# Patient Record
Sex: FEMALE | Race: WHITE | Employment: OTHER | URBAN - METROPOLITAN AREA
[De-identification: names, ages, dates, MRNs, and addresses within clinical notes are randomized per-mention and may not be internally consistent; named-entity substitution may affect disease eponyms.]

---

## 2017-02-14 RX ORDER — ERGOCALCIFEROL 1.25 MG/1
CAPSULE ORAL
Qty: 12 CAPSULE | Refills: 3 | Status: SHIPPED | OUTPATIENT
Start: 2017-02-14 | End: 2018-01-16 | Stop reason: SDUPTHER

## 2017-03-21 ENCOUNTER — HOSPITAL ENCOUNTER (OUTPATIENT)
Dept: LAB | Age: 72
Discharge: HOME | End: 2017-03-21
Payer: MEDICARE

## 2017-03-21 DIAGNOSIS — E03.1 CONGENITAL HYPOTHYROIDISM WITHOUT GOITER: ICD-10-CM

## 2017-03-21 DIAGNOSIS — R73.09 ELEVATED GLUCOSE: ICD-10-CM

## 2017-03-21 DIAGNOSIS — E78.2 MIXED HYPERLIPIDEMIA: ICD-10-CM

## 2017-03-21 LAB
ALBUMIN SERPL-MCNC: 4.3 G/DL (ref 3.9–4.9)
ALP BLD-CCNC: 67 U/L (ref 40–130)
ALT SERPL-CCNC: 14 U/L (ref 0–33)
ANION GAP SERPL CALCULATED.3IONS-SCNC: 15 MEQ/L (ref 7–13)
AST SERPL-CCNC: 22 U/L (ref 0–35)
BASOPHILS ABSOLUTE: 0.1 K/UL (ref 0–0.2)
BASOPHILS RELATIVE PERCENT: 0.7 %
BILIRUB SERPL-MCNC: 0.6 MG/DL (ref 0–1.2)
BUN BLDV-MCNC: 17 MG/DL (ref 8–23)
CALCIUM SERPL-MCNC: 9.7 MG/DL (ref 8.6–10.2)
CHLORIDE BLD-SCNC: 93 MEQ/L (ref 98–107)
CHOLESTEROL, TOTAL: 140 MG/DL (ref 0–199)
CO2: 23 MEQ/L (ref 22–29)
CREAT SERPL-MCNC: 1.14 MG/DL (ref 0.5–0.9)
EOSINOPHILS ABSOLUTE: 0.1 K/UL (ref 0–0.7)
EOSINOPHILS RELATIVE PERCENT: 0.4 %
GFR AFRICAN AMERICAN: 56.4
GFR NON-AFRICAN AMERICAN: 46.6
GLOBULIN: 2.3 G/DL (ref 2.3–3.5)
GLUCOSE BLD-MCNC: 101 MG/DL (ref 74–109)
HBA1C MFR BLD: 5.7 % (ref 4.8–5.9)
HCT VFR BLD CALC: 42.9 % (ref 37–47)
HDLC SERPL-MCNC: 72 MG/DL (ref 40–59)
HEMOGLOBIN: 14.3 G/DL (ref 12–16)
LDL CHOLESTEROL CALCULATED: 39 MG/DL (ref 0–129)
LYMPHOCYTES ABSOLUTE: 1.4 K/UL (ref 1–4.8)
LYMPHOCYTES RELATIVE PERCENT: 9.3 %
MCH RBC QN AUTO: 30 PG (ref 27–31.3)
MCHC RBC AUTO-ENTMCNC: 33.4 % (ref 33–37)
MCV RBC AUTO: 89.8 FL (ref 82–100)
MONOCYTES ABSOLUTE: 0.6 K/UL (ref 0.2–0.8)
MONOCYTES RELATIVE PERCENT: 3.8 %
NEUTROPHILS ABSOLUTE: 12.5 K/UL (ref 1.4–6.5)
NEUTROPHILS RELATIVE PERCENT: 85.8 %
PDW BLD-RTO: 14.6 % (ref 11.5–14.5)
PLATELET # BLD: 255 K/UL (ref 130–400)
POTASSIUM SERPL-SCNC: 4.2 MEQ/L (ref 3.5–5.1)
RBC # BLD: 4.78 M/UL (ref 4.2–5.4)
SODIUM BLD-SCNC: 131 MEQ/L (ref 132–144)
T4 FREE: 1.77 NG/DL (ref 0.93–1.7)
TOTAL PROTEIN: 6.6 G/DL (ref 6.4–8.1)
TRIGL SERPL-MCNC: 144 MG/DL (ref 0–200)
TSH SERPL DL<=0.05 MIU/L-ACNC: 0.26 UIU/ML (ref 0.27–4.2)
WBC # BLD: 14.6 K/UL (ref 4.8–10.8)

## 2017-03-21 PROCEDURE — 84443 ASSAY THYROID STIM HORMONE: CPT

## 2017-03-21 PROCEDURE — 83036 HEMOGLOBIN GLYCOSYLATED A1C: CPT

## 2017-03-21 PROCEDURE — 84439 ASSAY OF FREE THYROXINE: CPT

## 2017-03-21 PROCEDURE — 36415 COLL VENOUS BLD VENIPUNCTURE: CPT

## 2017-03-21 PROCEDURE — 85025 COMPLETE CBC W/AUTO DIFF WBC: CPT

## 2017-03-21 PROCEDURE — 80053 COMPREHEN METABOLIC PANEL: CPT

## 2017-03-21 PROCEDURE — 80061 LIPID PANEL: CPT

## 2017-03-23 ENCOUNTER — OFFICE VISIT (OUTPATIENT)
Dept: FAMILY MEDICINE CLINIC | Age: 72
End: 2017-03-23

## 2017-03-23 ENCOUNTER — CARE COORDINATOR VISIT (OUTPATIENT)
Dept: FAMILY MEDICINE CLINIC | Age: 72
End: 2017-03-23

## 2017-03-23 VITALS
HEART RATE: 93 BPM | TEMPERATURE: 98.2 F | BODY MASS INDEX: 26.33 KG/M2 | SYSTOLIC BLOOD PRESSURE: 118 MMHG | RESPIRATION RATE: 16 BRPM | WEIGHT: 148.6 LBS | HEIGHT: 63 IN | OXYGEN SATURATION: 93 % | DIASTOLIC BLOOD PRESSURE: 78 MMHG

## 2017-03-23 DIAGNOSIS — F32.A ANXIETY AND DEPRESSION: ICD-10-CM

## 2017-03-23 DIAGNOSIS — J44.1 CHRONIC OBSTRUCTIVE PULMONARY DISEASE WITH ACUTE EXACERBATION (HCC): ICD-10-CM

## 2017-03-23 DIAGNOSIS — I65.23 BILATERAL CAROTID ARTERY STENOSIS: ICD-10-CM

## 2017-03-23 DIAGNOSIS — E78.2 MIXED HYPERLIPIDEMIA: ICD-10-CM

## 2017-03-23 DIAGNOSIS — I25.10 CORONARY ARTERY DISEASE INVOLVING NATIVE HEART WITHOUT ANGINA PECTORIS, UNSPECIFIED VESSEL OR LESION TYPE: ICD-10-CM

## 2017-03-23 DIAGNOSIS — R94.4 DECREASED GFR: ICD-10-CM

## 2017-03-23 DIAGNOSIS — Z12.11 SCREENING FOR COLON CANCER: ICD-10-CM

## 2017-03-23 DIAGNOSIS — E03.1 CONGENITAL HYPOTHYROIDISM WITHOUT GOITER: ICD-10-CM

## 2017-03-23 DIAGNOSIS — R42 LIGHTHEADEDNESS: ICD-10-CM

## 2017-03-23 DIAGNOSIS — I10 ESSENTIAL HYPERTENSION: Primary | ICD-10-CM

## 2017-03-23 DIAGNOSIS — E55.9 VITAMIN D DEFICIENCY: ICD-10-CM

## 2017-03-23 DIAGNOSIS — F41.9 ANXIETY AND DEPRESSION: ICD-10-CM

## 2017-03-23 PROCEDURE — G8926 SPIRO NO PERF OR DOC: HCPCS | Performed by: NURSE PRACTITIONER

## 2017-03-23 PROCEDURE — 4040F PNEUMOC VAC/ADMIN/RCVD: CPT | Performed by: NURSE PRACTITIONER

## 2017-03-23 PROCEDURE — G8399 PT W/DXA RESULTS DOCUMENT: HCPCS | Performed by: NURSE PRACTITIONER

## 2017-03-23 PROCEDURE — 1123F ACP DISCUSS/DSCN MKR DOCD: CPT | Performed by: NURSE PRACTITIONER

## 2017-03-23 PROCEDURE — G8599 NO ASA/ANTIPLAT THER USE RNG: HCPCS | Performed by: NURSE PRACTITIONER

## 2017-03-23 PROCEDURE — G8484 FLU IMMUNIZE NO ADMIN: HCPCS | Performed by: NURSE PRACTITIONER

## 2017-03-23 PROCEDURE — 1036F TOBACCO NON-USER: CPT | Performed by: NURSE PRACTITIONER

## 2017-03-23 PROCEDURE — 82274 ASSAY TEST FOR BLOOD FECAL: CPT | Performed by: NURSE PRACTITIONER

## 2017-03-23 PROCEDURE — 3023F SPIROM DOC REV: CPT | Performed by: NURSE PRACTITIONER

## 2017-03-23 PROCEDURE — 3014F SCREEN MAMMO DOC REV: CPT | Performed by: NURSE PRACTITIONER

## 2017-03-23 PROCEDURE — 1090F PRES/ABSN URINE INCON ASSESS: CPT | Performed by: NURSE PRACTITIONER

## 2017-03-23 PROCEDURE — G8420 CALC BMI NORM PARAMETERS: HCPCS | Performed by: NURSE PRACTITIONER

## 2017-03-23 PROCEDURE — G8428 CUR MEDS NOT DOCUMENT: HCPCS | Performed by: NURSE PRACTITIONER

## 2017-03-23 PROCEDURE — 3017F COLORECTAL CA SCREEN DOC REV: CPT | Performed by: NURSE PRACTITIONER

## 2017-03-23 PROCEDURE — 99214 OFFICE O/P EST MOD 30 MIN: CPT | Performed by: NURSE PRACTITIONER

## 2017-03-28 ENCOUNTER — APPOINTMENT (OUTPATIENT)
Dept: GENERAL RADIOLOGY | Age: 72
DRG: 190 | End: 2017-03-28
Payer: MEDICARE

## 2017-03-28 ENCOUNTER — HOSPITAL ENCOUNTER (INPATIENT)
Age: 72
LOS: 1 days | Discharge: HOME | DRG: 190 | End: 2017-03-29
Attending: EMERGENCY MEDICINE | Admitting: INTERNAL MEDICINE
Payer: MEDICARE

## 2017-03-28 DIAGNOSIS — J44.1 COPD EXACERBATION (HCC): Primary | ICD-10-CM

## 2017-03-28 DIAGNOSIS — R09.02 HYPOXIA: ICD-10-CM

## 2017-03-28 LAB
ALBUMIN SERPL-MCNC: 3.8 G/DL (ref 3.9–4.9)
ALP BLD-CCNC: 65 U/L (ref 40–130)
ALT SERPL-CCNC: 18 U/L (ref 0–33)
ANION GAP SERPL CALCULATED.3IONS-SCNC: 13 MEQ/L (ref 7–13)
AST SERPL-CCNC: 17 U/L (ref 0–35)
BASOPHILS ABSOLUTE: 0.1 K/UL (ref 0–0.2)
BASOPHILS RELATIVE PERCENT: 0.7 %
BILIRUB SERPL-MCNC: 0.2 MG/DL (ref 0–1.2)
BUN BLDV-MCNC: 9 MG/DL (ref 8–23)
CALCIUM SERPL-MCNC: 9.5 MG/DL (ref 8.6–10.2)
CHLORIDE BLD-SCNC: 97 MEQ/L (ref 98–107)
CO2: 29 MEQ/L (ref 22–29)
CREAT SERPL-MCNC: 0.83 MG/DL (ref 0.5–0.9)
EOSINOPHILS ABSOLUTE: 0.2 K/UL (ref 0–0.7)
EOSINOPHILS RELATIVE PERCENT: 2.9 %
GFR AFRICAN AMERICAN: >60
GFR NON-AFRICAN AMERICAN: >60
GLOBULIN: 2.1 G/DL (ref 2.3–3.5)
GLUCOSE BLD-MCNC: 157 MG/DL (ref 60–115)
GLUCOSE BLD-MCNC: 157 MG/DL (ref 60–115)
GLUCOSE BLD-MCNC: 166 MG/DL (ref 60–115)
GLUCOSE BLD-MCNC: 167 MG/DL (ref 74–109)
GLUCOSE BLD-MCNC: 190 MG/DL (ref 60–115)
HBA1C MFR BLD: 5.7 % (ref 4.8–5.9)
HCT VFR BLD CALC: 40.8 % (ref 37–47)
HEMOGLOBIN: 13.8 G/DL (ref 12–16)
LYMPHOCYTES ABSOLUTE: 3 K/UL (ref 1–4.8)
LYMPHOCYTES RELATIVE PERCENT: 35 %
MCH RBC QN AUTO: 30.7 PG (ref 27–31.3)
MCHC RBC AUTO-ENTMCNC: 33.9 % (ref 33–37)
MCV RBC AUTO: 90.5 FL (ref 82–100)
MONOCYTES ABSOLUTE: 1 K/UL (ref 0.2–0.8)
MONOCYTES RELATIVE PERCENT: 11.6 %
NEUTROPHILS ABSOLUTE: 4.2 K/UL (ref 1.4–6.5)
NEUTROPHILS RELATIVE PERCENT: 49.8 %
PDW BLD-RTO: 14.3 % (ref 11.5–14.5)
PERFORMED ON: ABNORMAL
PLATELET # BLD: 225 K/UL (ref 130–400)
POTASSIUM SERPL-SCNC: 4.5 MEQ/L (ref 3.5–5.1)
RBC # BLD: 4.5 M/UL (ref 4.2–5.4)
SODIUM BLD-SCNC: 139 MEQ/L (ref 132–144)
TOTAL PROTEIN: 5.9 G/DL (ref 6.4–8.1)
WBC # BLD: 8.5 K/UL (ref 4.8–10.8)

## 2017-03-28 PROCEDURE — 96365 THER/PROPH/DIAG IV INF INIT: CPT

## 2017-03-28 PROCEDURE — 93005 ELECTROCARDIOGRAM TRACING: CPT

## 2017-03-28 PROCEDURE — 6370000000 HC RX 637 (ALT 250 FOR IP): Performed by: INTERNAL MEDICINE

## 2017-03-28 PROCEDURE — 94640 AIRWAY INHALATION TREATMENT: CPT

## 2017-03-28 PROCEDURE — 6360000002 HC RX W HCPCS: Performed by: EMERGENCY MEDICINE

## 2017-03-28 PROCEDURE — 96367 TX/PROPH/DG ADDL SEQ IV INF: CPT

## 2017-03-28 PROCEDURE — 94761 N-INVAS EAR/PLS OXIMETRY MLT: CPT

## 2017-03-28 PROCEDURE — 85025 COMPLETE CBC W/AUTO DIFF WBC: CPT

## 2017-03-28 PROCEDURE — 6370000000 HC RX 637 (ALT 250 FOR IP): Performed by: EMERGENCY MEDICINE

## 2017-03-28 PROCEDURE — 2580000003 HC RX 258: Performed by: EMERGENCY MEDICINE

## 2017-03-28 PROCEDURE — 71020 XR CHEST STANDARD TWO VW: CPT

## 2017-03-28 PROCEDURE — 2700000000 HC OXYGEN THERAPY PER DAY

## 2017-03-28 PROCEDURE — 96375 TX/PRO/DX INJ NEW DRUG ADDON: CPT

## 2017-03-28 PROCEDURE — 1210000000 HC MED SURG R&B

## 2017-03-28 PROCEDURE — 96372 THER/PROPH/DIAG INJ SC/IM: CPT

## 2017-03-28 PROCEDURE — 83036 HEMOGLOBIN GLYCOSYLATED A1C: CPT

## 2017-03-28 PROCEDURE — 2580000003 HC RX 258: Performed by: INTERNAL MEDICINE

## 2017-03-28 PROCEDURE — 96376 TX/PRO/DX INJ SAME DRUG ADON: CPT

## 2017-03-28 PROCEDURE — 6360000002 HC RX W HCPCS: Performed by: INTERNAL MEDICINE

## 2017-03-28 PROCEDURE — 99285 EMERGENCY DEPT VISIT HI MDM: CPT

## 2017-03-28 PROCEDURE — 80053 COMPREHEN METABOLIC PANEL: CPT

## 2017-03-28 RX ORDER — MORPHINE SULFATE 4 MG/ML
4 INJECTION, SOLUTION INTRAMUSCULAR; INTRAVENOUS EVERY 4 HOURS PRN
Status: DISCONTINUED | OUTPATIENT
Start: 2017-03-28 | End: 2017-03-29 | Stop reason: HOSPADM

## 2017-03-28 RX ORDER — GUAIFENESIN 100 MG/5ML
10 SOLUTION ORAL EVERY 4 HOURS PRN
Status: DISCONTINUED | OUTPATIENT
Start: 2017-03-28 | End: 2017-03-29 | Stop reason: HOSPADM

## 2017-03-28 RX ORDER — LISINOPRIL 10 MG/1
10 TABLET ORAL 2 TIMES DAILY
Status: DISCONTINUED | OUTPATIENT
Start: 2017-03-28 | End: 2017-03-29 | Stop reason: HOSPADM

## 2017-03-28 RX ORDER — SODIUM CHLORIDE 0.9 % (FLUSH) 0.9 %
10 SYRINGE (ML) INJECTION PRN
Status: DISCONTINUED | OUTPATIENT
Start: 2017-03-28 | End: 2017-03-29 | Stop reason: HOSPADM

## 2017-03-28 RX ORDER — ALPRAZOLAM 0.25 MG/1
0.25 TABLET ORAL 3 TIMES DAILY PRN
Status: DISCONTINUED | OUTPATIENT
Start: 2017-03-28 | End: 2017-03-29 | Stop reason: HOSPADM

## 2017-03-28 RX ORDER — METHYLPREDNISOLONE SODIUM SUCCINATE 125 MG/2ML
125 INJECTION, POWDER, LYOPHILIZED, FOR SOLUTION INTRAMUSCULAR; INTRAVENOUS ONCE
Status: COMPLETED | OUTPATIENT
Start: 2017-03-28 | End: 2017-03-28

## 2017-03-28 RX ORDER — SODIUM CHLORIDE 0.9 % (FLUSH) 0.9 %
10 SYRINGE (ML) INJECTION EVERY 12 HOURS SCHEDULED
Status: DISCONTINUED | OUTPATIENT
Start: 2017-03-28 | End: 2017-03-29 | Stop reason: HOSPADM

## 2017-03-28 RX ORDER — SIMVASTATIN 20 MG
10 TABLET ORAL NIGHTLY
Status: DISCONTINUED | OUTPATIENT
Start: 2017-03-28 | End: 2017-03-29 | Stop reason: HOSPADM

## 2017-03-28 RX ORDER — NITROGLYCERIN 0.4 MG/1
0.4 TABLET SUBLINGUAL EVERY 5 MIN PRN
Status: DISCONTINUED | OUTPATIENT
Start: 2017-03-28 | End: 2017-03-29 | Stop reason: HOSPADM

## 2017-03-28 RX ORDER — PANTOPRAZOLE SODIUM 40 MG/1
40 TABLET, DELAYED RELEASE ORAL
Status: DISCONTINUED | OUTPATIENT
Start: 2017-03-28 | End: 2017-03-29 | Stop reason: HOSPADM

## 2017-03-28 RX ORDER — GUAIFENESIN 600 MG/1
600 TABLET, EXTENDED RELEASE ORAL 2 TIMES DAILY
Status: DISCONTINUED | OUTPATIENT
Start: 2017-03-28 | End: 2017-03-29 | Stop reason: HOSPADM

## 2017-03-28 RX ORDER — NICOTINE POLACRILEX 4 MG
15 LOZENGE BUCCAL PRN
Status: DISCONTINUED | OUTPATIENT
Start: 2017-03-28 | End: 2017-03-29 | Stop reason: HOSPADM

## 2017-03-28 RX ORDER — OXYCODONE HYDROCHLORIDE AND ACETAMINOPHEN 5; 325 MG/1; MG/1
2 TABLET ORAL EVERY 8 HOURS PRN
Status: DISCONTINUED | OUTPATIENT
Start: 2017-03-28 | End: 2017-03-29 | Stop reason: HOSPADM

## 2017-03-28 RX ORDER — HYDRALAZINE HYDROCHLORIDE 20 MG/ML
10 INJECTION INTRAMUSCULAR; INTRAVENOUS EVERY 4 HOURS PRN
Status: DISCONTINUED | OUTPATIENT
Start: 2017-03-28 | End: 2017-03-29 | Stop reason: HOSPADM

## 2017-03-28 RX ORDER — TRAMADOL HYDROCHLORIDE 50 MG/1
50 TABLET ORAL EVERY 6 HOURS PRN
Status: DISCONTINUED | OUTPATIENT
Start: 2017-03-28 | End: 2017-03-29 | Stop reason: HOSPADM

## 2017-03-28 RX ORDER — METHYLPREDNISOLONE SODIUM SUCCINATE 40 MG/ML
40 INJECTION, POWDER, LYOPHILIZED, FOR SOLUTION INTRAMUSCULAR; INTRAVENOUS EVERY 8 HOURS
Status: DISCONTINUED | OUTPATIENT
Start: 2017-03-28 | End: 2017-03-29

## 2017-03-28 RX ORDER — DULOXETIN HYDROCHLORIDE 30 MG/1
60 CAPSULE, DELAYED RELEASE ORAL DAILY
Status: DISCONTINUED | OUTPATIENT
Start: 2017-03-28 | End: 2017-03-29 | Stop reason: HOSPADM

## 2017-03-28 RX ORDER — FAMOTIDINE 20 MG/1
20 TABLET, FILM COATED ORAL DAILY
Status: DISCONTINUED | OUTPATIENT
Start: 2017-03-28 | End: 2017-03-29 | Stop reason: HOSPADM

## 2017-03-28 RX ORDER — ACETAMINOPHEN 325 MG/1
650 TABLET ORAL EVERY 4 HOURS PRN
Status: DISCONTINUED | OUTPATIENT
Start: 2017-03-28 | End: 2017-03-29 | Stop reason: HOSPADM

## 2017-03-28 RX ORDER — CLONIDINE HYDROCHLORIDE 0.1 MG/1
0.1 TABLET ORAL EVERY 4 HOURS PRN
Status: DISCONTINUED | OUTPATIENT
Start: 2017-03-28 | End: 2017-03-29 | Stop reason: HOSPADM

## 2017-03-28 RX ORDER — MORPHINE SULFATE 2 MG/ML
2 INJECTION, SOLUTION INTRAMUSCULAR; INTRAVENOUS
Status: DISCONTINUED | OUTPATIENT
Start: 2017-03-28 | End: 2017-03-29 | Stop reason: HOSPADM

## 2017-03-28 RX ORDER — IPRATROPIUM BROMIDE AND ALBUTEROL SULFATE 2.5; .5 MG/3ML; MG/3ML
1 SOLUTION RESPIRATORY (INHALATION) EVERY 6 HOURS
Status: DISCONTINUED | OUTPATIENT
Start: 2017-03-28 | End: 2017-03-29 | Stop reason: HOSPADM

## 2017-03-28 RX ORDER — POLYETHYLENE GLYCOL 3350 17 G/17G
17 POWDER, FOR SOLUTION ORAL DAILY PRN
Status: DISCONTINUED | OUTPATIENT
Start: 2017-03-28 | End: 2017-03-29 | Stop reason: HOSPADM

## 2017-03-28 RX ORDER — DEXTROSE MONOHYDRATE 25 G/50ML
12.5 INJECTION, SOLUTION INTRAVENOUS PRN
Status: DISCONTINUED | OUTPATIENT
Start: 2017-03-28 | End: 2017-03-29 | Stop reason: HOSPADM

## 2017-03-28 RX ORDER — OXYCODONE HYDROCHLORIDE AND ACETAMINOPHEN 5; 325 MG/1; MG/1
1 TABLET ORAL EVERY 6 HOURS PRN
Status: DISCONTINUED | OUTPATIENT
Start: 2017-03-28 | End: 2017-03-29 | Stop reason: HOSPADM

## 2017-03-28 RX ORDER — ONDANSETRON 2 MG/ML
4 INJECTION INTRAMUSCULAR; INTRAVENOUS EVERY 6 HOURS PRN
Status: DISCONTINUED | OUTPATIENT
Start: 2017-03-28 | End: 2017-03-29 | Stop reason: HOSPADM

## 2017-03-28 RX ORDER — TRAZODONE HYDROCHLORIDE 50 MG/1
50 TABLET ORAL NIGHTLY
Status: DISCONTINUED | OUTPATIENT
Start: 2017-03-28 | End: 2017-03-29 | Stop reason: HOSPADM

## 2017-03-28 RX ORDER — LEVOTHYROXINE SODIUM 0.07 MG/1
75 TABLET ORAL DAILY
Status: DISCONTINUED | OUTPATIENT
Start: 2017-03-28 | End: 2017-03-29 | Stop reason: HOSPADM

## 2017-03-28 RX ORDER — IPRATROPIUM BROMIDE AND ALBUTEROL SULFATE 2.5; .5 MG/3ML; MG/3ML
1 SOLUTION RESPIRATORY (INHALATION) ONCE
Status: COMPLETED | OUTPATIENT
Start: 2017-03-28 | End: 2017-03-28

## 2017-03-28 RX ORDER — ALBUTEROL SULFATE 2.5 MG/3ML
2.5 SOLUTION RESPIRATORY (INHALATION)
Status: DISCONTINUED | OUTPATIENT
Start: 2017-03-28 | End: 2017-03-29 | Stop reason: HOSPADM

## 2017-03-28 RX ORDER — DEXTROSE MONOHYDRATE 50 MG/ML
100 INJECTION, SOLUTION INTRAVENOUS PRN
Status: DISCONTINUED | OUTPATIENT
Start: 2017-03-28 | End: 2017-03-29 | Stop reason: HOSPADM

## 2017-03-28 RX ADMIN — INSULIN LISPRO 3 UNITS: 100 INJECTION, SOLUTION INTRAVENOUS; SUBCUTANEOUS at 08:33

## 2017-03-28 RX ADMIN — TRAZODONE HYDROCHLORIDE 50 MG: 50 TABLET ORAL at 20:30

## 2017-03-28 RX ADMIN — IPRATROPIUM BROMIDE AND ALBUTEROL SULFATE 1 AMPULE: .5; 3 SOLUTION RESPIRATORY (INHALATION) at 03:44

## 2017-03-28 RX ADMIN — INSULIN LISPRO 1 UNITS: 100 INJECTION, SOLUTION INTRAVENOUS; SUBCUTANEOUS at 12:32

## 2017-03-28 RX ADMIN — TRAMADOL HYDROCHLORIDE 50 MG: 50 TABLET, COATED ORAL at 18:29

## 2017-03-28 RX ADMIN — THEOPHYLLINE ANHYDROUS 400 MG: 200 CAPSULE, EXTENDED RELEASE ORAL at 08:00

## 2017-03-28 RX ADMIN — INSULIN LISPRO 0 UNITS: 100 INJECTION, SOLUTION INTRAVENOUS; SUBCUTANEOUS at 08:01

## 2017-03-28 RX ADMIN — Medication 10 ML: at 20:32

## 2017-03-28 RX ADMIN — IPRATROPIUM BROMIDE AND ALBUTEROL SULFATE 1 AMPULE: .5; 3 SOLUTION RESPIRATORY (INHALATION) at 03:36

## 2017-03-28 RX ADMIN — LEVOTHYROXINE SODIUM 75 MCG: 75 TABLET ORAL at 05:02

## 2017-03-28 RX ADMIN — IPRATROPIUM BROMIDE AND ALBUTEROL SULFATE 1 AMPULE: .5; 3 SOLUTION RESPIRATORY (INHALATION) at 09:42

## 2017-03-28 RX ADMIN — METHYLPREDNISOLONE SODIUM SUCCINATE 40 MG: 40 INJECTION, POWDER, FOR SOLUTION INTRAMUSCULAR; INTRAVENOUS at 12:34

## 2017-03-28 RX ADMIN — METOPROLOL TARTRATE 12.5 MG: 25 TABLET ORAL at 20:30

## 2017-03-28 RX ADMIN — GUAIFENESIN 600 MG: 600 TABLET, EXTENDED RELEASE ORAL at 12:41

## 2017-03-28 RX ADMIN — LISINOPRIL 10 MG: 10 TABLET ORAL at 08:00

## 2017-03-28 RX ADMIN — INSULIN LISPRO 3 UNITS: 100 INJECTION, SOLUTION INTRAVENOUS; SUBCUTANEOUS at 12:31

## 2017-03-28 RX ADMIN — IPRATROPIUM BROMIDE AND ALBUTEROL SULFATE 1 AMPULE: .5; 3 SOLUTION RESPIRATORY (INHALATION) at 21:33

## 2017-03-28 RX ADMIN — METOPROLOL TARTRATE 12.5 MG: 25 TABLET ORAL at 08:00

## 2017-03-28 RX ADMIN — SIMVASTATIN 10 MG: 20 TABLET, FILM COATED ORAL at 20:30

## 2017-03-28 RX ADMIN — LISINOPRIL 10 MG: 10 TABLET ORAL at 20:30

## 2017-03-28 RX ADMIN — FAMOTIDINE 20 MG: 20 TABLET, FILM COATED ORAL at 08:01

## 2017-03-28 RX ADMIN — INSULIN LISPRO 3 UNITS: 100 INJECTION, SOLUTION INTRAVENOUS; SUBCUTANEOUS at 18:24

## 2017-03-28 RX ADMIN — AZITHROMYCIN MONOHYDRATE 500 MG: 500 INJECTION, POWDER, LYOPHILIZED, FOR SOLUTION INTRAVENOUS at 04:10

## 2017-03-28 RX ADMIN — INSULIN LISPRO 1 UNITS: 100 INJECTION, SOLUTION INTRAVENOUS; SUBCUTANEOUS at 18:29

## 2017-03-28 RX ADMIN — IPRATROPIUM BROMIDE AND ALBUTEROL SULFATE 1 AMPULE: .5; 3 SOLUTION RESPIRATORY (INHALATION) at 15:40

## 2017-03-28 RX ADMIN — CEFTRIAXONE 1 G: 1 INJECTION, POWDER, FOR SOLUTION INTRAMUSCULAR; INTRAVENOUS at 03:42

## 2017-03-28 RX ADMIN — DULOXETINE HYDROCHLORIDE 60 MG: 30 CAPSULE, DELAYED RELEASE ORAL at 07:59

## 2017-03-28 RX ADMIN — METHYLPREDNISOLONE SODIUM SUCCINATE 125 MG: 125 INJECTION, POWDER, FOR SOLUTION INTRAMUSCULAR; INTRAVENOUS at 03:32

## 2017-03-28 RX ADMIN — GUAIFENESIN 600 MG: 600 TABLET, EXTENDED RELEASE ORAL at 20:30

## 2017-03-28 RX ADMIN — PANTOPRAZOLE SODIUM 40 MG: 40 TABLET, DELAYED RELEASE ORAL at 05:02

## 2017-03-28 RX ADMIN — Medication 10 ML: at 08:39

## 2017-03-28 RX ADMIN — ENOXAPARIN SODIUM 40 MG: 40 INJECTION SUBCUTANEOUS at 07:59

## 2017-03-28 RX ADMIN — INSULIN LISPRO 1 UNITS: 100 INJECTION, SOLUTION INTRAVENOUS; SUBCUTANEOUS at 20:55

## 2017-03-28 ASSESSMENT — ENCOUNTER SYMPTOMS
EYE PAIN: 0
BACK PAIN: 0
CHEST TIGHTNESS: 1
WHEEZING: 0
ABDOMINAL PAIN: 0
COUGH: 1
DIARRHEA: 0
VOMITING: 0
SHORTNESS OF BREATH: 1
BLOOD IN STOOL: 0
TROUBLE SWALLOWING: 0
NAUSEA: 0
RHINORRHEA: 0

## 2017-03-28 ASSESSMENT — PAIN SCALES - GENERAL: PAINLEVEL_OUTOF10: 6

## 2017-03-29 VITALS
DIASTOLIC BLOOD PRESSURE: 54 MMHG | RESPIRATION RATE: 20 BRPM | BODY MASS INDEX: 27.23 KG/M2 | HEIGHT: 62 IN | TEMPERATURE: 97.7 F | WEIGHT: 148 LBS | SYSTOLIC BLOOD PRESSURE: 122 MMHG | OXYGEN SATURATION: 95 % | HEART RATE: 112 BPM

## 2017-03-29 LAB
ANION GAP SERPL CALCULATED.3IONS-SCNC: 18 MEQ/L (ref 7–13)
BUN BLDV-MCNC: 17 MG/DL (ref 8–23)
CALCIUM SERPL-MCNC: 9.8 MG/DL (ref 8.6–10.2)
CHLORIDE BLD-SCNC: 91 MEQ/L (ref 98–107)
CO2: 25 MEQ/L (ref 22–29)
CREAT SERPL-MCNC: 0.83 MG/DL (ref 0.5–0.9)
GFR AFRICAN AMERICAN: >60
GFR NON-AFRICAN AMERICAN: >60
GLUCOSE BLD-MCNC: 148 MG/DL (ref 60–115)
GLUCOSE BLD-MCNC: 170 MG/DL (ref 74–109)
HCT VFR BLD CALC: 40.5 % (ref 37–47)
HEMOGLOBIN: 13.5 G/DL (ref 12–16)
MCH RBC QN AUTO: 30.3 PG (ref 27–31.3)
MCHC RBC AUTO-ENTMCNC: 33.2 % (ref 33–37)
MCV RBC AUTO: 91.2 FL (ref 82–100)
PDW BLD-RTO: 14.8 % (ref 11.5–14.5)
PERFORMED ON: ABNORMAL
PLATELET # BLD: 283 K/UL (ref 130–400)
POTASSIUM SERPL-SCNC: 5 MEQ/L (ref 3.5–5.1)
RBC # BLD: 4.45 M/UL (ref 4.2–5.4)
SODIUM BLD-SCNC: 134 MEQ/L (ref 132–144)
WBC # BLD: 21.2 K/UL (ref 4.8–10.8)

## 2017-03-29 PROCEDURE — 80048 BASIC METABOLIC PNL TOTAL CA: CPT

## 2017-03-29 PROCEDURE — 6360000002 HC RX W HCPCS: Performed by: INTERNAL MEDICINE

## 2017-03-29 PROCEDURE — 96372 THER/PROPH/DIAG INJ SC/IM: CPT

## 2017-03-29 PROCEDURE — 85027 COMPLETE CBC AUTOMATED: CPT

## 2017-03-29 PROCEDURE — 94640 AIRWAY INHALATION TREATMENT: CPT

## 2017-03-29 PROCEDURE — 96376 TX/PRO/DX INJ SAME DRUG ADON: CPT

## 2017-03-29 PROCEDURE — 36415 COLL VENOUS BLD VENIPUNCTURE: CPT

## 2017-03-29 PROCEDURE — 2580000003 HC RX 258: Performed by: INTERNAL MEDICINE

## 2017-03-29 PROCEDURE — 6370000000 HC RX 637 (ALT 250 FOR IP): Performed by: INTERNAL MEDICINE

## 2017-03-29 PROCEDURE — 96368 THER/DIAG CONCURRENT INF: CPT

## 2017-03-29 PROCEDURE — 96366 THER/PROPH/DIAG IV INF ADDON: CPT

## 2017-03-29 RX ORDER — METHYLPREDNISOLONE 4 MG/1
4 TABLET ORAL
Status: DISCONTINUED | OUTPATIENT
Start: 2017-03-30 | End: 2017-03-29 | Stop reason: HOSPADM

## 2017-03-29 RX ORDER — METHYLPREDNISOLONE 4 MG/1
TABLET ORAL
Qty: 1 KIT | Refills: 0 | Status: SHIPPED | OUTPATIENT
Start: 2017-03-29 | End: 2017-04-04

## 2017-03-29 RX ORDER — METHYLPREDNISOLONE 4 MG/1
4 TABLET ORAL NIGHTLY
Status: DISCONTINUED | OUTPATIENT
Start: 2017-03-31 | End: 2017-03-29 | Stop reason: HOSPADM

## 2017-03-29 RX ORDER — AZITHROMYCIN 250 MG/1
250 TABLET, FILM COATED ORAL DAILY
Qty: 5 TABLET | Refills: 0 | Status: SHIPPED | OUTPATIENT
Start: 2017-03-29 | End: 2017-04-03

## 2017-03-29 RX ORDER — METHYLPREDNISOLONE 4 MG/1
8 TABLET ORAL NIGHTLY
Status: DISCONTINUED | OUTPATIENT
Start: 2017-03-30 | End: 2017-03-29 | Stop reason: HOSPADM

## 2017-03-29 RX ORDER — METHYLPREDNISOLONE 4 MG/1
4 TABLET ORAL SEE ADMIN INSTRUCTIONS
Status: DISCONTINUED | OUTPATIENT
Start: 2017-03-29 | End: 2017-03-29

## 2017-03-29 RX ORDER — METHYLPREDNISOLONE 4 MG/1
24 TABLET ORAL ONCE
Status: COMPLETED | OUTPATIENT
Start: 2017-03-29 | End: 2017-03-29

## 2017-03-29 RX ORDER — GUAIFENESIN 600 MG/1
600 TABLET, EXTENDED RELEASE ORAL 2 TIMES DAILY
Qty: 20 TABLET | Refills: 0 | Status: SHIPPED | OUTPATIENT
Start: 2017-03-29 | End: 2017-06-23 | Stop reason: CLARIF

## 2017-03-29 RX ADMIN — ACETAMINOPHEN 650 MG: 325 TABLET ORAL at 10:05

## 2017-03-29 RX ADMIN — IPRATROPIUM BROMIDE AND ALBUTEROL SULFATE 1 AMPULE: .5; 3 SOLUTION RESPIRATORY (INHALATION) at 06:07

## 2017-03-29 RX ADMIN — Medication 10 ML: at 08:55

## 2017-03-29 RX ADMIN — LEVOTHYROXINE SODIUM 75 MCG: 75 TABLET ORAL at 06:11

## 2017-03-29 RX ADMIN — INSULIN LISPRO 3 UNITS: 100 INJECTION, SOLUTION INTRAVENOUS; SUBCUTANEOUS at 08:55

## 2017-03-29 RX ADMIN — ENOXAPARIN SODIUM 40 MG: 40 INJECTION SUBCUTANEOUS at 08:51

## 2017-03-29 RX ADMIN — TRAMADOL HYDROCHLORIDE 50 MG: 50 TABLET, COATED ORAL at 08:54

## 2017-03-29 RX ADMIN — CEFTRIAXONE 1 G: 1 INJECTION, POWDER, FOR SOLUTION INTRAMUSCULAR; INTRAVENOUS at 03:33

## 2017-03-29 RX ADMIN — GUAIFENESIN 600 MG: 600 TABLET, EXTENDED RELEASE ORAL at 08:51

## 2017-03-29 RX ADMIN — AZITHROMYCIN MONOHYDRATE 500 MG: 500 INJECTION, POWDER, LYOPHILIZED, FOR SOLUTION INTRAVENOUS at 03:58

## 2017-03-29 RX ADMIN — THEOPHYLLINE ANHYDROUS 400 MG: 200 CAPSULE, EXTENDED RELEASE ORAL at 08:51

## 2017-03-29 RX ADMIN — TRAMADOL HYDROCHLORIDE 50 MG: 50 TABLET, COATED ORAL at 01:18

## 2017-03-29 RX ADMIN — INSULIN LISPRO 1 UNITS: 100 INJECTION, SOLUTION INTRAVENOUS; SUBCUTANEOUS at 08:54

## 2017-03-29 RX ADMIN — METOPROLOL TARTRATE 12.5 MG: 25 TABLET ORAL at 08:05

## 2017-03-29 RX ADMIN — PANTOPRAZOLE SODIUM 40 MG: 40 TABLET, DELAYED RELEASE ORAL at 06:11

## 2017-03-29 RX ADMIN — DULOXETINE HYDROCHLORIDE 60 MG: 30 CAPSULE, DELAYED RELEASE ORAL at 08:51

## 2017-03-29 RX ADMIN — METHYLPREDNISOLONE SODIUM SUCCINATE 40 MG: 40 INJECTION, POWDER, FOR SOLUTION INTRAMUSCULAR; INTRAVENOUS at 03:34

## 2017-03-29 RX ADMIN — METHYLPREDNISOLONE 24 MG: 4 TABLET ORAL at 10:36

## 2017-03-29 RX ADMIN — FAMOTIDINE 20 MG: 20 TABLET, FILM COATED ORAL at 08:51

## 2017-03-29 RX ADMIN — LISINOPRIL 10 MG: 10 TABLET ORAL at 08:51

## 2017-03-29 ASSESSMENT — PAIN SCALES - GENERAL
PAINLEVEL_OUTOF10: 5
PAINLEVEL_OUTOF10: 5
PAINLEVEL_OUTOF10: 6

## 2017-03-30 ENCOUNTER — CARE COORDINATION (OUTPATIENT)
Dept: FAMILY MEDICINE CLINIC | Age: 72
End: 2017-03-30

## 2017-04-04 LAB
EKG ATRIAL RATE: 113 BPM
EKG P AXIS: 78 DEGREES
EKG P-R INTERVAL: 136 MS
EKG Q-T INTERVAL: 354 MS
EKG QRS DURATION: 124 MS
EKG QTC CALCULATION (BAZETT): 485 MS
EKG R AXIS: 94 DEGREES
EKG T AXIS: 60 DEGREES
EKG VENTRICULAR RATE: 113 BPM

## 2017-05-29 DIAGNOSIS — I10 ESSENTIAL HYPERTENSION: ICD-10-CM

## 2017-05-30 RX ORDER — BISOPROLOL FUMARATE AND HYDROCHLOROTHIAZIDE 10; 6.25 MG/1; MG/1
TABLET ORAL
Qty: 90 TABLET | Refills: 0 | Status: SHIPPED | OUTPATIENT
Start: 2017-05-30 | End: 2017-06-23

## 2017-06-21 ENCOUNTER — HOSPITAL ENCOUNTER (OUTPATIENT)
Dept: LAB | Age: 72
Discharge: HOME | End: 2017-06-21
Payer: MEDICARE

## 2017-06-21 DIAGNOSIS — I10 ESSENTIAL HYPERTENSION: ICD-10-CM

## 2017-06-21 DIAGNOSIS — E78.2 MIXED HYPERLIPIDEMIA: ICD-10-CM

## 2017-06-21 DIAGNOSIS — E55.9 VITAMIN D DEFICIENCY: ICD-10-CM

## 2017-06-21 LAB
ALBUMIN SERPL-MCNC: 4 G/DL (ref 3.9–4.9)
ALP BLD-CCNC: 57 U/L (ref 40–130)
ALT SERPL-CCNC: 11 U/L (ref 0–33)
ANION GAP SERPL CALCULATED.3IONS-SCNC: 16 MEQ/L (ref 7–13)
AST SERPL-CCNC: 16 U/L (ref 0–35)
BASOPHILS ABSOLUTE: 0.1 K/UL (ref 0–0.2)
BASOPHILS RELATIVE PERCENT: 1.1 %
BILIRUB SERPL-MCNC: 0.4 MG/DL (ref 0–1.2)
BUN BLDV-MCNC: 10 MG/DL (ref 8–23)
CALCIUM SERPL-MCNC: 9.5 MG/DL (ref 8.6–10.2)
CHLORIDE BLD-SCNC: 94 MEQ/L (ref 98–107)
CHOLESTEROL, TOTAL: 134 MG/DL (ref 0–199)
CO2: 26 MEQ/L (ref 22–29)
CREAT SERPL-MCNC: 1.09 MG/DL (ref 0.5–0.9)
EOSINOPHILS ABSOLUTE: 0.2 K/UL (ref 0–0.7)
EOSINOPHILS RELATIVE PERCENT: 2.8 %
GFR AFRICAN AMERICAN: 59.3
GFR NON-AFRICAN AMERICAN: 49
GLOBULIN: 2.4 G/DL (ref 2.3–3.5)
GLUCOSE BLD-MCNC: 96 MG/DL (ref 74–109)
HCT VFR BLD CALC: 43.9 % (ref 37–47)
HDLC SERPL-MCNC: 57 MG/DL (ref 40–59)
HEMOGLOBIN: 14.3 G/DL (ref 12–16)
LDL CHOLESTEROL CALCULATED: 47 MG/DL (ref 0–129)
LYMPHOCYTES ABSOLUTE: 2.5 K/UL (ref 1–4.8)
LYMPHOCYTES RELATIVE PERCENT: 29.7 %
MCH RBC QN AUTO: 29.9 PG (ref 27–31.3)
MCHC RBC AUTO-ENTMCNC: 32.7 % (ref 33–37)
MCV RBC AUTO: 91.5 FL (ref 82–100)
MONOCYTES ABSOLUTE: 0.9 K/UL (ref 0.2–0.8)
MONOCYTES RELATIVE PERCENT: 11.1 %
NEUTROPHILS ABSOLUTE: 4.6 K/UL (ref 1.4–6.5)
NEUTROPHILS RELATIVE PERCENT: 55.3 %
PDW BLD-RTO: 13.7 % (ref 11.5–14.5)
PLATELET # BLD: 276 K/UL (ref 130–400)
POTASSIUM SERPL-SCNC: 2.9 MEQ/L (ref 3.5–5.1)
RBC # BLD: 4.8 M/UL (ref 4.2–5.4)
SODIUM BLD-SCNC: 136 MEQ/L (ref 132–144)
T4 FREE: 1.4 NG/DL (ref 0.93–1.7)
TOTAL PROTEIN: 6.4 G/DL (ref 6.4–8.1)
TRIGL SERPL-MCNC: 151 MG/DL (ref 0–200)
TSH SERPL DL<=0.05 MIU/L-ACNC: 0.37 UIU/ML (ref 0.27–4.2)
VITAMIN D 25-HYDROXY: 47.2 NG/ML (ref 30–100)
WBC # BLD: 8.3 K/UL (ref 4.8–10.8)

## 2017-06-21 PROCEDURE — 36415 COLL VENOUS BLD VENIPUNCTURE: CPT

## 2017-06-21 PROCEDURE — 84439 ASSAY OF FREE THYROXINE: CPT

## 2017-06-21 PROCEDURE — 84443 ASSAY THYROID STIM HORMONE: CPT

## 2017-06-21 PROCEDURE — 80053 COMPREHEN METABOLIC PANEL: CPT

## 2017-06-21 PROCEDURE — 85025 COMPLETE CBC W/AUTO DIFF WBC: CPT

## 2017-06-21 PROCEDURE — 80061 LIPID PANEL: CPT

## 2017-06-21 PROCEDURE — 82306 VITAMIN D 25 HYDROXY: CPT

## 2017-06-22 ENCOUNTER — TELEPHONE (OUTPATIENT)
Dept: INTERNAL MEDICINE | Age: 72
End: 2017-06-22

## 2017-06-22 DIAGNOSIS — E87.6 LOW SERUM POTASSIUM LEVEL: Primary | ICD-10-CM

## 2017-06-22 RX ORDER — POTASSIUM CHLORIDE 20 MEQ/1
40 TABLET, EXTENDED RELEASE ORAL DAILY
Qty: 4 TABLET | Refills: 0
Start: 2017-06-22 | End: 2017-06-23 | Stop reason: SDUPTHER

## 2017-06-23 ENCOUNTER — OFFICE VISIT (OUTPATIENT)
Dept: FAMILY MEDICINE CLINIC | Age: 72
End: 2017-06-23

## 2017-06-23 ENCOUNTER — HOSPITAL ENCOUNTER (OUTPATIENT)
Age: 72
Setting detail: SPECIMEN
Discharge: HOME | End: 2017-06-23
Payer: MEDICARE

## 2017-06-23 VITALS
RESPIRATION RATE: 12 BRPM | BODY MASS INDEX: 27.23 KG/M2 | HEIGHT: 62 IN | SYSTOLIC BLOOD PRESSURE: 138 MMHG | TEMPERATURE: 97.9 F | WEIGHT: 148 LBS | OXYGEN SATURATION: 93 % | DIASTOLIC BLOOD PRESSURE: 80 MMHG | HEART RATE: 121 BPM

## 2017-06-23 DIAGNOSIS — E78.2 MIXED HYPERLIPIDEMIA: ICD-10-CM

## 2017-06-23 DIAGNOSIS — E87.6 LOW SERUM POTASSIUM LEVEL: ICD-10-CM

## 2017-06-23 DIAGNOSIS — M15.9 OSTEOARTHRITIS OF MULTIPLE JOINTS, UNSPECIFIED OSTEOARTHRITIS TYPE: ICD-10-CM

## 2017-06-23 DIAGNOSIS — I10 ESSENTIAL HYPERTENSION: Primary | ICD-10-CM

## 2017-06-23 DIAGNOSIS — E55.9 VITAMIN D DEFICIENCY: ICD-10-CM

## 2017-06-23 DIAGNOSIS — E87.6 HYPOKALEMIA: ICD-10-CM

## 2017-06-23 DIAGNOSIS — J44.1 CHRONIC OBSTRUCTIVE PULMONARY DISEASE WITH ACUTE EXACERBATION (HCC): ICD-10-CM

## 2017-06-23 DIAGNOSIS — F41.9 ANXIETY: ICD-10-CM

## 2017-06-23 DIAGNOSIS — Z79.899 HIGH RISK MEDICATION USE: ICD-10-CM

## 2017-06-23 DIAGNOSIS — E03.1 CONGENITAL HYPOTHYROIDISM WITHOUT GOITER: ICD-10-CM

## 2017-06-23 PROCEDURE — G8419 CALC BMI OUT NRM PARAM NOF/U: HCPCS | Performed by: NURSE PRACTITIONER

## 2017-06-23 PROCEDURE — 3014F SCREEN MAMMO DOC REV: CPT | Performed by: NURSE PRACTITIONER

## 2017-06-23 PROCEDURE — 3023F SPIROM DOC REV: CPT | Performed by: NURSE PRACTITIONER

## 2017-06-23 PROCEDURE — G8399 PT W/DXA RESULTS DOCUMENT: HCPCS | Performed by: NURSE PRACTITIONER

## 2017-06-23 PROCEDURE — 3017F COLORECTAL CA SCREEN DOC REV: CPT | Performed by: NURSE PRACTITIONER

## 2017-06-23 PROCEDURE — G8427 DOCREV CUR MEDS BY ELIG CLIN: HCPCS | Performed by: NURSE PRACTITIONER

## 2017-06-23 PROCEDURE — 99214 OFFICE O/P EST MOD 30 MIN: CPT | Performed by: NURSE PRACTITIONER

## 2017-06-23 PROCEDURE — 1123F ACP DISCUSS/DSCN MKR DOCD: CPT | Performed by: NURSE PRACTITIONER

## 2017-06-23 PROCEDURE — 1090F PRES/ABSN URINE INCON ASSESS: CPT | Performed by: NURSE PRACTITIONER

## 2017-06-23 PROCEDURE — 4040F PNEUMOC VAC/ADMIN/RCVD: CPT | Performed by: NURSE PRACTITIONER

## 2017-06-23 PROCEDURE — G8926 SPIRO NO PERF OR DOC: HCPCS | Performed by: NURSE PRACTITIONER

## 2017-06-23 PROCEDURE — 1036F TOBACCO NON-USER: CPT | Performed by: NURSE PRACTITIONER

## 2017-06-23 PROCEDURE — G8598 ASA/ANTIPLAT THER USED: HCPCS | Performed by: NURSE PRACTITIONER

## 2017-06-23 PROCEDURE — 80307 DRUG TEST PRSMV CHEM ANLYZR: CPT

## 2017-06-23 RX ORDER — TRAMADOL HYDROCHLORIDE 50 MG/1
TABLET ORAL
Qty: 30 TABLET | Refills: 0 | Status: SHIPPED | OUTPATIENT
Start: 2017-06-23 | End: 2017-09-26 | Stop reason: SDUPTHER

## 2017-06-23 RX ORDER — POTASSIUM CHLORIDE 20 MEQ/1
20 TABLET, EXTENDED RELEASE ORAL DAILY
Qty: 30 TABLET | Refills: 0 | Status: SHIPPED | OUTPATIENT
Start: 2017-06-23 | End: 2017-09-26 | Stop reason: ALTCHOICE

## 2017-06-23 RX ORDER — BISOPROLOL FUMARATE 10 MG/1
10 TABLET ORAL DAILY
Qty: 90 TABLET | Refills: 1 | Status: SHIPPED | OUTPATIENT
Start: 2017-06-23 | End: 2017-06-26 | Stop reason: SDUPTHER

## 2017-06-23 RX ORDER — TRAMADOL HYDROCHLORIDE 50 MG/1
TABLET ORAL
Qty: 180 TABLET | Refills: 0 | Status: SHIPPED | OUTPATIENT
Start: 2017-06-23 | End: 2017-06-23 | Stop reason: SDUPTHER

## 2017-06-23 RX ORDER — ALPRAZOLAM 0.25 MG/1
0.25 TABLET ORAL 3 TIMES DAILY PRN
Qty: 90 TABLET | Refills: 0 | Status: SHIPPED | OUTPATIENT
Start: 2017-06-23 | End: 2017-10-26 | Stop reason: SDUPTHER

## 2017-06-23 ASSESSMENT — PATIENT HEALTH QUESTIONNAIRE - PHQ9
1. LITTLE INTEREST OR PLEASURE IN DOING THINGS: 0
2. FEELING DOWN, DEPRESSED OR HOPELESS: 0
SUM OF ALL RESPONSES TO PHQ QUESTIONS 1-9: 0
SUM OF ALL RESPONSES TO PHQ9 QUESTIONS 1 & 2: 0

## 2017-06-24 ENCOUNTER — HOSPITAL ENCOUNTER (OUTPATIENT)
Dept: LAB | Age: 72
Discharge: HOME | End: 2017-06-24
Payer: MEDICARE

## 2017-06-24 DIAGNOSIS — E87.6 HYPOKALEMIA: ICD-10-CM

## 2017-06-24 LAB
ALBUMIN SERPL-MCNC: 4.1 G/DL (ref 3.9–4.9)
ALP BLD-CCNC: 62 U/L (ref 40–130)
ALT SERPL-CCNC: 13 U/L (ref 0–33)
ANION GAP SERPL CALCULATED.3IONS-SCNC: 15 MEQ/L (ref 7–13)
AST SERPL-CCNC: 19 U/L (ref 0–35)
BILIRUB SERPL-MCNC: 0.2 MG/DL (ref 0–1.2)
BUN BLDV-MCNC: 7 MG/DL (ref 8–23)
CALCIUM SERPL-MCNC: 9.8 MG/DL (ref 8.6–10.2)
CHLORIDE BLD-SCNC: 102 MEQ/L (ref 98–107)
CO2: 26 MEQ/L (ref 22–29)
CREAT SERPL-MCNC: 0.77 MG/DL (ref 0.5–0.9)
GFR AFRICAN AMERICAN: >60
GFR NON-AFRICAN AMERICAN: >60
GLOBULIN: 2.2 G/DL (ref 2.3–3.5)
GLUCOSE BLD-MCNC: 113 MG/DL (ref 74–109)
POTASSIUM SERPL-SCNC: 5 MEQ/L (ref 3.5–5.1)
SODIUM BLD-SCNC: 143 MEQ/L (ref 132–144)
TOTAL PROTEIN: 6.3 G/DL (ref 6.4–8.1)

## 2017-06-24 PROCEDURE — 36415 COLL VENOUS BLD VENIPUNCTURE: CPT

## 2017-06-24 PROCEDURE — 80053 COMPREHEN METABOLIC PANEL: CPT

## 2017-06-25 LAB
ALCOHOL URINE: NEGATIVE MG/DL
AMPHETAMINE SCREEN, URINE: NEGATIVE NG/ML
BARBITURATE SCREEN URINE: NEGATIVE NG/ML
BENZODIAZEPINE SCREEN, URINE: NEGATIVE NG/ML
CANNABINOID SCREEN URINE: NEGATIVE NG/ML
COCAINE METABOLITE SCREEN URINE: NEGATIVE NG/ML
CREATININE URINE: 50.9 MG/DL (ref 20–400)
Lab: NORMAL
MDMA URINE: NEGATIVE NG/ML
METHADONE SCREEN, URINE: NEGATIVE NG/ML
OPIATE SCREEN URINE: NEGATIVE NG/ML
OXYCODONE SCREEN URINE: NEGATIVE NG/ML
PHENCYCLIDINE SCREEN URINE: NEGATIVE NG/ML
PROPOXYPHENE SCREEN: NEGATIVE NG/ML

## 2017-06-26 ENCOUNTER — OFFICE VISIT (OUTPATIENT)
Dept: FAMILY MEDICINE CLINIC | Age: 72
End: 2017-06-26

## 2017-06-26 VITALS
WEIGHT: 148 LBS | DIASTOLIC BLOOD PRESSURE: 60 MMHG | HEIGHT: 62 IN | SYSTOLIC BLOOD PRESSURE: 104 MMHG | BODY MASS INDEX: 27.23 KG/M2 | TEMPERATURE: 98 F | RESPIRATION RATE: 12 BRPM | HEART RATE: 90 BPM | OXYGEN SATURATION: 94 %

## 2017-06-26 DIAGNOSIS — S16.1XXA STRAIN OF NECK MUSCLE, INITIAL ENCOUNTER: ICD-10-CM

## 2017-06-26 DIAGNOSIS — R00.0 TACHYCARDIA: Primary | ICD-10-CM

## 2017-06-26 PROCEDURE — 1036F TOBACCO NON-USER: CPT | Performed by: NURSE PRACTITIONER

## 2017-06-26 PROCEDURE — 99213 OFFICE O/P EST LOW 20 MIN: CPT | Performed by: NURSE PRACTITIONER

## 2017-06-26 PROCEDURE — 4040F PNEUMOC VAC/ADMIN/RCVD: CPT | Performed by: NURSE PRACTITIONER

## 2017-06-26 PROCEDURE — 3014F SCREEN MAMMO DOC REV: CPT | Performed by: NURSE PRACTITIONER

## 2017-06-26 PROCEDURE — G8399 PT W/DXA RESULTS DOCUMENT: HCPCS | Performed by: NURSE PRACTITIONER

## 2017-06-26 PROCEDURE — G8427 DOCREV CUR MEDS BY ELIG CLIN: HCPCS | Performed by: NURSE PRACTITIONER

## 2017-06-26 PROCEDURE — G8598 ASA/ANTIPLAT THER USED: HCPCS | Performed by: NURSE PRACTITIONER

## 2017-06-26 PROCEDURE — 1090F PRES/ABSN URINE INCON ASSESS: CPT | Performed by: NURSE PRACTITIONER

## 2017-06-26 PROCEDURE — 1123F ACP DISCUSS/DSCN MKR DOCD: CPT | Performed by: NURSE PRACTITIONER

## 2017-06-26 PROCEDURE — 3017F COLORECTAL CA SCREEN DOC REV: CPT | Performed by: NURSE PRACTITIONER

## 2017-06-26 PROCEDURE — G8419 CALC BMI OUT NRM PARAM NOF/U: HCPCS | Performed by: NURSE PRACTITIONER

## 2017-06-26 RX ORDER — CYCLOBENZAPRINE HCL 5 MG
5 TABLET ORAL NIGHTLY PRN
Qty: 30 TABLET | Refills: 0 | Status: SHIPPED | OUTPATIENT
Start: 2017-06-26 | End: 2018-05-03 | Stop reason: ALTCHOICE

## 2017-06-26 RX ORDER — LEVOTHYROXINE SODIUM 0.07 MG/1
TABLET ORAL
Qty: 90 TABLET | Refills: 0 | Status: SHIPPED | OUTPATIENT
Start: 2017-06-26 | End: 2017-09-24 | Stop reason: SDUPTHER

## 2017-06-26 RX ORDER — BISOPROLOL FUMARATE 10 MG/1
10 TABLET ORAL DAILY
Qty: 30 TABLET | Refills: 0 | Status: SHIPPED | OUTPATIENT
Start: 2017-06-26 | End: 2017-11-13 | Stop reason: SDUPTHER

## 2017-06-26 ASSESSMENT — PATIENT HEALTH QUESTIONNAIRE - PHQ9
SUM OF ALL RESPONSES TO PHQ QUESTIONS 1-9: 0
2. FEELING DOWN, DEPRESSED OR HOPELESS: 0
SUM OF ALL RESPONSES TO PHQ9 QUESTIONS 1 & 2: 0
1. LITTLE INTEREST OR PLEASURE IN DOING THINGS: 0

## 2017-06-30 DIAGNOSIS — J44.9 CHRONIC OBSTRUCTIVE PULMONARY DISEASE, UNSPECIFIED COPD TYPE (HCC): ICD-10-CM

## 2017-06-30 RX ORDER — NEBULIZER ACCESSORIES
1 KIT MISCELLANEOUS DAILY PRN
Qty: 1 KIT | Refills: 5 | Status: SHIPPED | OUTPATIENT
Start: 2017-06-30 | End: 2017-11-13 | Stop reason: SDUPTHER

## 2017-07-22 DIAGNOSIS — F41.9 ANXIETY AND DEPRESSION: ICD-10-CM

## 2017-07-22 DIAGNOSIS — F32.A ANXIETY AND DEPRESSION: ICD-10-CM

## 2017-07-22 RX ORDER — THEOPHYLLINE 400 MG/1
TABLET, EXTENDED RELEASE ORAL
Qty: 90 TABLET | Refills: 0 | Status: SHIPPED | OUTPATIENT
Start: 2017-07-22 | End: 2017-10-23 | Stop reason: SDUPTHER

## 2017-07-22 RX ORDER — DULOXETIN HYDROCHLORIDE 60 MG/1
CAPSULE, DELAYED RELEASE ORAL
Qty: 90 CAPSULE | Refills: 0 | Status: SHIPPED | OUTPATIENT
Start: 2017-07-22 | End: 2017-09-26 | Stop reason: SDUPTHER

## 2017-07-24 RX ORDER — LOVASTATIN 20 MG/1
TABLET ORAL
Qty: 90 TABLET | Refills: 0 | Status: SHIPPED | OUTPATIENT
Start: 2017-07-24 | End: 2017-10-22 | Stop reason: SDUPTHER

## 2017-09-02 ENCOUNTER — TELEPHONE (OUTPATIENT)
Dept: FAMILY MEDICINE CLINIC | Age: 72
End: 2017-09-02

## 2017-09-02 DIAGNOSIS — F41.9 ANXIETY AND DEPRESSION: Primary | ICD-10-CM

## 2017-09-02 DIAGNOSIS — F32.A ANXIETY AND DEPRESSION: Primary | ICD-10-CM

## 2017-09-02 RX ORDER — DULOXETIN HYDROCHLORIDE 60 MG/1
60 CAPSULE, DELAYED RELEASE ORAL DAILY
Qty: 30 CAPSULE | Refills: 0 | Status: SHIPPED | OUTPATIENT
Start: 2017-09-02 | End: 2017-10-26 | Stop reason: SDUPTHER

## 2017-09-23 ENCOUNTER — HOSPITAL ENCOUNTER (OUTPATIENT)
Dept: LAB | Age: 72
Discharge: HOME | End: 2017-09-23
Payer: MEDICARE

## 2017-09-23 DIAGNOSIS — E55.9 VITAMIN D DEFICIENCY: ICD-10-CM

## 2017-09-23 DIAGNOSIS — E03.1 CONGENITAL HYPOTHYROIDISM WITHOUT GOITER: ICD-10-CM

## 2017-09-23 DIAGNOSIS — E78.2 MIXED HYPERLIPIDEMIA: ICD-10-CM

## 2017-09-23 DIAGNOSIS — I10 ESSENTIAL HYPERTENSION: ICD-10-CM

## 2017-09-23 LAB
ALBUMIN SERPL-MCNC: 4.2 G/DL (ref 3.9–4.9)
ALP BLD-CCNC: 62 U/L (ref 40–130)
ALT SERPL-CCNC: 10 U/L (ref 0–33)
ANION GAP SERPL CALCULATED.3IONS-SCNC: 17 MEQ/L (ref 7–13)
AST SERPL-CCNC: 20 U/L (ref 0–35)
BASOPHILS ABSOLUTE: 0.1 K/UL (ref 0–0.2)
BASOPHILS RELATIVE PERCENT: 1.7 %
BILIRUB SERPL-MCNC: 0.3 MG/DL (ref 0–1.2)
BUN BLDV-MCNC: 10 MG/DL (ref 8–23)
CALCIUM SERPL-MCNC: 9.8 MG/DL (ref 8.6–10.2)
CHLORIDE BLD-SCNC: 99 MEQ/L (ref 98–107)
CHOLESTEROL, TOTAL: 147 MG/DL (ref 0–199)
CO2: 27 MEQ/L (ref 22–29)
CREAT SERPL-MCNC: 1.18 MG/DL (ref 0.5–0.9)
EOSINOPHILS ABSOLUTE: 0.4 K/UL (ref 0–0.7)
EOSINOPHILS RELATIVE PERCENT: 4.5 %
GFR AFRICAN AMERICAN: 54.1
GFR NON-AFRICAN AMERICAN: 44.7
GLOBULIN: 2.2 G/DL (ref 2.3–3.5)
GLUCOSE BLD-MCNC: 95 MG/DL (ref 74–109)
HCT VFR BLD CALC: 42.4 % (ref 37–47)
HDLC SERPL-MCNC: 58 MG/DL (ref 40–59)
HEMOGLOBIN: 13.6 G/DL (ref 12–16)
LDL CHOLESTEROL CALCULATED: 57 MG/DL (ref 0–129)
LYMPHOCYTES ABSOLUTE: 2.2 K/UL (ref 1–4.8)
LYMPHOCYTES RELATIVE PERCENT: 24.7 %
MCH RBC QN AUTO: 29.3 PG (ref 27–31.3)
MCHC RBC AUTO-ENTMCNC: 32.2 % (ref 33–37)
MCV RBC AUTO: 91 FL (ref 82–100)
MONOCYTES ABSOLUTE: 0.9 K/UL (ref 0.2–0.8)
MONOCYTES RELATIVE PERCENT: 9.7 %
NEUTROPHILS ABSOLUTE: 5.2 K/UL (ref 1.4–6.5)
NEUTROPHILS RELATIVE PERCENT: 59.4 %
PDW BLD-RTO: 15 % (ref 11.5–14.5)
PLATELET # BLD: 280 K/UL (ref 130–400)
POTASSIUM SERPL-SCNC: 4.8 MEQ/L (ref 3.5–5.1)
RBC # BLD: 4.66 M/UL (ref 4.2–5.4)
SODIUM BLD-SCNC: 143 MEQ/L (ref 132–144)
T4 FREE: 1.15 NG/DL (ref 0.93–1.7)
TOTAL PROTEIN: 6.4 G/DL (ref 6.4–8.1)
TRIGL SERPL-MCNC: 161 MG/DL (ref 0–200)
TSH SERPL DL<=0.05 MIU/L-ACNC: 1.18 UIU/ML (ref 0.27–4.2)
VITAMIN D 25-HYDROXY: 40 NG/ML (ref 30–100)
WBC # BLD: 8.8 K/UL (ref 4.8–10.8)

## 2017-09-23 PROCEDURE — 84443 ASSAY THYROID STIM HORMONE: CPT

## 2017-09-23 PROCEDURE — 36415 COLL VENOUS BLD VENIPUNCTURE: CPT

## 2017-09-23 PROCEDURE — 80061 LIPID PANEL: CPT

## 2017-09-23 PROCEDURE — 84439 ASSAY OF FREE THYROXINE: CPT

## 2017-09-23 PROCEDURE — 85025 COMPLETE CBC W/AUTO DIFF WBC: CPT

## 2017-09-23 PROCEDURE — 80053 COMPREHEN METABOLIC PANEL: CPT

## 2017-09-23 PROCEDURE — 82306 VITAMIN D 25 HYDROXY: CPT

## 2017-09-25 RX ORDER — LEVOTHYROXINE SODIUM 0.07 MG/1
TABLET ORAL
Qty: 90 TABLET | Refills: 0 | Status: SHIPPED | OUTPATIENT
Start: 2017-09-25 | End: 2017-12-24 | Stop reason: SDUPTHER

## 2017-09-26 ENCOUNTER — OFFICE VISIT (OUTPATIENT)
Dept: FAMILY MEDICINE CLINIC | Age: 72
End: 2017-09-26

## 2017-09-26 VITALS
RESPIRATION RATE: 12 BRPM | SYSTOLIC BLOOD PRESSURE: 128 MMHG | HEART RATE: 88 BPM | TEMPERATURE: 100 F | HEIGHT: 62 IN | WEIGHT: 144.13 LBS | BODY MASS INDEX: 26.52 KG/M2 | DIASTOLIC BLOOD PRESSURE: 72 MMHG | OXYGEN SATURATION: 94 %

## 2017-09-26 DIAGNOSIS — R59.9 GLANDS SWOLLEN: ICD-10-CM

## 2017-09-26 DIAGNOSIS — E03.1 CONGENITAL HYPOTHYROIDISM WITHOUT GOITER: ICD-10-CM

## 2017-09-26 DIAGNOSIS — E55.9 VITAMIN D DEFICIENCY: ICD-10-CM

## 2017-09-26 DIAGNOSIS — M54.2 NECK PAIN: ICD-10-CM

## 2017-09-26 DIAGNOSIS — M53.82 NECK MUSCLE WEAKNESS: ICD-10-CM

## 2017-09-26 DIAGNOSIS — R94.4 DECREASED GFR: ICD-10-CM

## 2017-09-26 DIAGNOSIS — F33.41 MAJOR DEPRESSIVE DISORDER, RECURRENT, IN PARTIAL REMISSION (HCC): ICD-10-CM

## 2017-09-26 DIAGNOSIS — M15.9 OSTEOARTHRITIS OF MULTIPLE JOINTS, UNSPECIFIED OSTEOARTHRITIS TYPE: ICD-10-CM

## 2017-09-26 DIAGNOSIS — I10 ESSENTIAL HYPERTENSION: Primary | ICD-10-CM

## 2017-09-26 DIAGNOSIS — Z23 NEEDS FLU SHOT: ICD-10-CM

## 2017-09-26 DIAGNOSIS — R22.1 NECK FULLNESS: ICD-10-CM

## 2017-09-26 DIAGNOSIS — J44.1 CHRONIC OBSTRUCTIVE PULMONARY DISEASE WITH ACUTE EXACERBATION (HCC): ICD-10-CM

## 2017-09-26 DIAGNOSIS — I25.10 CORONARY ARTERY DISEASE INVOLVING NATIVE HEART WITHOUT ANGINA PECTORIS, UNSPECIFIED VESSEL OR LESION TYPE: ICD-10-CM

## 2017-09-26 DIAGNOSIS — E78.2 MIXED HYPERLIPIDEMIA: ICD-10-CM

## 2017-09-26 PROCEDURE — G8417 CALC BMI ABV UP PARAM F/U: HCPCS | Performed by: NURSE PRACTITIONER

## 2017-09-26 PROCEDURE — 99214 OFFICE O/P EST MOD 30 MIN: CPT | Performed by: NURSE PRACTITIONER

## 2017-09-26 PROCEDURE — 3017F COLORECTAL CA SCREEN DOC REV: CPT | Performed by: NURSE PRACTITIONER

## 2017-09-26 PROCEDURE — 3023F SPIROM DOC REV: CPT | Performed by: NURSE PRACTITIONER

## 2017-09-26 PROCEDURE — 1123F ACP DISCUSS/DSCN MKR DOCD: CPT | Performed by: NURSE PRACTITIONER

## 2017-09-26 PROCEDURE — 4040F PNEUMOC VAC/ADMIN/RCVD: CPT | Performed by: NURSE PRACTITIONER

## 2017-09-26 PROCEDURE — 3014F SCREEN MAMMO DOC REV: CPT | Performed by: NURSE PRACTITIONER

## 2017-09-26 PROCEDURE — G0008 ADMIN INFLUENZA VIRUS VAC: HCPCS | Performed by: NURSE PRACTITIONER

## 2017-09-26 PROCEDURE — G8427 DOCREV CUR MEDS BY ELIG CLIN: HCPCS | Performed by: NURSE PRACTITIONER

## 2017-09-26 PROCEDURE — G8598 ASA/ANTIPLAT THER USED: HCPCS | Performed by: NURSE PRACTITIONER

## 2017-09-26 PROCEDURE — G8926 SPIRO NO PERF OR DOC: HCPCS | Performed by: NURSE PRACTITIONER

## 2017-09-26 PROCEDURE — G8399 PT W/DXA RESULTS DOCUMENT: HCPCS | Performed by: NURSE PRACTITIONER

## 2017-09-26 PROCEDURE — 1090F PRES/ABSN URINE INCON ASSESS: CPT | Performed by: NURSE PRACTITIONER

## 2017-09-26 PROCEDURE — 1036F TOBACCO NON-USER: CPT | Performed by: NURSE PRACTITIONER

## 2017-09-26 PROCEDURE — 90662 IIV NO PRSV INCREASED AG IM: CPT | Performed by: NURSE PRACTITIONER

## 2017-09-26 RX ORDER — PREDNISONE 1 MG/1
TABLET ORAL
Refills: 1 | Status: ON HOLD | COMMUNITY
Start: 2017-08-09 | End: 2018-06-17 | Stop reason: HOSPADM

## 2017-09-26 RX ORDER — TRAMADOL HYDROCHLORIDE 50 MG/1
TABLET ORAL
Qty: 30 TABLET | Refills: 2 | Status: SHIPPED | OUTPATIENT
Start: 2017-09-26 | End: 2018-01-15 | Stop reason: SDUPTHER

## 2017-10-02 ENCOUNTER — HOSPITAL ENCOUNTER (OUTPATIENT)
Dept: CT IMAGING | Age: 72
Discharge: HOME | End: 2017-10-02
Payer: MEDICARE

## 2017-10-02 ENCOUNTER — HOSPITAL ENCOUNTER (OUTPATIENT)
Dept: ULTRASOUND IMAGING | Age: 72
Discharge: HOME | End: 2017-10-02
Payer: MEDICARE

## 2017-10-02 DIAGNOSIS — E03.1 CONGENITAL HYPOTHYROIDISM WITHOUT GOITER: ICD-10-CM

## 2017-10-02 DIAGNOSIS — M54.2 NECK PAIN: ICD-10-CM

## 2017-10-02 DIAGNOSIS — R59.9 GLANDS SWOLLEN: ICD-10-CM

## 2017-10-02 PROCEDURE — 76536 US EXAM OF HEAD AND NECK: CPT

## 2017-10-02 PROCEDURE — 70490 CT SOFT TISSUE NECK W/O DYE: CPT

## 2017-10-02 NOTE — PROGRESS NOTES
Please notify Sergey Almendarez that ultrasound does not show any nodules or dangerous findings of the thyroid. The swollen glands appears to be related to possible dental infection or problem. Please ask her to schedule appointment with dentist and follow up with me if symptoms do not improve.

## 2017-10-23 DIAGNOSIS — F41.9 ANXIETY AND DEPRESSION: ICD-10-CM

## 2017-10-23 DIAGNOSIS — F32.A ANXIETY AND DEPRESSION: ICD-10-CM

## 2017-10-23 RX ORDER — LOVASTATIN 20 MG/1
TABLET ORAL
Qty: 90 TABLET | Refills: 1 | Status: SHIPPED | OUTPATIENT
Start: 2017-10-23 | End: 2018-04-21 | Stop reason: SDUPTHER

## 2017-10-23 RX ORDER — DULOXETIN HYDROCHLORIDE 60 MG/1
CAPSULE, DELAYED RELEASE ORAL
Qty: 90 CAPSULE | Refills: 0 | Status: SHIPPED | OUTPATIENT
Start: 2017-10-23 | End: 2017-10-26 | Stop reason: ALTCHOICE

## 2017-10-23 RX ORDER — THEOPHYLLINE 400 MG/1
TABLET, EXTENDED RELEASE ORAL
Qty: 90 TABLET | Refills: 0 | Status: SHIPPED | OUTPATIENT
Start: 2017-10-23 | End: 2018-01-21 | Stop reason: SDUPTHER

## 2017-10-26 ENCOUNTER — OFFICE VISIT (OUTPATIENT)
Dept: FAMILY MEDICINE CLINIC | Age: 72
End: 2017-10-26

## 2017-10-26 VITALS
SYSTOLIC BLOOD PRESSURE: 120 MMHG | HEART RATE: 85 BPM | BODY MASS INDEX: 26.31 KG/M2 | WEIGHT: 143 LBS | HEIGHT: 62 IN | TEMPERATURE: 98.1 F | DIASTOLIC BLOOD PRESSURE: 80 MMHG | OXYGEN SATURATION: 95 %

## 2017-10-26 DIAGNOSIS — F41.9 ANXIETY: ICD-10-CM

## 2017-10-26 DIAGNOSIS — E03.1 CONGENITAL HYPOTHYROIDISM WITHOUT GOITER: ICD-10-CM

## 2017-10-26 DIAGNOSIS — F41.9 ANXIETY AND DEPRESSION: Primary | ICD-10-CM

## 2017-10-26 DIAGNOSIS — F32.A ANXIETY AND DEPRESSION: Primary | ICD-10-CM

## 2017-10-26 DIAGNOSIS — I10 ESSENTIAL HYPERTENSION: ICD-10-CM

## 2017-10-26 DIAGNOSIS — E55.9 VITAMIN D DEFICIENCY: ICD-10-CM

## 2017-10-26 DIAGNOSIS — E78.5 DYSLIPIDEMIA: ICD-10-CM

## 2017-10-26 PROCEDURE — 3017F COLORECTAL CA SCREEN DOC REV: CPT | Performed by: NURSE PRACTITIONER

## 2017-10-26 PROCEDURE — 1036F TOBACCO NON-USER: CPT | Performed by: NURSE PRACTITIONER

## 2017-10-26 PROCEDURE — 3014F SCREEN MAMMO DOC REV: CPT | Performed by: NURSE PRACTITIONER

## 2017-10-26 PROCEDURE — 1090F PRES/ABSN URINE INCON ASSESS: CPT | Performed by: NURSE PRACTITIONER

## 2017-10-26 PROCEDURE — G8399 PT W/DXA RESULTS DOCUMENT: HCPCS | Performed by: NURSE PRACTITIONER

## 2017-10-26 PROCEDURE — G8427 DOCREV CUR MEDS BY ELIG CLIN: HCPCS | Performed by: NURSE PRACTITIONER

## 2017-10-26 PROCEDURE — G8417 CALC BMI ABV UP PARAM F/U: HCPCS | Performed by: NURSE PRACTITIONER

## 2017-10-26 PROCEDURE — G8484 FLU IMMUNIZE NO ADMIN: HCPCS | Performed by: NURSE PRACTITIONER

## 2017-10-26 PROCEDURE — 99214 OFFICE O/P EST MOD 30 MIN: CPT | Performed by: NURSE PRACTITIONER

## 2017-10-26 PROCEDURE — 1123F ACP DISCUSS/DSCN MKR DOCD: CPT | Performed by: NURSE PRACTITIONER

## 2017-10-26 PROCEDURE — 4040F PNEUMOC VAC/ADMIN/RCVD: CPT | Performed by: NURSE PRACTITIONER

## 2017-10-26 PROCEDURE — G8598 ASA/ANTIPLAT THER USED: HCPCS | Performed by: NURSE PRACTITIONER

## 2017-10-26 RX ORDER — BUPROPION HYDROCHLORIDE 75 MG/1
75 TABLET ORAL 2 TIMES DAILY
Qty: 60 TABLET | Refills: 1 | Status: SHIPPED | OUTPATIENT
Start: 2017-10-26 | End: 2018-02-07 | Stop reason: SDUPTHER

## 2017-10-26 RX ORDER — DULOXETIN HYDROCHLORIDE 30 MG/1
30 CAPSULE, DELAYED RELEASE ORAL DAILY
Qty: 30 CAPSULE | Refills: 0 | Status: SHIPPED | OUTPATIENT
Start: 2017-10-26 | End: 2017-11-13 | Stop reason: SDUPTHER

## 2017-10-26 RX ORDER — ALPRAZOLAM 0.25 MG/1
0.25 TABLET ORAL 3 TIMES DAILY PRN
Qty: 30 TABLET | Refills: 0 | Status: SHIPPED | OUTPATIENT
Start: 2017-10-26 | End: 2018-01-15 | Stop reason: SDUPTHER

## 2017-10-26 ASSESSMENT — PATIENT HEALTH QUESTIONNAIRE - PHQ9
1. LITTLE INTEREST OR PLEASURE IN DOING THINGS: 1
2. FEELING DOWN, DEPRESSED OR HOPELESS: 1
SUM OF ALL RESPONSES TO PHQ QUESTIONS 1-9: 2
SUM OF ALL RESPONSES TO PHQ9 QUESTIONS 1 & 2: 2

## 2017-10-26 NOTE — PROGRESS NOTES
Peggy Rowley reports being in a fair mood that is not stable. The patient is  reporting insomnia, difficulty concentrating and usual interest in activities. This patient is  not homicidal or suicidal.  The medication from last visit, Cymbalta, is not helping and is  causing any side effects. The patient is not going to counseling/therapy. She was started on Cymbalta several years ago. It helped at first.   She used to take thorazine about 50 years ago and she stopped taking shortly in. She is not sure what to do. She stopped taking Cymbalta abruptly in the past and had terrible withdrawal effects. She has taken xanax in the past with some relief. Other than that, no medications tried. She wants to try something different but is afraid to come off of the Cymbalta again for fear of withdrawal symptoms. She states that she regrets ever having smoked a cigarette. Her chronic shortness of breath contributes to a down mood at times as well. Swollen glands: she states that symptoms are about the same. She is aware of several broken/bad teeth. No pain with them. She is going to wait until she has cataract surgery before she will see dentist. This is not bothering her. She does not want it looked into further at this time. EXAM:  Constitutional Blood pressure 120/80, pulse 85, temperature 98.1 °F (36.7 °C), temperature source Oral, height 5' 2\" (1.575 m), weight 143 lb (64.9 kg), SpO2 95 %, not currently breastfeeding. ,normal affect, no acute distress, appears well developed and well nourished. Lungs are clear with equal breath sounds. Chest wall is not tender. Heart is in a regular rhythm with normal rate and no murmurs, rubs, or gallops. The four extremities are without edema. DIAGNOSIS:   1. Anxiety and depression     2. Dyslipidemia  CBC Auto Differential    Comprehensive Metabolic Panel    Lipid Panel   3.  Congenital hypothyroidism without goiter  T4, Free    TSH without Reflex 4. Vitamin D deficiency  Vitamin D 25 Hydroxy   5. Essential hypertension     6. Anxiety  ALPRAZolam (XANAX) 0.25 MG tablet       PLAN: Include orders in the DX section. Follow up: 2 weeks and as needed. Also follow up in 2 months for labs prior. Reviewed recent CT scan results. Refill of xanax given. Controlled Substances Monitoring:     Attestation: The Prescription Monitoring Report for this patient was reviewed today. (Moshe Joseph, [place]QUIDEA)  Documentation: Possible medication side effects, risk of tolerance and/or dependence, and alternative treatments discussed., No signs of potential drug abuse or diversion identified. Bishop Nico Avendano, [place])    Start taking Cymbalta 30 mg daily. Start Wellbutrin tomorrow and that is 75 mg twice a day.        Electronically signed by Jose David Kc, 4:06 PM 10/26/17

## 2017-11-13 ENCOUNTER — OFFICE VISIT (OUTPATIENT)
Dept: FAMILY MEDICINE CLINIC | Age: 72
End: 2017-11-13

## 2017-11-13 VITALS
HEART RATE: 78 BPM | DIASTOLIC BLOOD PRESSURE: 80 MMHG | SYSTOLIC BLOOD PRESSURE: 120 MMHG | RESPIRATION RATE: 14 BRPM | BODY MASS INDEX: 25.95 KG/M2 | WEIGHT: 141 LBS | OXYGEN SATURATION: 95 % | HEIGHT: 62 IN | TEMPERATURE: 97.8 F

## 2017-11-13 DIAGNOSIS — F33.1 MODERATE EPISODE OF RECURRENT MAJOR DEPRESSIVE DISORDER (HCC): ICD-10-CM

## 2017-11-13 DIAGNOSIS — J44.9 CHRONIC OBSTRUCTIVE PULMONARY DISEASE, UNSPECIFIED COPD TYPE (HCC): ICD-10-CM

## 2017-11-13 DIAGNOSIS — I51.89 DIASTOLIC DYSFUNCTION: ICD-10-CM

## 2017-11-13 DIAGNOSIS — R00.0 TACHYCARDIA: ICD-10-CM

## 2017-11-13 DIAGNOSIS — R68.89 ACTIVITY INTOLERANCE: Primary | ICD-10-CM

## 2017-11-13 PROCEDURE — G8399 PT W/DXA RESULTS DOCUMENT: HCPCS | Performed by: NURSE PRACTITIONER

## 2017-11-13 PROCEDURE — 4040F PNEUMOC VAC/ADMIN/RCVD: CPT | Performed by: NURSE PRACTITIONER

## 2017-11-13 PROCEDURE — G8427 DOCREV CUR MEDS BY ELIG CLIN: HCPCS | Performed by: NURSE PRACTITIONER

## 2017-11-13 PROCEDURE — 3023F SPIROM DOC REV: CPT | Performed by: NURSE PRACTITIONER

## 2017-11-13 PROCEDURE — 1123F ACP DISCUSS/DSCN MKR DOCD: CPT | Performed by: NURSE PRACTITIONER

## 2017-11-13 PROCEDURE — 3017F COLORECTAL CA SCREEN DOC REV: CPT | Performed by: NURSE PRACTITIONER

## 2017-11-13 PROCEDURE — G8417 CALC BMI ABV UP PARAM F/U: HCPCS | Performed by: NURSE PRACTITIONER

## 2017-11-13 PROCEDURE — G8484 FLU IMMUNIZE NO ADMIN: HCPCS | Performed by: NURSE PRACTITIONER

## 2017-11-13 PROCEDURE — 1090F PRES/ABSN URINE INCON ASSESS: CPT | Performed by: NURSE PRACTITIONER

## 2017-11-13 PROCEDURE — 3014F SCREEN MAMMO DOC REV: CPT | Performed by: NURSE PRACTITIONER

## 2017-11-13 PROCEDURE — G8598 ASA/ANTIPLAT THER USED: HCPCS | Performed by: NURSE PRACTITIONER

## 2017-11-13 PROCEDURE — 99213 OFFICE O/P EST LOW 20 MIN: CPT | Performed by: NURSE PRACTITIONER

## 2017-11-13 PROCEDURE — 1036F TOBACCO NON-USER: CPT | Performed by: NURSE PRACTITIONER

## 2017-11-13 PROCEDURE — G8926 SPIRO NO PERF OR DOC: HCPCS | Performed by: NURSE PRACTITIONER

## 2017-11-13 RX ORDER — IPRATROPIUM BROMIDE AND ALBUTEROL SULFATE 2.5; .5 MG/3ML; MG/3ML
1 SOLUTION RESPIRATORY (INHALATION) EVERY 6 HOURS PRN
Qty: 200 VIAL | Refills: 3 | Status: ON HOLD | OUTPATIENT
Start: 2017-11-13 | End: 2019-06-06

## 2017-11-13 RX ORDER — NEBULIZER ACCESSORIES
1 KIT MISCELLANEOUS DAILY PRN
Qty: 1 KIT | Refills: 5 | Status: SHIPPED | OUTPATIENT
Start: 2017-11-13 | End: 2018-11-05 | Stop reason: SDUPTHER

## 2017-11-13 RX ORDER — DULOXETIN HYDROCHLORIDE 20 MG/1
20 CAPSULE, DELAYED RELEASE ORAL DAILY
Qty: 30 CAPSULE | Refills: 1 | Status: SHIPPED | OUTPATIENT
Start: 2017-11-13 | End: 2018-01-15 | Stop reason: SDUPTHER

## 2017-11-13 RX ORDER — BISOPROLOL FUMARATE 10 MG/1
10 TABLET ORAL DAILY
Qty: 30 TABLET | Refills: 5 | Status: SHIPPED | OUTPATIENT
Start: 2017-11-13 | End: 2018-01-15 | Stop reason: SDUPTHER

## 2017-11-13 ASSESSMENT — PATIENT HEALTH QUESTIONNAIRE - PHQ9
1. LITTLE INTEREST OR PLEASURE IN DOING THINGS: 0
2. FEELING DOWN, DEPRESSED OR HOPELESS: 0
SUM OF ALL RESPONSES TO PHQ9 QUESTIONS 1 & 2: 0
SUM OF ALL RESPONSES TO PHQ QUESTIONS 1-9: 0

## 2017-11-13 NOTE — PROGRESS NOTES
Ever Kiran reports being in a fair mood that is  stable. The patient is not reporting insomnia, difficulty concentrating and usual interest in activities. This patient is  not homicidal or suicidal.  The medication from last visit, wellbutrin, is  helping and is not causing any side effects. The patient is not going to counseling/therapy. She states that she is down to 30 mg cymbalta and so far has not had any symptoms of withdrawal.   She likes the Wellbutrin so far and would like to continue. COPD: her breathing has been at baseline but she has noticed that she gets heart palpitations. This is not always with activity. Sometimes they wake her up from sleep. She is no more labored at breathing than normal. She used to follow with Dr. Lizbet Alexandre but has not followed with him for a long time. She remembers being told in the past being told that she has CHF but she cannot remember who told her and when. No chest pain or pressure reported. EXAM:  Constitutional Blood pressure 120/80, pulse 78, temperature 97.8 °F (36.6 °C), temperature source Oral, resp. rate 14, height 5' 2\" (1.575 m), weight 141 lb (64 kg), SpO2 95 %, not currently breastfeeding. ,normal affect, no acute distress, appears well developed and well nourished. Lungs are clear with equal breath sounds. Chest wall is not tender. Heart is in a regular rhythm with normal rate and no murmurs, rubs, or gallops. The four extremities are without edema. DIAGNOSIS:   1. Activity intolerance  ECHO Complete 2D W Doppler W Color    Holter Monitor 24 Hour   2. Chronic obstructive pulmonary disease, unspecified COPD type ([facility]  ipratropium-albuterol (DUONEB) 0.5-2.5 (3) MG/3ML SOLN nebulizer solution    Respiratory Therapy Supplies (NEBULIZER/TUBING/MOUTHPIECE) KIT   3. Tachycardia  bisoprolol (ZEBETA) 10 MG tablet   4. Diastolic dysfunction  ECHO Complete 2D W Doppler W Color    Holter Monitor 24 Hour   5.  Moderate episode of recurrent major depressive disorder ([facility]         PLAN: Include orders in the DX section. Follow up: 1 month and as needed. Blood work one week prior as ordered. Recommend current cardiac testing (holter and echocardiogram). She does not want to see a specialist at this time. Discussed blood pressure medication. May consider change or increase depending on Holter monitor results. She had hypokalemia with thiazide diuretic. Will continue to wean cymbalta (20 mg capsule ordered) and continue current dose of wellbutrin. Will plan to start every other day for a week or so at next visit time and increase wellbutrin strength.        Electronically signed by Alexey Beach, 5:14 PM 11/13/17

## 2017-12-05 ENCOUNTER — HOSPITAL ENCOUNTER (OUTPATIENT)
Dept: NON INVASIVE DIAGNOSTICS | Age: 72
Discharge: HOME | End: 2017-12-05
Payer: MEDICARE

## 2017-12-05 DIAGNOSIS — I51.89 DIASTOLIC DYSFUNCTION: ICD-10-CM

## 2017-12-05 DIAGNOSIS — R68.89 ACTIVITY INTOLERANCE: ICD-10-CM

## 2017-12-05 LAB
LV EF: 55 %
LVEF MODALITY: NORMAL

## 2017-12-05 PROCEDURE — 93226 XTRNL ECG REC<48 HR SCAN A/R: CPT

## 2017-12-05 PROCEDURE — 93306 TTE W/DOPPLER COMPLETE: CPT

## 2017-12-05 PROCEDURE — 93225 XTRNL ECG REC<48 HRS REC: CPT

## 2017-12-13 DIAGNOSIS — J44.9 CHRONIC OBSTRUCTIVE PULMONARY DISEASE, UNSPECIFIED COPD TYPE (HCC): ICD-10-CM

## 2017-12-15 ENCOUNTER — TELEPHONE (OUTPATIENT)
Dept: FAMILY MEDICINE CLINIC | Age: 72
End: 2017-12-15

## 2017-12-15 NOTE — TELEPHONE ENCOUNTER
The echocardiogram is essentially normal with some minor changes. I do not see results for Holter monitor yet.

## 2017-12-21 RX ORDER — OMEPRAZOLE 20 MG/1
CAPSULE, DELAYED RELEASE ORAL
Qty: 90 CAPSULE | Refills: 3 | Status: ON HOLD | OUTPATIENT
Start: 2017-12-21 | End: 2018-06-12

## 2017-12-26 RX ORDER — LEVOTHYROXINE SODIUM 0.07 MG/1
TABLET ORAL
Qty: 90 TABLET | Refills: 1 | Status: SHIPPED | OUTPATIENT
Start: 2017-12-26 | End: 2018-06-22 | Stop reason: SDUPTHER

## 2017-12-27 RX ORDER — BISOPROLOL FUMARATE 10 MG/1
TABLET ORAL
Qty: 90 TABLET | Refills: 1 | Status: SHIPPED | OUTPATIENT
Start: 2017-12-27 | End: 2018-07-02 | Stop reason: SDUPTHER

## 2017-12-27 NOTE — TELEPHONE ENCOUNTER
Last seen and last approved on 11/13/2017 but, went to local pharmacy and [facility] is requesting 90 day refill.

## 2018-01-15 ENCOUNTER — OFFICE VISIT (OUTPATIENT)
Dept: FAMILY MEDICINE CLINIC | Age: 73
End: 2018-01-15

## 2018-01-15 VITALS
DIASTOLIC BLOOD PRESSURE: 62 MMHG | HEART RATE: 80 BPM | SYSTOLIC BLOOD PRESSURE: 108 MMHG | OXYGEN SATURATION: 95 % | TEMPERATURE: 97.9 F | RESPIRATION RATE: 14 BRPM | WEIGHT: 140 LBS | BODY MASS INDEX: 25.76 KG/M2 | HEIGHT: 62 IN

## 2018-01-15 DIAGNOSIS — F32.A DEPRESSION, UNSPECIFIED DEPRESSION TYPE: Primary | ICD-10-CM

## 2018-01-15 DIAGNOSIS — Z23 NEED FOR VACCINATION FOR STREP PNEUMONIAE: ICD-10-CM

## 2018-01-15 DIAGNOSIS — N64.4 BREAST PAIN, RIGHT: ICD-10-CM

## 2018-01-15 DIAGNOSIS — M15.9 OSTEOARTHRITIS OF MULTIPLE JOINTS, UNSPECIFIED OSTEOARTHRITIS TYPE: ICD-10-CM

## 2018-01-15 DIAGNOSIS — F41.9 ANXIETY: ICD-10-CM

## 2018-01-15 PROCEDURE — 90670 PCV13 VACCINE IM: CPT | Performed by: NURSE PRACTITIONER

## 2018-01-15 PROCEDURE — G8417 CALC BMI ABV UP PARAM F/U: HCPCS | Performed by: NURSE PRACTITIONER

## 2018-01-15 PROCEDURE — 1090F PRES/ABSN URINE INCON ASSESS: CPT | Performed by: NURSE PRACTITIONER

## 2018-01-15 PROCEDURE — G8599 NO ASA/ANTIPLAT THER USE RNG: HCPCS | Performed by: NURSE PRACTITIONER

## 2018-01-15 PROCEDURE — G0009 ADMIN PNEUMOCOCCAL VACCINE: HCPCS | Performed by: NURSE PRACTITIONER

## 2018-01-15 PROCEDURE — 3014F SCREEN MAMMO DOC REV: CPT | Performed by: NURSE PRACTITIONER

## 2018-01-15 PROCEDURE — 1036F TOBACCO NON-USER: CPT | Performed by: NURSE PRACTITIONER

## 2018-01-15 PROCEDURE — 99214 OFFICE O/P EST MOD 30 MIN: CPT | Performed by: NURSE PRACTITIONER

## 2018-01-15 PROCEDURE — 4040F PNEUMOC VAC/ADMIN/RCVD: CPT | Performed by: NURSE PRACTITIONER

## 2018-01-15 PROCEDURE — G8427 DOCREV CUR MEDS BY ELIG CLIN: HCPCS | Performed by: NURSE PRACTITIONER

## 2018-01-15 PROCEDURE — 3017F COLORECTAL CA SCREEN DOC REV: CPT | Performed by: NURSE PRACTITIONER

## 2018-01-15 PROCEDURE — G8399 PT W/DXA RESULTS DOCUMENT: HCPCS | Performed by: NURSE PRACTITIONER

## 2018-01-15 PROCEDURE — G8484 FLU IMMUNIZE NO ADMIN: HCPCS | Performed by: NURSE PRACTITIONER

## 2018-01-15 PROCEDURE — 1123F ACP DISCUSS/DSCN MKR DOCD: CPT | Performed by: NURSE PRACTITIONER

## 2018-01-15 RX ORDER — TRAMADOL HYDROCHLORIDE 50 MG/1
TABLET ORAL
Qty: 30 TABLET | Refills: 0 | Status: SHIPPED | OUTPATIENT
Start: 2018-01-15 | End: 2018-02-22 | Stop reason: SDUPTHER

## 2018-01-15 RX ORDER — DULOXETIN HYDROCHLORIDE 20 MG/1
20 CAPSULE, DELAYED RELEASE ORAL DAILY
Qty: 30 CAPSULE | Refills: 0 | Status: SHIPPED | OUTPATIENT
Start: 2018-01-15 | End: 2018-02-16 | Stop reason: SDUPTHER

## 2018-01-15 RX ORDER — ALPRAZOLAM 0.25 MG/1
0.25 TABLET ORAL 3 TIMES DAILY PRN
Qty: 30 TABLET | Refills: 0 | Status: SHIPPED | OUTPATIENT
Start: 2018-01-15 | End: 2018-01-15 | Stop reason: SDUPTHER

## 2018-01-15 RX ORDER — ALPRAZOLAM 0.25 MG/1
0.25 TABLET ORAL 3 TIMES DAILY PRN
Qty: 30 TABLET | Refills: 0 | Status: SHIPPED | OUTPATIENT
Start: 2018-01-15 | End: 2018-02-22 | Stop reason: SDUPTHER

## 2018-01-15 ASSESSMENT — PATIENT HEALTH QUESTIONNAIRE - PHQ9
SUM OF ALL RESPONSES TO PHQ9 QUESTIONS 1 & 2: 2
2. FEELING DOWN, DEPRESSED OR HOPELESS: 1
SUM OF ALL RESPONSES TO PHQ QUESTIONS 1-9: 2
1. LITTLE INTEREST OR PLEASURE IN DOING THINGS: 1

## 2018-01-16 RX ORDER — ERGOCALCIFEROL 1.25 MG/1
CAPSULE ORAL
Qty: 12 CAPSULE | Refills: 3 | Status: SHIPPED | OUTPATIENT
Start: 2018-01-16 | End: 2018-12-18 | Stop reason: SDUPTHER

## 2018-01-22 RX ORDER — THEOPHYLLINE 400 MG/1
TABLET, EXTENDED RELEASE ORAL
Qty: 90 TABLET | Refills: 1 | Status: SHIPPED | OUTPATIENT
Start: 2018-01-22 | End: 2018-07-21 | Stop reason: SDUPTHER

## 2018-02-08 RX ORDER — BUPROPION HYDROCHLORIDE 75 MG/1
75 TABLET ORAL 2 TIMES DAILY
Qty: 60 TABLET | Refills: 1 | Status: ON HOLD | OUTPATIENT
Start: 2018-02-08 | End: 2018-11-06

## 2018-02-16 DIAGNOSIS — F32.A DEPRESSION, UNSPECIFIED DEPRESSION TYPE: ICD-10-CM

## 2018-02-16 RX ORDER — DULOXETIN HYDROCHLORIDE 20 MG/1
CAPSULE, DELAYED RELEASE ORAL
Qty: 30 CAPSULE | Refills: 0 | Status: SHIPPED | OUTPATIENT
Start: 2018-02-16 | End: 2018-05-03 | Stop reason: ALTCHOICE

## 2018-02-18 DIAGNOSIS — J44.9 CHRONIC OBSTRUCTIVE PULMONARY DISEASE, UNSPECIFIED COPD TYPE (HCC): ICD-10-CM

## 2018-02-19 RX ORDER — DILTIAZEM HYDROCHLORIDE 60 MG/1
TABLET, FILM COATED ORAL
Qty: 30.6 G | Refills: 5 | Status: SHIPPED | OUTPATIENT
Start: 2018-02-19 | End: 2019-04-10 | Stop reason: DRUGHIGH

## 2018-02-22 DIAGNOSIS — M15.9 OSTEOARTHRITIS OF MULTIPLE JOINTS, UNSPECIFIED OSTEOARTHRITIS TYPE: ICD-10-CM

## 2018-02-22 DIAGNOSIS — F41.9 ANXIETY: ICD-10-CM

## 2018-02-23 RX ORDER — ALPRAZOLAM 0.25 MG/1
0.25 TABLET ORAL 3 TIMES DAILY PRN
Qty: 30 TABLET | Refills: 0 | Status: SHIPPED | OUTPATIENT
Start: 2018-02-23 | End: 2018-05-03 | Stop reason: SDUPTHER

## 2018-02-23 RX ORDER — TRAMADOL HYDROCHLORIDE 50 MG/1
TABLET ORAL
Qty: 30 TABLET | Refills: 0 | Status: SHIPPED | OUTPATIENT
Start: 2018-02-23 | End: 2018-05-03 | Stop reason: SDUPTHER

## 2018-04-14 ENCOUNTER — HOSPITAL ENCOUNTER (OUTPATIENT)
Dept: LAB | Age: 73
Discharge: HOME | End: 2018-04-14
Payer: MEDICARE

## 2018-04-14 LAB
ALBUMIN SERPL-MCNC: 3.9 G/DL (ref 3.9–4.9)
ALP BLD-CCNC: 84 U/L (ref 40–130)
ALT SERPL-CCNC: 14 U/L (ref 0–33)
ANION GAP SERPL CALCULATED.3IONS-SCNC: 15 MEQ/L (ref 7–13)
AST SERPL-CCNC: 22 U/L (ref 0–35)
BASOPHILS ABSOLUTE: 0.1 K/UL (ref 0–0.2)
BASOPHILS RELATIVE PERCENT: 0.9 %
BILIRUB SERPL-MCNC: 0.8 MG/DL (ref 0–1.2)
BUN BLDV-MCNC: 15 MG/DL (ref 8–23)
CALCIUM SERPL-MCNC: 9.8 MG/DL (ref 8.6–10.2)
CHLORIDE BLD-SCNC: 95 MEQ/L (ref 98–107)
CHOLESTEROL, TOTAL: 218 MG/DL (ref 0–199)
CO2: 27 MEQ/L (ref 22–29)
CREAT SERPL-MCNC: 1 MG/DL (ref 0.5–0.9)
EOSINOPHILS ABSOLUTE: 0.1 K/UL (ref 0–0.7)
EOSINOPHILS RELATIVE PERCENT: 1 %
GFR AFRICAN AMERICAN: >60
GFR NON-AFRICAN AMERICAN: 54
GLOBULIN: 2.5 G/DL (ref 2.3–3.5)
GLUCOSE BLD-MCNC: 105 MG/DL (ref 74–109)
HCT VFR BLD CALC: 43.3 % (ref 37–47)
HDLC SERPL-MCNC: 68 MG/DL (ref 40–59)
HEMOGLOBIN: 14.5 G/DL (ref 12–16)
LDL CHOLESTEROL CALCULATED: 116 MG/DL (ref 0–129)
LYMPHOCYTES ABSOLUTE: 2.4 K/UL (ref 1–4.8)
LYMPHOCYTES RELATIVE PERCENT: 21.3 %
MCH RBC QN AUTO: 31.2 PG (ref 27–31.3)
MCHC RBC AUTO-ENTMCNC: 33.5 % (ref 33–37)
MCV RBC AUTO: 93.1 FL (ref 82–100)
MONOCYTES ABSOLUTE: 0.9 K/UL (ref 0.2–0.8)
MONOCYTES RELATIVE PERCENT: 8 %
NEUTROPHILS ABSOLUTE: 7.9 K/UL (ref 1.4–6.5)
NEUTROPHILS RELATIVE PERCENT: 68.8 %
PDW BLD-RTO: 13.8 % (ref 11.5–14.5)
PLATELET # BLD: 370 K/UL (ref 130–400)
POTASSIUM SERPL-SCNC: 4.9 MEQ/L (ref 3.5–5.1)
RBC # BLD: 4.65 M/UL (ref 4.2–5.4)
SODIUM BLD-SCNC: 137 MEQ/L (ref 132–144)
T4 FREE: 1.24 NG/DL (ref 0.93–1.7)
TOTAL PROTEIN: 6.4 G/DL (ref 6.4–8.1)
TRIGL SERPL-MCNC: 168 MG/DL (ref 0–200)
TSH SERPL DL<=0.05 MIU/L-ACNC: 0.9 UIU/ML (ref 0.27–4.2)
VITAMIN D 25-HYDROXY: 47.3 NG/ML (ref 30–100)
WBC # BLD: 11.5 K/UL (ref 4.8–10.8)

## 2018-04-14 PROCEDURE — 82306 VITAMIN D 25 HYDROXY: CPT

## 2018-04-14 PROCEDURE — 36415 COLL VENOUS BLD VENIPUNCTURE: CPT

## 2018-04-14 PROCEDURE — 84443 ASSAY THYROID STIM HORMONE: CPT

## 2018-04-14 PROCEDURE — 80053 COMPREHEN METABOLIC PANEL: CPT

## 2018-04-14 PROCEDURE — 85025 COMPLETE CBC W/AUTO DIFF WBC: CPT

## 2018-04-14 PROCEDURE — 80061 LIPID PANEL: CPT

## 2018-04-14 PROCEDURE — 84439 ASSAY OF FREE THYROXINE: CPT

## 2018-04-23 RX ORDER — LOVASTATIN 20 MG/1
TABLET ORAL
Qty: 90 TABLET | Refills: 1 | Status: SHIPPED | OUTPATIENT
Start: 2018-04-23 | End: 2018-10-18 | Stop reason: SDUPTHER

## 2018-05-03 ENCOUNTER — OFFICE VISIT (OUTPATIENT)
Dept: FAMILY MEDICINE CLINIC | Age: 73
End: 2018-05-03
Payer: MEDICARE

## 2018-05-03 VITALS
OXYGEN SATURATION: 97 % | WEIGHT: 139 LBS | BODY MASS INDEX: 25.58 KG/M2 | HEART RATE: 90 BPM | RESPIRATION RATE: 16 BRPM | DIASTOLIC BLOOD PRESSURE: 70 MMHG | TEMPERATURE: 98.9 F | SYSTOLIC BLOOD PRESSURE: 130 MMHG | HEIGHT: 62 IN

## 2018-05-03 DIAGNOSIS — Z85.3 HISTORY OF BREAST CANCER: ICD-10-CM

## 2018-05-03 DIAGNOSIS — N63.10 BILATERAL BREAST LUMP: ICD-10-CM

## 2018-05-03 DIAGNOSIS — N63.20 BILATERAL BREAST LUMP: ICD-10-CM

## 2018-05-03 DIAGNOSIS — F33.1 MODERATE EPISODE OF RECURRENT MAJOR DEPRESSIVE DISORDER (HCC): ICD-10-CM

## 2018-05-03 DIAGNOSIS — I65.23 BILATERAL CAROTID ARTERY STENOSIS: ICD-10-CM

## 2018-05-03 DIAGNOSIS — G47.09 OTHER INSOMNIA: ICD-10-CM

## 2018-05-03 DIAGNOSIS — F41.9 ANXIETY: ICD-10-CM

## 2018-05-03 DIAGNOSIS — M15.9 OSTEOARTHRITIS OF MULTIPLE JOINTS, UNSPECIFIED OSTEOARTHRITIS TYPE: ICD-10-CM

## 2018-05-03 DIAGNOSIS — I25.10 CORONARY ARTERY DISEASE INVOLVING NATIVE HEART WITHOUT ANGINA PECTORIS, UNSPECIFIED VESSEL OR LESION TYPE: ICD-10-CM

## 2018-05-03 DIAGNOSIS — E78.2 MIXED HYPERLIPIDEMIA: Primary | ICD-10-CM

## 2018-05-03 DIAGNOSIS — J44.1 CHRONIC OBSTRUCTIVE PULMONARY DISEASE WITH ACUTE EXACERBATION (HCC): ICD-10-CM

## 2018-05-03 DIAGNOSIS — E03.1 CONGENITAL HYPOTHYROIDISM WITHOUT GOITER: ICD-10-CM

## 2018-05-03 DIAGNOSIS — E55.9 VITAMIN D DEFICIENCY: ICD-10-CM

## 2018-05-03 PROCEDURE — G8926 SPIRO NO PERF OR DOC: HCPCS | Performed by: NURSE PRACTITIONER

## 2018-05-03 PROCEDURE — G8599 NO ASA/ANTIPLAT THER USE RNG: HCPCS | Performed by: NURSE PRACTITIONER

## 2018-05-03 PROCEDURE — G8417 CALC BMI ABV UP PARAM F/U: HCPCS | Performed by: NURSE PRACTITIONER

## 2018-05-03 PROCEDURE — 4040F PNEUMOC VAC/ADMIN/RCVD: CPT | Performed by: NURSE PRACTITIONER

## 2018-05-03 PROCEDURE — 3017F COLORECTAL CA SCREEN DOC REV: CPT | Performed by: NURSE PRACTITIONER

## 2018-05-03 PROCEDURE — 3023F SPIROM DOC REV: CPT | Performed by: NURSE PRACTITIONER

## 2018-05-03 PROCEDURE — G8399 PT W/DXA RESULTS DOCUMENT: HCPCS | Performed by: NURSE PRACTITIONER

## 2018-05-03 PROCEDURE — 1123F ACP DISCUSS/DSCN MKR DOCD: CPT | Performed by: NURSE PRACTITIONER

## 2018-05-03 PROCEDURE — 1090F PRES/ABSN URINE INCON ASSESS: CPT | Performed by: NURSE PRACTITIONER

## 2018-05-03 PROCEDURE — 1036F TOBACCO NON-USER: CPT | Performed by: NURSE PRACTITIONER

## 2018-05-03 PROCEDURE — 99214 OFFICE O/P EST MOD 30 MIN: CPT | Performed by: NURSE PRACTITIONER

## 2018-05-03 PROCEDURE — G8427 DOCREV CUR MEDS BY ELIG CLIN: HCPCS | Performed by: NURSE PRACTITIONER

## 2018-05-03 RX ORDER — BUPROPION HYDROCHLORIDE 150 MG/1
150 TABLET ORAL EVERY MORNING
Qty: 90 TABLET | Refills: 1 | Status: SHIPPED | OUTPATIENT
Start: 2018-05-03 | End: 2018-07-02 | Stop reason: ALTCHOICE

## 2018-05-03 RX ORDER — TRAMADOL HYDROCHLORIDE 50 MG/1
TABLET ORAL
Qty: 90 TABLET | Refills: 0 | Status: SHIPPED | OUTPATIENT
Start: 2018-05-03 | End: 2018-11-05 | Stop reason: SDUPTHER

## 2018-05-03 RX ORDER — TRAZODONE HYDROCHLORIDE 150 MG/1
TABLET ORAL
Qty: 90 TABLET | Refills: 1 | Status: SHIPPED | OUTPATIENT
Start: 2018-05-03 | End: 2018-10-30 | Stop reason: SDUPTHER

## 2018-05-03 RX ORDER — VENLAFAXINE HYDROCHLORIDE 75 MG/1
75 CAPSULE, EXTENDED RELEASE ORAL DAILY
Qty: 90 CAPSULE | Refills: 1 | Status: ON HOLD | OUTPATIENT
Start: 2018-05-03 | End: 2018-11-06 | Stop reason: HOSPADM

## 2018-05-03 RX ORDER — ALPRAZOLAM 0.25 MG/1
0.25 TABLET ORAL 3 TIMES DAILY PRN
Qty: 90 TABLET | Refills: 0 | Status: SHIPPED | OUTPATIENT
Start: 2018-05-03 | End: 2018-11-05 | Stop reason: SDUPTHER

## 2018-06-07 ENCOUNTER — HOSPITAL ENCOUNTER (OUTPATIENT)
Dept: LAB | Age: 73
Discharge: HOME | End: 2018-06-07
Payer: MEDICARE

## 2018-06-07 ENCOUNTER — OFFICE VISIT (OUTPATIENT)
Dept: FAMILY MEDICINE CLINIC | Age: 73
End: 2018-06-07
Payer: MEDICARE

## 2018-06-07 ENCOUNTER — HOSPITAL ENCOUNTER (OUTPATIENT)
Age: 73
Discharge: HOME | End: 2018-06-09
Payer: MEDICARE

## 2018-06-07 ENCOUNTER — HOSPITAL ENCOUNTER (OUTPATIENT)
Dept: GENERAL RADIOLOGY | Age: 73
Discharge: HOME | End: 2018-06-09
Payer: MEDICARE

## 2018-06-07 VITALS
WEIGHT: 140 LBS | OXYGEN SATURATION: 95 % | SYSTOLIC BLOOD PRESSURE: 114 MMHG | BODY MASS INDEX: 25.76 KG/M2 | HEIGHT: 62 IN | DIASTOLIC BLOOD PRESSURE: 62 MMHG | RESPIRATION RATE: 14 BRPM | HEART RATE: 96 BPM | TEMPERATURE: 98.5 F

## 2018-06-07 DIAGNOSIS — R06.02 SHORTNESS OF BREATH: ICD-10-CM

## 2018-06-07 DIAGNOSIS — R42 LIGHTHEADEDNESS: ICD-10-CM

## 2018-06-07 DIAGNOSIS — Z79.899 HIGH RISK MEDICATION USE: ICD-10-CM

## 2018-06-07 DIAGNOSIS — I65.23 BILATERAL CAROTID ARTERY STENOSIS: ICD-10-CM

## 2018-06-07 DIAGNOSIS — R00.0 TACHYCARDIA: ICD-10-CM

## 2018-06-07 DIAGNOSIS — R73.09 ELEVATED GLUCOSE: ICD-10-CM

## 2018-06-07 DIAGNOSIS — H65.93 FLUID LEVEL BEHIND TYMPANIC MEMBRANE OF BOTH EARS: ICD-10-CM

## 2018-06-07 DIAGNOSIS — F41.9 ANXIETY AND DEPRESSION: Primary | ICD-10-CM

## 2018-06-07 DIAGNOSIS — I25.10 CORONARY ARTERY DISEASE INVOLVING NATIVE HEART WITHOUT ANGINA PECTORIS, UNSPECIFIED VESSEL OR LESION TYPE: ICD-10-CM

## 2018-06-07 DIAGNOSIS — J44.1 CHRONIC OBSTRUCTIVE PULMONARY DISEASE WITH ACUTE EXACERBATION (HCC): ICD-10-CM

## 2018-06-07 DIAGNOSIS — F32.A ANXIETY AND DEPRESSION: Primary | ICD-10-CM

## 2018-06-07 LAB
ALBUMIN SERPL-MCNC: 4.2 G/DL (ref 3.9–4.9)
ALP BLD-CCNC: 85 U/L (ref 40–130)
ALT SERPL-CCNC: 15 U/L (ref 0–33)
ANION GAP SERPL CALCULATED.3IONS-SCNC: 18 MEQ/L (ref 7–13)
AST SERPL-CCNC: 20 U/L (ref 0–35)
BASOPHILS ABSOLUTE: 0.1 K/UL (ref 0–0.2)
BASOPHILS RELATIVE PERCENT: 0.6 %
BILIRUB SERPL-MCNC: 0.4 MG/DL (ref 0–1.2)
BUN BLDV-MCNC: 17 MG/DL (ref 8–23)
CALCIUM SERPL-MCNC: 10 MG/DL (ref 8.6–10.2)
CHLORIDE BLD-SCNC: 96 MEQ/L (ref 98–107)
CO2: 23 MEQ/L (ref 22–29)
CREAT SERPL-MCNC: 1.15 MG/DL (ref 0.5–0.9)
EOSINOPHILS ABSOLUTE: 0.2 K/UL (ref 0–0.7)
EOSINOPHILS RELATIVE PERCENT: 2.4 %
GFR AFRICAN AMERICAN: 55.6
GFR NON-AFRICAN AMERICAN: 46
GLOBULIN: 3.3 G/DL (ref 2.3–3.5)
GLUCOSE BLD-MCNC: 108 MG/DL (ref 74–109)
HBA1C MFR BLD: 5.4 % (ref 4.8–5.9)
HCT VFR BLD CALC: 43.7 % (ref 37–47)
HEMOGLOBIN: 14.8 G/DL (ref 12–16)
LYMPHOCYTES ABSOLUTE: 2.5 K/UL (ref 1–4.8)
LYMPHOCYTES RELATIVE PERCENT: 24.1 %
MCH RBC QN AUTO: 31.3 PG (ref 27–31.3)
MCHC RBC AUTO-ENTMCNC: 33.9 % (ref 33–37)
MCV RBC AUTO: 92.4 FL (ref 82–100)
MONOCYTES ABSOLUTE: 0.9 K/UL (ref 0.2–0.8)
MONOCYTES RELATIVE PERCENT: 8.2 %
NEUTROPHILS ABSOLUTE: 6.7 K/UL (ref 1.4–6.5)
NEUTROPHILS RELATIVE PERCENT: 64.7 %
PDW BLD-RTO: 13.7 % (ref 11.5–14.5)
PLATELET # BLD: 368 K/UL (ref 130–400)
POTASSIUM SERPL-SCNC: 4.7 MEQ/L (ref 3.5–5.1)
PRO-BNP: 667 PG/ML
RBC # BLD: 4.73 M/UL (ref 4.2–5.4)
SODIUM BLD-SCNC: 137 MEQ/L (ref 132–144)
TOTAL PROTEIN: 7.5 G/DL (ref 6.4–8.1)
TROPONIN: <0.01 NG/ML (ref 0–0.01)
WBC # BLD: 10.4 K/UL (ref 4.8–10.8)

## 2018-06-07 PROCEDURE — 85025 COMPLETE CBC W/AUTO DIFF WBC: CPT

## 2018-06-07 PROCEDURE — 80307 DRUG TEST PRSMV CHEM ANLYZR: CPT

## 2018-06-07 PROCEDURE — 4040F PNEUMOC VAC/ADMIN/RCVD: CPT | Performed by: NURSE PRACTITIONER

## 2018-06-07 PROCEDURE — 83880 ASSAY OF NATRIURETIC PEPTIDE: CPT

## 2018-06-07 PROCEDURE — G8427 DOCREV CUR MEDS BY ELIG CLIN: HCPCS | Performed by: NURSE PRACTITIONER

## 2018-06-07 PROCEDURE — 3023F SPIROM DOC REV: CPT | Performed by: NURSE PRACTITIONER

## 2018-06-07 PROCEDURE — 71046 X-RAY EXAM CHEST 2 VIEWS: CPT

## 2018-06-07 PROCEDURE — 1036F TOBACCO NON-USER: CPT | Performed by: NURSE PRACTITIONER

## 2018-06-07 PROCEDURE — G8399 PT W/DXA RESULTS DOCUMENT: HCPCS | Performed by: NURSE PRACTITIONER

## 2018-06-07 PROCEDURE — 99214 OFFICE O/P EST MOD 30 MIN: CPT | Performed by: NURSE PRACTITIONER

## 2018-06-07 PROCEDURE — 83036 HEMOGLOBIN GLYCOSYLATED A1C: CPT

## 2018-06-07 PROCEDURE — G8599 NO ASA/ANTIPLAT THER USE RNG: HCPCS | Performed by: NURSE PRACTITIONER

## 2018-06-07 PROCEDURE — 1123F ACP DISCUSS/DSCN MKR DOCD: CPT | Performed by: NURSE PRACTITIONER

## 2018-06-07 PROCEDURE — G8417 CALC BMI ABV UP PARAM F/U: HCPCS | Performed by: NURSE PRACTITIONER

## 2018-06-07 PROCEDURE — G8926 SPIRO NO PERF OR DOC: HCPCS | Performed by: NURSE PRACTITIONER

## 2018-06-07 PROCEDURE — 84484 ASSAY OF TROPONIN QUANT: CPT

## 2018-06-07 PROCEDURE — 1090F PRES/ABSN URINE INCON ASSESS: CPT | Performed by: NURSE PRACTITIONER

## 2018-06-07 PROCEDURE — 80053 COMPREHEN METABOLIC PANEL: CPT

## 2018-06-07 PROCEDURE — 3017F COLORECTAL CA SCREEN DOC REV: CPT | Performed by: NURSE PRACTITIONER

## 2018-06-07 PROCEDURE — 36415 COLL VENOUS BLD VENIPUNCTURE: CPT

## 2018-06-07 RX ORDER — FLUTICASONE PROPIONATE 50 MCG
1 SPRAY, SUSPENSION (ML) NASAL DAILY
Qty: 1 BOTTLE | Refills: 3 | Status: ON HOLD | OUTPATIENT
Start: 2018-06-07 | End: 2018-06-12

## 2018-06-08 ENCOUNTER — TELEPHONE (OUTPATIENT)
Dept: FAMILY MEDICINE CLINIC | Age: 73
End: 2018-06-08

## 2018-06-08 DIAGNOSIS — R93.89 ABNORMAL CXR: Primary | ICD-10-CM

## 2018-06-11 ENCOUNTER — HOSPITAL ENCOUNTER (EMERGENCY)
Age: 73
Discharge: HOME | End: 2018-06-12
Attending: EMERGENCY MEDICINE
Payer: MEDICARE

## 2018-06-11 DIAGNOSIS — J44.1 COPD EXACERBATION (HCC): Primary | ICD-10-CM

## 2018-06-11 LAB
BASOPHILS ABSOLUTE: 0 K/UL (ref 0–0.2)
BASOPHILS RELATIVE PERCENT: 0 %
EKG ATRIAL RATE: 108 BPM
EKG P AXIS: 69 DEGREES
EKG P-R INTERVAL: 146 MS
EKG Q-T INTERVAL: 360 MS
EKG QRS DURATION: 124 MS
EKG QTC CALCULATION (BAZETT): 482 MS
EKG R AXIS: 89 DEGREES
EKG T AXIS: 55 DEGREES
EKG VENTRICULAR RATE: 108 BPM
EOSINOPHILS ABSOLUTE: 0 K/UL (ref 0–0.7)
EOSINOPHILS RELATIVE PERCENT: 0 %
HCT VFR BLD CALC: 38.9 % (ref 37–47)
HEMOGLOBIN: 13.3 G/DL (ref 12–16)
LYMPHOCYTES ABSOLUTE: 0.7 K/UL (ref 1–4.8)
LYMPHOCYTES RELATIVE PERCENT: 4.5 %
MCH RBC QN AUTO: 31.6 PG (ref 27–31.3)
MCHC RBC AUTO-ENTMCNC: 34.1 % (ref 33–37)
MCV RBC AUTO: 92.7 FL (ref 82–100)
MONOCYTES ABSOLUTE: 1 K/UL (ref 0.2–0.8)
MONOCYTES RELATIVE PERCENT: 6.1 %
NEUTROPHILS ABSOLUTE: 14.7 K/UL (ref 1.4–6.5)
NEUTROPHILS RELATIVE PERCENT: 89.4 %
PDW BLD-RTO: 13.5 % (ref 11.5–14.5)
PLATELET # BLD: 291 K/UL (ref 130–400)
RBC # BLD: 4.2 M/UL (ref 4.2–5.4)
WBC # BLD: 16.4 K/UL (ref 4.8–10.8)

## 2018-06-11 PROCEDURE — 6370000000 HC RX 637 (ALT 250 FOR IP): Performed by: EMERGENCY MEDICINE

## 2018-06-11 PROCEDURE — 93005 ELECTROCARDIOGRAM TRACING: CPT

## 2018-06-11 PROCEDURE — 94640 AIRWAY INHALATION TREATMENT: CPT

## 2018-06-11 PROCEDURE — 84484 ASSAY OF TROPONIN QUANT: CPT

## 2018-06-11 PROCEDURE — 83880 ASSAY OF NATRIURETIC PEPTIDE: CPT

## 2018-06-11 PROCEDURE — 85025 COMPLETE CBC W/AUTO DIFF WBC: CPT

## 2018-06-11 PROCEDURE — 80048 BASIC METABOLIC PNL TOTAL CA: CPT

## 2018-06-11 PROCEDURE — 36415 COLL VENOUS BLD VENIPUNCTURE: CPT

## 2018-06-11 PROCEDURE — 99284 EMERGENCY DEPT VISIT MOD MDM: CPT

## 2018-06-11 RX ORDER — IPRATROPIUM BROMIDE AND ALBUTEROL SULFATE 2.5; .5 MG/3ML; MG/3ML
1 SOLUTION RESPIRATORY (INHALATION) ONCE
Status: COMPLETED | OUTPATIENT
Start: 2018-06-11 | End: 2018-06-11

## 2018-06-11 RX ADMIN — IPRATROPIUM BROMIDE AND ALBUTEROL SULFATE 1 AMPULE: .5; 3 SOLUTION RESPIRATORY (INHALATION) at 23:00

## 2018-06-11 ASSESSMENT — ENCOUNTER SYMPTOMS
COUGH: 1
ABDOMINAL PAIN: 0
SHORTNESS OF BREATH: 1

## 2018-06-12 ENCOUNTER — APPOINTMENT (OUTPATIENT)
Dept: GENERAL RADIOLOGY | Age: 73
DRG: 192 | End: 2018-06-12
Payer: MEDICARE

## 2018-06-12 ENCOUNTER — HOSPITAL ENCOUNTER (INPATIENT)
Age: 73
LOS: 6 days | Discharge: HOME | DRG: 192 | End: 2018-06-18
Attending: STUDENT IN AN ORGANIZED HEALTH CARE EDUCATION/TRAINING PROGRAM | Admitting: INTERNAL MEDICINE
Payer: MEDICARE

## 2018-06-12 VITALS
SYSTOLIC BLOOD PRESSURE: 117 MMHG | TEMPERATURE: 98.6 F | HEART RATE: 115 BPM | OXYGEN SATURATION: 93 % | RESPIRATION RATE: 18 BRPM | DIASTOLIC BLOOD PRESSURE: 74 MMHG

## 2018-06-12 DIAGNOSIS — R79.82 ELEVATED C-REACTIVE PROTEIN (CRP): ICD-10-CM

## 2018-06-12 DIAGNOSIS — R91.1 PULMONARY NODULE: ICD-10-CM

## 2018-06-12 DIAGNOSIS — J44.1 ACUTE EXACERBATION OF CHRONIC OBSTRUCTIVE PULMONARY DISEASE (COPD) (HCC): Primary | ICD-10-CM

## 2018-06-12 DIAGNOSIS — Z78.9 FAILURE OF OUTPATIENT TREATMENT: ICD-10-CM

## 2018-06-12 DIAGNOSIS — R09.02 HYPOXIA: ICD-10-CM

## 2018-06-12 LAB
6-ACETYLMORPHINE: NOT DETECTED
7-AMINOCLONAZEPAM: NOT DETECTED
ALBUMIN SERPL-MCNC: 4 G/DL (ref 3.9–4.9)
ALP BLD-CCNC: 69 U/L (ref 40–130)
ALPHA-OH-ALPRAZOLAM: PRESENT
ALPRAZOLAM: NOT DETECTED
ALT SERPL-CCNC: 10 U/L (ref 0–33)
AMPHETAMINE: NOT DETECTED
ANION GAP SERPL CALCULATED.3IONS-SCNC: 15 MEQ/L (ref 7–13)
ANION GAP SERPL CALCULATED.3IONS-SCNC: 16 MEQ/L (ref 7–13)
APTT: 25.6 SEC (ref 21.6–35.4)
AST SERPL-CCNC: 16 U/L (ref 0–35)
BARBITURATES: NOT DETECTED
BASOPHILS ABSOLUTE: 0 K/UL (ref 0–0.2)
BASOPHILS RELATIVE PERCENT: 0.1 %
BENZOYLECGONINE: NOT DETECTED
BILIRUB SERPL-MCNC: 0.6 MG/DL (ref 0–1.2)
BUN BLDV-MCNC: 11 MG/DL (ref 8–23)
BUN BLDV-MCNC: 12 MG/DL (ref 8–23)
BUPRENORPHINE: NOT DETECTED
C-REACTIVE PROTEIN, HIGH SENSITIVITY: 101.3 MG/L (ref 0–5)
CALCIUM SERPL-MCNC: 9.6 MG/DL (ref 8.6–10.2)
CALCIUM SERPL-MCNC: 9.9 MG/DL (ref 8.6–10.2)
CARISOPRODOL: NOT DETECTED
CHLORIDE BLD-SCNC: 96 MEQ/L (ref 98–107)
CHLORIDE BLD-SCNC: 98 MEQ/L (ref 98–107)
CLONAZEPAM: NOT DETECTED
CO2: 24 MEQ/L (ref 22–29)
CO2: 25 MEQ/L (ref 22–29)
CODEINE: NOT DETECTED
CREAT SERPL-MCNC: 0.69 MG/DL (ref 0.5–0.9)
CREAT SERPL-MCNC: 0.8 MG/DL (ref 0.5–0.9)
CREATININE URINE: 100 MG/DL (ref 20–400)
DIAZEPAM: NOT DETECTED
EER PAIN MGT DRUG PANEL, HIGH RES/EMIT U: NORMAL
EOSINOPHILS ABSOLUTE: 0 K/UL (ref 0–0.7)
EOSINOPHILS RELATIVE PERCENT: 0.1 %
ETHYL GLUCURONIDE: NOT DETECTED
FENTANYL: NOT DETECTED
GFR AFRICAN AMERICAN: >60
GFR AFRICAN AMERICAN: >60
GFR NON-AFRICAN AMERICAN: >60
GFR NON-AFRICAN AMERICAN: >60
GLOBULIN: 2.9 G/DL (ref 2.3–3.5)
GLUCOSE BLD-MCNC: 125 MG/DL (ref 74–109)
GLUCOSE BLD-MCNC: 149 MG/DL (ref 74–109)
HCT VFR BLD CALC: 38.8 % (ref 37–47)
HEMOGLOBIN: 13.2 G/DL (ref 12–16)
HYDROCODONE: NOT DETECTED
HYDROMORPHONE: NOT DETECTED
INR BLD: 0.9
LACTIC ACID: 1.4 MMOL/L (ref 0.5–2.2)
LORAZEPAM: NOT DETECTED
LYMPHOCYTES ABSOLUTE: 0.5 K/UL (ref 1–4.8)
LYMPHOCYTES RELATIVE PERCENT: 3.8 %
MAGNESIUM: 2 MG/DL (ref 1.7–2.3)
MARIJUANA METABOLITE: NOT DETECTED
MCH RBC QN AUTO: 32.2 PG (ref 27–31.3)
MCHC RBC AUTO-ENTMCNC: 34.1 % (ref 33–37)
MCV RBC AUTO: 94.4 FL (ref 82–100)
MDA: NOT DETECTED
MDEA: NOT DETECTED
MDMA URINE: NOT DETECTED
MEPERIDINE: NOT DETECTED
METHADONE: NOT DETECTED
METHAMPHETAMINE: NOT DETECTED
METHYLPHENIDATE: NOT DETECTED
MIDAZOLAM: NOT DETECTED
MONOCYTES ABSOLUTE: 1.2 K/UL (ref 0.2–0.8)
MONOCYTES RELATIVE PERCENT: 9.3 %
MORPHINE: NOT DETECTED
NEUTROPHILS ABSOLUTE: 11.1 K/UL (ref 1.4–6.5)
NEUTROPHILS RELATIVE PERCENT: 86.7 %
NORBUPRENORPHINE, FREE: NOT DETECTED
NORDIAZEPAM: NOT DETECTED
NORFENTANYL: NOT DETECTED
NORHYDROCODONE, URINE: NOT DETECTED
NOROXYCODONE: NOT DETECTED
NOROXYMORPHONE, URINE: NOT DETECTED
OXAZEPAM: NOT DETECTED
OXYCODONE: NOT DETECTED
OXYMORPHONE: NOT DETECTED
PAIN MANAGEMENT DRUG PANEL: NORMAL
PCP: NOT DETECTED
PDW BLD-RTO: 13.9 % (ref 11.5–14.5)
PHENTERMINE: NOT DETECTED
PLATELET # BLD: 262 K/UL (ref 130–400)
POTASSIUM SERPL-SCNC: 4 MEQ/L (ref 3.5–5.1)
POTASSIUM SERPL-SCNC: 4.1 MEQ/L (ref 3.5–5.1)
PRO-BNP: 252 PG/ML
PRO-BNP: 307 PG/ML
PROPOXYPHENE: NOT DETECTED
PROTHROMBIN TIME: 9.9 SEC (ref 9.6–12.3)
RBC # BLD: 4.1 M/UL (ref 4.2–5.4)
SODIUM BLD-SCNC: 135 MEQ/L (ref 132–144)
SODIUM BLD-SCNC: 139 MEQ/L (ref 132–144)
TAPENTADOL, URINE: NOT DETECTED
TAPENTADOL-O-SULFATE, URINE: NOT DETECTED
TEMAZEPAM: NOT DETECTED
TOTAL CK: 99 U/L (ref 0–170)
TOTAL PROTEIN: 6.9 G/DL (ref 6.4–8.1)
TRAMADOL: PRESENT
TROPONIN: <0.01 NG/ML (ref 0–0.01)
TROPONIN: <0.01 NG/ML (ref 0–0.01)
WBC # BLD: 12.8 K/UL (ref 4.8–10.8)
ZOLPIDEM: NOT DETECTED

## 2018-06-12 PROCEDURE — 85610 PROTHROMBIN TIME: CPT

## 2018-06-12 PROCEDURE — 80053 COMPREHEN METABOLIC PANEL: CPT

## 2018-06-12 PROCEDURE — 83735 ASSAY OF MAGNESIUM: CPT

## 2018-06-12 PROCEDURE — 87040 BLOOD CULTURE FOR BACTERIA: CPT

## 2018-06-12 PROCEDURE — 6360000002 HC RX W HCPCS: Performed by: STUDENT IN AN ORGANIZED HEALTH CARE EDUCATION/TRAINING PROGRAM

## 2018-06-12 PROCEDURE — 94640 AIRWAY INHALATION TREATMENT: CPT

## 2018-06-12 PROCEDURE — 99285 EMERGENCY DEPT VISIT HI MDM: CPT

## 2018-06-12 PROCEDURE — 99223 1ST HOSP IP/OBS HIGH 75: CPT | Performed by: INTERNAL MEDICINE

## 2018-06-12 PROCEDURE — 85730 THROMBOPLASTIN TIME PARTIAL: CPT

## 2018-06-12 PROCEDURE — 83880 ASSAY OF NATRIURETIC PEPTIDE: CPT

## 2018-06-12 PROCEDURE — 84484 ASSAY OF TROPONIN QUANT: CPT

## 2018-06-12 PROCEDURE — 94664 DEMO&/EVAL PT USE INHALER: CPT

## 2018-06-12 PROCEDURE — 96365 THER/PROPH/DIAG IV INF INIT: CPT

## 2018-06-12 PROCEDURE — 71045 X-RAY EXAM CHEST 1 VIEW: CPT

## 2018-06-12 PROCEDURE — 6370000000 HC RX 637 (ALT 250 FOR IP): Performed by: INTERNAL MEDICINE

## 2018-06-12 PROCEDURE — 2580000003 HC RX 258: Performed by: STUDENT IN AN ORGANIZED HEALTH CARE EDUCATION/TRAINING PROGRAM

## 2018-06-12 PROCEDURE — 6360000002 HC RX W HCPCS: Performed by: INTERNAL MEDICINE

## 2018-06-12 PROCEDURE — 82550 ASSAY OF CK (CPK): CPT

## 2018-06-12 PROCEDURE — 2060000000 HC ICU INTERMEDIATE R&B

## 2018-06-12 PROCEDURE — 83605 ASSAY OF LACTIC ACID: CPT

## 2018-06-12 PROCEDURE — 96375 TX/PRO/DX INJ NEW DRUG ADDON: CPT

## 2018-06-12 PROCEDURE — 96367 TX/PROPH/DG ADDL SEQ IV INF: CPT

## 2018-06-12 PROCEDURE — 2580000003 HC RX 258: Performed by: INTERNAL MEDICINE

## 2018-06-12 PROCEDURE — 6370000000 HC RX 637 (ALT 250 FOR IP): Performed by: STUDENT IN AN ORGANIZED HEALTH CARE EDUCATION/TRAINING PROGRAM

## 2018-06-12 PROCEDURE — 85025 COMPLETE CBC W/AUTO DIFF WBC: CPT

## 2018-06-12 PROCEDURE — 86141 C-REACTIVE PROTEIN HS: CPT

## 2018-06-12 PROCEDURE — 36415 COLL VENOUS BLD VENIPUNCTURE: CPT

## 2018-06-12 RX ORDER — BUPROPION HYDROCHLORIDE 150 MG/1
150 TABLET ORAL EVERY MORNING
Status: DISCONTINUED | OUTPATIENT
Start: 2018-06-12 | End: 2018-06-18 | Stop reason: HOSPADM

## 2018-06-12 RX ORDER — TRAZODONE HYDROCHLORIDE 150 MG/1
150 TABLET ORAL NIGHTLY
Status: DISCONTINUED | OUTPATIENT
Start: 2018-06-12 | End: 2018-06-18 | Stop reason: HOSPADM

## 2018-06-12 RX ORDER — VENLAFAXINE HYDROCHLORIDE 75 MG/1
75 CAPSULE, EXTENDED RELEASE ORAL DAILY
Status: DISCONTINUED | OUTPATIENT
Start: 2018-06-12 | End: 2018-06-18 | Stop reason: HOSPADM

## 2018-06-12 RX ORDER — METHYLPREDNISOLONE SODIUM SUCCINATE 40 MG/ML
40 INJECTION, POWDER, LYOPHILIZED, FOR SOLUTION INTRAMUSCULAR; INTRAVENOUS EVERY 8 HOURS
Status: DISCONTINUED | OUTPATIENT
Start: 2018-06-12 | End: 2018-06-15

## 2018-06-12 RX ORDER — ONDANSETRON 2 MG/ML
4 INJECTION INTRAMUSCULAR; INTRAVENOUS EVERY 6 HOURS PRN
Status: DISCONTINUED | OUTPATIENT
Start: 2018-06-12 | End: 2018-06-18 | Stop reason: HOSPADM

## 2018-06-12 RX ORDER — ALBUTEROL SULFATE 2.5 MG/3ML
2.5 SOLUTION RESPIRATORY (INHALATION) 3 TIMES DAILY
Status: DISCONTINUED | OUTPATIENT
Start: 2018-06-12 | End: 2018-06-18

## 2018-06-12 RX ORDER — SODIUM CHLORIDE 0.9 % (FLUSH) 0.9 %
10 SYRINGE (ML) INJECTION EVERY 12 HOURS SCHEDULED
Status: DISCONTINUED | OUTPATIENT
Start: 2018-06-12 | End: 2018-06-18 | Stop reason: HOSPADM

## 2018-06-12 RX ORDER — OMEPRAZOLE 20 MG/1
1 CAPSULE, DELAYED RELEASE ORAL DAILY
Status: DISCONTINUED | OUTPATIENT
Start: 2018-06-12 | End: 2018-06-12 | Stop reason: CLARIF

## 2018-06-12 RX ORDER — PANTOPRAZOLE SODIUM 40 MG/1
40 TABLET, DELAYED RELEASE ORAL
Status: DISCONTINUED | OUTPATIENT
Start: 2018-06-13 | End: 2018-06-12

## 2018-06-12 RX ORDER — MAGNESIUM SULFATE IN WATER 40 MG/ML
2 INJECTION, SOLUTION INTRAVENOUS ONCE
Status: COMPLETED | OUTPATIENT
Start: 2018-06-12 | End: 2018-06-12

## 2018-06-12 RX ORDER — BUPROPION HYDROCHLORIDE 75 MG/1
75 TABLET ORAL 2 TIMES DAILY
Status: DISCONTINUED | OUTPATIENT
Start: 2018-06-12 | End: 2018-06-12 | Stop reason: SDUPTHER

## 2018-06-12 RX ORDER — METHYLPREDNISOLONE SODIUM SUCCINATE 125 MG/2ML
125 INJECTION, POWDER, LYOPHILIZED, FOR SOLUTION INTRAMUSCULAR; INTRAVENOUS ONCE
Status: COMPLETED | OUTPATIENT
Start: 2018-06-12 | End: 2018-06-12

## 2018-06-12 RX ORDER — BISOPROLOL FUMARATE 5 MG/1
TABLET ORAL DAILY
Status: DISCONTINUED | OUTPATIENT
Start: 2018-06-12 | End: 2018-06-15 | Stop reason: CLARIF

## 2018-06-12 RX ORDER — BUDESONIDE AND FORMOTEROL FUMARATE DIHYDRATE 80; 4.5 UG/1; UG/1
2 AEROSOL RESPIRATORY (INHALATION) 2 TIMES DAILY
Status: DISCONTINUED | OUTPATIENT
Start: 2018-06-12 | End: 2018-06-12 | Stop reason: CLARIF

## 2018-06-12 RX ORDER — ALBUTEROL SULFATE 2.5 MG/3ML
2.5 SOLUTION RESPIRATORY (INHALATION)
Status: DISCONTINUED | OUTPATIENT
Start: 2018-06-12 | End: 2018-06-18

## 2018-06-12 RX ORDER — ALBUTEROL SULFATE 2.5 MG/3ML
2.5 SOLUTION RESPIRATORY (INHALATION) EVERY 10 MIN PRN
Status: DISCONTINUED | OUTPATIENT
Start: 2018-06-12 | End: 2018-06-12

## 2018-06-12 RX ORDER — LEVOTHYROXINE SODIUM 0.07 MG/1
75 TABLET ORAL DAILY
Status: DISCONTINUED | OUTPATIENT
Start: 2018-06-12 | End: 2018-06-18 | Stop reason: HOSPADM

## 2018-06-12 RX ORDER — THEOPHYLLINE 400 MG/1
400 TABLET, EXTENDED RELEASE ORAL DAILY
Status: DISCONTINUED | OUTPATIENT
Start: 2018-06-12 | End: 2018-06-18 | Stop reason: HOSPADM

## 2018-06-12 RX ORDER — ALPRAZOLAM 0.25 MG/1
0.25 TABLET ORAL 3 TIMES DAILY PRN
Status: DISCONTINUED | OUTPATIENT
Start: 2018-06-12 | End: 2018-06-18 | Stop reason: HOSPADM

## 2018-06-12 RX ORDER — ALBUTEROL SULFATE 2.5 MG/3ML
2.5 SOLUTION RESPIRATORY (INHALATION)
Status: DISCONTINUED | OUTPATIENT
Start: 2018-06-12 | End: 2018-06-12

## 2018-06-12 RX ORDER — IPRATROPIUM BROMIDE AND ALBUTEROL SULFATE 2.5; .5 MG/3ML; MG/3ML
1 SOLUTION RESPIRATORY (INHALATION) ONCE
Status: COMPLETED | OUTPATIENT
Start: 2018-06-12 | End: 2018-06-12

## 2018-06-12 RX ORDER — SODIUM CHLORIDE 0.9 % (FLUSH) 0.9 %
10 SYRINGE (ML) INJECTION PRN
Status: DISCONTINUED | OUTPATIENT
Start: 2018-06-12 | End: 2018-06-18 | Stop reason: HOSPADM

## 2018-06-12 RX ORDER — FLUTICASONE PROPIONATE 50 MCG
1 SPRAY, SUSPENSION (ML) NASAL DAILY
Status: DISCONTINUED | OUTPATIENT
Start: 2018-06-12 | End: 2018-06-12

## 2018-06-12 RX ADMIN — IPRATROPIUM BROMIDE AND ALBUTEROL SULFATE 1 AMPULE: .5; 3 SOLUTION RESPIRATORY (INHALATION) at 10:45

## 2018-06-12 RX ADMIN — Medication 10 ML: at 21:57

## 2018-06-12 RX ADMIN — THEOPHYLLINE 400 MG: 400 TABLET, EXTENDED RELEASE ORAL at 15:34

## 2018-06-12 RX ADMIN — CEFTRIAXONE SODIUM 1 G: 1 INJECTION, POWDER, FOR SOLUTION INTRAMUSCULAR; INTRAVENOUS at 12:28

## 2018-06-12 RX ADMIN — TRAZODONE HYDROCHLORIDE 150 MG: 150 TABLET ORAL at 21:56

## 2018-06-12 RX ADMIN — BUPROPION HYDROCHLORIDE 150 MG: 150 TABLET, EXTENDED RELEASE ORAL at 15:34

## 2018-06-12 RX ADMIN — METHYLPREDNISOLONE SODIUM SUCCINATE 40 MG: 40 INJECTION, POWDER, FOR SOLUTION INTRAMUSCULAR; INTRAVENOUS at 21:56

## 2018-06-12 RX ADMIN — ENOXAPARIN SODIUM 40 MG: 40 INJECTION SUBCUTANEOUS at 15:33

## 2018-06-12 RX ADMIN — METHYLPREDNISOLONE SODIUM SUCCINATE 125 MG: 125 INJECTION, POWDER, FOR SOLUTION INTRAMUSCULAR; INTRAVENOUS at 11:01

## 2018-06-12 RX ADMIN — ALBUTEROL SULFATE 2.5 MG: 2.5 SOLUTION RESPIRATORY (INHALATION) at 10:55

## 2018-06-12 RX ADMIN — VENLAFAXINE HYDROCHLORIDE 75 MG: 75 CAPSULE, EXTENDED RELEASE ORAL at 21:56

## 2018-06-12 RX ADMIN — METHYLPREDNISOLONE SODIUM SUCCINATE 40 MG: 40 INJECTION, POWDER, FOR SOLUTION INTRAMUSCULAR; INTRAVENOUS at 15:33

## 2018-06-12 RX ADMIN — ALBUTEROL SULFATE 2.5 MG: 2.5 SOLUTION RESPIRATORY (INHALATION) at 14:53

## 2018-06-12 RX ADMIN — ALBUTEROL SULFATE 2.5 MG: 2.5 SOLUTION RESPIRATORY (INHALATION) at 11:06

## 2018-06-12 RX ADMIN — LEVOTHYROXINE SODIUM 75 MCG: 75 TABLET ORAL at 15:34

## 2018-06-12 RX ADMIN — Medication 2 PUFF: at 19:32

## 2018-06-12 RX ADMIN — AZITHROMYCIN MONOHYDRATE 500 MG: 500 INJECTION, POWDER, LYOPHILIZED, FOR SOLUTION INTRAVENOUS at 12:52

## 2018-06-12 RX ADMIN — ALBUTEROL SULFATE 2.5 MG: 2.5 SOLUTION RESPIRATORY (INHALATION) at 19:32

## 2018-06-12 RX ADMIN — MAGNESIUM SULFATE HEPTAHYDRATE 2 G: 40 INJECTION, SOLUTION INTRAVENOUS at 11:01

## 2018-06-12 ASSESSMENT — ENCOUNTER SYMPTOMS
CHEST TIGHTNESS: 0
BACK PAIN: 0
SINUS PRESSURE: 0
ABDOMINAL PAIN: 0
SHORTNESS OF BREATH: 1
WHEEZING: 1
COUGH: 1
NAUSEA: 0
DIARRHEA: 0
TROUBLE SWALLOWING: 0
VOMITING: 0

## 2018-06-12 ASSESSMENT — PULMONARY FUNCTION TESTS
PEFR_L/MIN: 80
PEFR_L/MIN: 150

## 2018-06-12 ASSESSMENT — PAIN SCALES - GENERAL: PAINLEVEL_OUTOF10: 0

## 2018-06-13 ENCOUNTER — APPOINTMENT (OUTPATIENT)
Dept: CT IMAGING | Age: 73
DRG: 192 | End: 2018-06-13
Payer: MEDICARE

## 2018-06-13 ENCOUNTER — APPOINTMENT (OUTPATIENT)
Dept: WOMENS IMAGING | Age: 73
DRG: 192 | End: 2018-06-13
Payer: MEDICARE

## 2018-06-13 ENCOUNTER — APPOINTMENT (OUTPATIENT)
Dept: ULTRASOUND IMAGING | Age: 73
DRG: 192 | End: 2018-06-13
Payer: MEDICARE

## 2018-06-13 LAB
ANION GAP SERPL CALCULATED.3IONS-SCNC: 13 MEQ/L (ref 7–13)
BASOPHILS ABSOLUTE: 0 K/UL (ref 0–0.2)
BASOPHILS RELATIVE PERCENT: 0.1 %
BUN BLDV-MCNC: 12 MG/DL (ref 8–23)
CALCIUM SERPL-MCNC: 9.2 MG/DL (ref 8.6–10.2)
CHLORIDE BLD-SCNC: 100 MEQ/L (ref 98–107)
CO2: 25 MEQ/L (ref 22–29)
CREAT SERPL-MCNC: 0.72 MG/DL (ref 0.5–0.9)
EOSINOPHILS ABSOLUTE: 0 K/UL (ref 0–0.7)
EOSINOPHILS RELATIVE PERCENT: 0 %
GFR AFRICAN AMERICAN: >60
GFR NON-AFRICAN AMERICAN: >60
GLUCOSE BLD-MCNC: 153 MG/DL (ref 74–109)
HCT VFR BLD CALC: 36 % (ref 37–47)
HEMOGLOBIN: 11.3 G/DL (ref 12–16)
LYMPHOCYTES ABSOLUTE: 0.5 K/UL (ref 1–4.8)
LYMPHOCYTES RELATIVE PERCENT: 6 %
MCH RBC QN AUTO: 29.6 PG (ref 27–31.3)
MCHC RBC AUTO-ENTMCNC: 31.3 % (ref 33–37)
MCV RBC AUTO: 94.4 FL (ref 82–100)
MONOCYTES ABSOLUTE: 0.4 K/UL (ref 0.2–0.8)
MONOCYTES RELATIVE PERCENT: 5.1 %
NEUTROPHILS ABSOLUTE: 6.8 K/UL (ref 1.4–6.5)
NEUTROPHILS RELATIVE PERCENT: 88.8 %
PDW BLD-RTO: 13.5 % (ref 11.5–14.5)
PLATELET # BLD: 246 K/UL (ref 130–400)
POTASSIUM REFLEX MAGNESIUM: 4.2 MEQ/L (ref 3.5–5.1)
RBC # BLD: 3.82 M/UL (ref 4.2–5.4)
SODIUM BLD-SCNC: 138 MEQ/L (ref 132–144)
WBC # BLD: 7.7 K/UL (ref 4.8–10.8)

## 2018-06-13 PROCEDURE — 71260 CT THORAX DX C+: CPT

## 2018-06-13 PROCEDURE — 99221 1ST HOSP IP/OBS SF/LOW 40: CPT | Performed by: SURGERY

## 2018-06-13 PROCEDURE — 2700000000 HC OXYGEN THERAPY PER DAY

## 2018-06-13 PROCEDURE — 80048 BASIC METABOLIC PNL TOTAL CA: CPT

## 2018-06-13 PROCEDURE — 94640 AIRWAY INHALATION TREATMENT: CPT

## 2018-06-13 PROCEDURE — 2580000003 HC RX 258: Performed by: INTERNAL MEDICINE

## 2018-06-13 PROCEDURE — 6360000002 HC RX W HCPCS: Performed by: INTERNAL MEDICINE

## 2018-06-13 PROCEDURE — 6360000004 HC RX CONTRAST MEDICATION: Performed by: INTERNAL MEDICINE

## 2018-06-13 PROCEDURE — 76642 ULTRASOUND BREAST LIMITED: CPT

## 2018-06-13 PROCEDURE — 2060000000 HC ICU INTERMEDIATE R&B

## 2018-06-13 PROCEDURE — 6370000000 HC RX 637 (ALT 250 FOR IP): Performed by: INTERNAL MEDICINE

## 2018-06-13 PROCEDURE — G0279 TOMOSYNTHESIS, MAMMO: HCPCS

## 2018-06-13 PROCEDURE — 85025 COMPLETE CBC W/AUTO DIFF WBC: CPT

## 2018-06-13 PROCEDURE — 99232 SBSQ HOSP IP/OBS MODERATE 35: CPT | Performed by: INTERNAL MEDICINE

## 2018-06-13 PROCEDURE — 94760 N-INVAS EAR/PLS OXIMETRY 1: CPT

## 2018-06-13 PROCEDURE — 36415 COLL VENOUS BLD VENIPUNCTURE: CPT

## 2018-06-13 RX ADMIN — ALPRAZOLAM 0.25 MG: 0.25 TABLET ORAL at 13:20

## 2018-06-13 RX ADMIN — THEOPHYLLINE 400 MG: 400 TABLET, EXTENDED RELEASE ORAL at 08:39

## 2018-06-13 RX ADMIN — LEVOTHYROXINE SODIUM 75 MCG: 75 TABLET ORAL at 08:39

## 2018-06-13 RX ADMIN — BUPROPION HYDROCHLORIDE 150 MG: 150 TABLET, EXTENDED RELEASE ORAL at 08:39

## 2018-06-13 RX ADMIN — METHYLPREDNISOLONE SODIUM SUCCINATE 40 MG: 40 INJECTION, POWDER, FOR SOLUTION INTRAMUSCULAR; INTRAVENOUS at 05:55

## 2018-06-13 RX ADMIN — IOPAMIDOL 75 ML: 755 INJECTION, SOLUTION INTRAVENOUS at 07:43

## 2018-06-13 RX ADMIN — Medication 2 PUFF: at 19:37

## 2018-06-13 RX ADMIN — METHYLPREDNISOLONE SODIUM SUCCINATE 40 MG: 40 INJECTION, POWDER, FOR SOLUTION INTRAMUSCULAR; INTRAVENOUS at 22:34

## 2018-06-13 RX ADMIN — AZITHROMYCIN MONOHYDRATE 500 MG: 500 INJECTION, POWDER, LYOPHILIZED, FOR SOLUTION INTRAVENOUS at 15:53

## 2018-06-13 RX ADMIN — TRAZODONE HYDROCHLORIDE 150 MG: 150 TABLET ORAL at 22:34

## 2018-06-13 RX ADMIN — Medication 10 ML: at 22:34

## 2018-06-13 RX ADMIN — ALBUTEROL SULFATE 2.5 MG: 2.5 SOLUTION RESPIRATORY (INHALATION) at 19:38

## 2018-06-13 RX ADMIN — VENLAFAXINE HYDROCHLORIDE 75 MG: 75 CAPSULE, EXTENDED RELEASE ORAL at 08:39

## 2018-06-13 RX ADMIN — METHYLPREDNISOLONE SODIUM SUCCINATE 40 MG: 40 INJECTION, POWDER, FOR SOLUTION INTRAMUSCULAR; INTRAVENOUS at 13:20

## 2018-06-13 RX ADMIN — TIOTROPIUM BROMIDE 18 MCG: 18 CAPSULE ORAL; RESPIRATORY (INHALATION) at 09:53

## 2018-06-13 RX ADMIN — ALBUTEROL SULFATE 2.5 MG: 2.5 SOLUTION RESPIRATORY (INHALATION) at 09:51

## 2018-06-13 RX ADMIN — Medication 2 PUFF: at 09:54

## 2018-06-13 RX ADMIN — ENOXAPARIN SODIUM 40 MG: 40 INJECTION SUBCUTANEOUS at 08:39

## 2018-06-13 ASSESSMENT — PAIN SCALES - GENERAL
PAINLEVEL_OUTOF10: 0
PAINLEVEL_OUTOF10: 0

## 2018-06-13 ASSESSMENT — ENCOUNTER SYMPTOMS
SORE THROAT: 0
FACIAL SWELLING: 0
PHOTOPHOBIA: 0

## 2018-06-14 PROCEDURE — 2060000000 HC ICU INTERMEDIATE R&B

## 2018-06-14 PROCEDURE — 2580000003 HC RX 258: Performed by: INTERNAL MEDICINE

## 2018-06-14 PROCEDURE — 94640 AIRWAY INHALATION TREATMENT: CPT

## 2018-06-14 PROCEDURE — 2700000000 HC OXYGEN THERAPY PER DAY

## 2018-06-14 PROCEDURE — 6360000002 HC RX W HCPCS: Performed by: INTERNAL MEDICINE

## 2018-06-14 PROCEDURE — 99232 SBSQ HOSP IP/OBS MODERATE 35: CPT | Performed by: INTERNAL MEDICINE

## 2018-06-14 PROCEDURE — 6370000000 HC RX 637 (ALT 250 FOR IP): Performed by: INTERNAL MEDICINE

## 2018-06-14 RX ORDER — ACETAMINOPHEN 325 MG/1
650 TABLET ORAL EVERY 4 HOURS PRN
Status: DISCONTINUED | OUTPATIENT
Start: 2018-06-14 | End: 2018-06-18 | Stop reason: HOSPADM

## 2018-06-14 RX ADMIN — TRAZODONE HYDROCHLORIDE 150 MG: 150 TABLET ORAL at 21:55

## 2018-06-14 RX ADMIN — LEVOTHYROXINE SODIUM 75 MCG: 75 TABLET ORAL at 11:01

## 2018-06-14 RX ADMIN — METHYLPREDNISOLONE SODIUM SUCCINATE 40 MG: 40 INJECTION, POWDER, FOR SOLUTION INTRAMUSCULAR; INTRAVENOUS at 05:22

## 2018-06-14 RX ADMIN — Medication 10 ML: at 11:03

## 2018-06-14 RX ADMIN — BUPROPION HYDROCHLORIDE 150 MG: 150 TABLET, EXTENDED RELEASE ORAL at 11:01

## 2018-06-14 RX ADMIN — VENLAFAXINE HYDROCHLORIDE 75 MG: 75 CAPSULE, EXTENDED RELEASE ORAL at 11:01

## 2018-06-14 RX ADMIN — ENOXAPARIN SODIUM 40 MG: 40 INJECTION SUBCUTANEOUS at 11:02

## 2018-06-14 RX ADMIN — ALBUTEROL SULFATE 2.5 MG: 2.5 SOLUTION RESPIRATORY (INHALATION) at 18:57

## 2018-06-14 RX ADMIN — Medication 10 ML: at 20:41

## 2018-06-14 RX ADMIN — METHYLPREDNISOLONE SODIUM SUCCINATE 40 MG: 40 INJECTION, POWDER, FOR SOLUTION INTRAMUSCULAR; INTRAVENOUS at 14:49

## 2018-06-14 RX ADMIN — ALBUTEROL SULFATE 2.5 MG: 2.5 SOLUTION RESPIRATORY (INHALATION) at 07:49

## 2018-06-14 RX ADMIN — Medication 2 PUFF: at 18:57

## 2018-06-14 RX ADMIN — METHYLPREDNISOLONE SODIUM SUCCINATE 40 MG: 40 INJECTION, POWDER, FOR SOLUTION INTRAMUSCULAR; INTRAVENOUS at 20:39

## 2018-06-14 RX ADMIN — AZITHROMYCIN MONOHYDRATE 500 MG: 500 INJECTION, POWDER, LYOPHILIZED, FOR SOLUTION INTRAVENOUS at 12:21

## 2018-06-14 RX ADMIN — ALPRAZOLAM 0.25 MG: 0.25 TABLET ORAL at 14:50

## 2018-06-14 RX ADMIN — ALBUTEROL SULFATE 2.5 MG: 2.5 SOLUTION RESPIRATORY (INHALATION) at 13:30

## 2018-06-14 RX ADMIN — Medication 2 PUFF: at 07:49

## 2018-06-14 RX ADMIN — TIOTROPIUM BROMIDE 18 MCG: 18 CAPSULE ORAL; RESPIRATORY (INHALATION) at 07:49

## 2018-06-14 RX ADMIN — THEOPHYLLINE 400 MG: 400 TABLET, EXTENDED RELEASE ORAL at 11:01

## 2018-06-14 ASSESSMENT — PAIN SCALES - GENERAL
PAINLEVEL_OUTOF10: 0

## 2018-06-15 PROCEDURE — 6370000000 HC RX 637 (ALT 250 FOR IP): Performed by: INTERNAL MEDICINE

## 2018-06-15 PROCEDURE — 94640 AIRWAY INHALATION TREATMENT: CPT

## 2018-06-15 PROCEDURE — 94760 N-INVAS EAR/PLS OXIMETRY 1: CPT

## 2018-06-15 PROCEDURE — 2060000000 HC ICU INTERMEDIATE R&B

## 2018-06-15 PROCEDURE — 94664 DEMO&/EVAL PT USE INHALER: CPT

## 2018-06-15 PROCEDURE — 6360000002 HC RX W HCPCS: Performed by: INTERNAL MEDICINE

## 2018-06-15 PROCEDURE — 2580000003 HC RX 258: Performed by: INTERNAL MEDICINE

## 2018-06-15 PROCEDURE — 99232 SBSQ HOSP IP/OBS MODERATE 35: CPT | Performed by: INTERNAL MEDICINE

## 2018-06-15 PROCEDURE — 2700000000 HC OXYGEN THERAPY PER DAY

## 2018-06-15 RX ORDER — METHYLPREDNISOLONE SODIUM SUCCINATE 40 MG/ML
40 INJECTION, POWDER, LYOPHILIZED, FOR SOLUTION INTRAMUSCULAR; INTRAVENOUS DAILY
Status: DISCONTINUED | OUTPATIENT
Start: 2018-06-15 | End: 2018-06-16

## 2018-06-15 RX ADMIN — ALPRAZOLAM 0.25 MG: 0.25 TABLET ORAL at 11:27

## 2018-06-15 RX ADMIN — BUPROPION HYDROCHLORIDE 150 MG: 150 TABLET, EXTENDED RELEASE ORAL at 08:35

## 2018-06-15 RX ADMIN — METHYLPREDNISOLONE SODIUM SUCCINATE 40 MG: 40 INJECTION, POWDER, FOR SOLUTION INTRAMUSCULAR; INTRAVENOUS at 06:45

## 2018-06-15 RX ADMIN — ALBUTEROL SULFATE 2.5 MG: 2.5 SOLUTION RESPIRATORY (INHALATION) at 07:30

## 2018-06-15 RX ADMIN — THEOPHYLLINE 400 MG: 400 TABLET, EXTENDED RELEASE ORAL at 08:35

## 2018-06-15 RX ADMIN — VENLAFAXINE HYDROCHLORIDE 75 MG: 75 CAPSULE, EXTENDED RELEASE ORAL at 08:35

## 2018-06-15 RX ADMIN — Medication 2 PUFF: at 07:37

## 2018-06-15 RX ADMIN — LEVOTHYROXINE SODIUM 75 MCG: 75 TABLET ORAL at 08:35

## 2018-06-15 RX ADMIN — ENOXAPARIN SODIUM 40 MG: 40 INJECTION SUBCUTANEOUS at 08:34

## 2018-06-15 RX ADMIN — ALBUTEROL SULFATE 2.5 MG: 2.5 SOLUTION RESPIRATORY (INHALATION) at 21:26

## 2018-06-15 RX ADMIN — METOPROLOL TARTRATE 25 MG: 25 TABLET ORAL at 14:15

## 2018-06-15 RX ADMIN — METOPROLOL TARTRATE 25 MG: 25 TABLET ORAL at 21:15

## 2018-06-15 RX ADMIN — ALBUTEROL SULFATE 2.5 MG: 2.5 SOLUTION RESPIRATORY (INHALATION) at 11:37

## 2018-06-15 RX ADMIN — TRAZODONE HYDROCHLORIDE 150 MG: 150 TABLET ORAL at 22:27

## 2018-06-15 RX ADMIN — Medication 2 PUFF: at 21:27

## 2018-06-15 RX ADMIN — Medication 10 ML: at 21:16

## 2018-06-15 RX ADMIN — TIOTROPIUM BROMIDE 18 MCG: 18 CAPSULE ORAL; RESPIRATORY (INHALATION) at 07:29

## 2018-06-15 RX ADMIN — AZITHROMYCIN MONOHYDRATE 500 MG: 500 INJECTION, POWDER, LYOPHILIZED, FOR SOLUTION INTRAVENOUS at 11:27

## 2018-06-15 ASSESSMENT — ENCOUNTER SYMPTOMS
COUGH: 1
SHORTNESS OF BREATH: 1
VOMITING: 0
NAUSEA: 0

## 2018-06-15 ASSESSMENT — PAIN SCALES - GENERAL: PAINLEVEL_OUTOF10: 0

## 2018-06-16 PROCEDURE — 6370000000 HC RX 637 (ALT 250 FOR IP): Performed by: INTERNAL MEDICINE

## 2018-06-16 PROCEDURE — 2060000000 HC ICU INTERMEDIATE R&B

## 2018-06-16 PROCEDURE — 2580000003 HC RX 258: Performed by: INTERNAL MEDICINE

## 2018-06-16 PROCEDURE — 99232 SBSQ HOSP IP/OBS MODERATE 35: CPT | Performed by: INTERNAL MEDICINE

## 2018-06-16 PROCEDURE — 2700000000 HC OXYGEN THERAPY PER DAY

## 2018-06-16 PROCEDURE — 6360000002 HC RX W HCPCS: Performed by: INTERNAL MEDICINE

## 2018-06-16 PROCEDURE — 94640 AIRWAY INHALATION TREATMENT: CPT

## 2018-06-16 RX ORDER — PREDNISONE 20 MG/1
40 TABLET ORAL DAILY
Status: DISCONTINUED | OUTPATIENT
Start: 2018-06-16 | End: 2018-06-18 | Stop reason: HOSPADM

## 2018-06-16 RX ADMIN — VENLAFAXINE HYDROCHLORIDE 75 MG: 75 CAPSULE, EXTENDED RELEASE ORAL at 21:18

## 2018-06-16 RX ADMIN — METOPROLOL TARTRATE 25 MG: 25 TABLET ORAL at 21:19

## 2018-06-16 RX ADMIN — LEVOTHYROXINE SODIUM 75 MCG: 75 TABLET ORAL at 08:12

## 2018-06-16 RX ADMIN — ENOXAPARIN SODIUM 40 MG: 40 INJECTION SUBCUTANEOUS at 08:12

## 2018-06-16 RX ADMIN — ALBUTEROL SULFATE 2.5 MG: 2.5 SOLUTION RESPIRATORY (INHALATION) at 19:10

## 2018-06-16 RX ADMIN — Medication 10 ML: at 08:12

## 2018-06-16 RX ADMIN — ALBUTEROL SULFATE 2.5 MG: 2.5 SOLUTION RESPIRATORY (INHALATION) at 04:12

## 2018-06-16 RX ADMIN — TRAZODONE HYDROCHLORIDE 150 MG: 150 TABLET ORAL at 21:19

## 2018-06-16 RX ADMIN — Medication 2 PUFF: at 19:10

## 2018-06-16 RX ADMIN — METOPROLOL TARTRATE 25 MG: 25 TABLET ORAL at 08:12

## 2018-06-16 RX ADMIN — THEOPHYLLINE 400 MG: 400 TABLET, EXTENDED RELEASE ORAL at 08:12

## 2018-06-16 RX ADMIN — BUPROPION HYDROCHLORIDE 150 MG: 150 TABLET, EXTENDED RELEASE ORAL at 08:12

## 2018-06-16 RX ADMIN — TIOTROPIUM BROMIDE 18 MCG: 18 CAPSULE ORAL; RESPIRATORY (INHALATION) at 04:05

## 2018-06-16 RX ADMIN — METHYLPREDNISOLONE SODIUM SUCCINATE 40 MG: 40 INJECTION, POWDER, FOR SOLUTION INTRAMUSCULAR; INTRAVENOUS at 08:12

## 2018-06-16 RX ADMIN — ALBUTEROL SULFATE 2.5 MG: 2.5 SOLUTION RESPIRATORY (INHALATION) at 14:41

## 2018-06-16 RX ADMIN — Medication 2 PUFF: at 04:05

## 2018-06-16 RX ADMIN — Medication 10 ML: at 21:19

## 2018-06-16 RX ADMIN — AZITHROMYCIN MONOHYDRATE 500 MG: 500 INJECTION, POWDER, LYOPHILIZED, FOR SOLUTION INTRAVENOUS at 11:26

## 2018-06-16 ASSESSMENT — PAIN SCALES - GENERAL
PAINLEVEL_OUTOF10: 0

## 2018-06-17 LAB
BLOOD CULTURE, ROUTINE: NORMAL
CULTURE, BLOOD 2: NORMAL

## 2018-06-17 PROCEDURE — 6360000002 HC RX W HCPCS: Performed by: INTERNAL MEDICINE

## 2018-06-17 PROCEDURE — 6370000000 HC RX 637 (ALT 250 FOR IP): Performed by: INTERNAL MEDICINE

## 2018-06-17 PROCEDURE — 2060000000 HC ICU INTERMEDIATE R&B

## 2018-06-17 PROCEDURE — 94760 N-INVAS EAR/PLS OXIMETRY 1: CPT

## 2018-06-17 PROCEDURE — 2700000000 HC OXYGEN THERAPY PER DAY

## 2018-06-17 PROCEDURE — 2580000003 HC RX 258: Performed by: INTERNAL MEDICINE

## 2018-06-17 PROCEDURE — 94640 AIRWAY INHALATION TREATMENT: CPT

## 2018-06-17 RX ORDER — PREDNISONE 20 MG/1
TABLET ORAL
Qty: 18 TABLET | Refills: 0 | Status: SHIPPED | OUTPATIENT
Start: 2018-06-17 | End: 2018-07-02 | Stop reason: ALTCHOICE

## 2018-06-17 RX ADMIN — ENOXAPARIN SODIUM 40 MG: 40 INJECTION SUBCUTANEOUS at 08:15

## 2018-06-17 RX ADMIN — ACETAMINOPHEN 650 MG: 325 TABLET ORAL at 16:15

## 2018-06-17 RX ADMIN — ALBUTEROL SULFATE 2.5 MG: 2.5 SOLUTION RESPIRATORY (INHALATION) at 19:52

## 2018-06-17 RX ADMIN — LEVOTHYROXINE SODIUM 75 MCG: 75 TABLET ORAL at 08:16

## 2018-06-17 RX ADMIN — VENLAFAXINE HYDROCHLORIDE 75 MG: 75 CAPSULE, EXTENDED RELEASE ORAL at 08:16

## 2018-06-17 RX ADMIN — METOPROLOL TARTRATE 25 MG: 25 TABLET ORAL at 20:33

## 2018-06-17 RX ADMIN — METOPROLOL TARTRATE 25 MG: 25 TABLET ORAL at 08:15

## 2018-06-17 RX ADMIN — THEOPHYLLINE 400 MG: 400 TABLET, EXTENDED RELEASE ORAL at 08:15

## 2018-06-17 RX ADMIN — ALBUTEROL SULFATE 2.5 MG: 2.5 SOLUTION RESPIRATORY (INHALATION) at 13:09

## 2018-06-17 RX ADMIN — PREDNISONE 40 MG: 20 TABLET ORAL at 08:15

## 2018-06-17 RX ADMIN — TRAZODONE HYDROCHLORIDE 150 MG: 150 TABLET ORAL at 20:33

## 2018-06-17 RX ADMIN — TIOTROPIUM BROMIDE 18 MCG: 18 CAPSULE ORAL; RESPIRATORY (INHALATION) at 08:41

## 2018-06-17 RX ADMIN — Medication 2 PUFF: at 19:52

## 2018-06-17 RX ADMIN — Medication 10 ML: at 08:15

## 2018-06-17 RX ADMIN — ALBUTEROL SULFATE 2.5 MG: 2.5 SOLUTION RESPIRATORY (INHALATION) at 08:41

## 2018-06-17 RX ADMIN — BUPROPION HYDROCHLORIDE 150 MG: 150 TABLET, EXTENDED RELEASE ORAL at 08:15

## 2018-06-17 RX ADMIN — Medication 2 PUFF: at 08:41

## 2018-06-17 ASSESSMENT — PAIN SCALES - GENERAL
PAINLEVEL_OUTOF10: 0
PAINLEVEL_OUTOF10: 3
PAINLEVEL_OUTOF10: 0

## 2018-06-17 ASSESSMENT — PAIN DESCRIPTION - LOCATION: LOCATION: HEAD

## 2018-06-17 ASSESSMENT — PAIN DESCRIPTION - PAIN TYPE: TYPE: ACUTE PAIN

## 2018-06-18 VITALS
TEMPERATURE: 98.2 F | BODY MASS INDEX: 25.45 KG/M2 | DIASTOLIC BLOOD PRESSURE: 72 MMHG | SYSTOLIC BLOOD PRESSURE: 138 MMHG | HEIGHT: 63 IN | HEART RATE: 86 BPM | WEIGHT: 143.6 LBS | RESPIRATION RATE: 18 BRPM | OXYGEN SATURATION: 95 %

## 2018-06-18 PROCEDURE — 6370000000 HC RX 637 (ALT 250 FOR IP): Performed by: INTERNAL MEDICINE

## 2018-06-18 PROCEDURE — 94664 DEMO&/EVAL PT USE INHALER: CPT

## 2018-06-18 PROCEDURE — 6360000002 HC RX W HCPCS: Performed by: INTERNAL MEDICINE

## 2018-06-18 PROCEDURE — 94618 PULMONARY STRESS TESTING: CPT

## 2018-06-18 PROCEDURE — 94640 AIRWAY INHALATION TREATMENT: CPT

## 2018-06-18 PROCEDURE — 99232 SBSQ HOSP IP/OBS MODERATE 35: CPT | Performed by: INTERNAL MEDICINE

## 2018-06-18 PROCEDURE — 2700000000 HC OXYGEN THERAPY PER DAY

## 2018-06-18 RX ORDER — ALBUTEROL SULFATE 2.5 MG/3ML
2.5 SOLUTION RESPIRATORY (INHALATION) EVERY 4 HOURS PRN
Status: DISCONTINUED | OUTPATIENT
Start: 2018-06-18 | End: 2018-06-18 | Stop reason: HOSPADM

## 2018-06-18 RX ADMIN — PREDNISONE 40 MG: 20 TABLET ORAL at 08:41

## 2018-06-18 RX ADMIN — ENOXAPARIN SODIUM 40 MG: 40 INJECTION SUBCUTANEOUS at 08:41

## 2018-06-18 RX ADMIN — BUPROPION HYDROCHLORIDE 150 MG: 150 TABLET, EXTENDED RELEASE ORAL at 08:41

## 2018-06-18 RX ADMIN — VENLAFAXINE HYDROCHLORIDE 75 MG: 75 CAPSULE, EXTENDED RELEASE ORAL at 08:41

## 2018-06-18 RX ADMIN — THEOPHYLLINE 400 MG: 400 TABLET, EXTENDED RELEASE ORAL at 08:41

## 2018-06-18 RX ADMIN — Medication 2 PUFF: at 09:18

## 2018-06-18 RX ADMIN — METOPROLOL TARTRATE 25 MG: 25 TABLET ORAL at 08:41

## 2018-06-18 RX ADMIN — LEVOTHYROXINE SODIUM 75 MCG: 75 TABLET ORAL at 06:00

## 2018-06-18 ASSESSMENT — PAIN SCALES - GENERAL: PAINLEVEL_OUTOF10: 0

## 2018-06-19 ENCOUNTER — TELEPHONE (OUTPATIENT)
Dept: FAMILY MEDICINE CLINIC | Age: 73
End: 2018-06-19

## 2018-06-19 NOTE — TELEPHONE ENCOUNTER
[facility] Transitions Initial Follow Up Call    Outreach made within 2 business days of discharge: Yes    Patient: Valarie Rodriguez Patient : 1945   MRN: [id number]  Reason for Admission: There are no discharge diagnoses documented for the most recent discharge. Discharge Date: 18       Spoke with: Trav Lopez: Khadar Burger    TCM Interactive Patient Contact:  Was patient able to fill all prescriptions: Yes  Was patient instructed to bring all medications to the follow-up visit: Yes  Is patient taking all medications as directed in the discharge summary? Yes  Does patient understand their discharge instructions: Yes  Does patient have questions or concerns that need addressed prior to 7-14 day follow up office visit: no    Scheduled appointment with PCP within 7-14 days    Follow Up  Future Appointments  Date Time Provider Darlene Walsh   2018 11:00 AM [address], MD [place] [address]   8/3/2018 10:30 AM Jewels Arreguin APRN - CNP [place] [id number]     Pt has an appt coming up on Monday [facility] hospital follow up.       Rogers Rodriguez MA

## 2018-06-20 ENCOUNTER — CARE COORDINATION (OUTPATIENT)
Dept: CASE MANAGEMENT | Age: 73
End: 2018-06-20

## 2018-07-02 ENCOUNTER — OFFICE VISIT (OUTPATIENT)
Dept: FAMILY MEDICINE CLINIC | Age: 73
End: 2018-07-02
Payer: MEDICARE

## 2018-07-02 VITALS
TEMPERATURE: 97.8 F | DIASTOLIC BLOOD PRESSURE: 66 MMHG | BODY MASS INDEX: 23.83 KG/M2 | HEART RATE: 75 BPM | OXYGEN SATURATION: 96 % | WEIGHT: 134.5 LBS | RESPIRATION RATE: 14 BRPM | SYSTOLIC BLOOD PRESSURE: 112 MMHG | HEIGHT: 63 IN

## 2018-07-02 DIAGNOSIS — J44.1 ACUTE EXACERBATION OF CHRONIC OBSTRUCTIVE PULMONARY DISEASE (COPD) (HCC): ICD-10-CM

## 2018-07-02 DIAGNOSIS — Z09 HOSPITAL DISCHARGE FOLLOW-UP: Primary | ICD-10-CM

## 2018-07-02 PROCEDURE — 99495 TRANSJ CARE MGMT MOD F2F 14D: CPT | Performed by: FAMILY MEDICINE

## 2018-07-02 PROCEDURE — 1111F DSCHRG MED/CURRENT MED MERGE: CPT | Performed by: FAMILY MEDICINE

## 2018-07-02 RX ORDER — BISOPROLOL FUMARATE 10 MG/1
TABLET ORAL
Qty: 90 TABLET | Refills: 1 | Status: ON HOLD | OUTPATIENT
Start: 2018-07-02 | End: 2018-11-06 | Stop reason: HOSPADM

## 2018-07-02 ASSESSMENT — ENCOUNTER SYMPTOMS
APNEA: 0
WHEEZING: 0
NAUSEA: 0
VOMITING: 0
DIARRHEA: 0
ABDOMINAL PAIN: 0
SHORTNESS OF BREATH: 1
BLOOD IN STOOL: 0
CHEST TIGHTNESS: 0
CONSTIPATION: 0
STRIDOR: 0
COUGH: 0

## 2018-07-02 NOTE — TELEPHONE ENCOUNTER
pharmacy requesting medication refill.  Please approve or deny this request.    Rx requested:  Requested Prescriptions     Pending Prescriptions Disp Refills    bisoprolol (Wyona Brash) 10 MG tablet [Pharmacy Med Name: BISOPROLOL FUM TABS 10MG] 90 tablet 1     Sig: TAKE 1 TABLET DAILY       Last Office Visit:   6/7/2018    Last Labs:      Next Visit Date:  Future Appointments  Date Time Provider [facility] Jillian   7/2/2018 1:30 PM Flynn Dimas MD [facility]   7/3/2018 7:30 AM Mariann Butler MD [address] [place] [address]   8/3/2018 10:30 AM Nidia Lincoln, APRN - [facility] 94

## 2018-07-02 NOTE — PROGRESS NOTES
Post-Discharge Transitional Care Management Services      Maddi Haq   YOB: 1945    Date of Office Visit:  7/2/2018  Date of Hospital Admission: 6/12/18  Date of Hospital Discharge: 6/18/18  Readmission Risk Score [risk of hospital readmission >=10  medium risk (chance of readmission ~ 12%) >14  high risk (chance of readmission ~18%)]:Readmission Risk Score: 16    Care management risk score Rising risk (score 2-5) and Complex Care (Scores >=6): 2       Patient Active Problem List   Diagnosis    Hyperlipidemia    Hypothyroidism    COPD (chronic obstructive pulmonary disease) ([facility]- Dr. Devan Fields Anxiety and depression    Insomnia    Osteoarthritis    S/P carotid endarterectomy    Dyspepsia    CAD (coronary artery disease)    History of tobacco abuse    GERD (gastroesophageal reflux disease)    Breast cancer ([facility]    Osteoporosis    Vitamin D deficiency    HTN (hypertension)    RBBB    Cholelithiasis    Right carotid bruit    Congenital hypothyroidism without goiter    Dyslipidemia    Hiatal hernia    Disturbance of smell and taste    Abnormal cardiovascular stress test    Myocardial perfusion defect, homogeneous    S/P cardiac catheterization    Abnormal ultrasound of breast    Bilateral carotid artery stenosis- Dr. Ellis Hernandez Decreased GFR    Diastolic dysfunction    Depression    Moderate episode of recurrent major depressive disorder (HCC)    History of breast cancer- left    Acute exacerbation of chronic obstructive pulmonary disease (COPD) ([facility]    Pulmonary nodule    Breast mass, right       No Known Allergies    Medications listed as ordered at the time of discharge from hospital   Shaila MATOS   Home Medication Instructions AKSHAT:    Printed on:07/02/18 5875   Medication Information                      albuterol sulfate HFA (PROAIR HFA) 108 (90 BASE) MCG/ACT inhaler  Inhale 2 puffs into the lungs every 6 hours as needed for Wheezing or Shortness of Breath             ALPRAZolam (XANAX) 0.25 MG tablet  Take 1 tablet by mouth 3 times daily as needed for Anxiety for up to 90 days. .             bisoprolol (ZEBETA) 10 MG tablet  TAKE 1 TABLET DAILY             buPROPion (WELLBUTRIN) 75 MG tablet  Take 1 tablet by mouth 2 times daily             ipratropium-albuterol (DUONEB) 0.5-2.5 (3) MG/3ML SOLN nebulizer solution  Inhale 3 mLs into the lungs every 6 hours as needed for Shortness of Breath             levothyroxine (SYNTHROID) 75 MCG tablet  TAKE 1 TABLET DAILY             lovastatin (MEVACOR) 20 MG tablet  TAKE 1 TABLET NIGHTLY             Respiratory Therapy Supplies (NEBULIZER/TUBING/MOUTHPIECE) KIT  1 kit by Does not apply route daily as needed (wheezing)             SYMBICORT 80-4.5 MCG/ACT AERO  USE 2 INHALATIONS TWICE A DAY             theophylline (UNIPHYL) 400 MG extended release tablet  TAKE 1 TABLET DAILY             tiotropium (SPIRIVA HANDIHALER) 18 MCG inhalation capsule  Inhale 1 capsule into the lungs daily             traZODone (DESYREL) 150 MG tablet  take 1 (ONE) tablet nightly             venlafaxine (EFFEXOR XR) 75 MG extended release capsule  Take 1 capsule by mouth daily             vitamin D (ERGOCALCIFEROL) 58532 units CAPS capsule  TAKE 1 CAPSULE ONCE A WEEK                   Medications marked \"taking\" at this time  Outpatient Prescriptions Marked as Taking for the 7/2/18 encounter (Office Visit) with Nicole Leroy MD   Medication Sig Dispense Refill    bisoprolol (ZEBETA) 10 MG tablet TAKE 1 TABLET DAILY 90 tablet 1    levothyroxine (SYNTHROID) 75 MCG tablet TAKE 1 TABLET DAILY 90 tablet 0    traZODone (DESYREL) 150 MG tablet take 1 (ONE) tablet nightly 90 tablet 1    ALPRAZolam (XANAX) 0.25 MG tablet Take 1 tablet by mouth 3 times daily as needed for Anxiety for up to 90 days. . 90 tablet 0    venlafaxine (EFFEXOR XR) 75 MG extended release capsule Take 1 capsule by mouth daily 90 capsule 1    lovastatin (MEVACOR) 20 MG tablet TAKE 1 TABLET NIGHTLY 90 tablet 1    SYMBICORT 80-4.5 MCG/ACT AERO USE 2 INHALATIONS TWICE A DAY 30.6 g 5    buPROPion (WELLBUTRIN) 75 MG tablet Take 1 tablet by mouth 2 times daily 60 tablet 1    theophylline (UNIPHYL) 400 MG extended release tablet TAKE 1 TABLET DAILY 90 tablet 1    vitamin D (ERGOCALCIFEROL) 44595 units CAPS capsule TAKE 1 CAPSULE ONCE A WEEK 12 capsule 3    tiotropium (SPIRIVA HANDIHALER) 18 MCG inhalation capsule Inhale 1 capsule into the lungs daily 90 capsule 3    ipratropium-albuterol (DUONEB) 0.5-2.5 (3) MG/3ML SOLN nebulizer solution Inhale 3 mLs into the lungs every 6 hours as needed for Shortness of Breath 200 vial 3    Respiratory Therapy Supplies (NEBULIZER/TUBING/MOUTHPIECE) KIT 1 kit by Does not apply route daily as needed (wheezing) 1 kit 5    albuterol sulfate HFA (PROAIR HFA) 108 (90 BASE) MCG/ACT inhaler Inhale 2 puffs into the lungs every 6 hours as needed for Wheezing or Shortness of Breath 3 Inhaler 3        Medications patient taking as of now reconciled against medications ordered at time of hospital discharge: Yes    Vitals:    07/02/18 1308   BP: 112/66   Site: Left Arm   Position: Sitting   Cuff Size: Medium Adult   Pulse: 75   Resp: 14   Temp: 97.8 °F (36.6 °C)   TempSrc: Temporal   SpO2: 96%   Weight: 134 lb 8 oz (61 kg)   Height: 5' 3\" (1.6 m)     Body mass index is 23.83 kg/m². Wt Readings from Last 3 Encounters:   07/02/18 134 lb 8 oz (61 kg)   06/18/18 143 lb 9.6 oz (65.1 kg)   06/07/18 140 lb (63.5 kg)     BP Readings from Last 3 Encounters:   07/02/18 112/66   06/18/18 138/72   06/12/18 117/74        Inpatient course: Discharge summary reviewed- see chart. Chief Complaint   Patient presents with    Follow-Up from Hospital     patient is here for a follow up from [facility]. was seen on 6/11/2018. was discharged on 6/18/2018. she was diagnosed with COPD.        HPI   Patient is 64-year-old female with a history of COPD who presents as a hospital follow-up for a recent COPD. She improved her hospitalization with steroids and antibiotics. Incidental finding of a right breast mass was discovered patient has since had an evaluation by general surgery and diagnostic mammogram and ultrasound with a plan for surgery soon. During Admission she was set up to have home oxygen , which she uses at night and as needed . Since discharge she has seen pulmonology, no changes in overall plan. She states that she feels well, shortness of breath is minimal only when she significantly exerts herself. She admits to a horse voice but states that it is improving. She denies any fever, chills, chest discomfort, or lower extremity edema    Review of Systems   Constitutional: Negative for activity change, appetite change and fatigue. Respiratory: Positive for shortness of breath (Back To baseline). Negative for apnea, cough, chest tightness, wheezing and stridor. Cardiovascular: Negative for chest pain, palpitations and leg swelling. Gastrointestinal: Negative for abdominal pain, blood in stool, constipation, diarrhea, nausea and vomiting. Musculoskeletal: Negative for arthralgias. Neurological: Negative for seizures and headaches. Psychiatric/Behavioral: Negative for hallucinations and suicidal ideas. Non face to face  following discharge, date last encounter closed (first attempt may have been earlier): 6/19/2018  1:30 PM 6/19/2018  1:30 PM    Call initiated 2 business days of discharge: Yes     Interval history/Current status: Improved      Physical Exam   Constitutional: She is oriented to person, place, and time. She appears well-developed and well-nourished. No distress. HENT:   Head: Normocephalic and atraumatic. Eyes: Conjunctivae and EOM are normal. Pupils are equal, round, and reactive to light. Neck: Normal range of motion. Neck supple. No JVD present. No tracheal deviation present. No thyromegaly present.    Cardiovascular: Normal rate, regular pt's family at bedside

## 2018-07-03 ENCOUNTER — OFFICE VISIT (OUTPATIENT)
Dept: CARDIOLOGY CLINIC | Age: 73
End: 2018-07-03
Payer: MEDICARE

## 2018-07-03 VITALS
BODY MASS INDEX: 24.1 KG/M2 | OXYGEN SATURATION: 96 % | HEART RATE: 76 BPM | SYSTOLIC BLOOD PRESSURE: 111 MMHG | WEIGHT: 136 LBS | RESPIRATION RATE: 16 BRPM | HEIGHT: 63 IN | DIASTOLIC BLOOD PRESSURE: 64 MMHG

## 2018-07-03 DIAGNOSIS — R93.1 MYOCARDIAL PERFUSION DEFECT: ICD-10-CM

## 2018-07-03 DIAGNOSIS — I65.23 CAROTID STENOSIS, ASYMPTOMATIC, BILATERAL: ICD-10-CM

## 2018-07-03 DIAGNOSIS — Z98.890 S/P CARDIAC CATHETERIZATION: ICD-10-CM

## 2018-07-03 DIAGNOSIS — I10 ESSENTIAL HYPERTENSION: ICD-10-CM

## 2018-07-03 DIAGNOSIS — E78.5 DYSLIPIDEMIA: ICD-10-CM

## 2018-07-03 DIAGNOSIS — I45.10 RBBB: ICD-10-CM

## 2018-07-03 DIAGNOSIS — I51.89 DIASTOLIC DYSFUNCTION: Primary | ICD-10-CM

## 2018-07-03 DIAGNOSIS — R94.39 ABNORMAL CARDIOVASCULAR STRESS TEST: ICD-10-CM

## 2018-07-03 DIAGNOSIS — I25.10 CORONARY ARTERY DISEASE INVOLVING NATIVE HEART WITHOUT ANGINA PECTORIS, UNSPECIFIED VESSEL OR LESION TYPE: ICD-10-CM

## 2018-07-03 PROCEDURE — 4040F PNEUMOC VAC/ADMIN/RCVD: CPT | Performed by: INTERNAL MEDICINE

## 2018-07-03 PROCEDURE — 1123F ACP DISCUSS/DSCN MKR DOCD: CPT | Performed by: INTERNAL MEDICINE

## 2018-07-03 PROCEDURE — 99214 OFFICE O/P EST MOD 30 MIN: CPT | Performed by: INTERNAL MEDICINE

## 2018-07-03 PROCEDURE — G8420 CALC BMI NORM PARAMETERS: HCPCS | Performed by: INTERNAL MEDICINE

## 2018-07-03 PROCEDURE — 1111F DSCHRG MED/CURRENT MED MERGE: CPT | Performed by: INTERNAL MEDICINE

## 2018-07-03 PROCEDURE — 1036F TOBACCO NON-USER: CPT | Performed by: INTERNAL MEDICINE

## 2018-07-03 PROCEDURE — G8427 DOCREV CUR MEDS BY ELIG CLIN: HCPCS | Performed by: INTERNAL MEDICINE

## 2018-07-03 PROCEDURE — G8598 ASA/ANTIPLAT THER USED: HCPCS | Performed by: INTERNAL MEDICINE

## 2018-07-03 PROCEDURE — 1090F PRES/ABSN URINE INCON ASSESS: CPT | Performed by: INTERNAL MEDICINE

## 2018-07-03 PROCEDURE — G8399 PT W/DXA RESULTS DOCUMENT: HCPCS | Performed by: INTERNAL MEDICINE

## 2018-07-03 PROCEDURE — 1101F PT FALLS ASSESS-DOCD LE1/YR: CPT | Performed by: INTERNAL MEDICINE

## 2018-07-03 PROCEDURE — 3017F COLORECTAL CA SCREEN DOC REV: CPT | Performed by: INTERNAL MEDICINE

## 2018-07-03 NOTE — PROGRESS NOTES
(PROAIR HFA) 108 (90 BASE) MCG/ACT inhaler Inhale 2 puffs into the lungs every 6 hours as needed for Wheezing or Shortness of Breath 3 Inhaler 3     No current facility-administered medications for this visit. Review of Systems:   Review of Systems   Constitutional: Negative for activity change and appetite change. HENT: Negative for congestion. Respiratory: Negative for apnea, choking and chest tightness. Cardiovascular: Negative for chest pain. Gastrointestinal: Negative for abdominal distention and abdominal pain. Endocrine: Negative for cold intolerance and heat intolerance. Genitourinary: Negative for dysuria and enuresis. Musculoskeletal: Negative for arthralgias and back pain. Skin: Negative for color change. Allergic/Immunologic: Negative. Neurological: Negative for dizziness, seizures, syncope and light-headedness. Psychiatric/Behavioral: Negative for agitation, behavioral problems and confusion. Physical Examination:    /64 (Site: Left Arm, Position: Sitting, Cuff Size: Large Adult)   Pulse 76   Resp 16   Ht 5' 3\" (1.6 m)   Wt 136 lb (61.7 kg)   LMP  (LMP Unknown)   SpO2 96%   BMI 24.09 kg/m²    Physical Exam   Constitutional: The patient appears healthy. No distress. HENT: Mouth/Throat: Oropharynx is clear. Eyes: Pupils are equal, round, and reactive to light. Neck: Normal range of motion. No JVD present. Cardiovascular: Regular rhythm, S1 normal, S2 normal, normal heart sounds and intact distal pulses. Exam reveals no gallop. No murmur heard. Pulses:       Radial pulses are 2+ on the right side. Dorsalis pedis pulses are 2+ on the right side. Pulmonary/Chest: Effort normal and breath sounds normal. No wheezes. No rales. No tenderness. Abdominal: Soft. Bowel sounds are normal.   Musculoskeletal: Normal range of motion. No edema. Neurological: The patient is alert and oriented to person, place, and time. Intact cranial nerves. Carotid stenosis, asymptomatic, bilateral    - Diagnostic Cardiac Cath Lab Procedure; Future       Counseling:  Heart Healthy Lifestyle, Improve BMI, Low Salt Diet and Walk Daily    Return in about 4 weeks (around 7/31/2018) for followup cv disease. Electronically signed by   Savana Germain.  Brielle Brush MD [facility] Director of Cardiology Services and Cardiac Catheterization Laboratory  [facility], [place]    on 7/3/2018 at 8:20 AM

## 2018-07-17 NOTE — PROGRESS NOTES
Subjective:      Patient ID: Katherine Schneider is a 74 y.o. female. HPI  She is here today because of an abnormal right breast mammogram. She was seen in November 2016 with an abnormal mammogram and had an ultrasound guided core biopsy done. The results were benign but I was still concerned about the lesion. A registered letter was sent and she did not respond to my request for another biopsy. She has a history of invasive ductal cancer of the left breast in 2014 managed with lumpectomy and RT. The tumor was 1.5 cm, ER and MO and HER 2 positive. She was recently admitted to [facility] with respiratory failure and a right breast mass was noted. Diagnostic breast studies were obtained and are suspicious for breast cancer. I recommended a wide excision of the mass be done and she is here to discuss this further.      Past Medical History:   Diagnosis Date    Anxiety     Anxiety and depression     CAD (coronary artery disease)     [facility] ([facility] 3/2014    Invasive ductal cancer left breast    Carotid artery stenosis and occlusion     COPD (chronic obstructive pulmonary disease) ([facility]     Depression 1/15/2018    GERD (gastroesophageal reflux disease)     Headache(784.0)     Hyperlipidemia     Hypertension     Hypothyroidism     Insomnia     Osteoarthritis     RBBB 10/15/2014    Right carotid bruit 5/26/2015    S/P cardiac catheterization 7/13/2016     Past Surgical History:   Procedure Laterality Date    BREAST BIOPSY Right 11/8/2016    ULTRASOUND GUIDED BIOPSY RIGHT BREAST WITH FROZEN SECTION  performed by Lincoln Hennessy MD at [address] [facility] Left 2/26/14    U/S Guided core bx of the left breast    BREAST SURGERY Right 3/20/14    U/S of the lateral right breast    BREAST SURGERY Left 3/27/14    U/S Guided Left breast lumpectomy and left snb    OTHER SURGICAL HISTORY  4/11/14    Placement drain, left axillary seroma     Current Outpatient Prescriptions on File Prior to Visit   Medication Sig Dispense Refill    bisoprolol (ZEBETA) 10 MG tablet TAKE 1 TABLET DAILY 90 tablet 1    levothyroxine (SYNTHROID) 75 MCG tablet TAKE 1 TABLET DAILY 90 tablet 0    traZODone (DESYREL) 150 MG tablet take 1 (ONE) tablet nightly 90 tablet 1    ALPRAZolam (XANAX) 0.25 MG tablet Take 1 tablet by mouth 3 times daily as needed for Anxiety for up to 90 days. . 90 tablet 0    venlafaxine (EFFEXOR XR) 75 MG extended release capsule Take 1 capsule by mouth daily 90 capsule 1    lovastatin (MEVACOR) 20 MG tablet TAKE 1 TABLET NIGHTLY 90 tablet 1    SYMBICORT 80-4.5 MCG/ACT AERO USE 2 INHALATIONS TWICE A DAY 30.6 g 5    buPROPion (WELLBUTRIN) 75 MG tablet Take 1 tablet by mouth 2 times daily 60 tablet 1    theophylline (UNIPHYL) 400 MG extended release tablet TAKE 1 TABLET DAILY 90 tablet 1    vitamin D (ERGOCALCIFEROL) 01917 units CAPS capsule TAKE 1 CAPSULE ONCE A WEEK 12 capsule 3    tiotropium (SPIRIVA HANDIHALER) 18 MCG inhalation capsule Inhale 1 capsule into the lungs daily 90 capsule 3    ipratropium-albuterol (DUONEB) 0.5-2.5 (3) MG/3ML SOLN nebulizer solution Inhale 3 mLs into the lungs every 6 hours as needed for Shortness of Breath 200 vial 3    Respiratory Therapy Supplies (NEBULIZER/TUBING/MOUTHPIECE) KIT 1 kit by Does not apply route daily as needed (wheezing) 1 kit 5    albuterol sulfate HFA (PROAIR HFA) 108 (90 BASE) MCG/ACT inhaler Inhale 2 puffs into the lungs every 6 hours as needed for Wheezing or Shortness of Breath 3 Inhaler 3     No current facility-administered medications on file prior to visit.       No Known Allergies  Family History   Problem Relation Age of Onset    Cancer Sister         breast    Cancer Maternal Uncle         pancreatic     Social History   Substance Use Topics    Smoking status: Former Smoker     Packs/day: 1.50     Years: 40.00     Types: Cigarettes     Quit date: 1/26/1989    Smokeless tobacco: Never Used    Alcohol use No     Review of Systems She is feeling well. She is off oxygen and breathing better. She recently saw Dr Sona Collier in the office and he recommended that she have a cardiac cath done. She is hesitant to proceed with this. Her appetite is fine. She is moving her bowels. She is here today with her significant other. Objective:   Physical Exam She appears well. Breath sounds are distant bilaterally. I hear no wheezing. She has a regular rate and rhythm with no murmur. The abdomen is not distended. There is some dimpling of the right breast at the inferior edge of the areola at 6 o'clock. There is a vague mass palpable in the deep central part of the right breast. There are no left breast masses noted. I reviewed the films and reports of diagnostic bilateral mammograms and right breast ultrasound done 6/13/2018. The symmetry seen on previous right breast studies in 2-16 is clearly larger and suspicious looking with spiculation and calcifications. Ultrasound confirms a highyly irregular hypoechoic mass. Assessment:      Probable right breast cancer. Plan:      I told her that the right breast is cancer until proven otherwise given the growth and the current appearance on exam, mammogram and ultrasound. I discussed surgical options both lumpectomy with sentinel node biopsy as well as mastectomy. I feel mastectomy is the better option given the size of the mass relative to the small size of the breast. Also she has significant COPD and may not be a candidate for adjuvant RT post lumpectomy. The patient is agreeable to mastectomy and voiced understanding of the rationale. Details of the procedure were outlined. I anticipate doing a biopsy after induction anesthesia to confirm breast cancer then will proceed with mastectomy and sentinel node biopsy under a general anesthetic. I anticipate little blood loss. I anticipate an overnight stay to monitor her breathing.  Plan a pre op risk evaluation with a different cardiologist. She

## 2018-07-20 ENCOUNTER — OFFICE VISIT (OUTPATIENT)
Dept: SURGERY | Age: 73
End: 2018-07-20
Payer: MEDICARE

## 2018-07-20 VITALS
DIASTOLIC BLOOD PRESSURE: 78 MMHG | HEIGHT: 63 IN | BODY MASS INDEX: 23.92 KG/M2 | WEIGHT: 135 LBS | TEMPERATURE: 97.9 F | SYSTOLIC BLOOD PRESSURE: 124 MMHG

## 2018-07-20 DIAGNOSIS — R92.8 ABNORMAL MAMMOGRAM: ICD-10-CM

## 2018-07-20 DIAGNOSIS — R92.8 ABNORMAL ULTRASOUND OF BREAST: ICD-10-CM

## 2018-07-20 DIAGNOSIS — N63.10 BREAST MASS, RIGHT: ICD-10-CM

## 2018-07-20 DIAGNOSIS — I25.10 CORONARY ARTERY DISEASE INVOLVING NATIVE CORONARY ARTERY OF NATIVE HEART WITHOUT ANGINA PECTORIS: Primary | ICD-10-CM

## 2018-07-20 PROCEDURE — 1090F PRES/ABSN URINE INCON ASSESS: CPT | Performed by: SURGERY

## 2018-07-20 PROCEDURE — G8427 DOCREV CUR MEDS BY ELIG CLIN: HCPCS | Performed by: SURGERY

## 2018-07-20 PROCEDURE — 1036F TOBACCO NON-USER: CPT | Performed by: SURGERY

## 2018-07-20 PROCEDURE — 99214 OFFICE O/P EST MOD 30 MIN: CPT | Performed by: SURGERY

## 2018-07-20 PROCEDURE — 4040F PNEUMOC VAC/ADMIN/RCVD: CPT | Performed by: SURGERY

## 2018-07-20 PROCEDURE — 1123F ACP DISCUSS/DSCN MKR DOCD: CPT | Performed by: SURGERY

## 2018-07-20 PROCEDURE — G8420 CALC BMI NORM PARAMETERS: HCPCS | Performed by: SURGERY

## 2018-07-20 PROCEDURE — G8399 PT W/DXA RESULTS DOCUMENT: HCPCS | Performed by: SURGERY

## 2018-07-20 PROCEDURE — 3017F COLORECTAL CA SCREEN DOC REV: CPT | Performed by: SURGERY

## 2018-07-20 PROCEDURE — 1101F PT FALLS ASSESS-DOCD LE1/YR: CPT | Performed by: SURGERY

## 2018-07-20 PROCEDURE — G8598 ASA/ANTIPLAT THER USED: HCPCS | Performed by: SURGERY

## 2018-07-20 RX ORDER — TRAMADOL HYDROCHLORIDE 50 MG/1
50 TABLET ORAL EVERY 6 HOURS PRN
Status: ON HOLD | COMMUNITY
End: 2018-11-06 | Stop reason: HOSPADM

## 2018-07-21 NOTE — TELEPHONE ENCOUNTER
Pharmacy  requesting medication refill.  Please approve or deny this request.    Rx requested:  Requested Prescriptions     Pending Prescriptions Disp Refills    theophylline (UNIPHYL) 400 MG extended release tablet [Pharmacy Med Name: THEOPHYLLINE ER TABS 400MG] 90 tablet 1     Sig: TAKE 1 TABLET DAILY       Last Office Visit:   6/7/2018    Last Labs:  6/12/18    Next Visit Date:  Future Appointments  Date Time Provider Darlene Walsh   8/3/2018 10:30 AM Andreas Bernal, APRN - CNP [facility]

## 2018-07-23 RX ORDER — THEOPHYLLINE 400 MG/1
TABLET, EXTENDED RELEASE ORAL
Qty: 90 TABLET | Refills: 1 | Status: SHIPPED | OUTPATIENT
Start: 2018-07-23 | End: 2019-01-19 | Stop reason: SDUPTHER

## 2018-08-03 ENCOUNTER — OFFICE VISIT (OUTPATIENT)
Dept: FAMILY MEDICINE CLINIC | Age: 73
End: 2018-08-03
Payer: MEDICARE

## 2018-08-03 VITALS
TEMPERATURE: 96.4 F | HEART RATE: 100 BPM | OXYGEN SATURATION: 95 % | HEIGHT: 63 IN | WEIGHT: 134 LBS | DIASTOLIC BLOOD PRESSURE: 68 MMHG | RESPIRATION RATE: 16 BRPM | SYSTOLIC BLOOD PRESSURE: 110 MMHG | BODY MASS INDEX: 23.74 KG/M2

## 2018-08-03 DIAGNOSIS — I65.23 BILATERAL CAROTID ARTERY STENOSIS: ICD-10-CM

## 2018-08-03 DIAGNOSIS — J44.1 CHRONIC OBSTRUCTIVE PULMONARY DISEASE WITH ACUTE EXACERBATION (HCC): Primary | ICD-10-CM

## 2018-08-03 DIAGNOSIS — I25.10 CORONARY ARTERY DISEASE INVOLVING NATIVE HEART WITHOUT ANGINA PECTORIS, UNSPECIFIED VESSEL OR LESION TYPE: ICD-10-CM

## 2018-08-03 DIAGNOSIS — N63.10 BREAST MASS, RIGHT: ICD-10-CM

## 2018-08-03 DIAGNOSIS — F33.1 MODERATE EPISODE OF RECURRENT MAJOR DEPRESSIVE DISORDER (HCC): ICD-10-CM

## 2018-08-03 PROCEDURE — G8427 DOCREV CUR MEDS BY ELIG CLIN: HCPCS | Performed by: NURSE PRACTITIONER

## 2018-08-03 PROCEDURE — G8598 ASA/ANTIPLAT THER USED: HCPCS | Performed by: NURSE PRACTITIONER

## 2018-08-03 PROCEDURE — G8420 CALC BMI NORM PARAMETERS: HCPCS | Performed by: NURSE PRACTITIONER

## 2018-08-03 PROCEDURE — G8926 SPIRO NO PERF OR DOC: HCPCS | Performed by: NURSE PRACTITIONER

## 2018-08-03 PROCEDURE — 1036F TOBACCO NON-USER: CPT | Performed by: NURSE PRACTITIONER

## 2018-08-03 PROCEDURE — G8399 PT W/DXA RESULTS DOCUMENT: HCPCS | Performed by: NURSE PRACTITIONER

## 2018-08-03 PROCEDURE — 1123F ACP DISCUSS/DSCN MKR DOCD: CPT | Performed by: NURSE PRACTITIONER

## 2018-08-03 PROCEDURE — 4040F PNEUMOC VAC/ADMIN/RCVD: CPT | Performed by: NURSE PRACTITIONER

## 2018-08-03 PROCEDURE — 3017F COLORECTAL CA SCREEN DOC REV: CPT | Performed by: NURSE PRACTITIONER

## 2018-08-03 PROCEDURE — 1101F PT FALLS ASSESS-DOCD LE1/YR: CPT | Performed by: NURSE PRACTITIONER

## 2018-08-03 PROCEDURE — 3023F SPIROM DOC REV: CPT | Performed by: NURSE PRACTITIONER

## 2018-08-03 PROCEDURE — 1090F PRES/ABSN URINE INCON ASSESS: CPT | Performed by: NURSE PRACTITIONER

## 2018-08-03 PROCEDURE — 99214 OFFICE O/P EST MOD 30 MIN: CPT | Performed by: NURSE PRACTITIONER

## 2018-08-03 NOTE — PROGRESS NOTES
Chief Complaint   Patient presents with   Williams Calvert     pt is following up after appt with general surgeon and cardiologist and COPD. HPI: Liza Stroud is a 74 y.o. female presenting for follow-up after recent hospitalization for COPD  She saw Dr. Lavone Shone last month for transition of care visit. COPD: she continues to follow with Dr. Adamaris Wolf. Had hospital visit earlier this summer. States that her breathing has been good. Wears oxygen at night and sometimes during the day. Going to get a portable oxygen tank soon so that she can leave her house more. She was in hospital for 6 days in June. Her chronic shortness of breath is at baseline. CAD: she saw Dr. Beba Fry while in the hospital for COPD exacerbation. Saw him outpatient in the beginning of July and was scheduled for heart catheterization. She saw Dr. Kendell Khan again and after some discussion, was referred to [facility] Dr. Ghislaine Gonzalez after patient wanted second opinion about necessity of heart catheterization. No chest pain. No heart palpitation. No chronic indigestion. She is aware of some blockage on left side of her heart from about 7 years ago. Abnormal mammogram: Dr. Kendell Khan saw her while in hospital. She had mammogram and ultrasound done. Was noted to have very suspicious mass on the right. Will undergo mastectomy on right after cardiac clearance. Depression: Elisabeth Ruggiero reports being in a good mood that is stable. The patient is not reporting insomnia, difficulty concentrating and usual interest in activities. This patient is not homicidal or suicidal.        ROS: The patient reports no nausea or vomiting. There is no heartburn or indigestion. There is no diarrhea or constipation. No black, bloody, mucusy or tarry stool noticed. The patient reports no bloating and no change in appetite.       I have reviewed the following diagnostic data: recent H&P hospital note, Dr. Kendell Khan visit note, Dr. Beba Fry office note, Dr. Ghislaine Gonzalez note [facility].       EXAM:  Constitutional Blood pressure 110/68, pulse 100, temperature 96.4 °F (35.8 °C), temperature source Temporal, resp. rate 16, height 5' 3\" (1.6 m), weight 134 lb (60.8 kg), SpO2 95 %, not currently breastfeeding. .  She has a normal affect, no acute distress, appears well developed and well nourished. Neck:  neck- supple, no mass, non-tender and no bruits. Lungs:  Normal expansion. Clear to auscultation. No rales, rhonchi, or wheezing., No chest wall tenderness. Heart:  Heart sounds are normal.  Regular rate and rhythm without murmur, gallop or rub. Abdomen:  Soft, non-tender, normal bowel sounds. No bruits, organomegaly or masses. Extremities: Extremities warm to touch, pink, with no edema. DIAGNOSIS:    Diagnosis Orders   1. Chronic obstructive pulmonary disease with acute exacerbation ([facility]     2. Breast mass, right- Dr. Jasson Whatley     3. Moderate episode of recurrent major depressive disorder ([facility]     4. Coronary artery disease involving native heart without angina pectoris, unspecified vessel or lesion type     5. Bilateral carotid artery stenosis- Dr. Alisha Houston: Include orders in the DX section. Follow up: 6 weeks and as needed. Blood work one week prior as ordered. She will be following with cardiology (Dr. Lily Friedman)  soon for planned cardiac clearance for mastectomy on right with Dr. Jasson Whatley. She has been feeling good overall lately. Breathing is back to baseline for her on home oxygen and she follows with Dr. Fiordaliza Gutierrez routinely. Reassurance given. Her mood has been pretty good recently. She wants to do whatever she has to do get/stay healthy. Follow up with me sooner as needed.      Electronically signed by Romelia Monroe, 2:13 PM 8/3/18

## 2018-08-15 ENCOUNTER — APPOINTMENT (OUTPATIENT)
Dept: GENERAL RADIOLOGY | Age: 73
DRG: 194 | End: 2018-08-15
Payer: MEDICARE

## 2018-08-15 ENCOUNTER — HOSPITAL ENCOUNTER (INPATIENT)
Age: 73
LOS: 3 days | Discharge: HOME | DRG: 194 | End: 2018-08-18
Attending: INTERNAL MEDICINE | Admitting: INTERNAL MEDICINE
Payer: MEDICARE

## 2018-08-15 DIAGNOSIS — Z85.3 HX: BREAST CANCER: ICD-10-CM

## 2018-08-15 DIAGNOSIS — R00.0 TACHYCARDIA: ICD-10-CM

## 2018-08-15 DIAGNOSIS — J44.1 COPD EXACERBATION (HCC): ICD-10-CM

## 2018-08-15 DIAGNOSIS — J18.9 PNEUMONIA DUE TO ORGANISM: Primary | ICD-10-CM

## 2018-08-15 LAB
ALBUMIN SERPL-MCNC: 3.7 G/DL (ref 3.9–4.9)
ALP BLD-CCNC: 69 U/L (ref 40–130)
ALT SERPL-CCNC: 10 U/L (ref 0–33)
ANION GAP SERPL CALCULATED.3IONS-SCNC: 16 MEQ/L (ref 7–13)
APTT: 29.3 SEC (ref 21.6–35.4)
AST SERPL-CCNC: 21 U/L (ref 0–35)
BASOPHILS ABSOLUTE: 0.1 K/UL (ref 0–0.2)
BASOPHILS RELATIVE PERCENT: 0.7 %
BILIRUB SERPL-MCNC: 0.9 MG/DL (ref 0–1.2)
BUN BLDV-MCNC: 13 MG/DL (ref 8–23)
CALCIUM SERPL-MCNC: 9 MG/DL (ref 8.6–10.2)
CHLORIDE BLD-SCNC: 97 MEQ/L (ref 98–107)
CO2: 24 MEQ/L (ref 22–29)
CREAT SERPL-MCNC: 0.93 MG/DL (ref 0.5–0.9)
EOSINOPHILS ABSOLUTE: 0.2 K/UL (ref 0–0.7)
EOSINOPHILS RELATIVE PERCENT: 1.2 %
GFR AFRICAN AMERICAN: >60
GFR NON-AFRICAN AMERICAN: 58.7
GLOBULIN: 2.6 G/DL (ref 2.3–3.5)
GLUCOSE BLD-MCNC: 122 MG/DL (ref 74–109)
HCT VFR BLD CALC: 41.6 % (ref 37–47)
HEMOGLOBIN: 13.8 G/DL (ref 12–16)
INR BLD: 1.1
LACTIC ACID: 1.8 MMOL/L (ref 0.5–2.2)
LYMPHOCYTES ABSOLUTE: 2 K/UL (ref 1–4.8)
LYMPHOCYTES RELATIVE PERCENT: 11.2 %
MAGNESIUM: 1.8 MG/DL (ref 1.7–2.3)
MCH RBC QN AUTO: 30.2 PG (ref 27–31.3)
MCHC RBC AUTO-ENTMCNC: 33.2 % (ref 33–37)
MCV RBC AUTO: 91.1 FL (ref 82–100)
MONOCYTES ABSOLUTE: 1.5 K/UL (ref 0.2–0.8)
MONOCYTES RELATIVE PERCENT: 8.3 %
NEUTROPHILS ABSOLUTE: 13.9 K/UL (ref 1.4–6.5)
NEUTROPHILS RELATIVE PERCENT: 78.6 %
PDW BLD-RTO: 14.6 % (ref 11.5–14.5)
PLATELET # BLD: 317 K/UL (ref 130–400)
POTASSIUM SERPL-SCNC: 3.9 MEQ/L (ref 3.5–5.1)
PROTHROMBIN TIME: 11.1 SEC (ref 9.6–12.3)
RBC # BLD: 4.57 M/UL (ref 4.2–5.4)
SODIUM BLD-SCNC: 137 MEQ/L (ref 132–144)
THEOPHYLLINE LEVEL: 11.1 UG/ML (ref 10–20)
TOTAL CK: 45 U/L (ref 0–170)
TOTAL PROTEIN: 6.3 G/DL (ref 6.4–8.1)
TROPONIN: <0.01 NG/ML (ref 0–0.01)
WBC # BLD: 17.7 K/UL (ref 4.8–10.8)

## 2018-08-15 PROCEDURE — 6370000000 HC RX 637 (ALT 250 FOR IP): Performed by: INTERNAL MEDICINE

## 2018-08-15 PROCEDURE — 1210000000 HC MED SURG R&B

## 2018-08-15 PROCEDURE — 36415 COLL VENOUS BLD VENIPUNCTURE: CPT

## 2018-08-15 PROCEDURE — 6360000002 HC RX W HCPCS: Performed by: INTERNAL MEDICINE

## 2018-08-15 PROCEDURE — 94761 N-INVAS EAR/PLS OXIMETRY MLT: CPT

## 2018-08-15 PROCEDURE — 87040 BLOOD CULTURE FOR BACTERIA: CPT

## 2018-08-15 PROCEDURE — 6360000002 HC RX W HCPCS: Performed by: PHYSICIAN ASSISTANT

## 2018-08-15 PROCEDURE — 84484 ASSAY OF TROPONIN QUANT: CPT

## 2018-08-15 PROCEDURE — 83605 ASSAY OF LACTIC ACID: CPT

## 2018-08-15 PROCEDURE — 80053 COMPREHEN METABOLIC PANEL: CPT

## 2018-08-15 PROCEDURE — 93005 ELECTROCARDIOGRAM TRACING: CPT

## 2018-08-15 PROCEDURE — 99285 EMERGENCY DEPT VISIT HI MDM: CPT

## 2018-08-15 PROCEDURE — 94640 AIRWAY INHALATION TREATMENT: CPT

## 2018-08-15 PROCEDURE — 85610 PROTHROMBIN TIME: CPT

## 2018-08-15 PROCEDURE — 82550 ASSAY OF CK (CPK): CPT

## 2018-08-15 PROCEDURE — 6370000000 HC RX 637 (ALT 250 FOR IP): Performed by: PHYSICIAN ASSISTANT

## 2018-08-15 PROCEDURE — 71045 X-RAY EXAM CHEST 1 VIEW: CPT

## 2018-08-15 PROCEDURE — 2580000003 HC RX 258: Performed by: INTERNAL MEDICINE

## 2018-08-15 PROCEDURE — 2500000003 HC RX 250 WO HCPCS: Performed by: PHYSICIAN ASSISTANT

## 2018-08-15 PROCEDURE — 85025 COMPLETE CBC W/AUTO DIFF WBC: CPT

## 2018-08-15 PROCEDURE — 85730 THROMBOPLASTIN TIME PARTIAL: CPT

## 2018-08-15 PROCEDURE — 96374 THER/PROPH/DIAG INJ IV PUSH: CPT

## 2018-08-15 PROCEDURE — 80198 ASSAY OF THEOPHYLLINE: CPT

## 2018-08-15 PROCEDURE — 83735 ASSAY OF MAGNESIUM: CPT

## 2018-08-15 PROCEDURE — 2580000003 HC RX 258: Performed by: PHYSICIAN ASSISTANT

## 2018-08-15 PROCEDURE — 96375 TX/PRO/DX INJ NEW DRUG ADDON: CPT

## 2018-08-15 RX ORDER — SODIUM CHLORIDE 0.9 % (FLUSH) 0.9 %
10 SYRINGE (ML) INJECTION EVERY 12 HOURS SCHEDULED
Status: DISCONTINUED | OUTPATIENT
Start: 2018-08-15 | End: 2018-08-18 | Stop reason: HOSPADM

## 2018-08-15 RX ORDER — LEVOTHYROXINE SODIUM 0.07 MG/1
75 TABLET ORAL DAILY
Status: DISCONTINUED | OUTPATIENT
Start: 2018-08-15 | End: 2018-08-16

## 2018-08-15 RX ORDER — METOPROLOL TARTRATE 50 MG/1
50 TABLET, FILM COATED ORAL 2 TIMES DAILY
Status: DISCONTINUED | OUTPATIENT
Start: 2018-08-15 | End: 2018-08-17

## 2018-08-15 RX ORDER — LEVALBUTEROL INHALATION SOLUTION 1.25 MG/3ML
1.25 SOLUTION RESPIRATORY (INHALATION)
Status: DISCONTINUED | OUTPATIENT
Start: 2018-08-15 | End: 2018-08-18 | Stop reason: HOSPADM

## 2018-08-15 RX ORDER — ONDANSETRON 2 MG/ML
4 INJECTION INTRAMUSCULAR; INTRAVENOUS EVERY 6 HOURS PRN
Status: DISCONTINUED | OUTPATIENT
Start: 2018-08-15 | End: 2018-08-18 | Stop reason: HOSPADM

## 2018-08-15 RX ORDER — SODIUM CHLORIDE 0.9 % (FLUSH) 0.9 %
10 SYRINGE (ML) INJECTION PRN
Status: DISCONTINUED | OUTPATIENT
Start: 2018-08-15 | End: 2018-08-18 | Stop reason: HOSPADM

## 2018-08-15 RX ORDER — METOPROLOL TARTRATE 5 MG/5ML
5 INJECTION INTRAVENOUS ONCE
Status: COMPLETED | OUTPATIENT
Start: 2018-08-15 | End: 2018-08-15

## 2018-08-15 RX ORDER — LEVOFLOXACIN 5 MG/ML
750 INJECTION, SOLUTION INTRAVENOUS ONCE
Status: DISCONTINUED | OUTPATIENT
Start: 2018-08-15 | End: 2018-08-15

## 2018-08-15 RX ORDER — BUPROPION HYDROCHLORIDE 75 MG/1
75 TABLET ORAL 2 TIMES DAILY
Status: DISCONTINUED | OUTPATIENT
Start: 2018-08-15 | End: 2018-08-18 | Stop reason: HOSPADM

## 2018-08-15 RX ORDER — ALPRAZOLAM 0.25 MG/1
0.25 TABLET ORAL 3 TIMES DAILY PRN
COMMUNITY
End: 2018-12-06 | Stop reason: SDUPTHER

## 2018-08-15 RX ORDER — SIMVASTATIN 10 MG
10 TABLET ORAL NIGHTLY
Status: DISCONTINUED | OUTPATIENT
Start: 2018-08-15 | End: 2018-08-18 | Stop reason: HOSPADM

## 2018-08-15 RX ORDER — ALPRAZOLAM 0.25 MG/1
0.25 TABLET ORAL 3 TIMES DAILY PRN
Status: DISCONTINUED | OUTPATIENT
Start: 2018-08-15 | End: 2018-08-18 | Stop reason: HOSPADM

## 2018-08-15 RX ORDER — 0.9 % SODIUM CHLORIDE 0.9 %
1000 INTRAVENOUS SOLUTION INTRAVENOUS ONCE
Status: COMPLETED | OUTPATIENT
Start: 2018-08-15 | End: 2018-08-15

## 2018-08-15 RX ORDER — LEVOFLOXACIN 5 MG/ML
500 INJECTION, SOLUTION INTRAVENOUS ONCE
Status: DISCONTINUED | OUTPATIENT
Start: 2018-08-15 | End: 2018-08-15

## 2018-08-15 RX ORDER — LEVALBUTEROL 1.25 MG/.5ML
1.25 SOLUTION, CONCENTRATE RESPIRATORY (INHALATION) 3 TIMES DAILY
Status: DISCONTINUED | OUTPATIENT
Start: 2018-08-16 | End: 2018-08-18 | Stop reason: HOSPADM

## 2018-08-15 RX ORDER — METHYLPREDNISOLONE SODIUM SUCCINATE 125 MG/2ML
125 INJECTION, POWDER, LYOPHILIZED, FOR SOLUTION INTRAMUSCULAR; INTRAVENOUS ONCE
Status: COMPLETED | OUTPATIENT
Start: 2018-08-15 | End: 2018-08-15

## 2018-08-15 RX ORDER — TRAMADOL HYDROCHLORIDE 50 MG/1
50 TABLET ORAL EVERY 6 HOURS PRN
Status: DISCONTINUED | OUTPATIENT
Start: 2018-08-15 | End: 2018-08-18 | Stop reason: HOSPADM

## 2018-08-15 RX ORDER — LEVALBUTEROL INHALATION SOLUTION 1.25 MG/3ML
1.25 SOLUTION RESPIRATORY (INHALATION) 3 TIMES DAILY
Status: DISCONTINUED | OUTPATIENT
Start: 2018-08-15 | End: 2018-08-15

## 2018-08-15 RX ORDER — ACETAMINOPHEN 500 MG
1000 TABLET ORAL ONCE
Status: COMPLETED | OUTPATIENT
Start: 2018-08-15 | End: 2018-08-15

## 2018-08-15 RX ORDER — LEVALBUTEROL INHALATION SOLUTION 1.25 MG/3ML
1.25 SOLUTION RESPIRATORY (INHALATION) EVERY 8 HOURS PRN
Status: DISCONTINUED | OUTPATIENT
Start: 2018-08-15 | End: 2018-08-15

## 2018-08-15 RX ADMIN — LEVOFLOXACIN 500 MG: 5 INJECTION, SOLUTION INTRAVENOUS at 12:51

## 2018-08-15 RX ADMIN — CEFTRIAXONE SODIUM 1 G: 1 INJECTION, POWDER, FOR SOLUTION INTRAMUSCULAR; INTRAVENOUS at 17:55

## 2018-08-15 RX ADMIN — Medication 2 PUFF: at 19:36

## 2018-08-15 RX ADMIN — Medication 10 ML: at 16:30

## 2018-08-15 RX ADMIN — METHYLPREDNISOLONE SODIUM SUCCINATE 125 MG: 125 INJECTION, POWDER, FOR SOLUTION INTRAMUSCULAR; INTRAVENOUS at 10:04

## 2018-08-15 RX ADMIN — ENOXAPARIN SODIUM 40 MG: 40 INJECTION SUBCUTANEOUS at 16:30

## 2018-08-15 RX ADMIN — SIMVASTATIN 10 MG: 10 TABLET, FILM COATED ORAL at 20:50

## 2018-08-15 RX ADMIN — LEVALBUTEROL 1.25 MG: 1.25 SOLUTION RESPIRATORY (INHALATION) at 19:36

## 2018-08-15 RX ADMIN — SODIUM CHLORIDE 1000 ML: 9 INJECTION, SOLUTION INTRAVENOUS at 12:31

## 2018-08-15 RX ADMIN — METOPROLOL TARTRATE 5 MG: 1 INJECTION, SOLUTION INTRAVENOUS at 11:10

## 2018-08-15 RX ADMIN — METOPROLOL TARTRATE 50 MG: 50 TABLET ORAL at 20:50

## 2018-08-15 RX ADMIN — BUPROPION HYDROCHLORIDE 75 MG: 75 TABLET, FILM COATED ORAL at 20:50

## 2018-08-15 RX ADMIN — TRAZODONE HYDROCHLORIDE 150 MG: 100 TABLET ORAL at 20:50

## 2018-08-15 RX ADMIN — LEVALBUTEROL 1.25 MG: 1.25 SOLUTION RESPIRATORY (INHALATION) at 13:08

## 2018-08-15 RX ADMIN — SODIUM CHLORIDE 1000 ML: 9 INJECTION, SOLUTION INTRAVENOUS at 10:04

## 2018-08-15 RX ADMIN — AZITHROMYCIN MONOHYDRATE 500 MG: 500 INJECTION, POWDER, LYOPHILIZED, FOR SOLUTION INTRAVENOUS at 16:29

## 2018-08-15 RX ADMIN — Medication 10 ML: at 20:50

## 2018-08-15 RX ADMIN — ACETAMINOPHEN 1000 MG: 500 TABLET ORAL at 11:45

## 2018-08-15 RX ADMIN — SODIUM CHLORIDE 1000 ML: 9 INJECTION, SOLUTION INTRAVENOUS at 10:55

## 2018-08-15 ASSESSMENT — ENCOUNTER SYMPTOMS
ABDOMINAL PAIN: 0
RHINORRHEA: 0
DIARRHEA: 0
NAUSEA: 0
CONSTIPATION: 0
COLOR CHANGE: 0
EYE DISCHARGE: 0
SORE THROAT: 0
ABDOMINAL DISTENTION: 0
VOMITING: 0
SHORTNESS OF BREATH: 1

## 2018-08-15 ASSESSMENT — PAIN SCALES - GENERAL
PAINLEVEL_OUTOF10: 7
PAINLEVEL_OUTOF10: 7

## 2018-08-15 ASSESSMENT — PAIN DESCRIPTION - DESCRIPTORS: DESCRIPTORS: ACHING

## 2018-08-15 ASSESSMENT — PAIN DESCRIPTION - FREQUENCY: FREQUENCY: CONTINUOUS

## 2018-08-15 ASSESSMENT — PAIN DESCRIPTION - LOCATION: LOCATION: GENERALIZED

## 2018-08-15 ASSESSMENT — PAIN DESCRIPTION - ONSET: ONSET: ON-GOING

## 2018-08-15 ASSESSMENT — PAIN DESCRIPTION - PAIN TYPE: TYPE: ACUTE PAIN

## 2018-08-15 NOTE — ED PROVIDER NOTES
HISTORY     Past Medical History:   Diagnosis Date    Anxiety     Anxiety and depression     CAD (coronary artery disease)     [facility] 3/2014    Invasive ductal cancer left breast    Carotid artery stenosis and occlusion     [facility]     Depression 1/15/2018    GERD (gastroesophageal reflux disease)     Headache(784.0)     Hyperlipidemia     Hypertension     Hypothyroidism     Insomnia     Osteoarthritis     RBBB 10/15/2014    Right carotid bruit 5/26/2015    S/P cardiac catheterization 7/13/2016         SURGICAL HISTORY       Past Surgical History:   Procedure Laterality Date    BREAST BIOPSY Right 11/8/2016    ULTRASOUND GUIDED BIOPSY RIGHT BREAST WITH FROZEN SECTION  performed by [place], MD at [address] Left 2/26/14    U/S Guided core bx of the left breast    BREAST SURGERY Right 3/20/14    U/S of the lateral right breast    BREAST SURGERY Left 3/27/14    U/S Guided Left breast lumpectomy and left snb    OTHER SURGICAL HISTORY  4/11/14    Placement drain, left axillary seroma         CURRENT MEDICATIONS       Previous Medications    ALBUTEROL SULFATE HFA (PROAIR HFA) 108 (90 BASE) MCG/ACT INHALER    Inhale 2 puffs into the lungs every 6 hours as needed for Wheezing or Shortness of Breath    ALPRAZOLAM (XANAX) 0.25 MG TABLET    Take 0.25 mg by mouth 3 times daily as needed for Sleep. Gill Bean     BISOPROLOL (ZEBETA) 10 MG TABLET    TAKE 1 TABLET DAILY    BUPROPION (WELLBUTRIN) 75 MG TABLET    Take 1 tablet by mouth 2 times daily    IPRATROPIUM-ALBUTEROL (DUONEB) 0.5-2.5 (3) MG/3ML SOLN NEBULIZER SOLUTION    Inhale 3 mLs into the lungs every 6 hours as needed for Shortness of Breath    LEVOTHYROXINE (SYNTHROID) 75 MCG TABLET    TAKE 1 TABLET DAILY    LOVASTATIN (MEVACOR) 20 MG TABLET    TAKE 1 TABLET NIGHTLY    RESPIRATORY THERAPY SUPPLIES (NEBULIZER/TUBING/MOUTHPIECE) KIT    1 kit by Does not apply route daily as needed (wheezing) SYMBICORT 80-4.5 MCG/ACT AERO    USE 2 INHALATIONS TWICE A DAY    THEOPHYLLINE (UNIPHYL) 400 MG EXTENDED RELEASE TABLET    TAKE 1 TABLET DAILY    TIOTROPIUM (SPIRIVA HANDIHALER) 18 MCG INHALATION CAPSULE    Inhale 1 capsule into the lungs daily    TRAMADOL (ULTRAM) 50 MG TABLET    Take 50 mg by mouth every 6 hours as needed for Pain. .    TRAZODONE (DESYREL) 150 MG TABLET    take 1 (ONE) tablet nightly    VENLAFAXINE (EFFEXOR XR) 75 MG EXTENDED RELEASE CAPSULE    Take 1 capsule by mouth daily    VITAMIN D (ERGOCALCIFEROL) 89261 UNITS CAPS CAPSULE    TAKE 1 CAPSULE ONCE A WEEK       ALLERGIES     Patient has no known allergies. FAMILY HISTORY       Family History   Problem Relation Age of Onset    Cancer Sister         breast    Cancer Maternal Uncle         pancreatic          SOCIAL HISTORY       Social History     Social History    Marital status:      Spouse name: N/A    Number of children: N/A    Years of education: N/A     Social History Main Topics    Smoking status: Former Smoker     Packs/day: 1.50     Years: 40.00     Types: Cigarettes     Quit date: 1/26/1989    Smokeless tobacco: Never Used    Alcohol use No    Drug use: No    Sexual activity: No     Other Topics Concern    None     Social History Narrative    None       SCREENINGS             PHYSICAL EXAM    (up to 7 for level 4, 8 or more for level 5)     ED Triage Vitals [08/15/18 0939]   BP Temp Temp Source Pulse Resp SpO2 Height Weight   (!) 130/101 99.4 °F (37.4 °C) Oral 157 26 95 % 5' 3\" (1.6 m) 132 lb (59.9 kg)       Physical Exam   Constitutional: She is oriented to person, place, and time. She appears well-developed and well-nourished. HENT:   Head: Normocephalic. Eyes: Pupils are equal, round, and reactive to light. Neck: Neck supple. No JVD present. No tracheal deviation present. Cardiovascular: Regular rhythm. Tachycardia   Pulmonary/Chest: Effort normal and breath sounds normal. No respiratory distress.

## 2018-08-15 NOTE — PROGRESS NOTES
[facility] Respiratory Therapy Evaluation   Current Order:  XOPENEX 1.25 Q8 PRN      Home Regimen: DUONEB PRN      Ordering Physician: Leon Hamilton  Re-evaluation Date:  8/18     Diagnosis:  PNEUMONIA      Patient Status: Stable / Unstable + Physician notified    The following MDI Criteria must be met in order to convert aerosol to MDI with spacer.  If unable to meet, MDI will be converted to aerosol:  []  Patient able to demonstrate the ability to use MDI effectively  []  Patient alert and cooperative  []  Patient able to take deep breath with 5-10 second hold  []  Medication(s) available in this delivery method   []  Peak flow greater than or equal to 200 ml/min            Current Order Substituted To  (same drug, same frequency)   Aerosol to MDI [] Albuterol Sulfate 0.083% unit dose by aerosol Albuterol Sulfate MDI 2 puffs by inhalation with spacer    [] Levalbuterol 1.25 mg unit dose by aerosol Levalbuterol MDI 2 puffs by inhalation with spacer    [] Levalbuterol 0.63 mg unit dose by aerosol Levalbuterol MDI 2 puffs by inhalation with spacer    [] Ipratropium Bromide 0.02% unit dose by aerosol Ipratropium Bromide MDI 2 puffs by inhalation with spacer    [] Duoneb (Ipratropium + Albuterol) unit dose by aerosol Ipratropium MDI + Albuterol MDI 2 puffs by inhalation w/spacer   MDI to Aerosol [] Albuterol Sulfate MDI Albuterol Sulfate 0.083% unit dose by aerosol    [] Levalbuterol MDI 2 puffs by inhalation Levalbuterol 1.25 mg unit dose by aerosol    [] Ipratropium Bromide MDI by inhalation Ipratropium Bromide 0.02% unit dose by aerosol    [] Combivent (Ipratropium + Albuterol) MDI by inhalation Duoneb (Ipratropium + Albuterol) unit dose by aerosol   Treatment Assessment [Frequency/Schedule]:  Change frequency to: _____XOPENEX 1.25 TID AND Q2 PRN_____________________________________________per Protocol, P&T, MEC      Points 0 1 2 3 4   Pulmonary Status  Non-Smoker  []   Smoking history   < 20 pack years  []   Smoking history  ?  20 pack years  []   Pulmonary Disorder  (acute or chronic)  [x]   Severe or Chronic w/ Exacerbation  []     Surgical Status No [x]   Surgeries     General []   Surgery Lower []   Abdominal Thoracic or []   Upper Abdominal Thoracic with  PulmonaryDisorder  []     Chest X-ray Clear/Not  Ordered     []  Chronic Changes  Results Pending  []  Infiltrates, atelectasis, pleural effusion, or edema  [x]  Infiltrates in more than one lobe []  Infiltrate + Atelectasis, &/or pleural effusion  []    Respiratory Pattern Regular,  RR = 12-20 [x]  Increased,  RR = 21-25 []  SOL, irregular,  or RR = 26-30 []  Decreased FEV1  or RR = 31-35 []  Severe SOB, use  of accessory muscles, or RR ? 35  []    Mental Status Alert, oriented,  Cooperative [x]  Confused but Follows commands []  Lethargic or unable to follow commands []  Obtunded  []  Comatose  []    Breath Sounds Clear to  auscultation  []  Decreased unilaterally or  in bases only []  Decreased  bilaterally  [x]  Crackles or intermittent wheezes []  Wheezes []    Cough Strong, Spontan., & nonproductive [x]  Strong,  spontaneous, &  productive []  Weak,  Nonproductive []  Weak, productive or  with wheezes []  No spontaneous  cough or may require suctioning []    Level of Activity Ambulatory []  Ambulatory w/ Assist  [x]  Non-ambulatory []  Paraplegic []  Quadriplegic []    Total    Score:___8____     Triage Score:___4_____      Tri       Triage:     1. (>20) Freq: Q3    2. (16-20) Freq: Q4   3. (11-15) Freq: QID & Albuterol Q2 PRN    4. (6-10) Freq: TID & Albuterol Q2 PRN    5. (0-5) Freq Q4prn

## 2018-08-15 NOTE — H&P
Tobacco History     Smoking Status  Former Smoker Quit date  1/26/1989 Smoking Frequency  1.5 packs/day for 40 years (61 pk yrs) Smoking Tobacco Type  Cigarettes    Smokeless Tobacco Use  Never Used          Alcohol History     Alcohol Use Status  No          Drug Use     Drug Use Status  No          Sexual Activity     Sexually Active  No                Family History:   Family History   Problem Relation Age of Onset    Cancer Sister         breast    Cancer Maternal Uncle         pancreatic       REVIEW OF SYSTEMS:  12 systems reviewed and are negative except as mentioned in HPI and A&P    PHYSICAL EXAM:  Vitals:  Vitals:    08/15/18 1352   BP: (!) 95/58   Pulse: 128   Resp: 22   Temp:    SpO2: 98%       General Exam (except as mentioned above):  CONSTITUTIONAL: Awake, alert, no apparent distress  EYES:  PERRL, conjunctiva normal  ENT:  Normocephalic, atraumatic  NECK:  Supple  BACK:  Symmetric  LUNGS:  CTAB except bilateral basilar crackles. CARDIOVASCULAR:  S1S2 present  ABDOMEN:  soft, non-distended, non-tender  MUSCULOSKELETAL:  There is no redness, warmth, or swelling of the joints. NEUROLOGIC:  Alert, awake, oriented x 3. No FND  EXTREMITIES: Warm and well perfused. DATA:  LABS  Recent Labs      08/15/18   1000   WBC  17.7*   RBC  4.57   HGB  13.8   HCT  41.6   MCV  91.1   MCH  30.2   MCHC  33.2   RDW  14.6*   PLT  317       Recent Labs      08/15/18   1000   NA  137   K  3.9   CL  97*   CO2  24   BUN  13   CREATININE  0.93*   GLUCOSE  122*   CALCIUM  9.0       Recent Labs      08/15/18   1000   MG  1.8   EKG:  I have reviewed the EKG     Radiology Review:  I have reviewed the imaging studies -     ASSESSMENT AND PLAN:    Tiffany Coleman 1106 Problems    Diagnosis Date Noted    Community [facility] right [facility] [J18.1] 08/15/2018    Hyperlipidemia [E78.5]     Hypothyroidism [E03.9]      Community acquired pneumonia: Ceftriaxone and azithromycin.  Strep and legionella antigen. IVF. Continue breathing treatments    COPD: Stable; Continue home breathing treatments. Steroids not indicated    Sinus tachycardia: chronic as per patient. Will consult [facility]. Will get TSH. Continue hydration    Hypothyroidism: Continue synthroid. TSH in view of tachycardia    Breast mass: Patient to continue OP follow up.     H/o CAD: Continue continue BB, statin    DVT prophylaxis: Lovenox    Fabrizio Truong MD  Pager : [phone]

## 2018-08-16 LAB
ANION GAP SERPL CALCULATED.3IONS-SCNC: 11 MEQ/L (ref 7–13)
BASOPHILS ABSOLUTE: 0 K/UL (ref 0–0.2)
BASOPHILS RELATIVE PERCENT: 0.2 %
BUN BLDV-MCNC: 10 MG/DL (ref 8–23)
CALCIUM SERPL-MCNC: 8.6 MG/DL (ref 8.6–10.2)
CHLORIDE BLD-SCNC: 107 MEQ/L (ref 98–107)
CO2: 23 MEQ/L (ref 22–29)
CREAT SERPL-MCNC: 0.6 MG/DL (ref 0.5–0.9)
EKG ATRIAL RATE: 107 BPM
EKG ATRIAL RATE: 150 BPM
EKG ATRIAL RATE: 86 BPM
EKG P AXIS: 59 DEGREES
EKG P AXIS: 82 DEGREES
EKG P-R INTERVAL: 146 MS
EKG P-R INTERVAL: 184 MS
EKG Q-T INTERVAL: 298 MS
EKG Q-T INTERVAL: 370 MS
EKG Q-T INTERVAL: 372 MS
EKG QRS DURATION: 110 MS
EKG QRS DURATION: 128 MS
EKG QRS DURATION: 136 MS
EKG QTC CALCULATION (BAZETT): 445 MS
EKG QTC CALCULATION (BAZETT): 480 MS
EKG QTC CALCULATION (BAZETT): 493 MS
EKG R AXIS: 103 DEGREES
EKG R AXIS: 66 DEGREES
EKG R AXIS: 95 DEGREES
EKG T AXIS: 43 DEGREES
EKG T AXIS: 59 DEGREES
EKG T AXIS: 78 DEGREES
EKG VENTRICULAR RATE: 107 BPM
EKG VENTRICULAR RATE: 156 BPM
EKG VENTRICULAR RATE: 86 BPM
EOSINOPHILS ABSOLUTE: 0 K/UL (ref 0–0.7)
EOSINOPHILS RELATIVE PERCENT: 0 %
GFR AFRICAN AMERICAN: >60
GFR NON-AFRICAN AMERICAN: >60
GLUCOSE BLD-MCNC: 132 MG/DL (ref 74–109)
HCT VFR BLD CALC: 33.4 % (ref 37–47)
HEMOGLOBIN: 11.2 G/DL (ref 12–16)
LACTIC ACID: 0.8 MMOL/L (ref 0.5–2.2)
LACTIC ACID: 1.7 MMOL/L (ref 0.5–2.2)
LACTIC ACID: 2 MMOL/L (ref 0.5–2.2)
LYMPHOCYTES ABSOLUTE: 0.6 K/UL (ref 1–4.8)
LYMPHOCYTES RELATIVE PERCENT: 6 %
MCH RBC QN AUTO: 31.1 PG (ref 27–31.3)
MCHC RBC AUTO-ENTMCNC: 33.7 % (ref 33–37)
MCV RBC AUTO: 92.3 FL (ref 82–100)
MONOCYTES ABSOLUTE: 0.5 K/UL (ref 0.2–0.8)
MONOCYTES RELATIVE PERCENT: 4.4 %
NEUTROPHILS ABSOLUTE: 9.2 K/UL (ref 1.4–6.5)
NEUTROPHILS RELATIVE PERCENT: 89.4 %
PDW BLD-RTO: 14.8 % (ref 11.5–14.5)
PLATELET # BLD: 228 K/UL (ref 130–400)
POTASSIUM REFLEX MAGNESIUM: 4.2 MEQ/L (ref 3.5–5.1)
RBC # BLD: 3.62 M/UL (ref 4.2–5.4)
SODIUM BLD-SCNC: 141 MEQ/L (ref 132–144)
TSH SERPL DL<=0.05 MIU/L-ACNC: 0.09 UIU/ML (ref 0.27–4.2)
WBC # BLD: 10.3 K/UL (ref 4.8–10.8)

## 2018-08-16 PROCEDURE — 6360000002 HC RX W HCPCS: Performed by: INTERNAL MEDICINE

## 2018-08-16 PROCEDURE — 83605 ASSAY OF LACTIC ACID: CPT

## 2018-08-16 PROCEDURE — G8978 MOBILITY CURRENT STATUS: HCPCS

## 2018-08-16 PROCEDURE — 94760 N-INVAS EAR/PLS OXIMETRY 1: CPT

## 2018-08-16 PROCEDURE — 6370000000 HC RX 637 (ALT 250 FOR IP): Performed by: INTERNAL MEDICINE

## 2018-08-16 PROCEDURE — 94640 AIRWAY INHALATION TREATMENT: CPT

## 2018-08-16 PROCEDURE — G8980 MOBILITY D/C STATUS: HCPCS

## 2018-08-16 PROCEDURE — 87899 AGENT NOS ASSAY W/OPTIC: CPT

## 2018-08-16 PROCEDURE — G8988 SELF CARE GOAL STATUS: HCPCS

## 2018-08-16 PROCEDURE — 93010 ELECTROCARDIOGRAM REPORT: CPT | Performed by: INTERNAL MEDICINE

## 2018-08-16 PROCEDURE — 80048 BASIC METABOLIC PNL TOTAL CA: CPT

## 2018-08-16 PROCEDURE — 94761 N-INVAS EAR/PLS OXIMETRY MLT: CPT

## 2018-08-16 PROCEDURE — 97165 OT EVAL LOW COMPLEX 30 MIN: CPT

## 2018-08-16 PROCEDURE — G8979 MOBILITY GOAL STATUS: HCPCS

## 2018-08-16 PROCEDURE — 93005 ELECTROCARDIOGRAM TRACING: CPT

## 2018-08-16 PROCEDURE — 1210000000 HC MED SURG R&B

## 2018-08-16 PROCEDURE — 87449 NOS EACH ORGANISM AG IA: CPT

## 2018-08-16 PROCEDURE — 2700000000 HC OXYGEN THERAPY PER DAY

## 2018-08-16 PROCEDURE — G8987 SELF CARE CURRENT STATUS: HCPCS

## 2018-08-16 PROCEDURE — 85025 COMPLETE CBC W/AUTO DIFF WBC: CPT

## 2018-08-16 PROCEDURE — 2580000003 HC RX 258: Performed by: INTERNAL MEDICINE

## 2018-08-16 PROCEDURE — 36415 COLL VENOUS BLD VENIPUNCTURE: CPT

## 2018-08-16 PROCEDURE — G8989 SELF CARE D/C STATUS: HCPCS

## 2018-08-16 PROCEDURE — 97161 PT EVAL LOW COMPLEX 20 MIN: CPT

## 2018-08-16 PROCEDURE — 84443 ASSAY THYROID STIM HORMONE: CPT

## 2018-08-16 RX ORDER — LEVOTHYROXINE SODIUM 0.07 MG/1
37.5 TABLET ORAL DAILY
Status: DISCONTINUED | OUTPATIENT
Start: 2018-08-17 | End: 2018-08-16

## 2018-08-16 RX ORDER — LEVOTHYROXINE SODIUM 0.05 MG/1
50 TABLET ORAL DAILY
Status: DISCONTINUED | OUTPATIENT
Start: 2018-08-17 | End: 2018-08-18 | Stop reason: HOSPADM

## 2018-08-16 RX ADMIN — SIMVASTATIN 10 MG: 10 TABLET, FILM COATED ORAL at 21:00

## 2018-08-16 RX ADMIN — Medication 10 ML: at 08:03

## 2018-08-16 RX ADMIN — BUPROPION HYDROCHLORIDE 75 MG: 75 TABLET, FILM COATED ORAL at 08:02

## 2018-08-16 RX ADMIN — THEOPHYLLINE ANHYDROUS 400 MG: 200 CAPSULE, EXTENDED RELEASE ORAL at 11:03

## 2018-08-16 RX ADMIN — METOPROLOL TARTRATE 50 MG: 50 TABLET ORAL at 08:02

## 2018-08-16 RX ADMIN — TRAZODONE HYDROCHLORIDE 150 MG: 100 TABLET ORAL at 21:00

## 2018-08-16 RX ADMIN — CEFTRIAXONE SODIUM 1 G: 1 INJECTION, POWDER, FOR SOLUTION INTRAMUSCULAR; INTRAVENOUS at 17:30

## 2018-08-16 RX ADMIN — Medication 10 ML: at 21:00

## 2018-08-16 RX ADMIN — LEVOTHYROXINE SODIUM 75 MCG: 75 TABLET ORAL at 05:31

## 2018-08-16 RX ADMIN — Medication 2 PUFF: at 19:10

## 2018-08-16 RX ADMIN — ENOXAPARIN SODIUM 40 MG: 40 INJECTION SUBCUTANEOUS at 08:03

## 2018-08-16 RX ADMIN — AZITHROMYCIN MONOHYDRATE 500 MG: 500 INJECTION, POWDER, LYOPHILIZED, FOR SOLUTION INTRAVENOUS at 16:16

## 2018-08-16 RX ADMIN — LEVALBUTEROL 1.25 MG: 1.25 SOLUTION, CONCENTRATE RESPIRATORY (INHALATION) at 13:56

## 2018-08-16 RX ADMIN — BUPROPION HYDROCHLORIDE 75 MG: 75 TABLET, FILM COATED ORAL at 21:00

## 2018-08-16 RX ADMIN — LEVALBUTEROL 1.25 MG: 1.25 SOLUTION, CONCENTRATE RESPIRATORY (INHALATION) at 07:41

## 2018-08-16 RX ADMIN — TIOTROPIUM BROMIDE 18 MCG: 18 CAPSULE ORAL; RESPIRATORY (INHALATION) at 07:50

## 2018-08-16 RX ADMIN — LEVALBUTEROL 1.25 MG: 1.25 SOLUTION, CONCENTRATE RESPIRATORY (INHALATION) at 19:10

## 2018-08-16 RX ADMIN — Medication 2 PUFF: at 07:41

## 2018-08-16 ASSESSMENT — PAIN SCALES - GENERAL
PAINLEVEL_OUTOF10: 0
PAINLEVEL_OUTOF10: 0

## 2018-08-16 NOTE — PROGRESS NOTES
Physical Therapy Med Surg Initial Assessment  Facility/Department: [facility]  Room: [facility]/[place] [address]       NAME: Maddi Haq  : 1945 (74 y.o.)  MRN: [id number]  CODE STATUS: Full Code    Date of Service: 2018    Patient Diagnosis(es): Community acquired pneumonia of right lower lobe of lung [facility]) [J18.1]   Chief Complaint   Patient presents with    Shortness of Breath     nausea,   Fever.        Patient Active Problem List    Diagnosis Date Noted    Community acquired pneumonia of right lower lobe of lung ([facility] 08/15/2018    Breast mass, right- Dr. Ryley Monet Acute exacerbation of chronic obstructive pulmonary disease (COPD) ([place] [address].) 2018    Pulmonary nodule     Moderate episode of recurrent major depressive disorder ([facility] 2018    History of breast cancer- left 2018    Depression     Diastolic dysfunction 1724    Decreased GFR 2017    Bilateral carotid artery stenosis- Dr. Kelle Eric 2016    Abnormal ultrasound of breast 2016    Abnormal cardiovascular stress test 2016    Myocardial perfusion defect, homogeneous 2016    S/P cardiac catheterization 2016    Hiatal hernia 09/10/2015    Dyslipidemia 2015    Congenital hypothyroidism without goiter 08/10/2015    Right carotid bruit 2015    Cholelithiasis 2014    RBBB 10/15/2014    HTN (hypertension) 2014    Vitamin D deficiency 2014    Osteoporosis 2014    Breast cancer ([facility] 2014    GERD (gastroesophageal reflux disease)     CAD (coronary artery disease)- Dr. Arnoldo Brannon 2013    History of tobacco abuse 2013    Dyspepsia 2013    Hyperlipidemia     Hypothyroidism     COPD (chronic obstructive pulmonary disease) ([facility]- Dr. Merly Mota and depression     Insomnia     Osteoarthritis     S/P carotid endarterectomy     Disturbance of smell and taste 2005        Past Medical History: Score : 23     Therapy Time:   Individual   Time In 1530   Time Out 1600   Minutes [address] Rahul Hassan [place], 08/16/18 at 4:26 PM

## 2018-08-16 NOTE — PROGRESS NOTES
home  Discussed and agreed upon: [x] Yes   [] No         Comments:     Assessment/Discharge Disposition:     Prognosis: Good  Discharge Recommendations: Continue to assess pending progress  No Skilled OT: At baseline function  History: multiple  Exam: 1 deficit  Assistance / Modification: no assist    Prognosis:  [x] Good   []Fair   [] Poor     Barriers to Improvement:  Tolerance    Six Click Score  How much help for putting on and taking off regular lower body clothing?: None  How much help for Bathing?: None  How much help for Toileting?: None  How much help for putting on and taking off regular upper body clothing?: None  How much help for taking care of personal grooming?: None  How much help for eating meals?: None  AM-PAC Inpatient Daily Activity Raw Score: 24  AM-PAC Inpatient ADL T-Scale Score : 57.54  ADL Inpatient CMS 0-100% Score: 0    Recommended DME:  [] W/W   [] Corky Simmering   [] Rollator   [] W/C   [] Grab Bars  [] Shower Chair   []Dressing AD []  BSC  [] Other:    Plan: ,  N/A    G-Codes:  OT G-codes  Functional Limitation: Self care  Self Care Current Status (): 0 percent impaired, limited or restricted  Self Care Goal Status (): 0 percent impaired, limited or restricted  Self Care Discharge Status (): 0 percent impaired, limited or restricted        Time in:  1335  Time out:  1410  Timed treatment minutes:  35  Total treatment time/minutes:  35    Electronically signed by:    REKHA Pérez  5/01/7454, 4:11 PM Electronically signed by REKHA Pérez on 4/07/60 at 4:20 PM

## 2018-08-16 NOTE — PROGRESS NOTES
Speech Language Pathology      NAME:  Javier Lainez  :  1945  DATE: 2018      A swallow screen order was entered for this patient, however Speech Therapy cannot complete unless a Clinical Bedside Swallow Evaluation is ordered. Please enter order in Epic if needed. Thank you. Dionicio Foster, Date: 2018, Time: 8:01 AM

## 2018-08-16 NOTE — PROGRESS NOTES
Stable; Continue home breathing treatments. Steroids not indicated     Sinus tachycardia:patient's TSH levels are significantly low. Decreased dose of synthroid to 50 mcg. Will need repeat TSH in 4 weeks to adjust dose. This can be contributing to her tachycardia. Theophylline levels are therapeutic. Continue hydration. Cardiology following     Hypothyroidism: Continue synthroid. Synthroid decreased to 50 mcg      Breast mass: Patient to continue OP follow up.     H/o CAD: Continue continue BB, statin    DVT prophylaxis: Lovenox  Ranjan Abreu MD  Pager : [phone]

## 2018-08-16 NOTE — CARE COORDINATION
MET WITH PATIENT TO DISCUSS D/C PLANS. SHE STATES SHE IS FROM HOME, HER DAUGHTER LIVES WITH HER. SHE STILL DRIVES. SHE HAS HOME O2 AND NEBULIZER THROUGH MEDICAL SERVICES. HER PULMONOLOGIST IS DR. Barrie De La O. SHE USES NO SERVICES. SHE DECLINES [facility] [place] [facility] BUT WOULD LIKE PULMONARY REHAB-NOTE LEFT FOR MD. SHE PLANS TO RETURN HOME ON D/C. PAGE 1 OF IMM EXPLAINED TO PATIENT WITH VERBALIZATION OF UNDERSTANDING, PATIENT SIGNED. CARE TEAM TO FOLLOW.  Electronically signed by Travis Alford RN on 8/16/2018 at 12:44 PM

## 2018-08-16 NOTE — CONSULTS
Prior to Admission medications    Medication Sig Start Date End Date Taking? Authorizing Provider   ALPRAZolam (XANAX) 0.25 MG tablet Take 0.25 mg by mouth 3 times daily as needed for Sleep. .   Yes Historical Provider, MD   theophylline (UNIPHYL) 400 MG extended release tablet TAKE 1 TABLET DAILY 7/23/18  Yes Galen Goldmann Riedy, APRN - CNP   bisoprolol (ZEBETA) 10 MG tablet TAKE 1 TABLET DAILY 7/2/18  Yes Galen Goldmann Riedy, APRN - CNP   levothyroxine (SYNTHROID) 75 MCG tablet TAKE 1 TABLET DAILY 6/22/18  Yes Delores Carr MD   traZODone (DESYREL) 150 MG tablet take 1 (ONE) tablet nightly 5/3/18  Yes Galen Goldmann Riedy, APRN - CNP   venlafaxine (EFFEXOR XR) 75 MG extended release capsule Take 1 capsule by mouth daily 5/3/18  Yes Galen Goldmann Riedy, APRN - CNP   lovastatin (MEVACOR) 20 MG tablet TAKE 1 TABLET NIGHTLY 4/23/18  Yes Galen Goldmann Riedy, APRN - CNP   SYMBICORT 80-4.5 MCG/ACT AERO USE 2 INHALATIONS TWICE A DAY 2/19/18  Yes Galen Goldmann Riedy, APRN - CNP   buPROPion (WELLBUTRIN) 75 MG tablet Take 1 tablet by mouth 2 times daily 2/8/18  Yes Galen Goldmann Riedy, APRN - CNP   vitamin D (ERGOCALCIFEROL) 03344 units CAPS capsule TAKE 1 CAPSULE ONCE A WEEK 1/16/18  Yes Galen Goldmann Riedy, APRN - CNP   tiotropium (Bary Ordoñez) 18 MCG inhalation capsule Inhale 1 capsule into the lungs daily 12/14/17  Yes ROSALIND Almanza CNP   ipratropium-albuterol (DUONEB) 0.5-2.5 (3) MG/3ML SOLN nebulizer solution Inhale 3 mLs into the lungs every 6 hours as needed for Shortness of Breath 11/13/17  Yes Galen Goldmann Riedy, APRN - CNP   Respiratory Therapy Supplies (NEBULIZER/TUBING/MOUTHPIECE) KIT 1 kit by Does not apply route daily as needed (wheezing) 11/13/17  Yes Galen Goldmann Riedy, APRN - CNP   albuterol sulfate HFA (PROAIR HFA) 108 (90 BASE) MCG/ACT inhaler Inhale 2 puffs into the lungs every 6 hours as needed for Wheezing or Shortness of Breath 12/9/16  Yes Galen Goldmann Riedy, APRN - CNP   traMADol (ULTRAM) 50 MG tablet Take 50 mg by mouth every 6 hours as needed for Pain. Chris Yan     Historical Provider, MD       Current Medications:      IV Medications:  Current Facility-Administered Medications   Medication Dose Route Frequency Provider Last Rate Last Dose    [START ON 8/17/2018] levothyroxine (SYNTHROID) tablet 50 mcg  50 mcg Oral Daily Jeffery Alexander MD        ALPRAZolam [employer]) tablet 0.25 mg  0.25 mg Oral TID PRN Jeffery Longoria MD        metoprolol tartrate (LOPRESSOR) tablet 50 mg  50 mg Oral BID Jeffery Longoria MD   50 mg at 08/16/18 0802    buPROPion [facility]) tablet 75 mg  75 mg Oral BID Jeffery Longoria MD   75 mg at 08/16/18 0802    simvastatin (ZOCOR) tablet 10 mg  10 mg Oral Nightly Jeffery Longoria MD   10 mg at 08/15/18 2050    mometasone-formoterol (DULERA) 100-5 MCG/ACT inhaler 2 puff  2 puff Inhalation BID Jeffery Longoria MD   2 puff at 08/16/18 0741    theophylline (MARK-24) extended release capsule 400 mg  400 mg Oral Daily Jeffery Longoria MD   400 mg at 08/16/18 1103    tiotropium (SPIRIVA) inhalation capsule 18 mcg  18 mcg Inhalation Daily Jeffery Longoria MD   18 mcg at 08/16/18 0750    traMADol (ULTRAM) tablet 50 mg  50 mg Oral Q6H PRN Jeffery Longoria MD        traZODone (DESYREL) tablet 150 mg  150 mg Oral Nightly Jeffery Longoria MD   150 mg at 08/15/18 2050    sodium chloride flush 0.9 % injection 10 mL  10 mL Intravenous 2 times per day Ashley Sahu MD   10 mL at 08/16/18 0803    sodium chloride flush 0.9 % injection 10 mL  10 mL Intravenous PRN Jeffery Longoria MD        magnesium hydroxide (MILK OF MAGNESIA) 400 MG/5ML suspension 30 mL  30 mL Oral Daily PRN Jeffery Longoria MD        ondansetron (ZOFRAN) injection 4 mg  4 mg Intravenous Q6H PRN Jeffery Alexander MD        enoxaparin (LOVENOX) injection 40 mg  40 mg Subcutaneous Daily Jeffery Alexander MD   40 mg at 08/16/18 0803    azithromycin (ZITHROMAX) 500 mg in D5W 250ml addavial  500 mg Intravenous Q24H Jeffery Longoria MD   Stopped at 08/15/18 1755    And    cefTRIAXone (ROCEPHIN) 1 g

## 2018-08-17 LAB
ANION GAP SERPL CALCULATED.3IONS-SCNC: 13 MEQ/L (ref 7–13)
BASOPHILS ABSOLUTE: 0 K/UL (ref 0–0.2)
BASOPHILS RELATIVE PERCENT: 0.3 %
BUN BLDV-MCNC: 7 MG/DL (ref 8–23)
CALCIUM SERPL-MCNC: 8.8 MG/DL (ref 8.6–10.2)
CHLORIDE BLD-SCNC: 102 MEQ/L (ref 98–107)
CO2: 26 MEQ/L (ref 22–29)
CREAT SERPL-MCNC: 0.78 MG/DL (ref 0.5–0.9)
EOSINOPHILS ABSOLUTE: 0.1 K/UL (ref 0–0.7)
EOSINOPHILS RELATIVE PERCENT: 1.4 %
GFR AFRICAN AMERICAN: >60
GFR NON-AFRICAN AMERICAN: >60
GLUCOSE BLD-MCNC: 125 MG/DL (ref 74–109)
HCT VFR BLD CALC: 33.5 % (ref 37–47)
HEMOGLOBIN: 11.4 G/DL (ref 12–16)
LACTIC ACID: 1.2 MMOL/L (ref 0.5–2.2)
LYMPHOCYTES ABSOLUTE: 1.3 K/UL (ref 1–4.8)
LYMPHOCYTES RELATIVE PERCENT: 15.8 %
MCH RBC QN AUTO: 31.3 PG (ref 27–31.3)
MCHC RBC AUTO-ENTMCNC: 34.1 % (ref 33–37)
MCV RBC AUTO: 91.7 FL (ref 82–100)
MONOCYTES ABSOLUTE: 0.6 K/UL (ref 0.2–0.8)
MONOCYTES RELATIVE PERCENT: 6.8 %
NEUTROPHILS ABSOLUTE: 6.5 K/UL (ref 1.4–6.5)
NEUTROPHILS RELATIVE PERCENT: 75.7 %
PDW BLD-RTO: 14.8 % (ref 11.5–14.5)
PLATELET # BLD: 237 K/UL (ref 130–400)
POTASSIUM SERPL-SCNC: 3.1 MEQ/L (ref 3.5–5.1)
RBC # BLD: 3.65 M/UL (ref 4.2–5.4)
SODIUM BLD-SCNC: 141 MEQ/L (ref 132–144)
STREP PNEUMO AG, UR: NEGATIVE
WBC # BLD: 8.5 K/UL (ref 4.8–10.8)

## 2018-08-17 PROCEDURE — 94761 N-INVAS EAR/PLS OXIMETRY MLT: CPT

## 2018-08-17 PROCEDURE — 2700000000 HC OXYGEN THERAPY PER DAY

## 2018-08-17 PROCEDURE — 94640 AIRWAY INHALATION TREATMENT: CPT

## 2018-08-17 PROCEDURE — 6370000000 HC RX 637 (ALT 250 FOR IP): Performed by: INTERNAL MEDICINE

## 2018-08-17 PROCEDURE — 36415 COLL VENOUS BLD VENIPUNCTURE: CPT

## 2018-08-17 PROCEDURE — 93010 ELECTROCARDIOGRAM REPORT: CPT | Performed by: INTERNAL MEDICINE

## 2018-08-17 PROCEDURE — 83605 ASSAY OF LACTIC ACID: CPT

## 2018-08-17 PROCEDURE — 6360000002 HC RX W HCPCS: Performed by: INTERNAL MEDICINE

## 2018-08-17 PROCEDURE — 80048 BASIC METABOLIC PNL TOTAL CA: CPT

## 2018-08-17 PROCEDURE — 85025 COMPLETE CBC W/AUTO DIFF WBC: CPT

## 2018-08-17 PROCEDURE — 1210000000 HC MED SURG R&B

## 2018-08-17 PROCEDURE — 93005 ELECTROCARDIOGRAM TRACING: CPT

## 2018-08-17 PROCEDURE — 94668 MNPJ CHEST WALL SBSQ: CPT

## 2018-08-17 PROCEDURE — 94667 MNPJ CHEST WALL 1ST: CPT

## 2018-08-17 PROCEDURE — 94760 N-INVAS EAR/PLS OXIMETRY 1: CPT

## 2018-08-17 PROCEDURE — 2580000003 HC RX 258: Performed by: INTERNAL MEDICINE

## 2018-08-17 RX ORDER — POTASSIUM CHLORIDE 20 MEQ/1
40 TABLET, EXTENDED RELEASE ORAL 2 TIMES DAILY
Status: COMPLETED | OUTPATIENT
Start: 2018-08-17 | End: 2018-08-17

## 2018-08-17 RX ADMIN — Medication 10 ML: at 11:29

## 2018-08-17 RX ADMIN — AZITHROMYCIN MONOHYDRATE 500 MG: 500 INJECTION, POWDER, LYOPHILIZED, FOR SOLUTION INTRAVENOUS at 17:21

## 2018-08-17 RX ADMIN — BUPROPION HYDROCHLORIDE 75 MG: 75 TABLET, FILM COATED ORAL at 11:29

## 2018-08-17 RX ADMIN — METOPROLOL TARTRATE 75 MG: 50 TABLET ORAL at 21:26

## 2018-08-17 RX ADMIN — LEVALBUTEROL 1.25 MG: 1.25 SOLUTION, CONCENTRATE RESPIRATORY (INHALATION) at 13:40

## 2018-08-17 RX ADMIN — POTASSIUM CHLORIDE 40 MEQ: 20 TABLET, EXTENDED RELEASE ORAL at 11:29

## 2018-08-17 RX ADMIN — LEVALBUTEROL 1.25 MG: 1.25 SOLUTION, CONCENTRATE RESPIRATORY (INHALATION) at 08:00

## 2018-08-17 RX ADMIN — TIOTROPIUM BROMIDE 18 MCG: 18 CAPSULE ORAL; RESPIRATORY (INHALATION) at 08:00

## 2018-08-17 RX ADMIN — CEFTRIAXONE SODIUM 1 G: 1 INJECTION, POWDER, FOR SOLUTION INTRAMUSCULAR; INTRAVENOUS at 18:43

## 2018-08-17 RX ADMIN — LEVOTHYROXINE SODIUM 50 MCG: 50 TABLET ORAL at 05:43

## 2018-08-17 RX ADMIN — THEOPHYLLINE ANHYDROUS 400 MG: 200 CAPSULE, EXTENDED RELEASE ORAL at 11:29

## 2018-08-17 RX ADMIN — LEVALBUTEROL 1.25 MG: 1.25 SOLUTION, CONCENTRATE RESPIRATORY (INHALATION) at 20:07

## 2018-08-17 RX ADMIN — BUPROPION HYDROCHLORIDE 75 MG: 75 TABLET, FILM COATED ORAL at 21:27

## 2018-08-17 RX ADMIN — Medication 2 PUFF: at 20:07

## 2018-08-17 RX ADMIN — TRAZODONE HYDROCHLORIDE 150 MG: 100 TABLET ORAL at 21:27

## 2018-08-17 RX ADMIN — Medication 2 PUFF: at 08:00

## 2018-08-17 RX ADMIN — METOPROLOL TARTRATE 50 MG: 50 TABLET ORAL at 11:28

## 2018-08-17 RX ADMIN — POTASSIUM CHLORIDE 40 MEQ: 20 TABLET, EXTENDED RELEASE ORAL at 21:27

## 2018-08-17 RX ADMIN — ENOXAPARIN SODIUM 40 MG: 40 INJECTION SUBCUTANEOUS at 11:29

## 2018-08-17 RX ADMIN — SIMVASTATIN 10 MG: 10 TABLET, FILM COATED ORAL at 21:27

## 2018-08-17 ASSESSMENT — PAIN SCALES - GENERAL
PAINLEVEL_OUTOF10: 0
PAINLEVEL_OUTOF10: 0

## 2018-08-17 NOTE — PROGRESS NOTES
Cardiology Progress Note      Cardiologist:  Juliana Mack MD   Date:  8/17/2018  Patient:  Lazarus Ishihara  YOB: 1945  MRN:  [id number]   Admit Date:  8/15/2018      SUBJECTIVE      Lazarus Ishihara was seen and examined today at bedside. No chest pain. Mild shortness of breath. Lying flat. Mild cough. CONSULT HPI:   Lazarus Ishihara is a 74 y.o.  female patient who is being at the request of Dr. Linda Brandt for inpatient consultation of arrhythmia. She was admitted on 8/15/2018. Previous [address] and [address] records have been reviewed in detail. She was recently seen on an outpatient basis by Dr. Mynor Willis to evaluate for persistent shortness of breath. 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 She underwent a myocardial perfusion study last month that was negative for ischemia. She had an echocardiogram that showed normal LV systolic function and normal valvular structure and function. She has a history of COPD and was placed on home O2 back in June of this year. She stopped smoking cigarettes 25 years ago but was exposed to large amounts of second hand smoke from her . She is currently wearing a CardioNet monitor. She was recently diagnosed with a breast mass and needs to be scheduled for surgery per Dr. Stacey Zuniga. Her recent EKG showed NSR with complete RBBB. She has hypertension and mild hyperlipidemia. She has a history of anxiety and depression. She states she has felt more short of breath over the past few 5 days or so. She has noted a low grade fever as well. She was diagnosed in the ED with possible pneumonia and admitted. She was noted to have sinus tachycardia in the past.  Her EKG done on admit here shows underlying SVT (atrial tachycardia). She has since converted back to NSR.   She has noted on her BP monitor at home that her HGB  13.8  11.2*   HCT  41.6  33.4*   MCV  91.1  92.3   MCH  30.2  31.1   MCHC  33.2  33.7   RDW  14.6*  14.8*   PLT  317  228       Cardiac Enzymes:   Recent Labs      08/15/18   1000   CKTOTAL  45   TROPONINI  <0.010       Hepatic Function Panel:  Recent Labs      08/15/18   1000   ALKPHOS  69   ALT  10   AST  21   PROT  6.3*   BILITOT  0.9   LABALBU  3.7*       Magnesium:  Recent Labs      08/15/18   1000   MG  1.8       Pro-BNP:  Lab Results   Component Value Date    PROBNP 252 06/12/2018    PROBNP 307 06/11/2018    PROBNP 667 06/07/2018       INR:  Recent Labs      08/15/18   1000   PROTIME  11.1   INR  1.1       TSH:  Lab Results   Component Value Date    TSH 0.086 08/16/2018       Lipid Profile:  Lab Results   Component Value Date    TRIG 168 04/14/2018    HDL 68 04/14/2018    LDLCALC 116 04/14/2018    LABVLDL 29.0 05/07/2013       HgbA1C:  Lab Results   Component Value Date    LABA1C 5.4 06/07/2018       Lactate Level:  Recent Labs      08/16/18   1824   LACTA  2.0       CMP:  Recent Labs      08/15/18   1000  08/16/18   0529   NA  137  141   K  3.9  4.2   CL  97*  107   CO2  24  23   BUN  13  10   CREATININE  0.93*  0.60   GLUCOSE  122*  132*   CALCIUM  9.0  8.6   PROT  6.3*   --    LABALBU  3.7*   --    BILITOT  0.9   --    ALKPHOS  69   --    AST  21   --    ALT  10   --        RADIOLOGY     XR CHEST PORTABLE   Final Result   RADIOGRAPHIC FINDINGS OF COPD. SUPERIMPOSED PATCHY TO COALESCENT GROUNDGLASS INFILTRATE IN THE RIGHT LOWER LOBE. THERE IS A VAGUE OPACITY IN THE INFEROMEDIAL ASPECT OF THE RIGHT LOWER LOBE.    OVERALL GIVEN THE CONSTELLATION OF FINDINGS RECOMMEND CT SCAN THE CHEST WITHOUT IV CONTRAST TO FURTHER EVALUATE          CURRENT MEDICATIONS       levothyroxine  50 mcg Oral Daily    metoprolol tartrate  50 mg Oral BID    buPROPion  75 mg Oral BID    simvastatin  10 mg Oral Nightly    mometasone-formoterol  2 puff Inhalation BID    theophylline  400 mg Oral Daily    tiotropium  18 mcg Inhalation Daily    traZODone  150 mg Oral Nightly    sodium chloride flush  10 mL Intravenous 2 times per day    enoxaparin  40 mg Subcutaneous Daily    azithromycin  500 mg Intravenous Q24H    And    cefTRIAXone (ROCEPHIN) IV  1 g Intravenous Q24H    levalbuterol  1.25 mg Nebulization TID       ASSESSMENT   Active Problems:    Hyperlipidemia    Hypothyroidism    Community acquired pneumonia of right lower lobe of lung ([address]       1. Shortness of breath secondary to COPD and possible pneumonia. 2.  Paroxysmal SVT (probable atrial tachycardia). Now NSR. 3.  History of ASHD by cath 2011 (60% nondominant anomalous circumflex lesion). Recent normal myocardial perfusion study and normal echo. 4.  Carotid artery disease with 100% occlusion of the LICA and moderate disease of the GILLIAN. 5.  Hypertension and hyperlipidemia. 6.  Breast mass. 7.  Anxiety/depression. PLAN     Monitor on telemetry     Will increase metoprolol to 75 mg po BID. IV antibiotics. Will review results of Cardio Net monitor once completed. No other new cardiac workup is indicated at this time. Please do not hesitate to call with questions.   Electronically signed by Landry Nava MD, [facility] on 8/17/2018 at 9:20 AM

## 2018-08-17 NOTE — PROGRESS NOTES
Oral Daily PRN Jeffery Nicholas MD        [facility]dansetron [facility]) injection 4 mg  4 mg Intravenous Q6H PRN Jeffery Alexander MD        enoxaparin (LOVENOX) injection 40 mg  40 mg Subcutaneous Daily Jeffery Alexander MD   40 mg at 08/16/18 0803    azithromycin (ZITHROMAX) 500 mg in D5W 250ml addavial  500 mg Intravenous Q24H Jeffery Nicholas MD   Stopped at 08/16/18 1727    And    cefTRIAXone (ROCEPHIN) 1 g IVPB in 50 mL D5W minibag  1 g Intravenous Q24H Jeffery Nicholas MD   Stopped at 08/16/18 1800    levalbuterol (XOPENEX) nebulizer solution 1.25 mg  1.25 mg Nebulization Q2H PRN Jeffery Alexander MD        levalbuterol Cassie Moron) nebulizer solution 1.25 mg  1.25 mg Nebulization TID Jeffery Alexander MD   1.25 mg at 08/17/18 0800         OBJECTIVE:  Vital Signs:  Vitals:    08/17/18 0836   BP: (!) 152/72   Pulse: 127   Resp: 18   Temp: 98.1 °F (36.7 °C)   SpO2: 99%       Focal exam:      General Exam (except as mentioned above):  CONSTITUTIONAL: Awake, alert, no apparent distress  EYES:  PERRL, conjunctiva normal  ENT:  Normocephalic, atraumatic  NECK:  Supple  BACK:  Symmetric  LUNGS:  CTAB except bilateral basilar crackles. CARDIOVASCULAR:  S1S2 present  ABDOMEN:  soft, non-distended, non-tender  MUSCULOSKELETAL:  There is no redness, warmth, or swelling of the joints. NEUROLOGIC:  Alert, awake, oriented x 3. No FND  EXTREMITIES: Warm and well perfused.      LABS  Recent Labs      08/15/18   1000  08/16/18   0529  08/17/18   0927   WBC  17.7*  10.3  8.5   RBC  4.57  3.62*  3.65*   HGB  13.8  11.2*  11.4*   HCT  41.6  33.4*  33.5*   MCV  91.1  92.3  91.7   MCH  30.2  31.1  31.3   MCHC  33.2  33.7  34.1   RDW  14.6*  14.8*  14.8*   PLT  317  228  237       Recent Labs      08/15/18   1000  08/16/18   0529  08/17/18   0927   NA  137  141  141   K  3.9  4.2  3.1*   CL  97*  107  102   CO2  24  23  26   BUN  13  10  7*   CREATININE  0.93*  0.60  0.78   GLUCOSE  122*  132*  125*   CALCIUM  9.0  8.6  8.8       Recent Labs      08/15/18 1000   MG  1.8           ASSESSMENT AND PLAN    Active Hospital Problems    Diagnosis Date Noted    Community acquired pneumonia of right lower lobe of lung ([facility] [address] [J18.1] 08/15/2018    Hyperlipidemia [E78.5]     Hypothyroidism [E03.9]      - Patient had severe COPD due to 40 Pack year smoking history. During her past admission patient had Home O2 eval and she qualified for it. Patient however is very reluctant to wear oxygen on ambulation. This makes her SOB . Encouraged her to wear oxygen . - Community acquired pneumonia: Ceftriaxone and azithromycin day 3 Strep and legionella antigen. Continue breathing treatments     COPD: Stable; Continue home breathing treatments. Steroids not indicated     Sinus tachycardia:patient's TSH levels are significantly low. Decreased dose of synthroid to 50 mcg. Will need repeat TSH in 4 weeks to adjust dose. This can be contributing to her tachycardia. Theophylline levels are therapeutic. Continue hydration. Cardiology following     Hypothyroidism: Continue synthroid. Synthroid decreased to 50 mcg      Breast mass: Patient to continue OP follow up.     H/o CAD: Continue continue BB, statin    DVT prophylaxis: Lovenox    Anticipate d/c tomorrow    Cleveland Ramos MD  Pager : [phone]

## 2018-08-17 NOTE — CONSULTS
Pulmonary Rehab info given, discussed benefits. Patient does want to start outpatient program after discharge and knows she needs to regain health and strength to prepare for upcoming surgery.

## 2018-08-18 VITALS
OXYGEN SATURATION: 99 % | DIASTOLIC BLOOD PRESSURE: 67 MMHG | WEIGHT: 132 LBS | HEIGHT: 63 IN | TEMPERATURE: 98.6 F | BODY MASS INDEX: 23.39 KG/M2 | SYSTOLIC BLOOD PRESSURE: 131 MMHG | HEART RATE: 80 BPM | RESPIRATION RATE: 16 BRPM

## 2018-08-18 PROBLEM — J96.11 CHRONIC RESPIRATORY FAILURE WITH HYPOXIA (HCC): Status: ACTIVE | Noted: 2018-08-18

## 2018-08-18 LAB
ANION GAP SERPL CALCULATED.3IONS-SCNC: 9 MEQ/L (ref 7–13)
BASOPHILS ABSOLUTE: 0.1 K/UL (ref 0–0.2)
BASOPHILS RELATIVE PERCENT: 0.9 %
BUN BLDV-MCNC: 6 MG/DL (ref 8–23)
CALCIUM SERPL-MCNC: 9.1 MG/DL (ref 8.6–10.2)
CHLORIDE BLD-SCNC: 100 MEQ/L (ref 98–107)
CO2: 31 MEQ/L (ref 22–29)
CREAT SERPL-MCNC: 0.72 MG/DL (ref 0.5–0.9)
EOSINOPHILS ABSOLUTE: 0.1 K/UL (ref 0–0.7)
EOSINOPHILS RELATIVE PERCENT: 2.6 %
GFR AFRICAN AMERICAN: >60
GFR NON-AFRICAN AMERICAN: >60
GLUCOSE BLD-MCNC: 87 MG/DL (ref 74–109)
HCT VFR BLD CALC: 34.6 % (ref 37–47)
HEMOGLOBIN: 11.5 G/DL (ref 12–16)
L. PNEUMOPHILA SEROGP 1 UR AG: NEGATIVE
LACTIC ACID: 0.8 MMOL/L (ref 0.5–2.2)
LYMPHOCYTES ABSOLUTE: 1.3 K/UL (ref 1–4.8)
LYMPHOCYTES RELATIVE PERCENT: 22 %
MCH RBC QN AUTO: 30.6 PG (ref 27–31.3)
MCHC RBC AUTO-ENTMCNC: 33.3 % (ref 33–37)
MCV RBC AUTO: 92 FL (ref 82–100)
MONOCYTES ABSOLUTE: 0.7 K/UL (ref 0.2–0.8)
MONOCYTES RELATIVE PERCENT: 11.6 %
NEUTROPHILS ABSOLUTE: 3.6 K/UL (ref 1.4–6.5)
NEUTROPHILS RELATIVE PERCENT: 62.9 %
PDW BLD-RTO: 14.8 % (ref 11.5–14.5)
PLATELET # BLD: 252 K/UL (ref 130–400)
POTASSIUM SERPL-SCNC: 4.4 MEQ/L (ref 3.5–5.1)
RBC # BLD: 3.76 M/UL (ref 4.2–5.4)
SODIUM BLD-SCNC: 140 MEQ/L (ref 132–144)
WBC # BLD: 5.7 K/UL (ref 4.8–10.8)

## 2018-08-18 PROCEDURE — 94761 N-INVAS EAR/PLS OXIMETRY MLT: CPT

## 2018-08-18 PROCEDURE — 80048 BASIC METABOLIC PNL TOTAL CA: CPT

## 2018-08-18 PROCEDURE — 83605 ASSAY OF LACTIC ACID: CPT

## 2018-08-18 PROCEDURE — 85025 COMPLETE CBC W/AUTO DIFF WBC: CPT

## 2018-08-18 PROCEDURE — 94640 AIRWAY INHALATION TREATMENT: CPT

## 2018-08-18 PROCEDURE — 6370000000 HC RX 637 (ALT 250 FOR IP): Performed by: INTERNAL MEDICINE

## 2018-08-18 PROCEDURE — 2580000003 HC RX 258: Performed by: INTERNAL MEDICINE

## 2018-08-18 PROCEDURE — 36415 COLL VENOUS BLD VENIPUNCTURE: CPT

## 2018-08-18 PROCEDURE — 94760 N-INVAS EAR/PLS OXIMETRY 1: CPT

## 2018-08-18 PROCEDURE — 6360000002 HC RX W HCPCS: Performed by: INTERNAL MEDICINE

## 2018-08-18 PROCEDURE — 2700000000 HC OXYGEN THERAPY PER DAY

## 2018-08-18 RX ORDER — METOPROLOL TARTRATE 75 MG/1
75 TABLET, FILM COATED ORAL 2 TIMES DAILY
Qty: 60 TABLET | Refills: 3 | Status: SHIPPED | OUTPATIENT
Start: 2018-08-18 | End: 2018-11-05 | Stop reason: ALTCHOICE

## 2018-08-18 RX ORDER — LEVOTHYROXINE SODIUM 0.05 MG/1
50 TABLET ORAL DAILY
Qty: 30 TABLET | Refills: 3 | Status: SHIPPED | OUTPATIENT
Start: 2018-08-19 | End: 2018-12-06 | Stop reason: SDUPTHER

## 2018-08-18 RX ORDER — AZITHROMYCIN 250 MG/1
250 TABLET, FILM COATED ORAL DAILY
Qty: 4 TABLET | Refills: 0 | Status: SHIPPED | OUTPATIENT
Start: 2018-08-19 | End: 2018-08-23

## 2018-08-18 RX ADMIN — THEOPHYLLINE ANHYDROUS 400 MG: 200 CAPSULE, EXTENDED RELEASE ORAL at 11:16

## 2018-08-18 RX ADMIN — BUPROPION HYDROCHLORIDE 75 MG: 75 TABLET, FILM COATED ORAL at 11:16

## 2018-08-18 RX ADMIN — Medication 2 PUFF: at 07:14

## 2018-08-18 RX ADMIN — LEVALBUTEROL 1.25 MG: 1.25 SOLUTION, CONCENTRATE RESPIRATORY (INHALATION) at 07:14

## 2018-08-18 RX ADMIN — METOPROLOL TARTRATE 75 MG: 50 TABLET ORAL at 11:16

## 2018-08-18 RX ADMIN — LEVOTHYROXINE SODIUM 50 MCG: 50 TABLET ORAL at 05:10

## 2018-08-18 RX ADMIN — LEVALBUTEROL 1.25 MG: 1.25 SOLUTION, CONCENTRATE RESPIRATORY (INHALATION) at 12:55

## 2018-08-18 RX ADMIN — TIOTROPIUM BROMIDE 18 MCG: 18 CAPSULE ORAL; RESPIRATORY (INHALATION) at 07:14

## 2018-08-18 RX ADMIN — Medication 10 ML: at 11:17

## 2018-08-18 ASSESSMENT — PAIN SCALES - GENERAL: PAINLEVEL_OUTOF10: 0

## 2018-08-18 NOTE — DISCHARGE SUMMARY
Physician Discharge Summary     Patient ID:  Lilli Tovar  [id number]  80 y.o.  1945    Admit date: 8/15/2018    Discharge date and time: 8/18/2018  12:21 PM    Admitting Physician: Khadar Taylor MD     Discharge Physician: Khadar Taylor MD    Admission Diagnoses: Community acquired pneumonia of right lower lobe of lung [facility]) [J18.1]    Discharge Diagnoses: Active Hospital Problems    Diagnosis Date Noted    Chronic respiratory failure with hypoxia [facility]) [J96.11] 08/18/2018    Community acquired pneumonia of right lower lobe of lung [address] [address] [J18.1] 08/15/2018    Hyperlipidemia [E78.5]     Hypothyroidism [E03.9]        Admission Condition: fair    Discharged Condition: good    Indication for Admission: shortness of breath    Hospital Course: Patient was admitted and treated for above conditions.  The patient was discharged in stable condition    Inpatient meds:  metoprolol tartrate, 75 mg, Oral, BID    levothyroxine, 50 mcg, Oral, Daily    buPROPion, 75 mg, Oral, BID    simvastatin, 10 mg, Oral, Nightly    mometasone-formoterol, 2 puff, Inhalation, BID    theophylline, 400 mg, Oral, Daily    tiotropium, 18 mcg, Inhalation, Daily    traZODone, 150 mg, Oral, Nightly    sodium chloride flush, 10 mL, Intravenous, 2 times per day    enoxaparin, 40 mg, Subcutaneous, Daily    azithromycin, 500 mg, Intravenous, Q24H **AND** cefTRIAXone (ROCEPHIN) IV, 1 g, Intravenous, Q24H    levalbuterol, 1.25 mg, Nebulization, TID    Labs:  LABS  Recent Labs      08/16/18   0529 08/17/18 0927 08/18/18   0521   WBC  10.3  8.5  5.7   RBC  3.62*  3.65*  3.76*   HGB  11.2*  11.4*  11.5*   HCT  33.4*  33.5*  34.6*   MCV  92.3  91.7  92.0   MCH  31.1  31.3  30.6   MCHC  33.7  34.1  33.3   RDW  14.8*  14.8*  14.8*   PLT  228  237  252       Recent Labs      08/16/18   0529  08/17/18   0927 08/18/18   0521   NA  141  141  140   K  4.2  3.1*  4.4   CL  107  102  100   CO2  23  26  31*   BUN  10  7*  6*   CREATININE  0.60

## 2018-08-18 NOTE — PROGRESS NOTES
Department of Internal Medicine  Progress Note      SUBJECTIVE:   No acute events overnight.        ROS:  All 12 systems reviewed and negative except mentioned in HPI and Assessment and plan    MEDICATIONS:  Current Facility-Administered Medications   Medication Dose Route Frequency Provider Last Rate Last Dose    metoprolol tartrate (LOPRESSOR) tablet 75 mg  75 mg Oral BID Last Merino MD   75 mg at 08/18/18 1116    levothyroxine (SYNTHROID) tablet 50 mcg  50 mcg Oral Daily Jeffery Crawford MD   50 mcg at 08/18/18 0510    ALPRAZolam [place]etta Field) tablet 0.25 mg  0.25 mg Oral TID PRN Jeffery Crawford MD        buPROPion [facility]) tablet 75 mg  75 mg Oral BID Jeffery Crawford MD   75 mg at 08/18/18 1116    simvastatin (ZOCOR) tablet 10 mg  10 mg Oral Nightly Jeffery Crawford MD   10 mg at 08/17/18 2127    mometasone-formoterol (DULERA) 100-5 MCG/ACT inhaler 2 puff  2 puff Inhalation BID Jeffery Crawford MD   2 puff at 08/18/18 0714    theophylline (MARK-24) extended release capsule 400 mg  400 mg Oral Daily Jeffery Crawford MD   400 mg at 08/18/18 1116    tiotropium (SPIRIVA) inhalation capsule 18 mcg  18 mcg Inhalation Daily Jeffery Crawford MD   18 mcg at 08/18/18 0714    traMADol (ULTRAM) tablet 50 mg  50 mg Oral Q6H PRN Jeffery Crawford MD        traZODone (DESYREL) tablet 150 mg  150 mg Oral Nightly Jeffery Crawford MD   150 mg at 08/17/18 2127    sodium chloride flush 0.9 % injection 10 mL  10 mL Intravenous 2 times per day Jeffery Crawford MD   10 mL at 08/18/18 1117    sodium chloride flush 0.9 % injection 10 mL  10 mL Intravenous PRN Jeffery Crawford MD        magnesium hydroxide (MILK OF MAGNESIA) 400 MG/5ML suspension 30 mL  30 mL Oral Daily PRN Jeffery Crawford MD        ondansetron (ZOFRAN) injection 4 mg  4 mg Intravenous Q6H PRN Jeffery Alexander MD        enoxaparin (LOVENOX) injection 40 mg  40 mg Subcutaneous Daily Jeffery Alexander MD   40 mg at 08/17/18 1129    azithromycin (ZITHROMAX) 500 mg in D5W 250ml addavial  500 mg

## 2018-08-18 NOTE — PROGRESS NOTES
Cardiology Progress Note       Date:  8/18/2018     Patient:  Tono Aguilera  YOB: 1945  MRN:  [id number]      Admit Date:  8/15/2018     Primary Cardiologist: Jamie Knox MD    Rounding Cardiologist:  Fredis Chavez MD    SUBJECTIVE         No chest pain. Mild shortness of breath. Lying flat. Mild cough. ASSESSMENT       1. Shortness of breath secondary to COPD and possible pneumonia. 2.  Paroxysmal SVT (probable atrial tachycardia). Now NSR. 3.  History of ASHD by cath 2011 (60% nondominant anomalous circumflex lesion). Recent normal myocardial perfusion study and normal echo. 4.  Carotid artery disease with 100% occlusion of the LICA and moderate disease of the GILLIAN. 5.  Hypertension and hyperlipidemia. 6.  Breast mass. 7.  Anxiety/depression. PLAN     Monitor on telemetry     Continue Metoprolol to 75 mg po BID. IV antibiotics. Will review results of Cardio Net monitor once completed. No other new cardiac workup is indicated at this time. OK to Discharge from CV point once ok with others. Follow up with Dr Emeterio Kocher as prior scheduled. INITIAL CONSULT HPI:      Tono Aguilera is a 74 y.o.  female patient who is being at the request of Dr. Raz Ricks for inpatient consultation of arrhythmia. She was admitted on 8/15/2018. Previous [facility] and [address] [facility] records have been reviewed in detail. She was recently seen on an outpatient basis by Dr. Jie Ordoñez to evaluate for persistent shortness of breath. 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 She underwent a myocardial perfusion study last month that was negative for ischemia. She had an echocardiogram that showed normal LV systolic function and normal valvular structure and function.  She has a history of COPD and was placed on home O2 back no bruit. LUNGS:  Clear to auscultation bilaterally, normal respiratory effort. HEART:  Rate and rhythm regular with no evident murmur, no gallop appreciated. There are no rubs, clicks or heaves. EXTREMITIES:  Warm with good color, no clubbing or cyanosis. There is no edema noted. PERIPHERAL VASCULAR:  Pulses present and equally palpable; 2+ throughout. DIAGNOSTIC RESULTS   EKG 8/16/2018:  Normal sinus rhythm  Right bundle branch block  Abnormal ECG    Telemetry: normal sinus  and sinus tachycardia. LAB DATA   BMP:  Recent Labs      08/16/18   0529  08/17/18   0927 08/18/18   0521   NA  141  141  140   K  4.2  3.1*  4.4   CL  107  102  100   CO2  23  26  31*   BUN  10  7*  6*   CREATININE  0.60  0.78  0.72   LABGLOM  >60.0  >60.0  >60.0       CBC:  Recent Labs      08/16/18   0529 08/17/18 0927 08/18/18   0521   WBC  10.3  8.5  5.7   RBC  3.62*  3.65*  3.76*   HGB  11.2*  11.4*  11.5*   HCT  33.4*  33.5*  34.6*   MCV  92.3  91.7  92.0   MCH  31.1  31.3  30.6   MCHC  33.7  34.1  33.3   RDW  14.8*  14.8*  14.8*   PLT  228  237  252       Cardiac Enzymes:   No results for input(s): CKTOTAL, CKMB, TROPONINI in the last 72 hours. Hepatic Function Panel:  No results for input(s): ALKPHOS, ALT, AST, PROT, BILITOT, BILIDIR, LABALBU in the last 72 hours. Magnesium:  No results for input(s): MG in the last 72 hours. Pro-BNP:  Lab Results   Component Value Date    PROBNP 252 06/12/2018    PROBNP 307 06/11/2018    PROBNP 667 06/07/2018       INR:  No results for input(s): PROTIME, INR in the last 72 hours.     TSH:  Lab Results   Component Value Date    TSH 0.086 08/16/2018       Lipid Profile:  Lab Results   Component Value Date    TRIG 168 04/14/2018    HDL 68 04/14/2018    LDLCALC 116 04/14/2018    LABVLDL 29.0 05/07/2013       HgbA1C:  Lab Results   Component Value Date    LABA1C 5.4 06/07/2018       Lactate Level:  Recent Labs      08/18/18   0521   LACTA  0.8       CMP:  Recent Labs 08/16/18   0529  08/17/18   0927  08/18/18   0521   NA  141  141  140   K  4.2  3.1*  4.4   CL  107  102  100   CO2  23  26  31*   BUN  10  7*  6*   CREATININE  0.60  0.78  0.72   GLUCOSE  132*  125*  87   CALCIUM  8.6  8.8  9.1       RADIOLOGY     XR CHEST PORTABLE   Final Result   RADIOGRAPHIC FINDINGS OF COPD. SUPERIMPOSED PATCHY TO COALESCENT GROUNDGLASS INFILTRATE IN THE RIGHT LOWER LOBE. THERE IS A VAGUE OPACITY IN THE INFEROMEDIAL ASPECT OF THE RIGHT LOWER LOBE.    OVERALL GIVEN THE CONSTELLATION OF FINDINGS RECOMMEND CT SCAN THE CHEST WITHOUT IV CONTRAST TO FURTHER EVALUATE          CURRENT MEDICATIONS       metoprolol tartrate  75 mg Oral BID    levothyroxine  50 mcg Oral Daily    buPROPion  75 mg Oral BID    simvastatin  10 mg Oral Nightly    mometasone-formoterol  2 puff Inhalation BID    theophylline  400 mg Oral Daily    tiotropium  18 mcg Inhalation Daily    traZODone  150 mg Oral Nightly    sodium chloride flush  10 mL Intravenous 2 times per day    enoxaparin  40 mg Subcutaneous Daily    azithromycin  500 mg Intravenous Q24H    And    cefTRIAXone (ROCEPHIN) IV  1 g Intravenous Q24H    levalbuterol  1.25 mg Nebulization TID           Electronically signed by Adriana Nath MD, [facility] on 8/18/2018 at 11:34 AM

## 2018-08-20 ENCOUNTER — TELEPHONE (OUTPATIENT)
Dept: FAMILY MEDICINE CLINIC | Age: 73
End: 2018-08-20

## 2018-08-20 ENCOUNTER — CARE COORDINATION (OUTPATIENT)
Dept: CASE MANAGEMENT | Age: 73
End: 2018-08-20

## 2018-08-20 LAB
BLOOD CULTURE, ROUTINE: NORMAL
CULTURE, BLOOD 2: NORMAL

## 2018-08-20 NOTE — TELEPHONE ENCOUNTER
[facility] Transitions Initial Follow Up Call    Outreach made within 2 business days of discharge: Yes    Patient: Merlyn Amato Patient : 1945   MRN: [id number]  Reason for Admission: There are no discharge diagnoses documented for the most recent discharge. Discharge Date: 18       Spoke with: Christina Franco    Discharge department/facility: [address] [place] [address] Interactive Patient Contact:  Was patient able to fill all prescriptions: Yes  Was patient instructed to bring all medications to the follow-up visit: Yes  Is patient taking all medications as directed in the discharge summary?  Yes  Does patient understand their discharge instructions: Yes  Does patient have questions or concerns that need addressed prior to 7-14 day follow up office visit: no    Scheduled appointment with PCP within 7-14 days    Follow Up  Future Appointments  Date Time Provider Darlene Walsh   2018 2:00 PM Zach Byrne, [facility]

## 2018-08-21 ENCOUNTER — CARE COORDINATION (OUTPATIENT)
Dept: CASE MANAGEMENT | Age: 73
End: 2018-08-21

## 2018-08-21 NOTE — CARE COORDINATION
Care Transition Nurse phoned patient for follow up. There was no answer. Left message informing patient of purpose of call and requesting return call.

## 2018-09-03 RX ORDER — SODIUM CHLORIDE, SODIUM LACTATE, POTASSIUM CHLORIDE, CALCIUM CHLORIDE 600; 310; 30; 20 MG/100ML; MG/100ML; MG/100ML; MG/100ML
INJECTION, SOLUTION INTRAVENOUS CONTINUOUS
Status: CANCELLED | OUTPATIENT
Start: 2018-09-03

## 2018-09-05 ENCOUNTER — ANESTHESIA EVENT (OUTPATIENT)
Dept: OPERATING ROOM | Age: 73
End: 2018-09-05
Payer: MEDICARE

## 2018-09-06 ENCOUNTER — ANESTHESIA (OUTPATIENT)
Dept: OPERATING ROOM | Age: 73
End: 2018-09-06
Payer: MEDICARE

## 2018-09-06 ENCOUNTER — HOSPITAL ENCOUNTER (OUTPATIENT)
Age: 73
Discharge: HOME | End: 2018-09-07
Attending: SURGERY | Admitting: SURGERY
Payer: MEDICARE

## 2018-09-06 VITALS — DIASTOLIC BLOOD PRESSURE: 52 MMHG | SYSTOLIC BLOOD PRESSURE: 94 MMHG | TEMPERATURE: 96.4 F | OXYGEN SATURATION: 100 %

## 2018-09-06 DIAGNOSIS — C50.911 BREAST CANCER METASTASIZED TO AXILLARY LYMPH NODE, RIGHT (HCC): Primary | ICD-10-CM

## 2018-09-06 DIAGNOSIS — G89.18 POST-OP PAIN: ICD-10-CM

## 2018-09-06 DIAGNOSIS — C77.3 BREAST CANCER METASTASIZED TO AXILLARY LYMPH NODE, RIGHT (HCC): Primary | ICD-10-CM

## 2018-09-06 LAB
ABO/RH: NORMAL
ANTIBODY SCREEN: NORMAL
GLUCOSE BLD-MCNC: 194 MG/DL (ref 60–115)
PERFORMED ON: ABNORMAL

## 2018-09-06 PROCEDURE — 2700000000 HC OXYGEN THERAPY PER DAY

## 2018-09-06 PROCEDURE — 99220 PR INITIAL OBSERVATION CARE/DAY 70 MINUTES: CPT | Performed by: INTERNAL MEDICINE

## 2018-09-06 PROCEDURE — 2780000010 HC IMPLANT OTHER: Performed by: SURGERY

## 2018-09-06 PROCEDURE — 3600000002 HC SURGERY LEVEL 2 BASE: Performed by: SURGERY

## 2018-09-06 PROCEDURE — 88342 IMHCHEM/IMCYTCHM 1ST ANTB: CPT

## 2018-09-06 PROCEDURE — 86850 RBC ANTIBODY SCREEN: CPT

## 2018-09-06 PROCEDURE — 88307 TISSUE EXAM BY PATHOLOGIST: CPT

## 2018-09-06 PROCEDURE — 2500000003 HC RX 250 WO HCPCS: Performed by: NURSE ANESTHETIST, CERTIFIED REGISTERED

## 2018-09-06 PROCEDURE — 94150 VITAL CAPACITY TEST: CPT

## 2018-09-06 PROCEDURE — 86900 BLOOD TYPING SEROLOGIC ABO: CPT

## 2018-09-06 PROCEDURE — 7100000001 HC PACU RECOVERY - ADDTL 15 MIN: Performed by: SURGERY

## 2018-09-06 PROCEDURE — 88329 PATH CONSLTJ DRG SURG: CPT

## 2018-09-06 PROCEDURE — 2500000003 HC RX 250 WO HCPCS

## 2018-09-06 PROCEDURE — 2580000003 HC RX 258

## 2018-09-06 PROCEDURE — 2580000003 HC RX 258: Performed by: SURGERY

## 2018-09-06 PROCEDURE — 3600000012 HC SURGERY LEVEL 2 ADDTL 15MIN: Performed by: SURGERY

## 2018-09-06 PROCEDURE — 7100000000 HC PACU RECOVERY - FIRST 15 MIN: Performed by: SURGERY

## 2018-09-06 PROCEDURE — 6370000000 HC RX 637 (ALT 250 FOR IP): Performed by: SURGERY

## 2018-09-06 PROCEDURE — 6360000002 HC RX W HCPCS: Performed by: SURGERY

## 2018-09-06 PROCEDURE — 88331 PATH CONSLTJ SURG 1 BLK 1SPC: CPT

## 2018-09-06 PROCEDURE — 88305 TISSUE EXAM BY PATHOLOGIST: CPT

## 2018-09-06 PROCEDURE — 94664 DEMO&/EVAL PT USE INHALER: CPT

## 2018-09-06 PROCEDURE — 86901 BLOOD TYPING SEROLOGIC RH(D): CPT

## 2018-09-06 PROCEDURE — 88341 IMHCHEM/IMCYTCHM EA ADD ANTB: CPT

## 2018-09-06 PROCEDURE — 3700000001 HC ADD 15 MINUTES (ANESTHESIA): Performed by: SURGERY

## 2018-09-06 PROCEDURE — 94761 N-INVAS EAR/PLS OXIMETRY MLT: CPT

## 2018-09-06 PROCEDURE — A4648 IMPLANTABLE TISSUE MARKER: HCPCS | Performed by: SURGERY

## 2018-09-06 PROCEDURE — 94667 MNPJ CHEST WALL 1ST: CPT

## 2018-09-06 PROCEDURE — 94760 N-INVAS EAR/PLS OXIMETRY 1: CPT

## 2018-09-06 PROCEDURE — 6360000002 HC RX W HCPCS: Performed by: NURSE ANESTHETIST, CERTIFIED REGISTERED

## 2018-09-06 PROCEDURE — C9250 ARTISS FIBRIN SEALANT: HCPCS | Performed by: SURGERY

## 2018-09-06 PROCEDURE — 3700000000 HC ANESTHESIA ATTENDED CARE: Performed by: SURGERY

## 2018-09-06 PROCEDURE — 94640 AIRWAY INHALATION TREATMENT: CPT

## 2018-09-06 PROCEDURE — 2709999900 HC NON-CHARGEABLE SUPPLY: Performed by: SURGERY

## 2018-09-06 DEVICE — TIP APPL L45CM FLX FOR SEAL EVICEL: Type: IMPLANTABLE DEVICE | Site: BREAST | Status: FUNCTIONAL

## 2018-09-06 DEVICE — Z INVALID PART NUMBER USE 2421675 SEALANT HEMSTAT TISS 2ML FIBRIN EVICEL: Type: IMPLANTABLE DEVICE | Status: FUNCTIONAL

## 2018-09-06 RX ORDER — MAGNESIUM HYDROXIDE 1200 MG/15ML
LIQUID ORAL CONTINUOUS PRN
Status: COMPLETED | OUTPATIENT
Start: 2018-09-06 | End: 2018-09-06

## 2018-09-06 RX ORDER — SODIUM CHLORIDE, SODIUM LACTATE, POTASSIUM CHLORIDE, CALCIUM CHLORIDE 600; 310; 30; 20 MG/100ML; MG/100ML; MG/100ML; MG/100ML
INJECTION, SOLUTION INTRAVENOUS
Status: DISCONTINUED
Start: 2018-09-06 | End: 2018-09-06 | Stop reason: WASHOUT

## 2018-09-06 RX ORDER — TRAZODONE HYDROCHLORIDE 150 MG/1
150 TABLET ORAL NIGHTLY
Status: DISCONTINUED | OUTPATIENT
Start: 2018-09-06 | End: 2018-09-07 | Stop reason: HOSPADM

## 2018-09-06 RX ORDER — OXYCODONE HYDROCHLORIDE AND ACETAMINOPHEN 5; 325 MG/1; MG/1
2 TABLET ORAL EVERY 4 HOURS PRN
Status: DISCONTINUED | OUTPATIENT
Start: 2018-09-06 | End: 2018-09-07 | Stop reason: HOSPADM

## 2018-09-06 RX ORDER — ONDANSETRON 2 MG/ML
4 INJECTION INTRAMUSCULAR; INTRAVENOUS EVERY 6 HOURS PRN
Status: DISCONTINUED | OUTPATIENT
Start: 2018-09-06 | End: 2018-09-07 | Stop reason: HOSPADM

## 2018-09-06 RX ORDER — LIDOCAINE HYDROCHLORIDE 20 MG/ML
INJECTION, SOLUTION INFILTRATION; PERINEURAL PRN
Status: DISCONTINUED | OUTPATIENT
Start: 2018-09-06 | End: 2018-09-06 | Stop reason: SDUPTHER

## 2018-09-06 RX ORDER — IPRATROPIUM BROMIDE AND ALBUTEROL SULFATE 2.5; .5 MG/3ML; MG/3ML
1 SOLUTION RESPIRATORY (INHALATION) EVERY 6 HOURS PRN
Status: DISCONTINUED | OUTPATIENT
Start: 2018-09-06 | End: 2018-09-06

## 2018-09-06 RX ORDER — VENLAFAXINE HYDROCHLORIDE 75 MG/1
75 CAPSULE, EXTENDED RELEASE ORAL DAILY
Status: DISCONTINUED | OUTPATIENT
Start: 2018-09-06 | End: 2018-09-07 | Stop reason: HOSPADM

## 2018-09-06 RX ORDER — HYDROCODONE BITARTRATE AND ACETAMINOPHEN 5; 325 MG/1; MG/1
1 TABLET ORAL PRN
Status: DISCONTINUED | OUTPATIENT
Start: 2018-09-06 | End: 2018-09-06 | Stop reason: HOSPADM

## 2018-09-06 RX ORDER — MEPERIDINE HYDROCHLORIDE 25 MG/ML
12.5 INJECTION INTRAMUSCULAR; INTRAVENOUS; SUBCUTANEOUS EVERY 5 MIN PRN
Status: DISCONTINUED | OUTPATIENT
Start: 2018-09-06 | End: 2018-09-06 | Stop reason: HOSPADM

## 2018-09-06 RX ORDER — BISOPROLOL FUMARATE 5 MG/1
10 TABLET ORAL DAILY
Status: DISCONTINUED | OUTPATIENT
Start: 2018-09-06 | End: 2018-09-07 | Stop reason: HOSPADM

## 2018-09-06 RX ORDER — ONDANSETRON 2 MG/ML
4 INJECTION INTRAMUSCULAR; INTRAVENOUS
Status: DISCONTINUED | OUTPATIENT
Start: 2018-09-06 | End: 2018-09-06 | Stop reason: HOSPADM

## 2018-09-06 RX ORDER — SODIUM CHLORIDE 0.9 % (FLUSH) 0.9 %
10 SYRINGE (ML) INJECTION EVERY 12 HOURS SCHEDULED
Status: DISCONTINUED | OUTPATIENT
Start: 2018-09-06 | End: 2018-09-07 | Stop reason: HOSPADM

## 2018-09-06 RX ORDER — SODIUM CHLORIDE 9 MG/ML
INJECTION, SOLUTION INTRAVENOUS CONTINUOUS
Status: DISCONTINUED | OUTPATIENT
Start: 2018-09-06 | End: 2018-09-06

## 2018-09-06 RX ORDER — PROPOFOL 10 MG/ML
INJECTION, EMULSION INTRAVENOUS PRN
Status: DISCONTINUED | OUTPATIENT
Start: 2018-09-06 | End: 2018-09-06 | Stop reason: SDUPTHER

## 2018-09-06 RX ORDER — OXYCODONE HYDROCHLORIDE AND ACETAMINOPHEN 5; 325 MG/1; MG/1
1 TABLET ORAL EVERY 4 HOURS PRN
Status: DISCONTINUED | OUTPATIENT
Start: 2018-09-06 | End: 2018-09-07 | Stop reason: HOSPADM

## 2018-09-06 RX ORDER — DIPHENHYDRAMINE HYDROCHLORIDE 50 MG/ML
12.5 INJECTION INTRAMUSCULAR; INTRAVENOUS
Status: DISCONTINUED | OUTPATIENT
Start: 2018-09-06 | End: 2018-09-06 | Stop reason: HOSPADM

## 2018-09-06 RX ORDER — SODIUM CHLORIDE 9 MG/ML
INJECTION, SOLUTION INTRAVENOUS CONTINUOUS
Status: DISCONTINUED | OUTPATIENT
Start: 2018-09-06 | End: 2018-09-07

## 2018-09-06 RX ORDER — LIDOCAINE HYDROCHLORIDE 10 MG/ML
INJECTION, SOLUTION EPIDURAL; INFILTRATION; INTRACAUDAL; PERINEURAL
Status: COMPLETED
Start: 2018-09-06 | End: 2018-09-06

## 2018-09-06 RX ORDER — SODIUM CHLORIDE 9 MG/ML
INJECTION, SOLUTION INTRAVENOUS
Status: COMPLETED
Start: 2018-09-06 | End: 2018-09-06

## 2018-09-06 RX ORDER — BUPROPION HYDROCHLORIDE 75 MG/1
75 TABLET ORAL 2 TIMES DAILY
Status: DISCONTINUED | OUTPATIENT
Start: 2018-09-06 | End: 2018-09-07 | Stop reason: HOSPADM

## 2018-09-06 RX ORDER — DEXAMETHASONE SODIUM PHOSPHATE 10 MG/ML
INJECTION INTRAMUSCULAR; INTRAVENOUS PRN
Status: DISCONTINUED | OUTPATIENT
Start: 2018-09-06 | End: 2018-09-06 | Stop reason: SDUPTHER

## 2018-09-06 RX ORDER — LIDOCAINE HYDROCHLORIDE 10 MG/ML
1 INJECTION, SOLUTION EPIDURAL; INFILTRATION; INTRACAUDAL; PERINEURAL
Status: COMPLETED | OUTPATIENT
Start: 2018-09-06 | End: 2018-09-06

## 2018-09-06 RX ORDER — SODIUM CHLORIDE 0.9 % (FLUSH) 0.9 %
10 SYRINGE (ML) INJECTION PRN
Status: DISCONTINUED | OUTPATIENT
Start: 2018-09-06 | End: 2018-09-06 | Stop reason: HOSPADM

## 2018-09-06 RX ORDER — THEOPHYLLINE 400 MG/1
400 TABLET, EXTENDED RELEASE ORAL DAILY
Status: DISCONTINUED | OUTPATIENT
Start: 2018-09-06 | End: 2018-09-07 | Stop reason: HOSPADM

## 2018-09-06 RX ORDER — SODIUM CHLORIDE 0.9 % (FLUSH) 0.9 %
10 SYRINGE (ML) INJECTION EVERY 12 HOURS SCHEDULED
Status: DISCONTINUED | OUTPATIENT
Start: 2018-09-06 | End: 2018-09-06 | Stop reason: HOSPADM

## 2018-09-06 RX ORDER — ALBUTEROL SULFATE 90 UG/1
2 AEROSOL, METERED RESPIRATORY (INHALATION) EVERY 6 HOURS PRN
Status: DISCONTINUED | OUTPATIENT
Start: 2018-09-06 | End: 2018-09-06

## 2018-09-06 RX ORDER — FENTANYL CITRATE 50 UG/ML
50 INJECTION, SOLUTION INTRAMUSCULAR; INTRAVENOUS EVERY 10 MIN PRN
Status: DISCONTINUED | OUTPATIENT
Start: 2018-09-06 | End: 2018-09-06 | Stop reason: HOSPADM

## 2018-09-06 RX ORDER — ALBUTEROL SULFATE 90 UG/1
2 AEROSOL, METERED RESPIRATORY (INHALATION) EVERY 4 HOURS PRN
Status: DISCONTINUED | OUTPATIENT
Start: 2018-09-06 | End: 2018-09-07 | Stop reason: HOSPADM

## 2018-09-06 RX ORDER — SODIUM CHLORIDE 0.9 % (FLUSH) 0.9 %
10 SYRINGE (ML) INJECTION PRN
Status: DISCONTINUED | OUTPATIENT
Start: 2018-09-06 | End: 2018-09-07 | Stop reason: HOSPADM

## 2018-09-06 RX ORDER — FENTANYL CITRATE 50 UG/ML
INJECTION, SOLUTION INTRAMUSCULAR; INTRAVENOUS PRN
Status: DISCONTINUED | OUTPATIENT
Start: 2018-09-06 | End: 2018-09-06 | Stop reason: SDUPTHER

## 2018-09-06 RX ORDER — ONDANSETRON 2 MG/ML
INJECTION INTRAMUSCULAR; INTRAVENOUS PRN
Status: DISCONTINUED | OUTPATIENT
Start: 2018-09-06 | End: 2018-09-06 | Stop reason: SDUPTHER

## 2018-09-06 RX ORDER — METOCLOPRAMIDE HYDROCHLORIDE 5 MG/ML
10 INJECTION INTRAMUSCULAR; INTRAVENOUS
Status: DISCONTINUED | OUTPATIENT
Start: 2018-09-06 | End: 2018-09-06 | Stop reason: HOSPADM

## 2018-09-06 RX ORDER — HYDROCODONE BITARTRATE AND ACETAMINOPHEN 5; 325 MG/1; MG/1
2 TABLET ORAL PRN
Status: DISCONTINUED | OUTPATIENT
Start: 2018-09-06 | End: 2018-09-06 | Stop reason: HOSPADM

## 2018-09-06 RX ORDER — HYDROMORPHONE HCL 110MG/55ML
PATIENT CONTROLLED ANALGESIA SYRINGE INTRAVENOUS PRN
Status: DISCONTINUED | OUTPATIENT
Start: 2018-09-06 | End: 2018-09-06 | Stop reason: SDUPTHER

## 2018-09-06 RX ORDER — ACETAMINOPHEN 650 MG/1
650 SUPPOSITORY RECTAL EVERY 4 HOURS PRN
Status: DISCONTINUED | OUTPATIENT
Start: 2018-09-06 | End: 2018-09-07 | Stop reason: HOSPADM

## 2018-09-06 RX ORDER — LEVOTHYROXINE SODIUM 0.05 MG/1
50 TABLET ORAL DAILY
Status: DISCONTINUED | OUTPATIENT
Start: 2018-09-06 | End: 2018-09-07 | Stop reason: HOSPADM

## 2018-09-06 RX ORDER — ACETAMINOPHEN 325 MG/1
650 TABLET ORAL EVERY 4 HOURS PRN
Status: DISCONTINUED | OUTPATIENT
Start: 2018-09-06 | End: 2018-09-07 | Stop reason: HOSPADM

## 2018-09-06 RX ORDER — BUDESONIDE AND FORMOTEROL FUMARATE DIHYDRATE 80; 4.5 UG/1; UG/1
1 AEROSOL RESPIRATORY (INHALATION) 2 TIMES DAILY
Status: DISCONTINUED | OUTPATIENT
Start: 2018-09-06 | End: 2018-09-06 | Stop reason: CLARIF

## 2018-09-06 RX ADMIN — HYDROMORPHONE HYDROCHLORIDE 0.2 MG: 2 INJECTION, SOLUTION INTRAMUSCULAR; INTRAVENOUS; SUBCUTANEOUS at 08:24

## 2018-09-06 RX ADMIN — PROPOFOL 140 MG: 10 INJECTION, EMULSION INTRAVENOUS at 07:45

## 2018-09-06 RX ADMIN — SODIUM CHLORIDE: 9 INJECTION, SOLUTION INTRAVENOUS at 14:55

## 2018-09-06 RX ADMIN — HYDROMORPHONE HYDROCHLORIDE 0.2 MG: 2 INJECTION, SOLUTION INTRAMUSCULAR; INTRAVENOUS; SUBCUTANEOUS at 08:00

## 2018-09-06 RX ADMIN — ONDANSETRON HYDROCHLORIDE 4 MG: 2 INJECTION, SOLUTION INTRAMUSCULAR; INTRAVENOUS at 08:54

## 2018-09-06 RX ADMIN — LIDOCAINE HYDROCHLORIDE 0.1 ML: 10 INJECTION, SOLUTION EPIDURAL; INFILTRATION; INTRACAUDAL; PERINEURAL at 06:42

## 2018-09-06 RX ADMIN — Medication 2 PUFF: at 20:20

## 2018-09-06 RX ADMIN — HYDROMORPHONE HYDROCHLORIDE 0.4 MG: 2 INJECTION, SOLUTION INTRAMUSCULAR; INTRAVENOUS; SUBCUTANEOUS at 08:19

## 2018-09-06 RX ADMIN — VENLAFAXINE HYDROCHLORIDE 75 MG: 75 CAPSULE, EXTENDED RELEASE ORAL at 14:54

## 2018-09-06 RX ADMIN — CEFTRIAXONE SODIUM 1 G: 1 INJECTION, POWDER, FOR SOLUTION INTRAMUSCULAR; INTRAVENOUS at 07:39

## 2018-09-06 RX ADMIN — DEXAMETHASONE SODIUM PHOSPHATE 8 MG: 10 INJECTION INTRAMUSCULAR; INTRAVENOUS at 07:52

## 2018-09-06 RX ADMIN — LEVOTHYROXINE SODIUM 50 MCG: 50 TABLET ORAL at 14:54

## 2018-09-06 RX ADMIN — BUPROPION HYDROCHLORIDE 75 MG: 75 TABLET, FILM COATED ORAL at 14:54

## 2018-09-06 RX ADMIN — HYDROMORPHONE HYDROCHLORIDE 0.2 MG: 2 INJECTION, SOLUTION INTRAMUSCULAR; INTRAVENOUS; SUBCUTANEOUS at 08:23

## 2018-09-06 RX ADMIN — PHENYLEPHRINE HYDROCHLORIDE 100 MCG: 10 INJECTION INTRAVENOUS at 08:40

## 2018-09-06 RX ADMIN — FENTANYL CITRATE 50 MCG: 50 INJECTION, SOLUTION INTRAMUSCULAR; INTRAVENOUS at 07:50

## 2018-09-06 RX ADMIN — SODIUM CHLORIDE, PRESERVATIVE FREE 10 ML: 5 INJECTION INTRAVENOUS at 22:35

## 2018-09-06 RX ADMIN — HYDROMORPHONE HYDROCHLORIDE 0.2 MG: 2 INJECTION, SOLUTION INTRAMUSCULAR; INTRAVENOUS; SUBCUTANEOUS at 08:17

## 2018-09-06 RX ADMIN — FENTANYL CITRATE 50 MCG: 50 INJECTION, SOLUTION INTRAMUSCULAR; INTRAVENOUS at 07:45

## 2018-09-06 RX ADMIN — ACETAMINOPHEN 650 MG: 325 TABLET ORAL at 15:01

## 2018-09-06 RX ADMIN — THEOPHYLLINE 400 MG: 400 TABLET, EXTENDED RELEASE ORAL at 14:54

## 2018-09-06 RX ADMIN — LIDOCAINE HYDROCHLORIDE 40 MG: 20 INJECTION, SOLUTION INFILTRATION; PERINEURAL at 07:45

## 2018-09-06 RX ADMIN — HYDROMORPHONE HYDROCHLORIDE 0.2 MG: 2 INJECTION, SOLUTION INTRAMUSCULAR; INTRAVENOUS; SUBCUTANEOUS at 08:13

## 2018-09-06 RX ADMIN — SODIUM CHLORIDE 1000 ML: 9 INJECTION, SOLUTION INTRAVENOUS at 06:42

## 2018-09-06 RX ADMIN — PHENYLEPHRINE HYDROCHLORIDE 100 MCG: 10 INJECTION INTRAVENOUS at 08:57

## 2018-09-06 RX ADMIN — TRAZODONE HYDROCHLORIDE 150 MG: 150 TABLET ORAL at 22:15

## 2018-09-06 RX ADMIN — SODIUM CHLORIDE: 9 INJECTION, SOLUTION INTRAVENOUS at 08:51

## 2018-09-06 ASSESSMENT — PULMONARY FUNCTION TESTS
PIF_VALUE: 11
PIF_VALUE: 10
PIF_VALUE: 3
PIF_VALUE: 3
PIF_VALUE: 2
PIF_VALUE: 11
PIF_VALUE: 11
PIF_VALUE: 1
PIF_VALUE: 11
PIF_VALUE: 7
PIF_VALUE: 11
PIF_VALUE: 2
PIF_VALUE: 10
PIF_VALUE: 11
PIF_VALUE: 7
PIF_VALUE: 5
PIF_VALUE: 10
PIF_VALUE: 6
PIF_VALUE: 10
PIF_VALUE: 7
PIF_VALUE: 11
PIF_VALUE: 9
PIF_VALUE: 11
PIF_VALUE: 10
PIF_VALUE: 2
PIF_VALUE: 3
PIF_VALUE: 10
PIF_VALUE: 12
PIF_VALUE: 9
PIF_VALUE: 7
PIF_VALUE: 10
PIF_VALUE: 1
PIF_VALUE: 2
PIF_VALUE: 2
PIF_VALUE: 9
PIF_VALUE: 6
PIF_VALUE: 8
PIF_VALUE: 11
PIF_VALUE: 10
PIF_VALUE: 2
PIF_VALUE: 2
PIF_VALUE: 10
PIF_VALUE: 11
PIF_VALUE: 11
PIF_VALUE: 10
PIF_VALUE: 2
PIF_VALUE: 11
PIF_VALUE: 10
PIF_VALUE: 9
PIF_VALUE: 2
PIF_VALUE: 10
PIF_VALUE: 7
PIF_VALUE: 11
PIF_VALUE: 2
PIF_VALUE: 11
PIF_VALUE: 2
PIF_VALUE: 7
PIF_VALUE: 11
PIF_VALUE: 2
PIF_VALUE: 1
PIF_VALUE: 9
PIF_VALUE: 0
PIF_VALUE: 11
PIF_VALUE: 7
PIF_VALUE: 2
PIF_VALUE: 9
PIF_VALUE: 2
PIF_VALUE: 11
PIF_VALUE: 11
PIF_VALUE: 2
PIF_VALUE: 7
PIF_VALUE: 0
PIF_VALUE: 2
PIF_VALUE: 11
PIF_VALUE: 3
PIF_VALUE: 2
PIF_VALUE: 1
PIF_VALUE: 2
PIF_VALUE: 11
PIF_VALUE: 2
PIF_VALUE: 2
PIF_VALUE: 11
PIF_VALUE: 10
PIF_VALUE: 20

## 2018-09-06 ASSESSMENT — PAIN SCALES - GENERAL
PAINLEVEL_OUTOF10: 3
PAINLEVEL_OUTOF10: 0

## 2018-09-06 ASSESSMENT — PAIN - FUNCTIONAL ASSESSMENT: PAIN_FUNCTIONAL_ASSESSMENT: 0-10

## 2018-09-06 NOTE — PROGRESS NOTES
[facility] [place] Respiratory Therapy Evaluation   Current Order:  Albuterol Q6PRN, Duoneb Q6PRN       Home Regimen: Albuterol PRN      Ordering Physician: Lauri Shields  Re-evaluation Date:       Diagnosis: (R) Mastectomy      Patient Status: Stable / Unstable + Physician notified    The following MDI Criteria must be met in order to convert aerosol to MDI with spacer. If unable to meet, MDI will be converted to aerosol:  []  Patient able to demonstrate the ability to use MDI effectively  []  Patient alert and cooperative  []  Patient able to take deep breath with 5-10 second hold  []  Medication(s) available in this delivery method   []  Peak flow greater than or equal to 200 ml/min            Current Order Substituted To  (same drug, same frequency)   Aerosol to MDI [] Albuterol Sulfate 0.083% unit dose by aerosol Albuterol Sulfate MDI 2 puffs by inhalation with spacer    [] Levalbuterol 1.25 mg unit dose by aerosol Levalbuterol MDI 2 puffs by inhalation with spacer    [] Levalbuterol 0.63 mg unit dose by aerosol Levalbuterol MDI 2 puffs by inhalation with spacer    [] Ipratropium Bromide 0.02% unit dose by aerosol Ipratropium Bromide MDI 2 puffs by inhalation with spacer    [] Duoneb (Ipratropium + Albuterol) unit dose by aerosol Ipratropium MDI + Albuterol MDI 2 puffs by inhalation w/spacer   MDI to Aerosol [] Albuterol Sulfate MDI Albuterol Sulfate 0.083% unit dose by aerosol    [] Levalbuterol MDI 2 puffs by inhalation Levalbuterol 1.25 mg unit dose by aerosol    [] Ipratropium Bromide MDI by inhalation Ipratropium Bromide 0.02% unit dose by aerosol    [] Combivent (Ipratropium + Albuterol) MDI by inhalation Duoneb (Ipratropium + Albuterol) unit dose by aerosol   Treatment Assessment [Frequency/Schedule]:  Change frequency to: Albuterol Q4PRN per Protocol, P&T, MEC      Points 0 1 2 3 4   Pulmonary Status  Non-Smoker  []   Smoking history   < 20 pack years  []   Smoking history  ?  20 pack years  []   Pulmonary Disorder  (acute or chronic)  [x]   Severe or Chronic w/ Exacerbation  []     Surgical Status No []   Surgeries     General [x]   Surgery Lower []   Abdominal Thoracic or []   Upper Abdominal Thoracic with  PulmonaryDisorder  []     Chest X-ray Clear/Not  Ordered     [x]  Chronic Changes  Results Pending  []  Infiltrates, atelectasis, pleural effusion, or edema  []  Infiltrates in more than one lobe []  Infiltrate + Atelectasis, &/or pleural effusion  []    Respiratory Pattern Regular,  RR = 12-20 [x]  Increased,  RR = 21-25 []  SOL, irregular,  or RR = 26-30 []  Decreased FEV1  or RR = 31-35 []  Severe SOB, use  of accessory muscles, or RR ? 35  []    Mental Status Alert, oriented,  Cooperative []  Confused but Follows commands []  Lethargic or unable to follow commands []  Obtunded  []  Comatose  []    Breath Sounds Clear to  auscultation  [x]  Decreased unilaterally or  in bases only []  Decreased  bilaterally  []  Crackles or intermittent wheezes []  Wheezes []    Cough Strong, Spontan., & nonproductive [x]  Strong,  spontaneous, &  productive []  Weak,  Nonproductive []  Weak, productive or  with wheezes []  No spontaneous  cough or may require suctioning []    Level of Activity Ambulatory []  Ambulatory w/ Assist  [x]  Non-ambulatory []  Paraplegic []  Quadriplegic []    Total    Score:___5____     Triage Score:____5____      Tri       Triage:     1. (>20) Freq: Q3    2. (16-20) Freq: Q4   3. (11-15) Freq: QID & Albuterol Q2 PRN    4. (6-10) Freq: TID & Albuterol Q2 PRN    5. (0-5) Freq Q4prn

## 2018-09-06 NOTE — H&P
History and Physical    CC:  Right breast mass    HPI:  She presented with an abnormal mammogram and underwent ultrasound guided biopsy in 11/2016. The pathology was benign but I was concerned about the area and recommended a surgical biopsy but she never returned to the office for this discussion. She was admitted in June and a suspicious right breast mass was noted. Mammogram and ultrasound studies were suggestive of malignancy. After further discussion she was agreeable to mastectomy. Past Medical History:   Diagnosis Date    Anxiety     Anxiety and depression     CAD (coronary artery disease)     Cancer ([facility] 3/2014    Invasive ductal cancer left breast    Carotid artery stenosis and occlusion     COPD (chronic obstructive pulmonary disease) ([facility]     Depression 1/15/2018    GERD (gastroesophageal reflux disease)     Headache(784.0)     Hyperlipidemia     Hypertension     Hypothyroidism     Insomnia     Osteoarthritis     RBBB 10/15/2014    Right carotid bruit 5/26/2015    S/P cardiac catheterization 7/13/2016     Past Surgical History:   Procedure Laterality Date    BREAST BIOPSY Right 11/8/2016    ULTRASOUND GUIDED BIOPSY RIGHT BREAST WITH FROZEN SECTION  performed by Nacho Bob MD at [address] Left 2/26/14    U/S Guided core bx of the left breast    BREAST SURGERY Right 3/20/14    U/S of the lateral right breast    BREAST SURGERY Left 3/27/14    U/S Guided Left breast lumpectomy and left snb    OTHER SURGICAL HISTORY  4/11/14    Placement drain, left axillary seroma     No current facility-administered medications on file prior to encounter.       Current Outpatient Prescriptions on File Prior to Encounter   Medication Sig Dispense Refill    levothyroxine (SYNTHROID) 50 MCG tablet Take 1 tablet by mouth Daily 30 tablet 3    metoprolol tartrate 75 MG TABS Take 75 mg by mouth 2 times daily 60 tablet 3    theophylline (UNIPHYL) 400 MG extended release tablet

## 2018-09-06 NOTE — ANESTHESIA POSTPROCEDURE EVALUATION
Department of Anesthesiology  Postprocedure Note    Patient: Jennifer Arzola  MRN: [id number]  YOB: 1945  Date of evaluation: 9/6/2018  Time:  9:22 AM     Procedure Summary     Date:  09/06/18 [place] / [place]  [place] [address] [place] [facility]    Anesthesia Start:  0739 Anesthesia Stop:  [phone]    Procedure:  INJECTION METHYLENE BLUE RIGHT  BREAST,SIMPLE RIGHT MASTCTOMY, RIGHT   SENTINEL NODE BIOPSY (Right Breast) Diagnosis:  (RIGHT BREAST CA )    Surgeon:  Gurpreet Heller MD Responsible Provider:  Christel Franklin MD    Anesthesia Type:  general ASA Status:  3          Anesthesia Type: general    Reyes Phase I: Reyes Score: 10    Reyes Phase II:      Last vitals: Reviewed and per EMR flowsheets.        Anesthesia Post Evaluation    Patient location during evaluation: bedside  Patient participation: complete - patient participated  Level of consciousness: awake and awake and alert  Airway patency: patent  Nausea & Vomiting: no nausea and no vomiting  Complications: no  Cardiovascular status: blood pressure returned to baseline and hemodynamically stable  Respiratory status: acceptable  Hydration status: euvolemic

## 2018-09-06 NOTE — PROGRESS NOTES
Dr. Gerber Moser vs at [facility]; discussed surgery with patient. Advised her that biopsies do show cancer, which is what she expected. Patient verbalized understanding. Ready for transfer to Med/Surg. KARIS drain pinned to front of gown. Condition stable. Has denies pain throughout PACU care.

## 2018-09-06 NOTE — ANESTHESIA PRE PROCEDURE
APRN - CNP   ALPRAZolam (XANAX) 0.25 MG tablet Take 0.25 mg by mouth 3 times daily as needed for Sleep. Stacey Haji Historical Provider, MD   traMADol (ULTRAM) 50 MG tablet Take 50 mg by mouth every 6 hours as needed for Pain. Stacey Haji Historical Provider, MD   Respiratory Therapy Supplies (NEBULIZER/TUBING/MOUTHPIECE) KIT 1 kit by Does not apply route daily as needed (wheezing) 11/13/17   ROSALIND Patel CNP   albuterol sulfate HFA (PROAIR HFA) 108 (90 BASE) MCG/ACT inhaler Inhale 2 puffs into the lungs every 6 hours as needed for Wheezing or Shortness of Breath 12/9/16   ROSALIND Patel CNP       Current medications:    No current facility-administered medications for this encounter.       Current Outpatient Prescriptions   Medication Sig Dispense Refill    levothyroxine (SYNTHROID) 50 MCG tablet Take 1 tablet by mouth Daily 30 tablet 3    metoprolol tartrate 75 MG TABS Take 75 mg by mouth 2 times daily 60 tablet 3    theophylline (UNIPHYL) 400 MG extended release tablet TAKE 1 TABLET DAILY 90 tablet 1    bisoprolol (ZEBETA) 10 MG tablet TAKE 1 TABLET DAILY 90 tablet 1    traZODone (DESYREL) 150 MG tablet take 1 (ONE) tablet nightly 90 tablet 1    venlafaxine (EFFEXOR XR) 75 MG extended release capsule Take 1 capsule by mouth daily 90 capsule 1    lovastatin (MEVACOR) 20 MG tablet TAKE 1 TABLET NIGHTLY 90 tablet 1    SYMBICORT 80-4.5 MCG/ACT AERO USE 2 INHALATIONS TWICE A DAY 30.6 g 5    buPROPion (WELLBUTRIN) 75 MG tablet Take 1 tablet by mouth 2 times daily 60 tablet 1    vitamin D (ERGOCALCIFEROL) 51923 units CAPS capsule TAKE 1 CAPSULE ONCE A WEEK 12 capsule 3    tiotropium (SPIRIVA HANDIHALER) 18 MCG inhalation capsule Inhale 1 capsule into the lungs daily 90 capsule 3    ipratropium-albuterol (DUONEB) 0.5-2.5 (3) MG/3ML SOLN nebulizer solution Inhale 3 mLs into the lungs every 6 hours as needed for Shortness of Breath 200 vial 3    ALPRAZolam (XANAX) 0.25 MG tablet Take 0.25 mg by mouth 3 times daily as needed for Sleep. [facility] traMADol (ULTRAM) 50 MG tablet Take 50 mg by mouth every 6 hours as needed for Pain. [facility] Respiratory Therapy Supplies (NEBULIZER/TUBING/MOUTHPIECE) KIT 1 kit by Does not apply route daily as needed (wheezing) 1 kit 5    albuterol sulfate HFA (PROAIR HFA) 108 (90 BASE) MCG/ACT inhaler Inhale 2 puffs into the lungs every 6 hours as needed for Wheezing or Shortness of Breath 3 Inhaler 3       Allergies:  No Known Allergies    Problem List:    Patient Active Problem List   Diagnosis Code    Hyperlipidemia E78.5    Hypothyroidism E03.9    COPD (chronic obstructive pulmonary disease) ([facility]- Dr. Kwasi Todd J44.9    Anxiety and depression F41.9, F32.9    Insomnia G47.00    Osteoarthritis M19.90    S/P carotid endarterectomy Z98.890    Dyspepsia R10.13    CAD (coronary artery disease)- Dr. 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 Nahum Ayoub I65.23    Decreased GFR L75.7    Diastolic dysfunction L57.0    Depression F32.9    Moderate episode of recurrent major depressive disorder (HCC) F33.1    History of breast cancer- left Z85.3    Acute exacerbation of chronic obstructive pulmonary disease (COPD) (HCC) J44.1    Pulmonary nodule R91.1    Breast mass, right- Dr. Frankie Cameron N63.10    Community acquired pneumonia of right lower lobe of lung ([facility] J18.1    Chronic respiratory failure

## 2018-09-06 NOTE — CONSULTS
Pulmonary and Critical Care Medicine  Consult Note  Encounter Date: 2018 4:41 PM    Ms. Joel Robles is a 74 y.o. female  : 1945  Requesting Provider: Nacho Bob MD    Reason for request: COPD             HISTORY OF PRESENT ILLNESS:    Patient is 74 y.o. presents with breast nodule status post mastectomy today. She has history of COPD on 2 L oxygen at home with exertion and while asleep. She reports dyspnea on exertion currently she is not short of breath however has not been out of bed yet. She reports no coughing, no chest pain, no nausea or vomiting. She has no fever or chills. She quit smoking 25 years ago smoked for almost 40 years 1 pack per day. She is on ICS/LABA and LAMA at home. Past Medical History:        Diagnosis Date    Anxiety     Anxiety and depression     CAD (coronary artery disease)     Cancer [facility] 3/2014    Invasive ductal cancer left breast    Carotid artery stenosis and occlusion     COPD (chronic obstructive pulmonary disease) ([facility]     Depression 1/15/2018    GERD (gastroesophageal reflux disease)     Headache(784.0)     Hyperlipidemia     Hypertension     Hypothyroidism     Insomnia     Osteoarthritis     RBBB 10/15/2014    Right carotid bruit 2015    S/P cardiac catheterization 2016       Past Surgical History:        Procedure Laterality Date    BREAST BIOPSY Right 2016    ULTRASOUND GUIDED BIOPSY RIGHT BREAST WITH FROZEN SECTION  performed by Nacho Bob MD at [address]    U/S Guided core bx of the left breast    BREAST SURGERY Right 3/20/14    U/S of the lateral right breast    BREAST SURGERY Left 3/27/14    U/S Guided Left breast lumpectomy and left snb    OTHER SURGICAL HISTORY  14    Placement drain, left axillary seroma       Social History:     reports that she quit smoking about 29 years ago. Her smoking use included Cigarettes. She has a 60.00 pack-year smoking history. She has never used smokeless tobacco. She reports that she does not drink alcohol or use drugs. Family History:   Family History   Problem Relation Age of Onset    Cancer Sister         breast    Cancer Maternal Uncle         pancreatic       Allergies:  Patient has no known allergies. MEDICATIONS during current hospitalization:    Continuous Infusions:   sodium chloride 75 mL/hr at 09/06/18 1455       Scheduled Meds:   bisoprolol  10 mg Oral Daily    buPROPion  75 mg Oral BID    levothyroxine  50 mcg Oral Daily    metoprolol tartrate  75 mg Oral BID    theophylline  400 mg Oral Daily    tiotropium  18 mcg Inhalation Daily    traZODone  150 mg Oral Nightly    venlafaxine  75 mg Oral Daily    sodium chloride flush  10 mL Intravenous 2 times per day    [START ON 9/7/2018] enoxaparin  40 mg Subcutaneous Daily    mometasone-formoterol  2 puff Inhalation BID       PRN Meds:sodium chloride flush, acetaminophen, acetaminophen, oxyCODONE-acetaminophen **OR** oxyCODONE-acetaminophen, ondansetron, HYDROmorphone **OR** HYDROmorphone, albuterol sulfate HFA        REVIEW OF SYSTEMS:  ROS: 10 organs review of system is done including general, psychological, ENT, hematological, endocrine, respiratory, cardiovascular, gastrointestinal, musculoskeletal, neurological,  allergy and Immunology is done and is otherwise negative. PHYSICAL EXAM:    Vitals:  BP (!) 111/59   Pulse 72   Temp 98.2 °F (36.8 °C) (Oral)   Resp 16   Ht 5' 3\" (1.6 m)   Wt 125 lb (56.7 kg)   LMP  (LMP Unknown)   SpO2 98%   BMI 22.14 kg/m²     General: alert, cooperative, no distress  Head: normocephalic, atraumatic  Eyes:No gross abnormalities. , PERRL and Sclera nonicteric  ENT:  MMM no lesions  Neck:  supple and no masses  Chest : clear to auscultation bilaterally- no wheezes, rales or rhonchi, normal air movement, no respiratory distress, right sided mastectomy with clean dressing  Heart[de-identified] Heart sounds are normal.  Regular rate and rhythm without murmur, gallop or rub. ABD:  symmetric, liver span normal to percussion, soft, non-tender, spleen non-palpable  Musculoskeletal : no cyanosis, no clubbing and no edema  Neuro:  Grossly normal  Skin: No rashes or nodules noted. Lymph node:  no cervical nodes     Psychiatric: appropriate        Data Review  No results for input(s): WBC, HGB, HCT, PLT in the last 72 hours. No results for input(s): NA, K, CL, CO2, BUN, CREATININE, GLUCOSE in the last 72 hours. Invalid input(s): CA    MV Settings: ABGs: No results for input(s): PHART, UKJ1NGU, PO2ART, BEH8RAC, BEART, J6CHBAXH, TXD7HUS in the last 72 hours.   O2 Device: Nasal cannula  O2 Flow Rate (L/min): 2 L/min  Lab Results   Component Value Date    LACTA 0.8 08/18/2018    LACTA 1.2 08/17/2018    LACTA 2.0 08/16/2018       Radiology  I personally reviewed imaging studies and   CT chest 6/13 2018 show mediastinal lymphadenopathy and sever emphysema          Assessment, plan:   Patient is at risk due to    · Status post mastectomy  · Chronic respiratory failure secondary to COPD  · Mediastinal lymphadenopathy of unclear etiology   · COPD(FEV1 37%)  no signs of exacerbation at this time  · GERD    Recommended  · Continue current inhaler  · O2 to keep sats 88-90%  · Recommend PET CT if mediastinal lymph nodes are positive then we can do EBUS and TBNA   · Incentive spirometry and flutter valve  · Avoid volume overload  · Early mobility       Thank you for consultation    Electronically signed by Alber Ramos MD, [facility],  on 9/6/2018 at 4:41 PM

## 2018-09-07 VITALS
HEIGHT: 63 IN | DIASTOLIC BLOOD PRESSURE: 65 MMHG | BODY MASS INDEX: 22.15 KG/M2 | TEMPERATURE: 97.5 F | WEIGHT: 125 LBS | SYSTOLIC BLOOD PRESSURE: 117 MMHG | OXYGEN SATURATION: 100 % | HEART RATE: 100 BPM | RESPIRATION RATE: 16 BRPM

## 2018-09-07 PROCEDURE — 94640 AIRWAY INHALATION TREATMENT: CPT

## 2018-09-07 PROCEDURE — 6370000000 HC RX 637 (ALT 250 FOR IP): Performed by: SURGERY

## 2018-09-07 PROCEDURE — 38900 IO MAP OF SENT LYMPH NODE: CPT | Performed by: SURGERY

## 2018-09-07 PROCEDURE — 2700000000 HC OXYGEN THERAPY PER DAY

## 2018-09-07 PROCEDURE — 99235 HOSP IP/OBS SAME DATE MOD 70: CPT | Performed by: INTERNAL MEDICINE

## 2018-09-07 PROCEDURE — 19307 MAST MOD RAD: CPT | Performed by: SURGERY

## 2018-09-07 PROCEDURE — 2580000003 HC RX 258: Performed by: SURGERY

## 2018-09-07 RX ORDER — OXYCODONE HYDROCHLORIDE AND ACETAMINOPHEN 5; 325 MG/1; MG/1
1 TABLET ORAL EVERY 6 HOURS PRN
Qty: 20 TABLET | Refills: 0 | Status: SHIPPED | OUTPATIENT
Start: 2018-09-07 | End: 2018-09-14

## 2018-09-07 RX ADMIN — Medication 2 PUFF: at 07:27

## 2018-09-07 RX ADMIN — VENLAFAXINE HYDROCHLORIDE 75 MG: 75 CAPSULE, EXTENDED RELEASE ORAL at 13:05

## 2018-09-07 RX ADMIN — LEVOTHYROXINE SODIUM 50 MCG: 50 TABLET ORAL at 06:37

## 2018-09-07 RX ADMIN — BUPROPION HYDROCHLORIDE 75 MG: 75 TABLET, FILM COATED ORAL at 13:05

## 2018-09-07 RX ADMIN — THEOPHYLLINE 400 MG: 400 TABLET, EXTENDED RELEASE ORAL at 13:05

## 2018-09-07 RX ADMIN — TIOTROPIUM BROMIDE 18 MCG: 18 CAPSULE ORAL; RESPIRATORY (INHALATION) at 07:25

## 2018-09-07 RX ADMIN — SODIUM CHLORIDE: 9 INJECTION, SOLUTION INTRAVENOUS at 04:56

## 2018-09-07 ASSESSMENT — PAIN SCALES - GENERAL: PAINLEVEL_OUTOF10: 3

## 2018-09-07 NOTE — PROGRESS NOTES
INPATIENT PROGRESS NOTES    PATIENT NAME: Liza Stroud  MRN: [id number]  SERVICE DATE:  September 7, 2018   SERVICE TIME:  12:45 PM      PRIMARY SERVICE: Pulmonary Disease    CHIEF COMPLAINTS: COPD     INTERVAL HPI: Patient seen and examined at bedside, Interval Notes, orders reviewed. Nursing notes noted    Patient report no chest pain aside from wound site, no SOB, cough , or discomfort, she is at baseline, and feels comfortable, she is ready to go home    Review of system:     GI Abdominal pain No  Skin Rash No    Social History   Substance Use Topics    Smoking status: Former Smoker     Packs/day: 1.50     Years: 40.00     Types: Cigarettes     Quit date: 1/26/1989    Smokeless tobacco: Never Used    Alcohol use No     Family History   Problem Relation Age of Onset    Cancer Sister         breast    Cancer Maternal Uncle         pancreatic         OBJECTIVE    Body mass index is 22.14 kg/m². PHYSICAL EXAM:  Vitals:  /72   Pulse 71   Temp 97.7 °F (36.5 °C) (Oral)   Resp 16   Ht 5' 3\" (1.6 m)   Wt 125 lb (56.7 kg)   LMP  (LMP Unknown)   SpO2 100%   BMI 22.14 kg/m²     General: alert, cooperative, no distress  Head: normocephalic, atraumatic  Eyes:No gross abnormalities. , PERRL and Sclera nonicteric  ENT:  MMM no lesions  Neck:  supple and no masses  Chest : clear to auscultation bilaterally- no wheezes, rales or rhonchi, normal air movement, no respiratory distress, right-sided mastectomy  Heart[de-identified] Heart sounds are normal.  Regular rate and rhythm without murmur, gallop or rub. ABD:  symmetric, soft, non-tender  Musculoskeletal : no cyanosis, no clubbing and no edema  Neuro:  Grossly normal  Skin: No rashes or nodules noted. Lymph node:  no cervical nodes  Urology: No Georges   Psychiatric: appropriate    DATA:   No results for input(s): WBC, HGB, HCT, MCV, PLT in the last 72 hours.   No results for input(s): NA, K, CL, CO2, BUN, CREATININE, GLUCOSE, CALCIUM, PROT, LABALBU, BILITOT, ALKPHOS,

## 2018-09-07 NOTE — PROGRESS NOTES
Surgery Progress Note    She is POD #1 from right modified radical mastectomy for breast cancer. She has had a quiet night. Her pain is controlled. She is tolerating a diet without nausea or vomiting. She is wearing her nasal cannula oxygen. She looks well. She is afebrile with good BP. UO was good overnight. The right chest wall incision is clear without cellulitis or drainage. The skin flaps are viable and healthy looking. There is serosanguinous fluid in the KARIS. This put out 105 ml yesterday. Dr Margaux Riddle note reviewed and his input is appreciated. Satisfactory course. Her breathing seems to be at baseline. Plan discharge today. She has a daughter who is a nurse and does not need HHC. Instructions were given.

## 2018-09-07 NOTE — OP NOTE
injected into the retroareolar right breast.   Half of the mixture was injected subdermally and the other half was  injected intradermally. I then massaged the nipple areolar area for 5  minutes. The right breast neck and axilla were widely prepped with  Betadine and she was sterilely draped. The mass was present in the central  right breast positioned slightly inferiorly. I marked out the intended  incision for mastectomy. An ellipse was marked out around the nipple  areolar complex. I opened the lower part of the incision centrally for  about 3 cm. This was dissected down through the skin and subcutaneous  tissue. The mass was encountered, grossly it appeared to be cancer. I  excised a cube of tissue approximately 4 x 4 x 4 mm. This was sent for  frozen section and invasive mammary cancer was confirmed. I proceeded with  mastectomy. The incision for the inferior flap was made with a #10 blade. This was carried down through skin and subcutaneous tissue. A flap was  created to the inframammary crease using cautery. I then made my superior  incision using the #10 blade. Superior flap was created using the cautery. This was done so that the skin flap was a little on the thick side given  her history of COPD and use of steroids intermittently. I did not want the  skin flaps too thin. The dissection was carried to the clavicle  superiorly, the edge of the sternum medially. The skin was oriented with  sutures and the breast was taken off of the chest wall using cautery. Bleeding was well controlled, using handheld retractors the right axilla  was inspected. Blue dye could be seen coursing in vessels. The axillary  fat pad was visualized and grasped with DeBakey forceps. The axillary fat  pad was elevated and I could see a tiny blue node immediately. This was  dissected out and sent for frozen section.   While we were waiting for the  frozen section results, the axillary fat pad was palpated and

## 2018-09-10 ENCOUNTER — TELEPHONE (OUTPATIENT)
Dept: FAMILY MEDICINE CLINIC | Age: 73
End: 2018-09-10

## 2018-09-10 NOTE — TELEPHONE ENCOUNTER
Patient calling back. See MINDI Call below:      [address] Initial Follow Up Call    Outreach made within 2 business days of discharge: No 3 days    Patient: Katherine Schneider Patient : 1945   MRN: [id number]  Reason for Admission: There are no discharge diagnoses documented for the most recent discharge. Discharge Date: 18       Spoke with: Patient    Discharge department/facility: [address] Interactive Patient Contact:  Was patient able to fill all prescriptions: Yes  Was patient instructed to bring all medications to the follow-up visit: Yes  Is patient taking all medications as directed in the discharge summary?  Yes  Does patient understand their discharge instructions: Yes  Does patient have questions or concerns that need addressed prior to 7-14 day follow up office visit: No    Scheduled appointment with PCP within 7-14 days    Follow Up  Future Appointments  Date Time Provider Darlene Walsh   2018 11:30 AM [address], MD [place] [address] [place] [address]   2018 1:50 PM Lincoln Hennessy MD [address] [facility]   2018 2:00 PM ROSALIND Vail - CNP [place], [place]

## 2018-09-17 ENCOUNTER — OFFICE VISIT (OUTPATIENT)
Dept: SURGERY | Age: 73
End: 2018-09-17

## 2018-09-17 VITALS
WEIGHT: 133 LBS | TEMPERATURE: 97.6 F | DIASTOLIC BLOOD PRESSURE: 80 MMHG | BODY MASS INDEX: 23.57 KG/M2 | SYSTOLIC BLOOD PRESSURE: 130 MMHG | HEIGHT: 63 IN

## 2018-09-17 DIAGNOSIS — Z09 SURGICAL FOLLOWUP: Primary | ICD-10-CM

## 2018-09-17 DIAGNOSIS — C50.811 MALIGNANT NEOPLASM OF OVERLAPPING SITES OF RIGHT BREAST IN FEMALE, ESTROGEN RECEPTOR POSITIVE (HCC): ICD-10-CM

## 2018-09-17 DIAGNOSIS — Z17.0 MALIGNANT NEOPLASM OF OVERLAPPING SITES OF RIGHT BREAST IN FEMALE, ESTROGEN RECEPTOR POSITIVE (HCC): ICD-10-CM

## 2018-09-17 PROCEDURE — 99024 POSTOP FOLLOW-UP VISIT: CPT | Performed by: SURGERY

## 2018-09-17 NOTE — PROGRESS NOTES
Surgery Progress Note    She is here today for surgical follow up. She is s/p right modified radical mastectomy for breast cancer. The surgery was done on 9/6/2018. She does not feel well today and is having some epigastric discomfort with decreased appetite. The KARIS in the right chest has a small amount of serous fluid in the bulb. The skin flaps on the right breast are healthy looking. The right chest wall is flat and soft. There are no masses or fluid collections noted. The KARIS was removed intact and discarded. The KARIS site was dressed with gauze and tape. She was given a copy of the pathology report and the results were discussed. Invasive ductal cancer was noted. The tumor was 2 cm in size and was nuclear grade 2. The tumor was ER and MD +. The closest margin was deep (3 MM). 5 nodes were positive for tumor. Node positive right breast cancer. The HER 2 is still not available. Plan referral to oncology for an opinion regarding adjuvant therapy. She is agreeable. She will recheck with me in two weeks. Will observe her today to see if the epigastric discomfort subsides.

## 2018-09-20 ENCOUNTER — OFFICE VISIT (OUTPATIENT)
Dept: FAMILY MEDICINE CLINIC | Age: 73
End: 2018-09-20
Payer: MEDICARE

## 2018-09-20 VITALS
SYSTOLIC BLOOD PRESSURE: 100 MMHG | DIASTOLIC BLOOD PRESSURE: 50 MMHG | HEIGHT: 63 IN | TEMPERATURE: 99.3 F | OXYGEN SATURATION: 94 % | RESPIRATION RATE: 12 BRPM | BODY MASS INDEX: 23.21 KG/M2 | WEIGHT: 131 LBS | HEART RATE: 77 BPM

## 2018-09-20 DIAGNOSIS — I10 ESSENTIAL HYPERTENSION: ICD-10-CM

## 2018-09-20 DIAGNOSIS — I95.9 HYPOTENSION, UNSPECIFIED HYPOTENSION TYPE: ICD-10-CM

## 2018-09-20 DIAGNOSIS — F32.A ANXIETY AND DEPRESSION: ICD-10-CM

## 2018-09-20 DIAGNOSIS — R50.9 FEVER, UNSPECIFIED FEVER CAUSE: ICD-10-CM

## 2018-09-20 DIAGNOSIS — R42 LIGHTHEADEDNESS: ICD-10-CM

## 2018-09-20 DIAGNOSIS — J44.9 CHRONIC OBSTRUCTIVE PULMONARY DISEASE, UNSPECIFIED COPD TYPE (HCC): ICD-10-CM

## 2018-09-20 DIAGNOSIS — J96.11 CHRONIC RESPIRATORY FAILURE WITH HYPOXIA (HCC): ICD-10-CM

## 2018-09-20 DIAGNOSIS — F41.9 ANXIETY AND DEPRESSION: ICD-10-CM

## 2018-09-20 DIAGNOSIS — E03.1 CONGENITAL HYPOTHYROIDISM WITHOUT GOITER: Primary | ICD-10-CM

## 2018-09-20 PROCEDURE — 99214 OFFICE O/P EST MOD 30 MIN: CPT | Performed by: NURSE PRACTITIONER

## 2018-09-20 PROCEDURE — 1090F PRES/ABSN URINE INCON ASSESS: CPT | Performed by: NURSE PRACTITIONER

## 2018-09-20 PROCEDURE — 1123F ACP DISCUSS/DSCN MKR DOCD: CPT | Performed by: NURSE PRACTITIONER

## 2018-09-20 PROCEDURE — 3023F SPIROM DOC REV: CPT | Performed by: NURSE PRACTITIONER

## 2018-09-20 PROCEDURE — 1101F PT FALLS ASSESS-DOCD LE1/YR: CPT | Performed by: NURSE PRACTITIONER

## 2018-09-20 PROCEDURE — G8427 DOCREV CUR MEDS BY ELIG CLIN: HCPCS | Performed by: NURSE PRACTITIONER

## 2018-09-20 PROCEDURE — G8399 PT W/DXA RESULTS DOCUMENT: HCPCS | Performed by: NURSE PRACTITIONER

## 2018-09-20 PROCEDURE — G8598 ASA/ANTIPLAT THER USED: HCPCS | Performed by: NURSE PRACTITIONER

## 2018-09-20 PROCEDURE — 3017F COLORECTAL CA SCREEN DOC REV: CPT | Performed by: NURSE PRACTITIONER

## 2018-09-20 PROCEDURE — G8420 CALC BMI NORM PARAMETERS: HCPCS | Performed by: NURSE PRACTITIONER

## 2018-09-20 PROCEDURE — 1036F TOBACCO NON-USER: CPT | Performed by: NURSE PRACTITIONER

## 2018-09-20 PROCEDURE — 4040F PNEUMOC VAC/ADMIN/RCVD: CPT | Performed by: NURSE PRACTITIONER

## 2018-09-20 PROCEDURE — G8926 SPIRO NO PERF OR DOC: HCPCS | Performed by: NURSE PRACTITIONER

## 2018-09-20 NOTE — PROGRESS NOTES
indigestion. There is no diarrhea or constipation. No black, bloody, mucusy or tarry stool noticed. The patient reports no bloating and no change in appetite. EXAM:  Constitutional Blood pressure (!) 100/50, pulse 77, temperature 99.3 °F (37.4 °C), temperature source Temporal, resp. rate 12, height 5' 3\" (1.6 m), weight 131 lb (59.4 kg), SpO2 94 %, not currently breastfeeding. She has a normal affect, no acute distress, appears well developed and well nourished. Neck:  neck- supple, no mass, non-tender and no bruits  Lungs:  Normal expansion. Clear to auscultation. No rales, rhonchi, or wheezing., No chest wall tenderness. Heart:  Heart sounds are normal.  Regular rate and rhythm without murmur, gallop or rub. Abdomen:  Soft, non-tender, normal bowel sounds. No bruits, organomegaly or masses. Extremities: Extremities warm to touch, pink, with no edema. Right breast mastectomy site with closed surgical incision without open area, drainage, erythema or warmth. DIAGNOSIS:    Diagnosis Orders   1. Congenital hypothyroidism without goiter     2. Anxiety and depression     3. Essential hypertension     4. Chronic obstructive pulmonary disease, unspecified COPD type ([facility] [address]     5. Fever, unspecified fever cause  CBC Auto Differential    Lactic Acid, Plasma    Comprehensive Metabolic Panel    XR CHEST STANDARD (2 VW)   6. Hypotension, unspecified hypotension type  CBC Auto Differential    Lactic Acid, Plasma    Comprehensive Metabolic Panel    XR CHEST STANDARD (2 VW)   7. Lightheadedness         PLAN: Include orders in the DX section. Follow up: 1 week and as needed. Most recent TSH slightly low. Will want to order routine lab next week. Recommend blood work and CXR. Low grade fever with lightheadedness. Metoprolol dose decreased to 25 mg BID for now and discuss with cardiology. Follow up next week for blood pressure check and discuss symptoms.    She is advised to go to ER if near-syncope occurs or if fever worsens.        Electronically signed by Cody Zimmerman, 2:37 PM 9/20/18

## 2018-09-24 RX ORDER — LEVOTHYROXINE SODIUM 0.07 MG/1
TABLET ORAL
Qty: 90 TABLET | Refills: 0 | Status: SHIPPED | OUTPATIENT
Start: 2018-09-24 | End: 2018-11-05 | Stop reason: ALTCHOICE

## 2018-10-01 ENCOUNTER — OFFICE VISIT (OUTPATIENT)
Dept: SURGERY | Age: 73
End: 2018-10-01

## 2018-10-01 VITALS
HEIGHT: 62 IN | DIASTOLIC BLOOD PRESSURE: 76 MMHG | SYSTOLIC BLOOD PRESSURE: 122 MMHG | BODY MASS INDEX: 23.74 KG/M2 | WEIGHT: 129 LBS | TEMPERATURE: 98 F

## 2018-10-01 DIAGNOSIS — Z09 SURGICAL FOLLOWUP: Primary | ICD-10-CM

## 2018-10-01 PROCEDURE — 99024 POSTOP FOLLOW-UP VISIT: CPT | Performed by: SURGERY

## 2018-10-01 RX ORDER — METOPROLOL TARTRATE 50 MG/1
TABLET, FILM COATED ORAL
Refills: 3 | COMMUNITY
Start: 2018-09-25 | End: 2018-11-05 | Stop reason: ALTCHOICE

## 2018-10-03 ENCOUNTER — HOSPITAL ENCOUNTER (OUTPATIENT)
Dept: NUCLEAR MEDICINE | Age: 73
Discharge: HOME | End: 2018-10-05
Payer: MEDICARE

## 2018-10-03 ENCOUNTER — HOSPITAL ENCOUNTER (OUTPATIENT)
Dept: CT IMAGING | Age: 73
Discharge: HOME | End: 2018-10-05
Payer: MEDICARE

## 2018-10-03 VITALS — BODY MASS INDEX: 23.74 KG/M2 | WEIGHT: 129 LBS | HEIGHT: 62 IN

## 2018-10-03 DIAGNOSIS — C50.911 BILATERAL MALIGNANT NEOPLASM OF BREAST IN FEMALE, UNSPECIFIED ESTROGEN RECEPTOR STATUS, UNSPECIFIED SITE OF BREAST (HCC): ICD-10-CM

## 2018-10-03 DIAGNOSIS — C50.912 BILATERAL MALIGNANT NEOPLASM OF BREAST IN FEMALE, UNSPECIFIED ESTROGEN RECEPTOR STATUS, UNSPECIFIED SITE OF BREAST (HCC): ICD-10-CM

## 2018-10-03 DIAGNOSIS — C50.919 MALIGNANT NEOPLASM OF FEMALE BREAST, UNSPECIFIED ESTROGEN RECEPTOR STATUS, UNSPECIFIED LATERALITY, UNSPECIFIED SITE OF BREAST (HCC): ICD-10-CM

## 2018-10-03 PROCEDURE — 3430000000 HC RX DIAGNOSTIC RADIOPHARMACEUTICAL: Performed by: INTERNAL MEDICINE

## 2018-10-03 PROCEDURE — 2580000003 HC RX 258: Performed by: INTERNAL MEDICINE

## 2018-10-03 PROCEDURE — A9503 TC99M MEDRONATE: HCPCS | Performed by: INTERNAL MEDICINE

## 2018-10-03 PROCEDURE — 74177 CT ABD & PELVIS W/CONTRAST: CPT

## 2018-10-03 PROCEDURE — 78306 BONE IMAGING WHOLE BODY: CPT

## 2018-10-03 PROCEDURE — 2500000003 HC RX 250 WO HCPCS: Performed by: INTERNAL MEDICINE

## 2018-10-03 PROCEDURE — 6360000004 HC RX CONTRAST MEDICATION: Performed by: INTERNAL MEDICINE

## 2018-10-03 PROCEDURE — 70470 CT HEAD/BRAIN W/O & W/DYE: CPT

## 2018-10-03 RX ORDER — SODIUM CHLORIDE 0.9 % (FLUSH) 0.9 %
10 SYRINGE (ML) INJECTION
Status: COMPLETED | OUTPATIENT
Start: 2018-10-03 | End: 2018-10-03

## 2018-10-03 RX ORDER — TC 99M MEDRONATE 20 MG/10ML
25 INJECTION, POWDER, LYOPHILIZED, FOR SOLUTION INTRAVENOUS
Status: COMPLETED | OUTPATIENT
Start: 2018-10-03 | End: 2018-10-03

## 2018-10-03 RX ADMIN — IOPAMIDOL 100 ML: 755 INJECTION, SOLUTION INTRAVENOUS at 11:25

## 2018-10-03 RX ADMIN — Medication 24.6 MILLICURIE: at 10:10

## 2018-10-03 RX ADMIN — Medication 10 ML: at 10:10

## 2018-10-03 RX ADMIN — BARIUM SULFATE 450 ML: 20 SUSPENSION ORAL at 11:25

## 2018-10-18 RX ORDER — LOVASTATIN 20 MG/1
TABLET ORAL
Qty: 90 TABLET | Refills: 1 | Status: SHIPPED | OUTPATIENT
Start: 2018-10-18 | End: 2019-04-16 | Stop reason: SDUPTHER

## 2018-10-30 DIAGNOSIS — G47.09 OTHER INSOMNIA: ICD-10-CM

## 2018-10-30 RX ORDER — TRAZODONE HYDROCHLORIDE 150 MG/1
150 TABLET ORAL NIGHTLY
Qty: 90 TABLET | Refills: 0 | Status: SHIPPED | OUTPATIENT
Start: 2018-10-30 | End: 2019-01-28 | Stop reason: SDUPTHER

## 2018-11-05 ENCOUNTER — APPOINTMENT (OUTPATIENT)
Dept: CT IMAGING | Age: 73
DRG: 309 | End: 2018-11-05
Payer: MEDICARE

## 2018-11-05 ENCOUNTER — OFFICE VISIT (OUTPATIENT)
Dept: FAMILY MEDICINE CLINIC | Age: 73
End: 2018-11-05
Payer: MEDICARE

## 2018-11-05 ENCOUNTER — HOSPITAL ENCOUNTER (INPATIENT)
Age: 73
LOS: 1 days | Discharge: HOME | DRG: 309 | End: 2018-11-06
Attending: STUDENT IN AN ORGANIZED HEALTH CARE EDUCATION/TRAINING PROGRAM | Admitting: INTERNAL MEDICINE
Payer: MEDICARE

## 2018-11-05 VITALS
WEIGHT: 130 LBS | SYSTOLIC BLOOD PRESSURE: 112 MMHG | HEIGHT: 62 IN | TEMPERATURE: 98.3 F | RESPIRATION RATE: 14 BRPM | DIASTOLIC BLOOD PRESSURE: 64 MMHG | HEART RATE: 145 BPM | OXYGEN SATURATION: 94 % | BODY MASS INDEX: 23.92 KG/M2

## 2018-11-05 DIAGNOSIS — F41.9 ANXIETY: ICD-10-CM

## 2018-11-05 DIAGNOSIS — R00.0 TACHYCARDIA: ICD-10-CM

## 2018-11-05 DIAGNOSIS — J44.9 CHRONIC OBSTRUCTIVE PULMONARY DISEASE, UNSPECIFIED COPD TYPE (HCC): ICD-10-CM

## 2018-11-05 DIAGNOSIS — E03.1 CONGENITAL HYPOTHYROIDISM WITHOUT GOITER: ICD-10-CM

## 2018-11-05 DIAGNOSIS — E55.9 VITAMIN D DEFICIENCY: ICD-10-CM

## 2018-11-05 DIAGNOSIS — I25.10 CORONARY ARTERY DISEASE INVOLVING NATIVE HEART WITHOUT ANGINA PECTORIS, UNSPECIFIED VESSEL OR LESION TYPE: ICD-10-CM

## 2018-11-05 DIAGNOSIS — Z23 NEED FOR SHINGLES VACCINE: ICD-10-CM

## 2018-11-05 DIAGNOSIS — F32.A ANXIETY AND DEPRESSION: Primary | ICD-10-CM

## 2018-11-05 DIAGNOSIS — M15.9 OSTEOARTHRITIS OF MULTIPLE JOINTS, UNSPECIFIED OSTEOARTHRITIS TYPE: ICD-10-CM

## 2018-11-05 DIAGNOSIS — N28.9 RENAL INSUFFICIENCY: Primary | ICD-10-CM

## 2018-11-05 DIAGNOSIS — E78.2 MIXED HYPERLIPIDEMIA: ICD-10-CM

## 2018-11-05 DIAGNOSIS — F41.9 ANXIETY AND DEPRESSION: Primary | ICD-10-CM

## 2018-11-05 DIAGNOSIS — E83.42 HYPOMAGNESEMIA: ICD-10-CM

## 2018-11-05 DIAGNOSIS — Z23 NEED FOR INFLUENZA VACCINATION: ICD-10-CM

## 2018-11-05 DIAGNOSIS — Z23 NEED FOR VACCINATION FOR STREP PNEUMONIAE: ICD-10-CM

## 2018-11-05 PROBLEM — E86.0 DEHYDRATION: Status: ACTIVE | Noted: 2018-11-05

## 2018-11-05 LAB
ALBUMIN SERPL-MCNC: 4.4 G/DL (ref 3.9–4.9)
ALP BLD-CCNC: 88 U/L (ref 40–130)
ALT SERPL-CCNC: 16 U/L (ref 0–33)
ANION GAP SERPL CALCULATED.3IONS-SCNC: 21 MEQ/L (ref 7–13)
APTT: 24.1 SEC (ref 21.6–35.4)
AST SERPL-CCNC: 20 U/L (ref 0–35)
BASOPHILS ABSOLUTE: 0.1 K/UL (ref 0–0.2)
BASOPHILS RELATIVE PERCENT: 0.9 %
BILIRUB SERPL-MCNC: 0.2 MG/DL (ref 0–1.2)
BILIRUBIN URINE: NEGATIVE
BLOOD, URINE: NEGATIVE
BUN BLDV-MCNC: 12 MG/DL (ref 8–23)
C-REACTIVE PROTEIN, HIGH SENSITIVITY: 2.8 MG/L (ref 0–5)
CALCIUM SERPL-MCNC: 10.4 MG/DL (ref 8.6–10.2)
CHLORIDE BLD-SCNC: 96 MEQ/L (ref 98–107)
CLARITY: CLEAR
CO2: 24 MEQ/L (ref 22–29)
COLOR: NORMAL
CREAT SERPL-MCNC: 1.46 MG/DL (ref 0.5–0.9)
EKG ATRIAL RATE: 129 BPM
EKG ATRIAL RATE: 150 BPM
EKG ATRIAL RATE: 80 BPM
EKG Q-T INTERVAL: 334 MS
EKG Q-T INTERVAL: 336 MS
EKG Q-T INTERVAL: 340 MS
EKG QRS DURATION: 124 MS
EKG QRS DURATION: 130 MS
EKG QRS DURATION: 130 MS
EKG QTC CALCULATION (BAZETT): 483 MS
EKG QTC CALCULATION (BAZETT): 496 MS
EKG QTC CALCULATION (BAZETT): 516 MS
EKG R AXIS: 100 DEGREES
EKG R AXIS: 102 DEGREES
EKG R AXIS: 96 DEGREES
EKG T AXIS: 39 DEGREES
EKG T AXIS: 50 DEGREES
EKG T AXIS: 53 DEGREES
EKG VENTRICULAR RATE: 126 BPM
EKG VENTRICULAR RATE: 128 BPM
EKG VENTRICULAR RATE: 142 BPM
EOSINOPHILS ABSOLUTE: 0.3 K/UL (ref 0–0.7)
EOSINOPHILS RELATIVE PERCENT: 2.5 %
GFR AFRICAN AMERICAN: 42.2
GFR NON-AFRICAN AMERICAN: 34.9
GLOBULIN: 3.2 G/DL (ref 2.3–3.5)
GLUCOSE BLD-MCNC: 140 MG/DL (ref 74–109)
GLUCOSE URINE: NEGATIVE MG/DL
HCT VFR BLD CALC: 47.1 % (ref 37–47)
HEMOGLOBIN: 15.7 G/DL (ref 12–16)
INR BLD: 0.9
KETONES, URINE: NEGATIVE MG/DL
LACTIC ACID: 2.8 MMOL/L (ref 0.5–2.2)
LEUKOCYTE ESTERASE, URINE: NEGATIVE
LV EF: 65 %
LVEF MODALITY: NORMAL
LYMPHOCYTES ABSOLUTE: 3.7 K/UL (ref 1–4.8)
LYMPHOCYTES RELATIVE PERCENT: 27.8 %
MAGNESIUM: 1.4 MG/DL (ref 1.7–2.3)
MCH RBC QN AUTO: 30 PG (ref 27–31.3)
MCHC RBC AUTO-ENTMCNC: 33.3 % (ref 33–37)
MCV RBC AUTO: 89.8 FL (ref 82–100)
MONOCYTES ABSOLUTE: 1.2 K/UL (ref 0.2–0.8)
MONOCYTES RELATIVE PERCENT: 9.2 %
NEUTROPHILS ABSOLUTE: 7.9 K/UL (ref 1.4–6.5)
NEUTROPHILS RELATIVE PERCENT: 59.6 %
NITRITE, URINE: NEGATIVE
PDW BLD-RTO: 14.5 % (ref 11.5–14.5)
PH UA: 6 (ref 5–9)
PLATELET # BLD: 393 K/UL (ref 130–400)
POTASSIUM SERPL-SCNC: 4.9 MEQ/L (ref 3.5–5.1)
PRO-BNP: 622 PG/ML
PROTEIN UA: NEGATIVE MG/DL
PROTHROMBIN TIME: 9.2 SEC (ref 9.6–12.3)
RAPID INFLUENZA  B AGN: NEGATIVE
RAPID INFLUENZA A AGN: NEGATIVE
RBC # BLD: 5.24 M/UL (ref 4.2–5.4)
SODIUM BLD-SCNC: 141 MEQ/L (ref 132–144)
SPECIFIC GRAVITY UA: <=1.005 (ref 1–1.03)
TOTAL CK: 110 U/L (ref 0–170)
TOTAL PROTEIN: 7.6 G/DL (ref 6.4–8.1)
TROPONIN: <0.01 NG/ML (ref 0–0.01)
URINE REFLEX TO CULTURE: NORMAL
URINE TYPE: NORMAL
UROBILINOGEN, URINE: 0.2 E.U./DL
WBC # BLD: 13.3 K/UL (ref 4.8–10.8)

## 2018-11-05 PROCEDURE — 83735 ASSAY OF MAGNESIUM: CPT

## 2018-11-05 PROCEDURE — 1123F ACP DISCUSS/DSCN MKR DOCD: CPT | Performed by: NURSE PRACTITIONER

## 2018-11-05 PROCEDURE — 6360000002 HC RX W HCPCS: Performed by: INTERNAL MEDICINE

## 2018-11-05 PROCEDURE — 36415 COLL VENOUS BLD VENIPUNCTURE: CPT

## 2018-11-05 PROCEDURE — 82550 ASSAY OF CK (CPK): CPT

## 2018-11-05 PROCEDURE — 94761 N-INVAS EAR/PLS OXIMETRY MLT: CPT

## 2018-11-05 PROCEDURE — 83880 ASSAY OF NATRIURETIC PEPTIDE: CPT

## 2018-11-05 PROCEDURE — 99214 OFFICE O/P EST MOD 30 MIN: CPT | Performed by: NURSE PRACTITIONER

## 2018-11-05 PROCEDURE — 81003 URINALYSIS AUTO W/O SCOPE: CPT

## 2018-11-05 PROCEDURE — 93005 ELECTROCARDIOGRAM TRACING: CPT

## 2018-11-05 PROCEDURE — 6360000002 HC RX W HCPCS: Performed by: STUDENT IN AN ORGANIZED HEALTH CARE EDUCATION/TRAINING PROGRAM

## 2018-11-05 PROCEDURE — 87804 INFLUENZA ASSAY W/OPTIC: CPT

## 2018-11-05 PROCEDURE — 6370000000 HC RX 637 (ALT 250 FOR IP): Performed by: INTERNAL MEDICINE

## 2018-11-05 PROCEDURE — 93000 ELECTROCARDIOGRAM COMPLETE: CPT | Performed by: NURSE PRACTITIONER

## 2018-11-05 PROCEDURE — 85610 PROTHROMBIN TIME: CPT

## 2018-11-05 PROCEDURE — 1090F PRES/ABSN URINE INCON ASSESS: CPT | Performed by: NURSE PRACTITIONER

## 2018-11-05 PROCEDURE — 1101F PT FALLS ASSESS-DOCD LE1/YR: CPT | Performed by: NURSE PRACTITIONER

## 2018-11-05 PROCEDURE — 3023F SPIROM DOC REV: CPT | Performed by: NURSE PRACTITIONER

## 2018-11-05 PROCEDURE — 94640 AIRWAY INHALATION TREATMENT: CPT

## 2018-11-05 PROCEDURE — 4040F PNEUMOC VAC/ADMIN/RCVD: CPT | Performed by: NURSE PRACTITIONER

## 2018-11-05 PROCEDURE — 2580000003 HC RX 258: Performed by: INTERNAL MEDICINE

## 2018-11-05 PROCEDURE — 6360000004 HC RX CONTRAST MEDICATION: Performed by: STUDENT IN AN ORGANIZED HEALTH CARE EDUCATION/TRAINING PROGRAM

## 2018-11-05 PROCEDURE — G8484 FLU IMMUNIZE NO ADMIN: HCPCS | Performed by: NURSE PRACTITIONER

## 2018-11-05 PROCEDURE — 1210000000 HC MED SURG R&B

## 2018-11-05 PROCEDURE — G8598 ASA/ANTIPLAT THER USED: HCPCS | Performed by: NURSE PRACTITIONER

## 2018-11-05 PROCEDURE — 87040 BLOOD CULTURE FOR BACTERIA: CPT

## 2018-11-05 PROCEDURE — 84484 ASSAY OF TROPONIN QUANT: CPT

## 2018-11-05 PROCEDURE — 1036F TOBACCO NON-USER: CPT | Performed by: NURSE PRACTITIONER

## 2018-11-05 PROCEDURE — 86141 C-REACTIVE PROTEIN HS: CPT

## 2018-11-05 PROCEDURE — G8926 SPIRO NO PERF OR DOC: HCPCS | Performed by: NURSE PRACTITIONER

## 2018-11-05 PROCEDURE — 2500000003 HC RX 250 WO HCPCS: Performed by: STUDENT IN AN ORGANIZED HEALTH CARE EDUCATION/TRAINING PROGRAM

## 2018-11-05 PROCEDURE — 80053 COMPREHEN METABOLIC PANEL: CPT

## 2018-11-05 PROCEDURE — 71275 CT ANGIOGRAPHY CHEST: CPT

## 2018-11-05 PROCEDURE — G8427 DOCREV CUR MEDS BY ELIG CLIN: HCPCS | Performed by: NURSE PRACTITIONER

## 2018-11-05 PROCEDURE — 85025 COMPLETE CBC W/AUTO DIFF WBC: CPT

## 2018-11-05 PROCEDURE — 83605 ASSAY OF LACTIC ACID: CPT

## 2018-11-05 PROCEDURE — 99285 EMERGENCY DEPT VISIT HI MDM: CPT

## 2018-11-05 PROCEDURE — G8420 CALC BMI NORM PARAMETERS: HCPCS | Performed by: NURSE PRACTITIONER

## 2018-11-05 PROCEDURE — 3017F COLORECTAL CA SCREEN DOC REV: CPT | Performed by: NURSE PRACTITIONER

## 2018-11-05 PROCEDURE — 85730 THROMBOPLASTIN TIME PARTIAL: CPT

## 2018-11-05 PROCEDURE — 2580000003 HC RX 258: Performed by: STUDENT IN AN ORGANIZED HEALTH CARE EDUCATION/TRAINING PROGRAM

## 2018-11-05 PROCEDURE — G8399 PT W/DXA RESULTS DOCUMENT: HCPCS | Performed by: NURSE PRACTITIONER

## 2018-11-05 RX ORDER — MAGNESIUM SULFATE IN WATER 40 MG/ML
2 INJECTION, SOLUTION INTRAVENOUS ONCE
Status: DISCONTINUED | OUTPATIENT
Start: 2018-11-05 | End: 2018-11-05 | Stop reason: RX

## 2018-11-05 RX ORDER — TRAMADOL HYDROCHLORIDE 50 MG/1
50 TABLET ORAL EVERY 8 HOURS PRN
Qty: 90 TABLET | Refills: 0 | Status: SHIPPED | OUTPATIENT
Start: 2018-11-05 | End: 2018-12-05

## 2018-11-05 RX ORDER — SODIUM CHLORIDE 0.9 % (FLUSH) 0.9 %
10 SYRINGE (ML) INJECTION EVERY 12 HOURS SCHEDULED
Status: DISCONTINUED | OUTPATIENT
Start: 2018-11-05 | End: 2018-11-06 | Stop reason: HOSPADM

## 2018-11-05 RX ORDER — MAGNESIUM SULFATE 1 G/100ML
1 INJECTION INTRAVENOUS
Status: COMPLETED | OUTPATIENT
Start: 2018-11-05 | End: 2018-11-05

## 2018-11-05 RX ORDER — 0.9 % SODIUM CHLORIDE 0.9 %
1000 INTRAVENOUS SOLUTION INTRAVENOUS ONCE
Status: COMPLETED | OUTPATIENT
Start: 2018-11-05 | End: 2018-11-05

## 2018-11-05 RX ORDER — ALPRAZOLAM 0.25 MG/1
0.25 TABLET ORAL 3 TIMES DAILY PRN
Qty: 90 TABLET | Refills: 0 | Status: ON HOLD | OUTPATIENT
Start: 2018-11-05 | End: 2018-11-06 | Stop reason: HOSPADM

## 2018-11-05 RX ORDER — LEVOTHYROXINE SODIUM 0.03 MG/1
50 TABLET ORAL DAILY
Status: DISCONTINUED | OUTPATIENT
Start: 2018-11-06 | End: 2018-11-06 | Stop reason: HOSPADM

## 2018-11-05 RX ORDER — ALPRAZOLAM 0.25 MG/1
0.25 TABLET ORAL 3 TIMES DAILY PRN
Status: DISCONTINUED | OUTPATIENT
Start: 2018-11-05 | End: 2018-11-06 | Stop reason: HOSPADM

## 2018-11-05 RX ORDER — SODIUM CHLORIDE 0.9 % (FLUSH) 0.9 %
10 SYRINGE (ML) INJECTION PRN
Status: DISCONTINUED | OUTPATIENT
Start: 2018-11-05 | End: 2018-11-06 | Stop reason: HOSPADM

## 2018-11-05 RX ORDER — ONDANSETRON 2 MG/ML
4 INJECTION INTRAMUSCULAR; INTRAVENOUS EVERY 6 HOURS PRN
Status: DISCONTINUED | OUTPATIENT
Start: 2018-11-05 | End: 2018-11-06 | Stop reason: HOSPADM

## 2018-11-05 RX ORDER — METOPROLOL TARTRATE 5 MG/5ML
5 INJECTION INTRAVENOUS ONCE
Status: COMPLETED | OUTPATIENT
Start: 2018-11-05 | End: 2018-11-05

## 2018-11-05 RX ORDER — SODIUM CHLORIDE 9 MG/ML
INJECTION, SOLUTION INTRAVENOUS
Status: DISPENSED
Start: 2018-11-05 | End: 2018-11-06

## 2018-11-05 RX ORDER — NEBULIZER ACCESSORIES
1 KIT MISCELLANEOUS DAILY PRN
Qty: 1 KIT | Refills: 5 | Status: SHIPPED | OUTPATIENT
Start: 2018-11-05

## 2018-11-05 RX ORDER — SODIUM CHLORIDE 9 MG/ML
INJECTION, SOLUTION INTRAVENOUS CONTINUOUS
Status: DISCONTINUED | OUTPATIENT
Start: 2018-11-05 | End: 2018-11-06

## 2018-11-05 RX ORDER — ALPRAZOLAM 0.25 MG/1
0.25 TABLET ORAL 3 TIMES DAILY PRN
Status: CANCELLED | OUTPATIENT
Start: 2018-11-05

## 2018-11-05 RX ORDER — IPRATROPIUM BROMIDE AND ALBUTEROL SULFATE 2.5; .5 MG/3ML; MG/3ML
1 SOLUTION RESPIRATORY (INHALATION) EVERY 6 HOURS PRN
Status: DISCONTINUED | OUTPATIENT
Start: 2018-11-05 | End: 2018-11-06 | Stop reason: HOSPADM

## 2018-11-05 RX ORDER — BUPROPION HYDROCHLORIDE 75 MG/1
75 TABLET ORAL 2 TIMES DAILY
Status: DISCONTINUED | OUTPATIENT
Start: 2018-11-05 | End: 2018-11-06

## 2018-11-05 RX ORDER — TRAMADOL HYDROCHLORIDE 50 MG/1
50 TABLET ORAL EVERY 6 HOURS PRN
Status: CANCELLED | OUTPATIENT
Start: 2018-11-05

## 2018-11-05 RX ORDER — TRAZODONE HYDROCHLORIDE 50 MG/1
50 TABLET ORAL NIGHTLY
Status: DISCONTINUED | OUTPATIENT
Start: 2018-11-05 | End: 2018-11-06 | Stop reason: HOSPADM

## 2018-11-05 RX ADMIN — SODIUM CHLORIDE: 9 INJECTION, SOLUTION INTRAVENOUS at 21:24

## 2018-11-05 RX ADMIN — TRAZODONE HYDROCHLORIDE 50 MG: 50 TABLET ORAL at 21:23

## 2018-11-05 RX ADMIN — MOMETASONE FUROATE AND FORMOTEROL FUMARATE DIHYDRATE 2 PUFF: 100; 5 AEROSOL RESPIRATORY (INHALATION) at 21:36

## 2018-11-05 RX ADMIN — PIPERACILLIN SODIUM AND TAZOBACTAM SODIUM 3.38 G: 3; .375 INJECTION, POWDER, LYOPHILIZED, FOR SOLUTION INTRAVENOUS at 17:26

## 2018-11-05 RX ADMIN — ENOXAPARIN SODIUM 30 MG: 30 INJECTION SUBCUTANEOUS at 21:23

## 2018-11-05 RX ADMIN — BUPROPION HYDROCHLORIDE 75 MG: 75 TABLET, FILM COATED ORAL at 21:23

## 2018-11-05 RX ADMIN — SODIUM CHLORIDE 1000 ML: 9 INJECTION, SOLUTION INTRAVENOUS at 14:15

## 2018-11-05 RX ADMIN — THEOPHYLLINE ANHYDROUS 400 MG: 200 CAPSULE, EXTENDED RELEASE ORAL at 21:23

## 2018-11-05 RX ADMIN — IOPAMIDOL 100 ML: 755 INJECTION, SOLUTION INTRAVENOUS at 15:03

## 2018-11-05 RX ADMIN — SODIUM CHLORIDE 1000 ML: 9 INJECTION, SOLUTION INTRAVENOUS at 17:26

## 2018-11-05 RX ADMIN — METOPROLOL TARTRATE 12.5 MG: 25 TABLET ORAL at 21:23

## 2018-11-05 RX ADMIN — MAGNESIUM SULFATE HEPTAHYDRATE 1 G: 1 INJECTION, SOLUTION INTRAVENOUS at 21:26

## 2018-11-05 RX ADMIN — MAGNESIUM SULFATE HEPTAHYDRATE 1 G: 1 INJECTION, SOLUTION INTRAVENOUS at 18:51

## 2018-11-05 RX ADMIN — METOPROLOL TARTRATE 5 MG: 5 INJECTION, SOLUTION INTRAVENOUS at 17:29

## 2018-11-05 ASSESSMENT — ENCOUNTER SYMPTOMS
ABDOMINAL PAIN: 0
SINUS PRESSURE: 0
DIARRHEA: 0
TROUBLE SWALLOWING: 0
VOMITING: 0
CHEST TIGHTNESS: 0
SHORTNESS OF BREATH: 0
BACK PAIN: 0
COUGH: 0

## 2018-11-05 NOTE — PROGRESS NOTES
[facility]  and ask him about admiting orders and was informed he is still waiting for VS and ECG before accepting patient. ED nurse João Zayas informed.

## 2018-11-05 NOTE — ED PROVIDER NOTES
[address] [place] [address] ED  eMERGENCY dEPARTMENT eNCOUnter      Pt Name: Ronaldo Cortez  MRN: [id number]  [place] 1945  Date of evaluation: 11/5/2018  Provider: Hari Steinberg DO    CHIEF COMPLAINT       Chief Complaint   Patient presents with    Tachycardia     Sent from Dr Chu Huerta. HISTORY OF PRESENT ILLNESS   (Location/Symptom, Timing/Onset,Context/Setting, Quality, Duration, Modifying Factors, Severity)  Note limiting factors. Ronaldo Cortez is a 74 y.o. female who presents to the emergency department with complaint of fast heart rate. Patient saw her PCP who sent her to the ER. Patient denies fever, chills, cough, nausea or vomiting. Patient had breast surgery to remove a cancerous tumor and is supposed to start chemotherapy in the next week or two. Patient denies chest pain. HPI    NursingNotes were reviewed. REVIEW OF SYSTEMS    (2-9 systems for level 4, 10 or more for level 5)     Review of Systems   Constitutional: Negative for activity change, appetite change, chills, fever and unexpected weight change. HENT: Negative for drooling, ear pain, nosebleeds, sinus pressure and trouble swallowing. Respiratory: Negative for cough, chest tightness and shortness of breath. Cardiovascular: Positive for palpitations. Negative for chest pain and leg swelling. Gastrointestinal: Negative for abdominal pain, diarrhea and vomiting. Endocrine: Negative for polydipsia and polyphagia. Genitourinary: Negative for dysuria, flank pain and frequency. Musculoskeletal: Negative for back pain and myalgias. Skin: Negative for pallor and rash. Neurological: Negative for syncope, weakness and headaches. Hematological: Does not bruise/bleed easily. All other systems reviewed and are negative. Except as noted above the remainder of the review of systems was reviewed and negative.        PAST MEDICAL HISTORY     Past Medical History:   Diagnosis Date    Anxiety     Anxiety and depression     CAD (coronary artery disease)     [facility]    Invasive ductal cancer left breast    Carotid artery stenosis and occlusion     COPD [facility]ch[facility] obstructive [facility] disease) ([facility]     Depression 1/15/2018    GERD (gastroesophageal reflux disease)     Headache(784.0)     Hyperlipidemia     Hypertension     Hypothyroidism     Insomnia     Osteoarthritis     RBBB 10/15/2014    Right carotid bruit 5/26/2015    S/P cardiac catheterization 7/13/2016         SURGICALHISTORY       Past Surgical History:   Procedure Laterality Date    BREAST BIOPSY Right 11/8/2016    US biopsy    BREAST SURGERY Left 2/26/14    U/S Guided core bx of the left breast    BREAST SURGERY Right 3/20/14    U/S of the lateral right breast    BREAST SURGERY Left 3/27/14    U/S Guided Left breast lumpectomy and left snb    OTHER SURGICAL HISTORY  4/11/14    Placement drain, left axillary seroma    DE MASTECTOMY, SIMPLE, COMPLETE Right 9/6/2018    R modified radical mastectomy         CURRENT MEDICATIONS       Previous Medications    ALBUTEROL SULFATE HFA (PROAIR HFA) 108 (90 BASE) MCG/ACT INHALER    Inhale 2 puffs into the lungs every 6 hours as needed for Wheezing or Shortness of Breath    ALPRAZOLAM (XANAX) 0.25 MG TABLET    Take 0.25 mg by mouth 3 times daily as needed for Sleep. Balta Higgins ALPRAZOLAM (XANAX) 0.25 MG TABLET    Take 1 tablet by mouth 3 times daily as needed for Anxiety for up to 90 days. Balta Higgins     BISOPROLOL (ZEBETA) 10 MG TABLET    TAKE 1 TABLET DAILY    BUPROPION (WELLBUTRIN) 75 MG TABLET    Take 1 tablet by mouth 2 times daily    IPRATROPIUM-ALBUTEROL (DUONEB) 0.5-2.5 (3) MG/3ML SOLN NEBULIZER SOLUTION    Inhale 3 mLs into the lungs every 6 hours as needed for Shortness of Breath    LEVOTHYROXINE (SYNTHROID) 50 MCG TABLET    Take 1 tablet by mouth Daily    LOVASTATIN (MEVACOR) 20 MG TABLET    TAKE 1 TABLET NIGHTLY    METOPROLOL TARTRATE (LOPRESSOR) 25 MG TABLET    Take 1 tablet by mouth 2 times daily PM      PATIENT REFERRED TO:  ROSALIND Bean - CNP  [address], [id number] [place] [address] Road  [phone]            DISCHARGE MEDICATIONS:  New Prescriptions    No medications on file          (Please note that portions of this note were completed with a voice recognition program.  Efforts were made to edit the dictations but occasionally words are mis-transcribed.)    Betina Chandler DO (electronically signed)  Attending Emergency Physician          Betina Chandler DO  11/05/18 9600

## 2018-11-06 ENCOUNTER — TELEPHONE (OUTPATIENT)
Dept: FAMILY MEDICINE CLINIC | Age: 73
End: 2018-11-06

## 2018-11-06 VITALS
TEMPERATURE: 97.9 F | BODY MASS INDEX: 23.63 KG/M2 | DIASTOLIC BLOOD PRESSURE: 45 MMHG | HEART RATE: 139 BPM | OXYGEN SATURATION: 99 % | WEIGHT: 133.38 LBS | HEIGHT: 63 IN | SYSTOLIC BLOOD PRESSURE: 93 MMHG | RESPIRATION RATE: 18 BRPM

## 2018-11-06 LAB
ALBUMIN SERPL-MCNC: 3 G/DL (ref 3.9–4.9)
ALP BLD-CCNC: 57 U/L (ref 40–130)
ALT SERPL-CCNC: 8 U/L (ref 0–33)
ANION GAP SERPL CALCULATED.3IONS-SCNC: 11 MEQ/L (ref 7–13)
AST SERPL-CCNC: 13 U/L (ref 0–35)
BASOPHILS ABSOLUTE: 0.1 K/UL (ref 0–0.2)
BASOPHILS RELATIVE PERCENT: 1.2 %
BILIRUB SERPL-MCNC: 0.3 MG/DL (ref 0–1.2)
BUN BLDV-MCNC: 7 MG/DL (ref 8–23)
CALCIUM SERPL-MCNC: 8.6 MG/DL (ref 8.6–10.2)
CHLORIDE BLD-SCNC: 108 MEQ/L (ref 98–107)
CO2: 24 MEQ/L (ref 22–29)
CREAT SERPL-MCNC: 1.09 MG/DL (ref 0.5–0.9)
EOSINOPHILS ABSOLUTE: 0.3 K/UL (ref 0–0.7)
EOSINOPHILS RELATIVE PERCENT: 4 %
GFR AFRICAN AMERICAN: 59.1
GFR NON-AFRICAN AMERICAN: 48.8
GLOBULIN: 2 G/DL (ref 2.3–3.5)
GLUCOSE BLD-MCNC: 102 MG/DL (ref 74–109)
HCT VFR BLD CALC: 34.5 % (ref 37–47)
HEMOGLOBIN: 11.5 G/DL (ref 12–16)
LYMPHOCYTES ABSOLUTE: 2.1 K/UL (ref 1–4.8)
LYMPHOCYTES RELATIVE PERCENT: 26.8 %
MAGNESIUM: 2.3 MG/DL (ref 1.7–2.3)
MCH RBC QN AUTO: 30.1 PG (ref 27–31.3)
MCHC RBC AUTO-ENTMCNC: 33.4 % (ref 33–37)
MCV RBC AUTO: 90.3 FL (ref 82–100)
MONOCYTES ABSOLUTE: 0.9 K/UL (ref 0.2–0.8)
MONOCYTES RELATIVE PERCENT: 11.7 %
NEUTROPHILS ABSOLUTE: 4.3 K/UL (ref 1.4–6.5)
NEUTROPHILS RELATIVE PERCENT: 56.3 %
PDW BLD-RTO: 14.6 % (ref 11.5–14.5)
PLATELET # BLD: 253 K/UL (ref 130–400)
POTASSIUM REFLEX MAGNESIUM: 3.9 MEQ/L (ref 3.5–5.1)
RBC # BLD: 3.82 M/UL (ref 4.2–5.4)
SODIUM BLD-SCNC: 143 MEQ/L (ref 132–144)
TOTAL PROTEIN: 5 G/DL (ref 6.4–8.1)
TROPONIN: <0.01 NG/ML (ref 0–0.01)
TROPONIN: <0.01 NG/ML (ref 0–0.01)
WBC # BLD: 7.7 K/UL (ref 4.8–10.8)

## 2018-11-06 PROCEDURE — 97530 THERAPEUTIC ACTIVITIES: CPT

## 2018-11-06 PROCEDURE — G8978 MOBILITY CURRENT STATUS: HCPCS

## 2018-11-06 PROCEDURE — 90686 IIV4 VACC NO PRSV 0.5 ML IM: CPT | Performed by: INTERNAL MEDICINE

## 2018-11-06 PROCEDURE — 6370000000 HC RX 637 (ALT 250 FOR IP): Performed by: INTERNAL MEDICINE

## 2018-11-06 PROCEDURE — 80053 COMPREHEN METABOLIC PANEL: CPT

## 2018-11-06 PROCEDURE — 97165 OT EVAL LOW COMPLEX 30 MIN: CPT

## 2018-11-06 PROCEDURE — G8987 SELF CARE CURRENT STATUS: HCPCS

## 2018-11-06 PROCEDURE — G0008 ADMIN INFLUENZA VIRUS VAC: HCPCS | Performed by: INTERNAL MEDICINE

## 2018-11-06 PROCEDURE — 84484 ASSAY OF TROPONIN QUANT: CPT

## 2018-11-06 PROCEDURE — G8979 MOBILITY GOAL STATUS: HCPCS

## 2018-11-06 PROCEDURE — 6360000002 HC RX W HCPCS: Performed by: INTERNAL MEDICINE

## 2018-11-06 PROCEDURE — 83735 ASSAY OF MAGNESIUM: CPT

## 2018-11-06 PROCEDURE — G8988 SELF CARE GOAL STATUS: HCPCS

## 2018-11-06 PROCEDURE — 2580000003 HC RX 258: Performed by: INTERNAL MEDICINE

## 2018-11-06 PROCEDURE — 97162 PT EVAL MOD COMPLEX 30 MIN: CPT

## 2018-11-06 PROCEDURE — 85025 COMPLETE CBC W/AUTO DIFF WBC: CPT

## 2018-11-06 PROCEDURE — 36415 COLL VENOUS BLD VENIPUNCTURE: CPT

## 2018-11-06 PROCEDURE — 94640 AIRWAY INHALATION TREATMENT: CPT

## 2018-11-06 PROCEDURE — 93306 TTE W/DOPPLER COMPLETE: CPT

## 2018-11-06 RX ORDER — BUPROPION HYDROCHLORIDE 100 MG/1
100 TABLET ORAL 2 TIMES DAILY
Qty: 60 TABLET | Refills: 3 | Status: SHIPPED | OUTPATIENT
Start: 2018-11-06 | End: 2018-12-06 | Stop reason: SINTOL

## 2018-11-06 RX ORDER — BUPROPION HYDROCHLORIDE 100 MG/1
100 TABLET ORAL 2 TIMES DAILY
Status: DISCONTINUED | OUTPATIENT
Start: 2018-11-06 | End: 2018-11-06 | Stop reason: HOSPADM

## 2018-11-06 RX ADMIN — INFLUENZA A VIRUS A/MICHIGAN/45/2015 X-275 (H1N1) ANTIGEN (FORMALDEHYDE INACTIVATED), INFLUENZA A VIRUS A/SINGAPORE/INFIMH-16-0019/2016 IVR-186 (H3N2) ANTIGEN (FORMALDEHYDE INACTIVATED), INFLUENZA B VIRUS B/PHUKET/3073/2013 ANTIGEN (FORMALDEHYDE INACTIVATED), AND INFLUENZA B VIRUS B/MARYLAND/15/2016 BX-69A ANTIGEN (FORMALDEHYDE INACTIVATED) 0.5 ML: 15; 15; 15; 15 INJECTION, SUSPENSION INTRAMUSCULAR at 14:27

## 2018-11-06 RX ADMIN — SODIUM CHLORIDE: 9 INJECTION, SOLUTION INTRAVENOUS at 12:14

## 2018-11-06 RX ADMIN — ENOXAPARIN SODIUM 30 MG: 30 INJECTION SUBCUTANEOUS at 08:47

## 2018-11-06 RX ADMIN — TIOTROPIUM BROMIDE 18 MCG: 18 CAPSULE ORAL; RESPIRATORY (INHALATION) at 05:40

## 2018-11-06 RX ADMIN — METOPROLOL TARTRATE 12.5 MG: 25 TABLET ORAL at 08:47

## 2018-11-06 RX ADMIN — THEOPHYLLINE ANHYDROUS 400 MG: 200 CAPSULE, EXTENDED RELEASE ORAL at 08:47

## 2018-11-06 RX ADMIN — MOMETASONE FUROATE AND FORMOTEROL FUMARATE DIHYDRATE 2 PUFF: 100; 5 AEROSOL RESPIRATORY (INHALATION) at 05:40

## 2018-11-06 RX ADMIN — LEVOTHYROXINE SODIUM 50 MCG: 25 TABLET ORAL at 06:07

## 2018-11-06 RX ADMIN — BUPROPION HYDROCHLORIDE 75 MG: 75 TABLET, FILM COATED ORAL at 08:47

## 2018-11-06 NOTE — PROGRESS NOTES
Occupational Therapy   Occupational Therapy Initial Assessment- med  Date: 2018   Patient Name: Akash Doherty  MRN: [id number]     : 1945    Date of Service: 2018    Discharge Recommendations:  Home with assist PRN  OT Equipment Recommendations  Equipment Needed: No      Patient Diagnosis(es): The primary encounter diagnosis was Renal insufficiency. Diagnoses of Tachycardia and Hypomagnesemia were also pertinent to this visit. has a past medical history of Anxiety; Anxiety and depression; CAD (coronary artery disease); Cancer ([facility] [address] Carotid artery stenosis and occlusion; COPD (chronic obstructive pulmonary disease) ([facility] [address] Depression; GERD (gastroesophageal reflux disease); Headache(784.0); Hyperlipidemia; Hypertension; Hypothyroidism; Insomnia; Osteoarthritis; RBBB; Right carotid bruit; and S/P cardiac catheterization. has a past surgical history that includes Breast surgery (Left, 14); Breast surgery (Right, 3/20/14); Breast surgery (Left, 3/27/14); other surgical history (14); Breast biopsy (Right, 2016); and pr mastectomy, simple, complete (Right, 2018). Restrictions       Subjective   General  Chart Reviewed: Yes  Patient assessed for rehabilitation services?: Yes  Additional Pertinent Hx: Mastectomy R breast  and will need chemo- reports 5 lymph nodes removed as well 2* found cancerous  Family / Caregiver Present: No  Referring Practitioner: Dr. Pagan Parent  Diagnosis: Renal insufficiency (primary), tachycardia, hypomagnesia  Subjective  Subjective: Pt agreeable to OT eval/tx  Pain Assessment  Patient Currently in Pain: No     Social/Functional History  Social/Functional History  Lives With: Daughter  Type of Home: House  Home Layout: One level, Laundry in basement  Home Access: Stairs to enter with rails  Entrance Stairs - Number of Steps: 1 Step up (no rail) +2-3 steps to kitchen area (rail on L when ascending).    Bathroom Shower/Tub: Tub/Shower Assessment   Performance deficits / Impairments: Decreased functional mobility ; Decreased ADL status; Decreased strength;Decreased endurance;Decreased high-level IADLs  Assessment: Pt is a 74y/o female whom presents s/p Renal insufficiency (primary), tachycardia, hypomagnesia with the above resulting perf def/impair. Pt would benefit from OT skilled services to maximize strength/end and safety/I with ADL for safe d/c transition. Prognosis: Good  Decision Making: Low Complexity  History: multi comorb  Exam: 5 perf def/impair  Patient Education: Cleared pt for I in room when not on IV. When on IV, call for assistance- pt educated in these regards, with good understanding  REQUIRES OT FOLLOW UP: Yes         Plan   Plan  Times per week: 3-6x/wk  Plan weeks: <1-3 days- obs pt  Current Treatment Recommendations: Strengthening, ROM, Balance Training, Functional Mobility Training, Endurance Training, Safety Education & Training, Patient/Caregiver Education & Training, Self-Care / ADL, Home Management Training, Stair training, Neuromuscular Re-education    G-Code  OT G-codes  Functional Assessment Tool Used: PSFS  Functional Limitation: Self care  Self Care Current Status (): At least 1 percent but less than 20 percent impaired, limited or restricted  Self Care Goal Status (): 0 percent impaired, limited or restricted    Goals  Short term goals  Time Frame for Short term goals: 1-3 days- Obs pt  Short term goal 1: I with BUE HEP  Short term goal 2: I with LB dressing and bathing  Short term goal 3: I with toileting tasks  Short term goal 4: Increase to 10-15min stand silas/end for decreased fall risk during ADL  Long term goals  Time Frame for Long term goals : Same as STGs  Long term goal 1: Same as STGs  Long term goal 2: Same as STGs  Patient Goals   Patient goals :  \"To be able to keep myself in shape and to take care of myself\"       Therapy Time   Individual Concurrent Group Co-treatment   Time In  9:15 Time Out  10:15         Minutes  [address] [place], [place]

## 2018-11-06 NOTE — CARE COORDINATION
Chart reviewed and patient discussed in daily quality rounds. Patient from home with adult daughter residing with her. She uses O2 2L NC at home. No SS/DC needs identified at this time. SSS to follow to monitor progress and DC needs.

## 2018-11-07 ENCOUNTER — APPOINTMENT (OUTPATIENT)
Dept: GENERAL RADIOLOGY | Age: 73
End: 2018-11-07
Payer: MEDICARE

## 2018-11-07 ENCOUNTER — TELEPHONE (OUTPATIENT)
Dept: OTHER | Facility: CLINIC | Age: 73
End: 2018-11-07

## 2018-11-07 ENCOUNTER — HOSPITAL ENCOUNTER (EMERGENCY)
Age: 73
Discharge: HOME | End: 2018-11-07
Attending: EMERGENCY MEDICINE
Payer: MEDICARE

## 2018-11-07 VITALS
HEIGHT: 63 IN | DIASTOLIC BLOOD PRESSURE: 87 MMHG | RESPIRATION RATE: 20 BRPM | OXYGEN SATURATION: 97 % | TEMPERATURE: 98.5 F | BODY MASS INDEX: 23.57 KG/M2 | SYSTOLIC BLOOD PRESSURE: 152 MMHG | HEART RATE: 86 BPM | WEIGHT: 133 LBS

## 2018-11-07 DIAGNOSIS — R00.0 TACHYCARDIA: Primary | ICD-10-CM

## 2018-11-07 LAB
ALBUMIN SERPL-MCNC: 3.1 G/DL (ref 3.9–4.9)
ALP BLD-CCNC: 55 U/L (ref 40–130)
ALT SERPL-CCNC: 12 U/L (ref 0–33)
ANION GAP SERPL CALCULATED.3IONS-SCNC: 9 MEQ/L (ref 7–13)
AST SERPL-CCNC: 28 U/L (ref 0–35)
BASOPHILS ABSOLUTE: 0.1 K/UL (ref 0–0.2)
BASOPHILS RELATIVE PERCENT: 1 %
BILIRUB SERPL-MCNC: 0.3 MG/DL (ref 0–1.2)
BUN BLDV-MCNC: 5 MG/DL (ref 8–23)
CALCIUM SERPL-MCNC: 8.7 MG/DL (ref 8.6–10.2)
CHLORIDE BLD-SCNC: 107 MEQ/L (ref 98–107)
CO2: 23 MEQ/L (ref 22–29)
CREAT SERPL-MCNC: 0.86 MG/DL (ref 0.5–0.9)
EKG ATRIAL RATE: 116 BPM
EKG P AXIS: 69 DEGREES
EKG P-R INTERVAL: 154 MS
EKG Q-T INTERVAL: 332 MS
EKG QRS DURATION: 130 MS
EKG QTC CALCULATION (BAZETT): 461 MS
EKG R AXIS: 105 DEGREES
EKG T AXIS: 36 DEGREES
EKG VENTRICULAR RATE: 116 BPM
EOSINOPHILS ABSOLUTE: 0.1 K/UL (ref 0–0.7)
EOSINOPHILS RELATIVE PERCENT: 2.2 %
GFR AFRICAN AMERICAN: >60
GFR NON-AFRICAN AMERICAN: >60
GLOBULIN: 2.6 G/DL (ref 2.3–3.5)
GLUCOSE BLD-MCNC: 104 MG/DL (ref 74–109)
HCT VFR BLD CALC: 37.1 % (ref 37–47)
HEMOGLOBIN: 12.3 G/DL (ref 12–16)
LACTIC ACID: 1.6 MMOL/L (ref 0.5–2.2)
LYMPHOCYTES ABSOLUTE: 0.9 K/UL (ref 1–4.8)
LYMPHOCYTES RELATIVE PERCENT: 15 %
MAGNESIUM: 2 MG/DL (ref 1.7–2.3)
MCH RBC QN AUTO: 30.3 PG (ref 27–31.3)
MCHC RBC AUTO-ENTMCNC: 33.1 % (ref 33–37)
MCV RBC AUTO: 91.5 FL (ref 82–100)
MONOCYTES ABSOLUTE: 0.7 K/UL (ref 0.2–0.8)
MONOCYTES RELATIVE PERCENT: 12.3 %
NEUTROPHILS ABSOLUTE: 4 K/UL (ref 1.4–6.5)
NEUTROPHILS RELATIVE PERCENT: 69.5 %
PDW BLD-RTO: 14.8 % (ref 11.5–14.5)
PLATELET # BLD: 221 K/UL (ref 130–400)
POTASSIUM SERPL-SCNC: 5.3 MEQ/L (ref 3.5–5.1)
RBC # BLD: 4.05 M/UL (ref 4.2–5.4)
SODIUM BLD-SCNC: 139 MEQ/L (ref 132–144)
THEOPHYLLINE LEVEL: 12 UG/ML (ref 10–20)
TOTAL PROTEIN: 5.7 G/DL (ref 6.4–8.1)
TSH SERPL DL<=0.05 MIU/L-ACNC: 6.88 UIU/ML (ref 0.27–4.2)
WBC # BLD: 5.8 K/UL (ref 4.8–10.8)

## 2018-11-07 PROCEDURE — 2580000003 HC RX 258: Performed by: EMERGENCY MEDICINE

## 2018-11-07 PROCEDURE — 36415 COLL VENOUS BLD VENIPUNCTURE: CPT

## 2018-11-07 PROCEDURE — 71045 X-RAY EXAM CHEST 1 VIEW: CPT

## 2018-11-07 PROCEDURE — 6370000000 HC RX 637 (ALT 250 FOR IP): Performed by: EMERGENCY MEDICINE

## 2018-11-07 PROCEDURE — 83605 ASSAY OF LACTIC ACID: CPT

## 2018-11-07 PROCEDURE — 83735 ASSAY OF MAGNESIUM: CPT

## 2018-11-07 PROCEDURE — 85025 COMPLETE CBC W/AUTO DIFF WBC: CPT

## 2018-11-07 PROCEDURE — 99285 EMERGENCY DEPT VISIT HI MDM: CPT

## 2018-11-07 PROCEDURE — 94640 AIRWAY INHALATION TREATMENT: CPT

## 2018-11-07 PROCEDURE — 93005 ELECTROCARDIOGRAM TRACING: CPT

## 2018-11-07 PROCEDURE — 6360000002 HC RX W HCPCS: Performed by: EMERGENCY MEDICINE

## 2018-11-07 PROCEDURE — 96374 THER/PROPH/DIAG INJ IV PUSH: CPT

## 2018-11-07 PROCEDURE — 80198 ASSAY OF THEOPHYLLINE: CPT

## 2018-11-07 PROCEDURE — 80053 COMPREHEN METABOLIC PANEL: CPT

## 2018-11-07 PROCEDURE — 84443 ASSAY THYROID STIM HORMONE: CPT

## 2018-11-07 RX ORDER — IPRATROPIUM BROMIDE AND ALBUTEROL SULFATE 2.5; .5 MG/3ML; MG/3ML
1 SOLUTION RESPIRATORY (INHALATION) PRN
Status: DISCONTINUED | OUTPATIENT
Start: 2018-11-07 | End: 2018-11-07 | Stop reason: HOSPADM

## 2018-11-07 RX ORDER — FUROSEMIDE 10 MG/ML
20 INJECTION INTRAMUSCULAR; INTRAVENOUS ONCE
Status: COMPLETED | OUTPATIENT
Start: 2018-11-07 | End: 2018-11-07

## 2018-11-07 RX ORDER — METOPROLOL TARTRATE 50 MG/1
50 TABLET, FILM COATED ORAL ONCE
Status: COMPLETED | OUTPATIENT
Start: 2018-11-07 | End: 2018-11-07

## 2018-11-07 RX ORDER — 0.9 % SODIUM CHLORIDE 0.9 %
500 INTRAVENOUS SOLUTION INTRAVENOUS ONCE
Status: COMPLETED | OUTPATIENT
Start: 2018-11-07 | End: 2018-11-07

## 2018-11-07 RX ADMIN — IPRATROPIUM BROMIDE AND ALBUTEROL SULFATE 1 AMPULE: .5; 3 SOLUTION RESPIRATORY (INHALATION) at 16:22

## 2018-11-07 RX ADMIN — SODIUM CHLORIDE 500 ML: 9 INJECTION, SOLUTION INTRAVENOUS at 14:48

## 2018-11-07 RX ADMIN — FUROSEMIDE 20 MG: 10 INJECTION, SOLUTION INTRAVENOUS at 16:54

## 2018-11-07 RX ADMIN — METOPROLOL TARTRATE 50 MG: 50 TABLET ORAL at 14:47

## 2018-11-07 ASSESSMENT — ENCOUNTER SYMPTOMS
COUGH: 1
SHORTNESS OF BREATH: 0
EYE PAIN: 0
NAUSEA: 0
VOMITING: 0
SORE THROAT: 0
CHEST TIGHTNESS: 0
ABDOMINAL PAIN: 0

## 2018-11-07 NOTE — ED PROVIDER NOTES
THERAPY SUPPLIES (NEBULIZER/TUBING/MOUTHPIECE) KIT    1 kit by Does not apply route daily as needed (wheezing)    SYMBICORT 80-4.5 MCG/ACT AERO    USE 2 INHALATIONS TWICE A DAY    THEOPHYLLINE (UNIPHYL) 400 MG EXTENDED RELEASE TABLET    TAKE 1 TABLET DAILY    TIOTROPIUM (SPIRIVA HANDIHALER) 18 MCG INHALATION CAPSULE    Inhale 1 capsule into the lungs daily    TRAMADOL (ULTRAM) 50 MG TABLET    Take 1 tablet by mouth every 8 hours as needed for Pain for up to 30 days. .    TRAZODONE (DESYREL) 150 MG TABLET    Take 1 tablet by mouth nightly    VITAMIN D (ERGOCALCIFEROL) 47055 UNITS CAPS CAPSULE    TAKE 1 CAPSULE ONCE A WEEK       ALLERGIES     Patient has no known allergies. FAMILY HISTORY       Family History   Problem Relation Age of Onset    Cancer Sister         breast    Cancer Maternal Uncle         pancreatic          SOCIAL HISTORY       Social History     Social History    Marital status:      Spouse name: N/A    Number of children: N/A    Years of education: N/A     Social History Main Topics    Smoking status: Former Smoker     Packs/day: 1.50     Years: 40.00     Types: Cigarettes     Quit date: 1/26/1989    Smokeless tobacco: Never Used    Alcohol use No    Drug use: No    Sexual activity: No     Other Topics Concern    None     Social History Narrative    None       SCREENINGS             PHYSICAL EXAM    (up to 7 for level 4, 8 or more for level 5)     ED Triage Vitals [11/07/18 1250]   BP Temp Temp Source Pulse Resp SpO2 Height Weight   (!) 153/67 98.5 °F (36.9 °C) Oral 128 18 96 % 5' 3\" (1.6 m) 133 lb (60.3 kg)       Physical Exam   Constitutional: She is oriented to person, place, and time. She appears well-developed and well-nourished. No distress. HENT:   Head: Normocephalic and atraumatic. Right Ear: External ear normal.   Left Ear: External ear normal.   Mouth/Throat: Oropharynx is clear and moist. No oropharyngeal exudate.    Eyes: Pupils are equal, round, and reactive to

## 2018-11-08 PROCEDURE — 93010 ELECTROCARDIOGRAM REPORT: CPT | Performed by: INTERNAL MEDICINE

## 2018-11-10 LAB
BLOOD CULTURE, ROUTINE: NORMAL
CULTURE, BLOOD 2: NORMAL

## 2018-12-05 PROBLEM — E86.0 DEHYDRATION: Status: RESOLVED | Noted: 2018-11-05 | Resolved: 2018-12-05

## 2018-12-06 ENCOUNTER — OFFICE VISIT (OUTPATIENT)
Dept: FAMILY MEDICINE CLINIC | Age: 73
End: 2018-12-06
Payer: MEDICARE

## 2018-12-06 VITALS
DIASTOLIC BLOOD PRESSURE: 60 MMHG | TEMPERATURE: 99.3 F | RESPIRATION RATE: 18 BRPM | HEART RATE: 79 BPM | WEIGHT: 131 LBS | SYSTOLIC BLOOD PRESSURE: 108 MMHG | BODY MASS INDEX: 23.21 KG/M2 | HEIGHT: 63 IN | OXYGEN SATURATION: 95 %

## 2018-12-06 DIAGNOSIS — F33.1 MODERATE EPISODE OF RECURRENT MAJOR DEPRESSIVE DISORDER (HCC): Primary | ICD-10-CM

## 2018-12-06 DIAGNOSIS — E03.1 CONGENITAL HYPOTHYROIDISM WITHOUT GOITER: ICD-10-CM

## 2018-12-06 DIAGNOSIS — C50.911 BREAST CANCER METASTASIZED TO AXILLARY LYMPH NODE, RIGHT (HCC): ICD-10-CM

## 2018-12-06 DIAGNOSIS — F41.0 PANIC ATTACK: ICD-10-CM

## 2018-12-06 DIAGNOSIS — Z12.11 SCREENING FOR COLON CANCER: ICD-10-CM

## 2018-12-06 DIAGNOSIS — C77.3 BREAST CANCER METASTASIZED TO AXILLARY LYMPH NODE, RIGHT (HCC): ICD-10-CM

## 2018-12-06 DIAGNOSIS — F41.9 ANXIETY: ICD-10-CM

## 2018-12-06 PROCEDURE — 99214 OFFICE O/P EST MOD 30 MIN: CPT | Performed by: NURSE PRACTITIONER

## 2018-12-06 PROCEDURE — G8482 FLU IMMUNIZE ORDER/ADMIN: HCPCS | Performed by: NURSE PRACTITIONER

## 2018-12-06 PROCEDURE — 1123F ACP DISCUSS/DSCN MKR DOCD: CPT | Performed by: NURSE PRACTITIONER

## 2018-12-06 PROCEDURE — G8420 CALC BMI NORM PARAMETERS: HCPCS | Performed by: NURSE PRACTITIONER

## 2018-12-06 PROCEDURE — G8598 ASA/ANTIPLAT THER USED: HCPCS | Performed by: NURSE PRACTITIONER

## 2018-12-06 PROCEDURE — G8427 DOCREV CUR MEDS BY ELIG CLIN: HCPCS | Performed by: NURSE PRACTITIONER

## 2018-12-06 PROCEDURE — 3017F COLORECTAL CA SCREEN DOC REV: CPT | Performed by: NURSE PRACTITIONER

## 2018-12-06 PROCEDURE — 1101F PT FALLS ASSESS-DOCD LE1/YR: CPT | Performed by: NURSE PRACTITIONER

## 2018-12-06 PROCEDURE — 1090F PRES/ABSN URINE INCON ASSESS: CPT | Performed by: NURSE PRACTITIONER

## 2018-12-06 PROCEDURE — 1111F DSCHRG MED/CURRENT MED MERGE: CPT | Performed by: NURSE PRACTITIONER

## 2018-12-06 PROCEDURE — 1036F TOBACCO NON-USER: CPT | Performed by: NURSE PRACTITIONER

## 2018-12-06 PROCEDURE — G8399 PT W/DXA RESULTS DOCUMENT: HCPCS | Performed by: NURSE PRACTITIONER

## 2018-12-06 PROCEDURE — 4040F PNEUMOC VAC/ADMIN/RCVD: CPT | Performed by: NURSE PRACTITIONER

## 2018-12-06 RX ORDER — ALPRAZOLAM 0.25 MG/1
0.25 TABLET ORAL 3 TIMES DAILY PRN
Qty: 60 TABLET | Refills: 2 | Status: SHIPPED | OUTPATIENT
Start: 2018-12-06 | End: 2019-03-06

## 2018-12-06 RX ORDER — LEVOTHYROXINE SODIUM 0.07 MG/1
75 TABLET ORAL DAILY
Qty: 90 TABLET | Refills: 1 | Status: SHIPPED | OUTPATIENT
Start: 2018-12-06 | End: 2019-08-29 | Stop reason: SDUPTHER

## 2018-12-06 RX ORDER — PAROXETINE HYDROCHLORIDE 20 MG/1
20 TABLET, FILM COATED ORAL DAILY
Qty: 30 TABLET | Refills: 3 | Status: SHIPPED | OUTPATIENT
Start: 2018-12-06 | End: 2019-05-17

## 2018-12-06 ASSESSMENT — PATIENT HEALTH QUESTIONNAIRE - PHQ9
SUM OF ALL RESPONSES TO PHQ QUESTIONS 1-9: 2
2. FEELING DOWN, DEPRESSED OR HOPELESS: 1
SUM OF ALL RESPONSES TO PHQ9 QUESTIONS 1 & 2: 2
SUM OF ALL RESPONSES TO PHQ QUESTIONS 1-9: 2
1. LITTLE INTEREST OR PLEASURE IN DOING THINGS: 1

## 2018-12-06 NOTE — PROGRESS NOTES
Janice Arias reports being in a fair mood that is not stable. The patient is  reporting insomnia, difficulty concentrating and usual interest in activities. This patient is  not homicidal or suicidal.  The medication from last visit, wellbutrin, is not helping and is  causing any side effects. The patient is not going to counseling/therapy. Anxiety: states that she has been in a state of panic for several weeks now. Feels overwhelmed with everything going on. Has been taking xanax more than she would like. Had to stop taking effexor due to cancer treatment. Hypothyroidism: The patient reports no heat or cold intolerance, poor energy levels and no hair loss or excessive skin dryness. Breast cancer: she has had a mastectomy. She was advised to get radiation and chemotherapy. She chose to just do radiation but changed her mind and would rather do chemotherapy than radiation. She has seen Dr. Heron Falcon and lately Dr. Alfredo Tucker. She had testing done and was told that there is no evidence of metastasis. ROS: This patient reports no chest pain or pressure. There is no shortness of breath or cough. The patient reports no nausea or vomiting. There is no heartburn or indigestion. There is no diarrhea or constipation. No black, bloody, mucusy or tarry stool noticed. The patient reports no bloating and no change in appetite. There is no numbness, tingling or swelling in the extremities. EXAM:  Constitutional Blood pressure 108/60, pulse 79, temperature 99.3 °F (37.4 °C), temperature source Temporal, resp. rate 18, height 5' 3\" (1.6 m), weight 131 lb (59.4 kg), SpO2 95 %, not currently breastfeeding. ,normal affect, no acute distress, appears well developed and well nourished. Neck:  neck- supple, no mass, non-tender and no bruits  Lungs:  Normal expansion. Clear to auscultation. No rales, rhonchi, or wheezing., No chest wall tenderness.   Heart:  Heart sounds are normal.  Regular rate and

## 2018-12-17 RX ORDER — OMEPRAZOLE 20 MG/1
CAPSULE, DELAYED RELEASE ORAL
Qty: 90 CAPSULE | Refills: 3 | Status: SHIPPED | OUTPATIENT
Start: 2018-12-17 | End: 2019-05-17 | Stop reason: DRUGHIGH

## 2018-12-18 RX ORDER — ERGOCALCIFEROL 1.25 MG/1
CAPSULE ORAL
Qty: 12 CAPSULE | Refills: 3 | Status: SHIPPED | OUTPATIENT
Start: 2018-12-18 | End: 2019-11-05 | Stop reason: SDUPTHER

## 2018-12-18 NOTE — TELEPHONE ENCOUNTER
Last seen 12/6/2018. Has a follow up appointment scheduled for Future Appointments  Date Time Provider Darlene Walsh   12/21/2018 9:00 AM MD AGUSTO Vigil [facility] AG[facility]O [facility] E   1/7/2019 11:10 AM ROSALIND Levy - [facility] [address]   1/21/2019 3:00 PM Alexandro Peña MD [address]   2/8/2019 2:20 PM ROSALIND Levy - [facility] [address]   3/27/2019 1:15 PM MD AGUSTO Dawson [facility] AFL [facility]ONC E   . Medication is pending if okay. Please advise      Patient needs to repeat Vitamin D level. Labs are pending in patient's chart.

## 2019-01-07 ENCOUNTER — OFFICE VISIT (OUTPATIENT)
Dept: FAMILY MEDICINE CLINIC | Age: 74
End: 2019-01-07
Payer: MEDICARE

## 2019-01-07 ENCOUNTER — HOSPITAL ENCOUNTER (OUTPATIENT)
Dept: GENERAL RADIOLOGY | Age: 74
Discharge: HOME | End: 2019-01-09
Payer: MEDICARE

## 2019-01-07 ENCOUNTER — HOSPITAL ENCOUNTER (OUTPATIENT)
Age: 74
Discharge: HOME | End: 2019-01-09
Payer: MEDICARE

## 2019-01-07 ENCOUNTER — HOSPITAL ENCOUNTER (OUTPATIENT)
Dept: LAB | Age: 74
Discharge: HOME | End: 2019-01-07
Payer: MEDICARE

## 2019-01-07 ENCOUNTER — HOSPITAL ENCOUNTER (OUTPATIENT)
Age: 74
Discharge: HOME | End: 2019-01-07
Payer: MEDICARE

## 2019-01-07 VITALS
WEIGHT: 133 LBS | SYSTOLIC BLOOD PRESSURE: 112 MMHG | HEART RATE: 77 BPM | TEMPERATURE: 99.6 F | DIASTOLIC BLOOD PRESSURE: 60 MMHG | RESPIRATION RATE: 18 BRPM | OXYGEN SATURATION: 94 % | BODY MASS INDEX: 23.57 KG/M2 | HEIGHT: 63 IN

## 2019-01-07 DIAGNOSIS — J44.9 CHRONIC OBSTRUCTIVE PULMONARY DISEASE, UNSPECIFIED COPD TYPE (HCC): ICD-10-CM

## 2019-01-07 DIAGNOSIS — F33.1 MODERATE EPISODE OF RECURRENT MAJOR DEPRESSIVE DISORDER (HCC): Primary | ICD-10-CM

## 2019-01-07 DIAGNOSIS — R06.02 SHORTNESS OF BREATH: ICD-10-CM

## 2019-01-07 DIAGNOSIS — J44.1 COPD EXACERBATION (HCC): ICD-10-CM

## 2019-01-07 DIAGNOSIS — J96.11 CHRONIC RESPIRATORY FAILURE WITH HYPOXIA (HCC): ICD-10-CM

## 2019-01-07 DIAGNOSIS — Z79.899 HIGH RISK MEDICATION USE: ICD-10-CM

## 2019-01-07 DIAGNOSIS — I25.10 CORONARY ARTERY DISEASE INVOLVING NATIVE HEART WITHOUT ANGINA PECTORIS, UNSPECIFIED VESSEL OR LESION TYPE: ICD-10-CM

## 2019-01-07 DIAGNOSIS — C50.919 MALIGNANT NEOPLASM OF FEMALE BREAST, UNSPECIFIED ESTROGEN RECEPTOR STATUS, UNSPECIFIED LATERALITY, UNSPECIFIED SITE OF BREAST (HCC): ICD-10-CM

## 2019-01-07 DIAGNOSIS — R50.9 FEVER, UNSPECIFIED FEVER CAUSE: ICD-10-CM

## 2019-01-07 DIAGNOSIS — F41.9 ANXIETY: ICD-10-CM

## 2019-01-07 LAB
ALBUMIN SERPL-MCNC: 4.2 G/DL (ref 3.9–4.9)
ALP BLD-CCNC: 78 U/L (ref 40–130)
ALT SERPL-CCNC: 15 U/L (ref 0–33)
ANION GAP SERPL CALCULATED.3IONS-SCNC: 12 MEQ/L (ref 7–13)
AST SERPL-CCNC: 22 U/L (ref 0–35)
BASE EXCESS ARTERIAL: 6
BASOPHILS ABSOLUTE: 0 K/UL (ref 0–0.2)
BASOPHILS RELATIVE PERCENT: 0.4 %
BILIRUB SERPL-MCNC: 0.4 MG/DL (ref 0–1.2)
BUN BLDV-MCNC: 10 MG/DL (ref 8–23)
CALCIUM SERPL-MCNC: 9.6 MG/DL (ref 8.6–10.2)
CHLORIDE BLD-SCNC: 97 MEQ/L (ref 98–107)
CO2: 32 MEQ/L (ref 22–29)
CREAT SERPL-MCNC: 0.68 MG/DL (ref 0.5–0.9)
EOSINOPHILS ABSOLUTE: 0.6 K/UL (ref 0–0.7)
EOSINOPHILS RELATIVE PERCENT: 6.4 %
GFR AFRICAN AMERICAN: >60
GFR NON-AFRICAN AMERICAN: >60
GLOBULIN: 2.8 G/DL (ref 2.3–3.5)
GLUCOSE BLD-MCNC: 115 MG/DL (ref 74–109)
HCO3 ARTERIAL: 31.3 MMOL/L (ref 21–29)
HCT VFR BLD CALC: 37.3 % (ref 37–47)
HEMOGLOBIN: 12.6 G/DL (ref 12–16)
LYMPHOCYTES ABSOLUTE: 1.1 K/UL (ref 1–4.8)
LYMPHOCYTES RELATIVE PERCENT: 12.7 %
MCH RBC QN AUTO: 29.9 PG (ref 27–31.3)
MCHC RBC AUTO-ENTMCNC: 33.6 % (ref 33–37)
MCV RBC AUTO: 88.9 FL (ref 82–100)
MONOCYTES ABSOLUTE: 1 K/UL (ref 0.2–0.8)
MONOCYTES RELATIVE PERCENT: 11.3 %
NEUTROPHILS ABSOLUTE: 6 K/UL (ref 1.4–6.5)
NEUTROPHILS RELATIVE PERCENT: 69.2 %
O2 SAT, ARTERIAL: 89 % (ref 93–100)
PCO2 ARTERIAL: 51 MM HG (ref 35–45)
PDW BLD-RTO: 14.5 % (ref 11.5–14.5)
PERFORMED ON: ABNORMAL
PH ARTERIAL: 7.4 (ref 7.35–7.45)
PLATELET # BLD: 264 K/UL (ref 130–400)
PO2 ARTERIAL: 57 MM HG (ref 75–108)
POC SAMPLE TYPE: ABNORMAL
POTASSIUM SERPL-SCNC: 5 MEQ/L (ref 3.5–5.1)
PRO-BNP: 216 PG/ML
RBC # BLD: 4.2 M/UL (ref 4.2–5.4)
SODIUM BLD-SCNC: 141 MEQ/L (ref 132–144)
TCO2 ARTERIAL: 33
TOTAL PROTEIN: 7 G/DL (ref 6.4–8.1)
TROPONIN: <0.01 NG/ML (ref 0–0.01)
WBC # BLD: 8.7 K/UL (ref 4.8–10.8)

## 2019-01-07 PROCEDURE — 1036F TOBACCO NON-USER: CPT | Performed by: NURSE PRACTITIONER

## 2019-01-07 PROCEDURE — 83880 ASSAY OF NATRIURETIC PEPTIDE: CPT

## 2019-01-07 PROCEDURE — G8926 SPIRO NO PERF OR DOC: HCPCS | Performed by: NURSE PRACTITIONER

## 2019-01-07 PROCEDURE — 36415 COLL VENOUS BLD VENIPUNCTURE: CPT

## 2019-01-07 PROCEDURE — 1101F PT FALLS ASSESS-DOCD LE1/YR: CPT | Performed by: NURSE PRACTITIONER

## 2019-01-07 PROCEDURE — 84484 ASSAY OF TROPONIN QUANT: CPT

## 2019-01-07 PROCEDURE — 99214 OFFICE O/P EST MOD 30 MIN: CPT | Performed by: NURSE PRACTITIONER

## 2019-01-07 PROCEDURE — 1123F ACP DISCUSS/DSCN MKR DOCD: CPT | Performed by: NURSE PRACTITIONER

## 2019-01-07 PROCEDURE — 3023F SPIROM DOC REV: CPT | Performed by: NURSE PRACTITIONER

## 2019-01-07 PROCEDURE — G8399 PT W/DXA RESULTS DOCUMENT: HCPCS | Performed by: NURSE PRACTITIONER

## 2019-01-07 PROCEDURE — 82803 BLOOD GASES ANY COMBINATION: CPT

## 2019-01-07 PROCEDURE — G8427 DOCREV CUR MEDS BY ELIG CLIN: HCPCS | Performed by: NURSE PRACTITIONER

## 2019-01-07 PROCEDURE — 1090F PRES/ABSN URINE INCON ASSESS: CPT | Performed by: NURSE PRACTITIONER

## 2019-01-07 PROCEDURE — 4040F PNEUMOC VAC/ADMIN/RCVD: CPT | Performed by: NURSE PRACTITIONER

## 2019-01-07 PROCEDURE — 85025 COMPLETE CBC W/AUTO DIFF WBC: CPT

## 2019-01-07 PROCEDURE — G8482 FLU IMMUNIZE ORDER/ADMIN: HCPCS | Performed by: NURSE PRACTITIONER

## 2019-01-07 PROCEDURE — 71046 X-RAY EXAM CHEST 2 VIEWS: CPT

## 2019-01-07 PROCEDURE — 3017F COLORECTAL CA SCREEN DOC REV: CPT | Performed by: NURSE PRACTITIONER

## 2019-01-07 PROCEDURE — 36600 WITHDRAWAL OF ARTERIAL BLOOD: CPT

## 2019-01-07 PROCEDURE — 80307 DRUG TEST PRSMV CHEM ANLYZR: CPT

## 2019-01-07 PROCEDURE — G8420 CALC BMI NORM PARAMETERS: HCPCS | Performed by: NURSE PRACTITIONER

## 2019-01-07 PROCEDURE — 80053 COMPREHEN METABOLIC PANEL: CPT

## 2019-01-07 PROCEDURE — G8599 NO ASA/ANTIPLAT THER USE RNG: HCPCS | Performed by: NURSE PRACTITIONER

## 2019-01-07 RX ORDER — PREDNISONE 20 MG/1
TABLET ORAL
Qty: 20 TABLET | Refills: 0 | Status: SHIPPED | OUTPATIENT
Start: 2019-01-07 | End: 2019-01-17

## 2019-01-07 RX ORDER — LEVOFLOXACIN 500 MG/1
500 TABLET, FILM COATED ORAL DAILY
Qty: 10 TABLET | Refills: 0 | Status: SHIPPED | OUTPATIENT
Start: 2019-01-07 | End: 2019-01-18

## 2019-01-07 ASSESSMENT — PATIENT HEALTH QUESTIONNAIRE - PHQ9
2. FEELING DOWN, DEPRESSED OR HOPELESS: 1
1. LITTLE INTEREST OR PLEASURE IN DOING THINGS: 1
SUM OF ALL RESPONSES TO PHQ9 QUESTIONS 1 & 2: 2
SUM OF ALL RESPONSES TO PHQ QUESTIONS 1-9: 2
SUM OF ALL RESPONSES TO PHQ QUESTIONS 1-9: 2

## 2019-01-10 LAB
6-ACETYLMORPHINE: NOT DETECTED
7-AMINOCLONAZEPAM: NOT DETECTED
ALPHA-OH-ALPRAZOLAM: PRESENT
ALPRAZOLAM: PRESENT
AMPHETAMINE: NOT DETECTED
BARBITURATES: NOT DETECTED
BENZOYLECGONINE: NOT DETECTED
BUPRENORPHINE: NOT DETECTED
CARISOPRODOL: NOT DETECTED
CLONAZEPAM: NOT DETECTED
CODEINE: NOT DETECTED
CREATININE URINE: 175.2 MG/DL (ref 20–400)
DIAZEPAM: NOT DETECTED
EER PAIN MGT DRUG PANEL, HIGH RES/EMIT U: NORMAL
ETHYL GLUCURONIDE: NOT DETECTED
FENTANYL: NOT DETECTED
HYDROCODONE: NOT DETECTED
HYDROMORPHONE: NOT DETECTED
LORAZEPAM: NOT DETECTED
MARIJUANA METABOLITE: NOT DETECTED
MDA: NOT DETECTED
MDEA: NOT DETECTED
MDMA URINE: NOT DETECTED
MEPERIDINE: NOT DETECTED
METHADONE: NOT DETECTED
METHAMPHETAMINE: NOT DETECTED
METHYLPHENIDATE: NOT DETECTED
MIDAZOLAM: NOT DETECTED
MORPHINE: NOT DETECTED
NORBUPRENORPHINE, FREE: NOT DETECTED
NORDIAZEPAM: NOT DETECTED
NORFENTANYL: NOT DETECTED
NORHYDROCODONE, URINE: NOT DETECTED
NOROXYCODONE: PRESENT
NOROXYMORPHONE, URINE: NOT DETECTED
OXAZEPAM: NOT DETECTED
OXYCODONE: PRESENT
OXYMORPHONE: NOT DETECTED
PAIN MANAGEMENT DRUG PANEL: NORMAL
PCP: NOT DETECTED
PHENTERMINE: NOT DETECTED
PROPOXYPHENE: NOT DETECTED
TAPENTADOL, URINE: NOT DETECTED
TAPENTADOL-O-SULFATE, URINE: NOT DETECTED
TEMAZEPAM: NOT DETECTED
TRAMADOL: PRESENT
ZOLPIDEM: NOT DETECTED

## 2019-01-17 ENCOUNTER — HOSPITAL ENCOUNTER (EMERGENCY)
Age: 74
Discharge: HOME | End: 2019-01-18
Attending: EMERGENCY MEDICINE
Payer: MEDICARE

## 2019-01-17 DIAGNOSIS — R50.9 FEVER, UNSPECIFIED FEVER CAUSE: ICD-10-CM

## 2019-01-17 DIAGNOSIS — J40 BRONCHITIS: Primary | ICD-10-CM

## 2019-01-17 DIAGNOSIS — J44.1 COPD EXACERBATION (HCC): ICD-10-CM

## 2019-01-17 PROCEDURE — 99285 EMERGENCY DEPT VISIT HI MDM: CPT

## 2019-01-17 RX ORDER — AZITHROMYCIN 500 MG/1
500 TABLET, FILM COATED ORAL ONCE
Status: COMPLETED | OUTPATIENT
Start: 2019-01-17 | End: 2019-01-18

## 2019-01-17 RX ORDER — SODIUM CHLORIDE 0.9 % (FLUSH) 0.9 %
3 SYRINGE (ML) INJECTION EVERY 8 HOURS
Status: DISCONTINUED | OUTPATIENT
Start: 2019-01-17 | End: 2019-01-18 | Stop reason: HOSPADM

## 2019-01-17 RX ORDER — METHYLPREDNISOLONE SODIUM SUCCINATE 125 MG/2ML
125 INJECTION, POWDER, LYOPHILIZED, FOR SOLUTION INTRAMUSCULAR; INTRAVENOUS ONCE
Status: COMPLETED | OUTPATIENT
Start: 2019-01-17 | End: 2019-01-18

## 2019-01-17 RX ORDER — 0.9 % SODIUM CHLORIDE 0.9 %
500 INTRAVENOUS SOLUTION INTRAVENOUS ONCE
Status: COMPLETED | OUTPATIENT
Start: 2019-01-17 | End: 2019-01-18

## 2019-01-17 ASSESSMENT — PAIN DESCRIPTION - PAIN TYPE: TYPE: ACUTE PAIN

## 2019-01-17 ASSESSMENT — PAIN DESCRIPTION - DESCRIPTORS: DESCRIPTORS: HEADACHE

## 2019-01-17 ASSESSMENT — PAIN SCALES - GENERAL: PAINLEVEL_OUTOF10: 2

## 2019-01-17 ASSESSMENT — PAIN DESCRIPTION - LOCATION: LOCATION: HEAD

## 2019-01-18 ENCOUNTER — APPOINTMENT (OUTPATIENT)
Dept: GENERAL RADIOLOGY | Age: 74
End: 2019-01-18
Payer: MEDICARE

## 2019-01-18 VITALS
OXYGEN SATURATION: 100 % | HEIGHT: 63 IN | SYSTOLIC BLOOD PRESSURE: 110 MMHG | DIASTOLIC BLOOD PRESSURE: 78 MMHG | WEIGHT: 130 LBS | RESPIRATION RATE: 20 BRPM | BODY MASS INDEX: 23.04 KG/M2 | HEART RATE: 98 BPM | TEMPERATURE: 98.5 F

## 2019-01-18 LAB
ALBUMIN SERPL-MCNC: 3.4 G/DL (ref 3.9–4.9)
ALP BLD-CCNC: 59 U/L (ref 40–130)
ALT SERPL-CCNC: 14 U/L (ref 0–33)
ANION GAP SERPL CALCULATED.3IONS-SCNC: 9 MEQ/L (ref 7–13)
AST SERPL-CCNC: 25 U/L (ref 0–35)
ATYPICAL LYMPHOCYTE RELATIVE PERCENT: 2 %
BASOPHILS ABSOLUTE: 0 K/UL (ref 0–0.2)
BASOPHILS RELATIVE PERCENT: 1.2 %
BILIRUB SERPL-MCNC: 0.5 MG/DL (ref 0–1.2)
BILIRUBIN URINE: NEGATIVE
BLOOD, URINE: NEGATIVE
BUN BLDV-MCNC: 12 MG/DL (ref 8–23)
CALCIUM SERPL-MCNC: 9.1 MG/DL (ref 8.6–10.2)
CHLORIDE BLD-SCNC: 90 MEQ/L (ref 98–107)
CLARITY: CLEAR
CO2: 33 MEQ/L (ref 22–29)
COLOR: YELLOW
CREAT SERPL-MCNC: 0.61 MG/DL (ref 0.5–0.9)
EKG ATRIAL RATE: 100 BPM
EKG P AXIS: 56 DEGREES
EKG P-R INTERVAL: 138 MS
EKG Q-T INTERVAL: 368 MS
EKG QRS DURATION: 120 MS
EKG QTC CALCULATION (BAZETT): 474 MS
EKG R AXIS: 83 DEGREES
EKG T AXIS: 47 DEGREES
EKG VENTRICULAR RATE: 100 BPM
EOSINOPHILS ABSOLUTE: 0.5 K/UL (ref 0–0.7)
EOSINOPHILS RELATIVE PERCENT: 6 %
GFR AFRICAN AMERICAN: >60
GFR NON-AFRICAN AMERICAN: >60
GLOBULIN: 2.5 G/DL (ref 2.3–3.5)
GLUCOSE BLD-MCNC: 109 MG/DL (ref 74–109)
GLUCOSE URINE: NEGATIVE MG/DL
HCT VFR BLD CALC: 37.5 % (ref 37–47)
HEMOGLOBIN: 12.7 G/DL (ref 12–16)
KETONES, URINE: NEGATIVE MG/DL
LEUKOCYTE ESTERASE, URINE: NEGATIVE
LYMPHOCYTES ABSOLUTE: 1.2 K/UL (ref 1–4.8)
LYMPHOCYTES RELATIVE PERCENT: 14 %
MCH RBC QN AUTO: 31 PG (ref 27–31.3)
MCHC RBC AUTO-ENTMCNC: 33.9 % (ref 33–37)
MCV RBC AUTO: 91.4 FL (ref 82–100)
MONOCYTES ABSOLUTE: 0.8 K/UL (ref 0.2–0.8)
MONOCYTES RELATIVE PERCENT: 10.4 %
NEUTROPHILS ABSOLUTE: 5.2 K/UL (ref 1.4–6.5)
NEUTROPHILS RELATIVE PERCENT: 68 %
NITRITE, URINE: NEGATIVE
PDW BLD-RTO: 14.7 % (ref 11.5–14.5)
PH UA: 7.5 (ref 5–9)
PLATELET # BLD: 246 K/UL (ref 130–400)
POTASSIUM SERPL-SCNC: 5 MEQ/L (ref 3.5–5.1)
PROTEIN UA: NEGATIVE MG/DL
RBC # BLD: 4.1 M/UL (ref 4.2–5.4)
RBC # BLD: NORMAL 10*6/UL
SMUDGE CELLS: 2.8
SODIUM BLD-SCNC: 132 MEQ/L (ref 132–144)
SPECIFIC GRAVITY UA: 1.01 (ref 1–1.03)
TOTAL PROTEIN: 5.9 G/DL (ref 6.4–8.1)
TROPONIN: <0.01 NG/ML (ref 0–0.01)
URINE REFLEX TO CULTURE: NORMAL
UROBILINOGEN, URINE: 0.2 E.U./DL
WBC # BLD: 7.6 K/UL (ref 4.8–10.8)

## 2019-01-18 PROCEDURE — 84484 ASSAY OF TROPONIN QUANT: CPT

## 2019-01-18 PROCEDURE — 85025 COMPLETE CBC W/AUTO DIFF WBC: CPT

## 2019-01-18 PROCEDURE — 71045 X-RAY EXAM CHEST 1 VIEW: CPT

## 2019-01-18 PROCEDURE — 2580000003 HC RX 258: Performed by: EMERGENCY MEDICINE

## 2019-01-18 PROCEDURE — 36415 COLL VENOUS BLD VENIPUNCTURE: CPT

## 2019-01-18 PROCEDURE — 94640 AIRWAY INHALATION TREATMENT: CPT

## 2019-01-18 PROCEDURE — 81003 URINALYSIS AUTO W/O SCOPE: CPT

## 2019-01-18 PROCEDURE — 6360000002 HC RX W HCPCS: Performed by: EMERGENCY MEDICINE

## 2019-01-18 PROCEDURE — 96365 THER/PROPH/DIAG IV INF INIT: CPT

## 2019-01-18 PROCEDURE — 93010 ELECTROCARDIOGRAM REPORT: CPT | Performed by: INTERNAL MEDICINE

## 2019-01-18 PROCEDURE — 93005 ELECTROCARDIOGRAM TRACING: CPT

## 2019-01-18 PROCEDURE — 2700000000 HC OXYGEN THERAPY PER DAY

## 2019-01-18 PROCEDURE — 80053 COMPREHEN METABOLIC PANEL: CPT

## 2019-01-18 PROCEDURE — 6370000000 HC RX 637 (ALT 250 FOR IP): Performed by: EMERGENCY MEDICINE

## 2019-01-18 PROCEDURE — 87040 BLOOD CULTURE FOR BACTERIA: CPT

## 2019-01-18 PROCEDURE — 96375 TX/PRO/DX INJ NEW DRUG ADDON: CPT

## 2019-01-18 RX ORDER — LEVOFLOXACIN 500 MG/1
500 TABLET, FILM COATED ORAL DAILY
Qty: 10 TABLET | Refills: 0 | Status: SHIPPED | OUTPATIENT
Start: 2019-01-18 | End: 2019-01-18

## 2019-01-18 RX ORDER — ALBUTEROL SULFATE 90 UG/1
2 AEROSOL, METERED RESPIRATORY (INHALATION) EVERY 6 HOURS PRN
Qty: 1 INHALER | Refills: 3 | Status: SHIPPED | OUTPATIENT
Start: 2019-01-18 | End: 2019-04-10 | Stop reason: SDUPTHER

## 2019-01-18 RX ORDER — METHYLPREDNISOLONE 4 MG/1
TABLET ORAL
Qty: 1 KIT | Refills: 0 | Status: SHIPPED | OUTPATIENT
Start: 2019-01-18 | End: 2019-04-10 | Stop reason: ALTCHOICE

## 2019-01-18 RX ORDER — AZITHROMYCIN 250 MG/1
TABLET, FILM COATED ORAL
Qty: 1 PACKET | Refills: 0 | Status: SHIPPED | OUTPATIENT
Start: 2019-01-18 | End: 2019-04-10 | Stop reason: ALTCHOICE

## 2019-01-18 RX ADMIN — ALBUTEROL SULFATE 5 MG: 2.5 SOLUTION RESPIRATORY (INHALATION) at 00:11

## 2019-01-18 RX ADMIN — CEFTRIAXONE SODIUM 1 G: 1 INJECTION, POWDER, FOR SOLUTION INTRAMUSCULAR; INTRAVENOUS at 00:31

## 2019-01-18 RX ADMIN — AZITHROMYCIN MONOHYDRATE 500 MG: 500 TABLET ORAL at 00:31

## 2019-01-18 RX ADMIN — ALBUTEROL SULFATE 5 MG: 2.5 SOLUTION RESPIRATORY (INHALATION) at 00:02

## 2019-01-18 RX ADMIN — SODIUM CHLORIDE 500 ML: 9 INJECTION, SOLUTION INTRAVENOUS at 00:31

## 2019-01-18 RX ADMIN — METHYLPREDNISOLONE SODIUM SUCCINATE 125 MG: 125 INJECTION, POWDER, FOR SOLUTION INTRAMUSCULAR; INTRAVENOUS at 00:31

## 2019-01-18 ASSESSMENT — PULMONARY FUNCTION TESTS: PEFR_L/MIN: 150

## 2019-01-18 ASSESSMENT — PAIN DESCRIPTION - DESCRIPTORS: DESCRIPTORS: ACHING

## 2019-01-18 ASSESSMENT — PAIN DESCRIPTION - PAIN TYPE: TYPE: ACUTE PAIN

## 2019-01-20 LAB
BLOOD CULTURE, ROUTINE: ABNORMAL
BLOOD CULTURE, ROUTINE: ABNORMAL
ORGANISM: ABNORMAL

## 2019-01-21 RX ORDER — THEOPHYLLINE 400 MG/1
TABLET, EXTENDED RELEASE ORAL
Qty: 90 TABLET | Refills: 1 | Status: SHIPPED | OUTPATIENT
Start: 2019-01-21 | End: 2019-05-17

## 2019-01-23 LAB — CULTURE, BLOOD 2: NORMAL

## 2019-01-28 DIAGNOSIS — G47.09 OTHER INSOMNIA: ICD-10-CM

## 2019-01-28 RX ORDER — TRAZODONE HYDROCHLORIDE 150 MG/1
TABLET ORAL
Qty: 90 TABLET | Refills: 0 | Status: SHIPPED | OUTPATIENT
Start: 2019-01-28 | End: 2019-04-29 | Stop reason: SDUPTHER

## 2019-01-29 ENCOUNTER — CARE COORDINATION (OUTPATIENT)
Dept: CARE COORDINATION | Age: 74
End: 2019-01-29

## 2019-02-28 RX ORDER — LEVOTHYROXINE SODIUM 0.07 MG/1
75 TABLET ORAL DAILY
Qty: 90 TABLET | Refills: 1 | Status: ON HOLD | OUTPATIENT
Start: 2019-02-28 | End: 2019-05-14 | Stop reason: HOSPADM

## 2019-04-10 ENCOUNTER — OFFICE VISIT (OUTPATIENT)
Dept: PULMONOLOGY | Age: 74
End: 2019-04-10
Payer: MEDICARE

## 2019-04-10 VITALS
HEIGHT: 63 IN | BODY MASS INDEX: 24.13 KG/M2 | TEMPERATURE: 97.6 F | OXYGEN SATURATION: 98 % | DIASTOLIC BLOOD PRESSURE: 82 MMHG | WEIGHT: 136.2 LBS | HEART RATE: 79 BPM | SYSTOLIC BLOOD PRESSURE: 128 MMHG

## 2019-04-10 DIAGNOSIS — J44.9 CHRONIC OBSTRUCTIVE PULMONARY DISEASE, UNSPECIFIED COPD TYPE (HCC): ICD-10-CM

## 2019-04-10 DIAGNOSIS — R59.0 MEDIASTINAL LYMPHADENOPATHY: ICD-10-CM

## 2019-04-10 DIAGNOSIS — J96.11 CHRONIC RESPIRATORY FAILURE WITH HYPOXIA (HCC): Primary | ICD-10-CM

## 2019-04-10 PROCEDURE — G8427 DOCREV CUR MEDS BY ELIG CLIN: HCPCS | Performed by: INTERNAL MEDICINE

## 2019-04-10 PROCEDURE — 3023F SPIROM DOC REV: CPT | Performed by: INTERNAL MEDICINE

## 2019-04-10 PROCEDURE — 4040F PNEUMOC VAC/ADMIN/RCVD: CPT | Performed by: INTERNAL MEDICINE

## 2019-04-10 PROCEDURE — 99214 OFFICE O/P EST MOD 30 MIN: CPT | Performed by: INTERNAL MEDICINE

## 2019-04-10 PROCEDURE — G8399 PT W/DXA RESULTS DOCUMENT: HCPCS | Performed by: INTERNAL MEDICINE

## 2019-04-10 PROCEDURE — G8926 SPIRO NO PERF OR DOC: HCPCS | Performed by: INTERNAL MEDICINE

## 2019-04-10 PROCEDURE — G8420 CALC BMI NORM PARAMETERS: HCPCS | Performed by: INTERNAL MEDICINE

## 2019-04-10 PROCEDURE — 1123F ACP DISCUSS/DSCN MKR DOCD: CPT | Performed by: INTERNAL MEDICINE

## 2019-04-10 PROCEDURE — 1090F PRES/ABSN URINE INCON ASSESS: CPT | Performed by: INTERNAL MEDICINE

## 2019-04-10 PROCEDURE — G8599 NO ASA/ANTIPLAT THER USE RNG: HCPCS | Performed by: INTERNAL MEDICINE

## 2019-04-10 PROCEDURE — 1036F TOBACCO NON-USER: CPT | Performed by: INTERNAL MEDICINE

## 2019-04-10 RX ORDER — BUDESONIDE AND FORMOTEROL FUMARATE DIHYDRATE 160; 4.5 UG/1; UG/1
2 AEROSOL RESPIRATORY (INHALATION) 2 TIMES DAILY
Qty: 1 INHALER | Refills: 3 | Status: SHIPPED | OUTPATIENT
Start: 2019-04-10 | End: 2019-05-20 | Stop reason: SDUPTHER

## 2019-04-10 RX ORDER — ALBUTEROL SULFATE 90 UG/1
2 AEROSOL, METERED RESPIRATORY (INHALATION) EVERY 6 HOURS PRN
Qty: 3 INHALER | Refills: 3 | Status: SHIPPED | OUTPATIENT
Start: 2019-04-10 | End: 2019-05-28 | Stop reason: ALTCHOICE

## 2019-04-10 NOTE — PROGRESS NOTES
Socioeconomic History    Marital status:      Spouse name: Not on file    Number of children: Not on file    Years of education: Not on file    Highest education level: Not on file   Occupational History    Not on file   Social Needs    Financial resource strain: Not on file    Food insecurity:     Worry: Not on file     Inability: Not on file    Transportation needs:     Medical: Not on file     Non-medical: Not on file   Tobacco Use    Smoking status: Former Smoker     Packs/day: 1.50     Years: 40.00     Pack years: 60.00     Types: Cigarettes     Last attempt to quit: 1989     Years since quittin.2    Smokeless tobacco: Never Used   Substance and Sexual Activity    Alcohol use: No    Drug use: No    Sexual activity: Never   Lifestyle    Physical activity:     Days per week: Not on file     Minutes per session: Not on file    Stress: Not on file   Relationships    Social connections:     Talks on phone: Not on file     Gets together: Not on file     Attends Cheondoism service: Not on file     Active member of club or organization: Not on file     Attends meetings of clubs or organizations: Not on file     Relationship status: Not on file    Intimate partner violence:     Fear of current or ex partner: Not on file     Emotionally abused: Not on file     Physically abused: Not on file     Forced sexual activity: Not on file   Other Topics Concern    Not on file   Social History Narrative    Not on file         Review of Systems      ROS: 10 organs review of system is done including general, psychological, ENT, hematological, endocrine, respiratory, cardiovascular, gastrointestinal,musculoskeletal, neurological,  allergy and Immunology is done and is otherwise negative.     Current Outpatient Medications   Medication Sig Dispense Refill    levothyroxine (SYNTHROID) 75 MCG tablet Take 1 tablet by mouth Daily 90 tablet 1    traZODone (DESYREL) 150 MG tablet TAKE 1 TABLET NIGHTLY 90 tablet 0    theophylline (UNIPHYL) 400 MG extended release tablet TAKE 1 TABLET DAILY 90 tablet 1    albuterol sulfate HFA (PROVENTIL HFA) 108 (90 Base) MCG/ACT inhaler Inhale 2 puffs into the lungs every 6 hours as needed for Wheezing 1 Inhaler 3    vitamin D (ERGOCALCIFEROL) 42536 units CAPS capsule TAKE 1 CAPSULE ONCE A WEEK 12 capsule 3    omeprazole (PRILOSEC) 20 MG delayed release capsule TAKE 1 CAPSULE DAILY 90 capsule 3    SPIRIVA HANDIHALER 18 MCG inhalation capsule INHALE THE CONTENTS OF 1 CAPSULE DAILY 90 capsule 0    levothyroxine (SYNTHROID) 75 MCG tablet Take 1 tablet by mouth Daily 90 tablet 1    PARoxetine (PAXIL) 20 MG tablet Take 1 tablet by mouth daily 30 tablet 3    letrozole (FEMARA) 2.5 MG tablet Take 1 tablet by mouth daily 30 tablet 3    Respiratory Therapy Supplies (NEBULIZER/TUBING/MOUTHPIECE) KIT 1 kit by Does not apply route daily as needed (wheezing) 1 kit 5    lovastatin (MEVACOR) 20 MG tablet TAKE 1 TABLET NIGHTLY 90 tablet 1    metoprolol tartrate (LOPRESSOR) 25 MG tablet Take 1 tablet by mouth 2 times daily 60 tablet 3    SYMBICORT 80-4.5 MCG/ACT AERO USE 2 INHALATIONS TWICE A DAY 30.6 g 5    ipratropium-albuterol (DUONEB) 0.5-2.5 (3) MG/3ML SOLN nebulizer solution Inhale 3 mLs into the lungs every 6 hours as needed for Shortness of Breath 200 vial 3    albuterol sulfate HFA (PROAIR HFA) 108 (90 BASE) MCG/ACT inhaler Inhale 2 puffs into the lungs every 6 hours as needed for Wheezing or Shortness of Breath 3 Inhaler 3     No current facility-administered medications for this visit. Objective:     Vitals:    04/10/19 1402   BP: 128/82   Site: Left Upper Arm   Pulse: 79   Temp: 97.6 °F (36.4 °C)   TempSrc: Tympanic   SpO2: 98%   Weight: 136 lb 3.2 oz (61.8 kg)   Height: 5' 3\" (1.6 m)         Physical Exam   Constitutional: She is oriented to person, place, and time. She appears well-developed and well-nourished. No distress.    HENT:   Head: Normocephalic and atraumatic. Eyes: Pupils are equal, round, and reactive to light. Conjunctivae are normal.   Neck: Normal range of motion. Neck supple. Cardiovascular: Normal rate and regular rhythm. Exam reveals no gallop and no friction rub. No murmur heard. Pulmonary/Chest: Effort normal. No respiratory distress. She has no wheezes. She has no rales. She exhibits no tenderness. Poor air movement    Abdominal: Soft. She exhibits no distension. There is no tenderness. There is no rebound. Musculoskeletal: She exhibits no edema or tenderness. Lymphadenopathy:     She has no cervical adenopathy. Neurological: She is alert and oriented to person, place, and time. Skin: Skin is warm and dry. She is not diaphoretic. No erythema. Psychiatric: She has a normal mood and affect. Judgment normal.      Imaging studies reviewed by me CT chest reviewed by me shows large hiatal hernia, enlarged mediastinal lymph node along with severe centrilobular emphysema  Lab results reviewed in chart  PFT FEV1 32%  ECHO: Ejection fraction 76%    Assessment and Plan       Diagnosis Orders   1. Chronic respiratory failure with hypoxia ([facility])  Full PFT Study With Bronchodilator   2. Chronic obstructive pulmonary disease, unspecified COPD type ([facility] [address]  Full PFT Study With Bronchodilator    budesonide-formoterol (SYMBICORT) 160-4.5 MCG/ACT [facility] [place]   3. Mediastinal lymphadenopathy       · Very severe COPD with chronic respiratory failure and hypoxia, patient will continue current inhalers, I will increase Symbicort dose 160/4.5. Continue Spiriva, yearly flu shot, and refer to pulmonary. We will obtain pulmonary function test, walk test patient qualify for oxygen at 2 L, she is advised to use with activity and while asleep does not need oxygen at rest.  · Mediastinal lymphadenopathy with history of breast cancer, patient declined.   CT scan at this point and declined further workup      Orders Placed This Encounter Procedures   [facility] [place]     Referral Priority:   Routine     Referral Type:   Eval and Treat     Referral Reason:   Specialty Services Required     Requested Specialty:   Pulmonology     Number of Visits Requested:   1    Full PFT Study With Bronchodilator     Standing Status:   Future     Standing Expiration Date:   4/10/2020     Order Specific Question:   Draw ABG with Pulmonary Function Test?     Answer:   Yes     Orders Placed This Encounter   Medications    budesonide-formoterol (SYMBICORT) 160-4.5 MCG/ACT AERO     Sig: Inhale 2 puffs into the lungs 2 times daily     Dispense:  1 Inhaler     Refill:  3    albuterol sulfate HFA (PROAIR HFA) 108 (90 Base) MCG/ACT inhaler     Sig: Inhale 2 puffs into the lungs every 6 hours as needed for Wheezing or Shortness of Breath     Dispense:  3 Inhaler     Refill:  3           Return in about 6 weeks (around 5/22/2019).       Marisa Davies MD

## 2019-04-16 RX ORDER — LOVASTATIN 20 MG/1
TABLET ORAL
Qty: 90 TABLET | Refills: 1 | Status: SHIPPED | OUTPATIENT
Start: 2019-04-16 | End: 2019-10-13 | Stop reason: SDUPTHER

## 2019-04-16 NOTE — TELEPHONE ENCOUNTER
pharmacy requesting medication refill.  Please approve or deny this request.    Rx requested:  Requested Prescriptions     Pending Prescriptions Disp Refills    lovastatin (MEVACOR) 20 MG tablet [Pharmacy Med Name: LOVASTATIN TABS 20MG] 90 tablet 1     Sig: TAKE 1 TABLET NIGHTLY         Last Office Visit:   1/7/2019      Next Visit Date:  Future Appointments   Date Time Provider Darlene Walsh   4/19/2019  3:00 PM [address] [place] [address] ROOM [address] [place] [address]   5/23/2019  3:30 PM Lorenza Pedro MD  [address]   6/10/2019  3:20 PM [facility]

## 2019-04-29 DIAGNOSIS — G47.09 OTHER INSOMNIA: ICD-10-CM

## 2019-04-29 RX ORDER — TRAZODONE HYDROCHLORIDE 150 MG/1
TABLET ORAL
Qty: 90 TABLET | Refills: 0 | Status: SHIPPED | OUTPATIENT
Start: 2019-04-29 | End: 2019-07-28 | Stop reason: SDUPTHER

## 2019-04-29 NOTE — TELEPHONE ENCOUNTER
Pharmacy  requesting medication refill.  Please approve or deny this request.    Rx requested:  Requested Prescriptions     Pending Prescriptions Disp Refills    traZODone (DESYREL) 150 MG tablet [Pharmacy Med Name: TRAZODONE HCL TABS 150MG] 90 tablet 0     Sig: TAKE 1 TABLET NIGHTLY         Last Office Visit:   1/7/2019      Next Visit Date:  Future Appointments   Date Time Provider Darlene Walsh   5/3/2019  3:00 PM [place] PFT ROOM [address]   5/23/2019  3:30 PM Nicole Kolb MD [facility]   6/10/2019  3:20 PM ROSALIND Borrero - CNP [address]

## 2019-05-11 ENCOUNTER — HOSPITAL ENCOUNTER (EMERGENCY)
Age: 74
Discharge: HOME | DRG: 191 | End: 2019-05-12
Attending: EMERGENCY MEDICINE
Payer: MEDICARE

## 2019-05-11 ENCOUNTER — APPOINTMENT (OUTPATIENT)
Dept: GENERAL RADIOLOGY | Age: 74
DRG: 191 | End: 2019-05-11
Payer: MEDICARE

## 2019-05-11 DIAGNOSIS — J40 BRONCHITIS: Primary | ICD-10-CM

## 2019-05-11 LAB
ALBUMIN SERPL-MCNC: 4.1 G/DL (ref 3.5–4.6)
ALP BLD-CCNC: 63 U/L (ref 40–130)
ALT SERPL-CCNC: 11 U/L (ref 0–33)
ANION GAP SERPL CALCULATED.3IONS-SCNC: 15 MEQ/L (ref 9–15)
AST SERPL-CCNC: 20 U/L (ref 0–35)
BASOPHILS ABSOLUTE: 0.1 K/UL (ref 0–0.2)
BASOPHILS RELATIVE PERCENT: 1.1 %
BILIRUB SERPL-MCNC: 0.3 MG/DL (ref 0.2–0.7)
BUN BLDV-MCNC: 11 MG/DL (ref 8–23)
CALCIUM SERPL-MCNC: 9.7 MG/DL (ref 8.5–9.9)
CHLORIDE BLD-SCNC: 95 MEQ/L (ref 95–107)
CO2: 26 MEQ/L (ref 20–31)
CREAT SERPL-MCNC: 0.69 MG/DL (ref 0.5–0.9)
EOSINOPHILS ABSOLUTE: 0.2 K/UL (ref 0–0.7)
EOSINOPHILS RELATIVE PERCENT: 2.8 %
GFR AFRICAN AMERICAN: >60
GFR NON-AFRICAN AMERICAN: >60
GLOBULIN: 2.6 G/DL (ref 2.3–3.5)
GLUCOSE BLD-MCNC: 101 MG/DL (ref 70–99)
HCT VFR BLD CALC: 39.4 % (ref 37–47)
HEMOGLOBIN: 13 G/DL (ref 12–16)
LYMPHOCYTES ABSOLUTE: 1.8 K/UL (ref 1–4.8)
LYMPHOCYTES RELATIVE PERCENT: 24 %
MCH RBC QN AUTO: 30.6 PG (ref 27–31.3)
MCHC RBC AUTO-ENTMCNC: 32.8 % (ref 33–37)
MCV RBC AUTO: 93.1 FL (ref 82–100)
MONOCYTES ABSOLUTE: 0.7 K/UL (ref 0.2–0.8)
MONOCYTES RELATIVE PERCENT: 8.6 %
NEUTROPHILS ABSOLUTE: 4.8 K/UL (ref 1.4–6.5)
NEUTROPHILS RELATIVE PERCENT: 63.5 %
PDW BLD-RTO: 14 % (ref 11.5–14.5)
PLATELET # BLD: 279 K/UL (ref 130–400)
POTASSIUM SERPL-SCNC: 4.2 MEQ/L (ref 3.4–4.9)
RBC # BLD: 4.24 M/UL (ref 4.2–5.4)
SODIUM BLD-SCNC: 136 MEQ/L (ref 135–144)
TOTAL PROTEIN: 6.7 G/DL (ref 6.3–8)
WBC # BLD: 7.6 K/UL (ref 4.8–10.8)

## 2019-05-11 PROCEDURE — 85025 COMPLETE CBC W/AUTO DIFF WBC: CPT

## 2019-05-11 PROCEDURE — 94640 AIRWAY INHALATION TREATMENT: CPT

## 2019-05-11 PROCEDURE — 71046 X-RAY EXAM CHEST 2 VIEWS: CPT

## 2019-05-11 PROCEDURE — 36415 COLL VENOUS BLD VENIPUNCTURE: CPT

## 2019-05-11 PROCEDURE — 80053 COMPREHEN METABOLIC PANEL: CPT

## 2019-05-11 PROCEDURE — 99285 EMERGENCY DEPT VISIT HI MDM: CPT

## 2019-05-11 PROCEDURE — 6370000000 HC RX 637 (ALT 250 FOR IP): Performed by: EMERGENCY MEDICINE

## 2019-05-11 RX ORDER — AZITHROMYCIN 500 MG/1
500 TABLET, FILM COATED ORAL ONCE
Status: COMPLETED | OUTPATIENT
Start: 2019-05-11 | End: 2019-05-12

## 2019-05-11 RX ORDER — PREDNISONE 20 MG/1
20 TABLET ORAL ONCE
Status: COMPLETED | OUTPATIENT
Start: 2019-05-11 | End: 2019-05-12

## 2019-05-11 RX ORDER — IPRATROPIUM BROMIDE AND ALBUTEROL SULFATE 2.5; .5 MG/3ML; MG/3ML
1 SOLUTION RESPIRATORY (INHALATION)
Status: DISCONTINUED | OUTPATIENT
Start: 2019-05-12 | End: 2019-05-11

## 2019-05-11 RX ORDER — PREDNISONE 10 MG/1
TABLET ORAL
Qty: 30 TABLET | Refills: 0 | Status: ON HOLD | OUTPATIENT
Start: 2019-05-11 | End: 2019-05-14 | Stop reason: HOSPADM

## 2019-05-11 RX ORDER — HYDROCODONE BITARTRATE AND ACETAMINOPHEN 5; 325 MG/1; MG/1
1 TABLET ORAL EVERY 6 HOURS PRN
Qty: 20 TABLET | Refills: 0 | Status: SHIPPED | OUTPATIENT
Start: 2019-05-11 | End: 2019-05-11

## 2019-05-11 RX ORDER — AZITHROMYCIN 250 MG/1
TABLET, FILM COATED ORAL
Qty: 6 TABLET | Refills: 0 | Status: ON HOLD | OUTPATIENT
Start: 2019-05-11 | End: 2019-05-14 | Stop reason: HOSPADM

## 2019-05-11 RX ORDER — IPRATROPIUM BROMIDE AND ALBUTEROL SULFATE 2.5; .5 MG/3ML; MG/3ML
1 SOLUTION RESPIRATORY (INHALATION) ONCE
Status: COMPLETED | OUTPATIENT
Start: 2019-05-11 | End: 2019-05-11

## 2019-05-11 RX ORDER — TAMSULOSIN HYDROCHLORIDE 0.4 MG/1
0.4 CAPSULE ORAL DAILY
Qty: 5 CAPSULE | Refills: 0 | Status: SHIPPED | OUTPATIENT
Start: 2019-05-11 | End: 2019-05-11

## 2019-05-11 RX ADMIN — IPRATROPIUM BROMIDE AND ALBUTEROL SULFATE 1 AMPULE: .5; 3 SOLUTION RESPIRATORY (INHALATION) at 22:18

## 2019-05-11 ASSESSMENT — ENCOUNTER SYMPTOMS
SHORTNESS OF BREATH: 1
PHOTOPHOBIA: 0
ABDOMINAL DISTENTION: 0
SORE THROAT: 0
EYE DISCHARGE: 0
VOMITING: 0
WHEEZING: 0
COUGH: 1
ABDOMINAL PAIN: 0
CHEST TIGHTNESS: 0

## 2019-05-12 ENCOUNTER — HOSPITAL ENCOUNTER (INPATIENT)
Age: 74
LOS: 1 days | Discharge: HOME | DRG: 191 | End: 2019-05-14
Attending: EMERGENCY MEDICINE | Admitting: INTERNAL MEDICINE
Payer: MEDICARE

## 2019-05-12 VITALS
WEIGHT: 138 LBS | TEMPERATURE: 98.4 F | HEART RATE: 85 BPM | OXYGEN SATURATION: 98 % | HEIGHT: 61 IN | DIASTOLIC BLOOD PRESSURE: 72 MMHG | SYSTOLIC BLOOD PRESSURE: 133 MMHG | BODY MASS INDEX: 26.06 KG/M2 | RESPIRATION RATE: 20 BRPM

## 2019-05-12 DIAGNOSIS — J44.1 COPD EXACERBATION (HCC): Primary | ICD-10-CM

## 2019-05-12 LAB
ALBUMIN SERPL-MCNC: 4.1 G/DL (ref 3.5–4.6)
ALP BLD-CCNC: 64 U/L (ref 40–130)
ALT SERPL-CCNC: 11 U/L (ref 0–33)
ANION GAP SERPL CALCULATED.3IONS-SCNC: 19 MEQ/L (ref 9–15)
AST SERPL-CCNC: 19 U/L (ref 0–35)
BASOPHILS ABSOLUTE: 0 K/UL (ref 0–0.2)
BASOPHILS RELATIVE PERCENT: 0.4 %
BILIRUB SERPL-MCNC: <0.2 MG/DL (ref 0.2–0.7)
BUN BLDV-MCNC: 10 MG/DL (ref 8–23)
CALCIUM SERPL-MCNC: 9.7 MG/DL (ref 8.5–9.9)
CHLORIDE BLD-SCNC: 97 MEQ/L (ref 95–107)
CO2: 25 MEQ/L (ref 20–31)
CREAT SERPL-MCNC: 0.76 MG/DL (ref 0.5–0.9)
EOSINOPHILS ABSOLUTE: 0 K/UL (ref 0–0.7)
EOSINOPHILS RELATIVE PERCENT: 0.1 %
GFR AFRICAN AMERICAN: >60
GFR NON-AFRICAN AMERICAN: >60
GLOBULIN: 2.5 G/DL (ref 2.3–3.5)
GLUCOSE BLD-MCNC: 234 MG/DL (ref 70–99)
HCT VFR BLD CALC: 39.6 % (ref 37–47)
HEMOGLOBIN: 12.9 G/DL (ref 12–16)
LYMPHOCYTES ABSOLUTE: 0.4 K/UL (ref 1–4.8)
LYMPHOCYTES RELATIVE PERCENT: 6 %
MCH RBC QN AUTO: 30.5 PG (ref 27–31.3)
MCHC RBC AUTO-ENTMCNC: 32.6 % (ref 33–37)
MCV RBC AUTO: 93.6 FL (ref 82–100)
MONOCYTES ABSOLUTE: 0 K/UL (ref 0.2–0.8)
MONOCYTES RELATIVE PERCENT: 0.5 %
NEUTROPHILS ABSOLUTE: 6 K/UL (ref 1.4–6.5)
NEUTROPHILS RELATIVE PERCENT: 93 %
PDW BLD-RTO: 14.1 % (ref 11.5–14.5)
PLATELET # BLD: 248 K/UL (ref 130–400)
POTASSIUM REFLEX MAGNESIUM: 4.4 MEQ/L (ref 3.4–4.9)
RBC # BLD: 4.23 M/UL (ref 4.2–5.4)
SODIUM BLD-SCNC: 141 MEQ/L (ref 135–144)
TOTAL PROTEIN: 6.6 G/DL (ref 6.3–8)
WBC # BLD: 6.4 K/UL (ref 4.8–10.8)

## 2019-05-12 PROCEDURE — G0378 HOSPITAL OBSERVATION PER HR: HCPCS

## 2019-05-12 PROCEDURE — 96374 THER/PROPH/DIAG INJ IV PUSH: CPT

## 2019-05-12 PROCEDURE — 6370000000 HC RX 637 (ALT 250 FOR IP): Performed by: INTERNAL MEDICINE

## 2019-05-12 PROCEDURE — 6360000002 HC RX W HCPCS: Performed by: EMERGENCY MEDICINE

## 2019-05-12 PROCEDURE — 2580000003 HC RX 258: Performed by: INTERNAL MEDICINE

## 2019-05-12 PROCEDURE — 6360000002 HC RX W HCPCS: Performed by: INTERNAL MEDICINE

## 2019-05-12 PROCEDURE — 80053 COMPREHEN METABOLIC PANEL: CPT

## 2019-05-12 PROCEDURE — 96375 TX/PRO/DX INJ NEW DRUG ADDON: CPT

## 2019-05-12 PROCEDURE — 85025 COMPLETE CBC W/AUTO DIFF WBC: CPT

## 2019-05-12 PROCEDURE — 94664 DEMO&/EVAL PT USE INHALER: CPT

## 2019-05-12 PROCEDURE — 6370000000 HC RX 637 (ALT 250 FOR IP): Performed by: FAMILY MEDICINE

## 2019-05-12 PROCEDURE — 96376 TX/PRO/DX INJ SAME DRUG ADON: CPT

## 2019-05-12 PROCEDURE — 94760 N-INVAS EAR/PLS OXIMETRY 1: CPT

## 2019-05-12 PROCEDURE — 36415 COLL VENOUS BLD VENIPUNCTURE: CPT

## 2019-05-12 PROCEDURE — 96372 THER/PROPH/DIAG INJ SC/IM: CPT

## 2019-05-12 PROCEDURE — 99285 EMERGENCY DEPT VISIT HI MDM: CPT

## 2019-05-12 PROCEDURE — 2500000003 HC RX 250 WO HCPCS: Performed by: INTERNAL MEDICINE

## 2019-05-12 PROCEDURE — 96365 THER/PROPH/DIAG IV INF INIT: CPT

## 2019-05-12 PROCEDURE — 94640 AIRWAY INHALATION TREATMENT: CPT

## 2019-05-12 PROCEDURE — 6370000000 HC RX 637 (ALT 250 FOR IP): Performed by: EMERGENCY MEDICINE

## 2019-05-12 PROCEDURE — 99223 1ST HOSP IP/OBS HIGH 75: CPT | Performed by: INTERNAL MEDICINE

## 2019-05-12 RX ORDER — TRAZODONE HYDROCHLORIDE 150 MG/1
75 TABLET ORAL NIGHTLY
Status: DISCONTINUED | OUTPATIENT
Start: 2019-05-13 | End: 2019-05-14 | Stop reason: HOSPADM

## 2019-05-12 RX ORDER — LEVOTHYROXINE SODIUM 0.07 MG/1
75 TABLET ORAL DAILY
Status: DISCONTINUED | OUTPATIENT
Start: 2019-05-12 | End: 2019-05-14 | Stop reason: HOSPADM

## 2019-05-12 RX ORDER — METOPROLOL TARTRATE 50 MG/1
50 TABLET, FILM COATED ORAL 2 TIMES DAILY
Status: DISCONTINUED | OUTPATIENT
Start: 2019-05-12 | End: 2019-05-14 | Stop reason: HOSPADM

## 2019-05-12 RX ORDER — ONDANSETRON 2 MG/ML
4 INJECTION INTRAMUSCULAR; INTRAVENOUS EVERY 6 HOURS PRN
Status: DISCONTINUED | OUTPATIENT
Start: 2019-05-12 | End: 2019-05-14 | Stop reason: HOSPADM

## 2019-05-12 RX ORDER — IPRATROPIUM BROMIDE AND ALBUTEROL SULFATE 2.5; .5 MG/3ML; MG/3ML
1 SOLUTION RESPIRATORY (INHALATION)
Status: DISCONTINUED | OUTPATIENT
Start: 2019-05-12 | End: 2019-05-12

## 2019-05-12 RX ORDER — ALPRAZOLAM 0.25 MG/1
0.25 TABLET ORAL NIGHTLY PRN
Status: DISCONTINUED | OUTPATIENT
Start: 2019-05-13 | End: 2019-05-14 | Stop reason: HOSPADM

## 2019-05-12 RX ORDER — SODIUM CHLORIDE 9 MG/ML
INJECTION, SOLUTION INTRAVENOUS CONTINUOUS
Status: DISCONTINUED | OUTPATIENT
Start: 2019-05-12 | End: 2019-05-13

## 2019-05-12 RX ORDER — GUAIFENESIN 600 MG/1
1200 TABLET, EXTENDED RELEASE ORAL 2 TIMES DAILY
Status: DISCONTINUED | OUTPATIENT
Start: 2019-05-12 | End: 2019-05-14 | Stop reason: HOSPADM

## 2019-05-12 RX ORDER — IPRATROPIUM BROMIDE AND ALBUTEROL SULFATE 2.5; .5 MG/3ML; MG/3ML
1 SOLUTION RESPIRATORY (INHALATION) 3 TIMES DAILY
Status: DISCONTINUED | OUTPATIENT
Start: 2019-05-12 | End: 2019-05-13

## 2019-05-12 RX ORDER — GUAIFENESIN 600 MG/1
1200 TABLET, EXTENDED RELEASE ORAL 2 TIMES DAILY
COMMUNITY

## 2019-05-12 RX ORDER — SODIUM CHLORIDE 0.9 % (FLUSH) 0.9 %
10 SYRINGE (ML) INJECTION EVERY 12 HOURS SCHEDULED
Status: DISCONTINUED | OUTPATIENT
Start: 2019-05-12 | End: 2019-05-14 | Stop reason: HOSPADM

## 2019-05-12 RX ORDER — METHYLPREDNISOLONE SODIUM SUCCINATE 40 MG/ML
40 INJECTION, POWDER, LYOPHILIZED, FOR SOLUTION INTRAMUSCULAR; INTRAVENOUS EVERY 8 HOURS
Status: DISCONTINUED | OUTPATIENT
Start: 2019-05-12 | End: 2019-05-13

## 2019-05-12 RX ORDER — ACETAMINOPHEN 325 MG/1
650 TABLET ORAL EVERY 4 HOURS PRN
Status: DISCONTINUED | OUTPATIENT
Start: 2019-05-12 | End: 2019-05-14 | Stop reason: HOSPADM

## 2019-05-12 RX ORDER — SODIUM CHLORIDE 0.9 % (FLUSH) 0.9 %
10 SYRINGE (ML) INJECTION PRN
Status: DISCONTINUED | OUTPATIENT
Start: 2019-05-12 | End: 2019-05-14 | Stop reason: HOSPADM

## 2019-05-12 RX ORDER — ALBUTEROL SULFATE 2.5 MG/3ML
2.5 SOLUTION RESPIRATORY (INHALATION)
Status: DISCONTINUED | OUTPATIENT
Start: 2019-05-12 | End: 2019-05-14 | Stop reason: HOSPADM

## 2019-05-12 RX ORDER — IPRATROPIUM BROMIDE AND ALBUTEROL SULFATE 2.5; .5 MG/3ML; MG/3ML
1 SOLUTION RESPIRATORY (INHALATION) 3 TIMES DAILY
Status: DISCONTINUED | OUTPATIENT
Start: 2019-05-12 | End: 2019-05-12

## 2019-05-12 RX ORDER — METHYLPREDNISOLONE SODIUM SUCCINATE 125 MG/2ML
125 INJECTION, POWDER, LYOPHILIZED, FOR SOLUTION INTRAMUSCULAR; INTRAVENOUS ONCE
Status: COMPLETED | OUTPATIENT
Start: 2019-05-12 | End: 2019-05-12

## 2019-05-12 RX ADMIN — IPRATROPIUM BROMIDE AND ALBUTEROL SULFATE 1 AMPULE: .5; 3 SOLUTION RESPIRATORY (INHALATION) at 13:17

## 2019-05-12 RX ADMIN — METHYLPREDNISOLONE SODIUM SUCCINATE 125 MG: 125 INJECTION, POWDER, FOR SOLUTION INTRAMUSCULAR; INTRAVENOUS at 02:06

## 2019-05-12 RX ADMIN — AZITHROMYCIN MONOHYDRATE 500 MG: 500 TABLET ORAL at 00:05

## 2019-05-12 RX ADMIN — GUAIFENESIN 1200 MG: 600 TABLET, EXTENDED RELEASE ORAL at 10:22

## 2019-05-12 RX ADMIN — Medication 10 ML: at 08:00

## 2019-05-12 RX ADMIN — METOPROLOL TARTRATE 50 MG: 50 TABLET ORAL at 21:28

## 2019-05-12 RX ADMIN — SODIUM CHLORIDE 75 ML/HR: 9 INJECTION, SOLUTION INTRAVENOUS at 20:22

## 2019-05-12 RX ADMIN — METHYLPREDNISOLONE SODIUM SUCCINATE 40 MG: 40 INJECTION, POWDER, FOR SOLUTION INTRAMUSCULAR; INTRAVENOUS at 15:50

## 2019-05-12 RX ADMIN — IPRATROPIUM BROMIDE AND ALBUTEROL SULFATE 1 AMPULE: .5; 3 SOLUTION RESPIRATORY (INHALATION) at 07:23

## 2019-05-12 RX ADMIN — PREDNISONE 20 MG: 20 TABLET ORAL at 00:06

## 2019-05-12 RX ADMIN — Medication 10 ML: at 21:30

## 2019-05-12 RX ADMIN — IPRATROPIUM BROMIDE AND ALBUTEROL SULFATE 1 AMPULE: .5; 3 SOLUTION RESPIRATORY (INHALATION) at 20:00

## 2019-05-12 RX ADMIN — FAMOTIDINE 20 MG: 10 INJECTION, SOLUTION INTRAVENOUS at 21:28

## 2019-05-12 RX ADMIN — METOPROLOL TARTRATE 50 MG: 50 TABLET ORAL at 07:53

## 2019-05-12 RX ADMIN — SODIUM CHLORIDE: 9 INJECTION, SOLUTION INTRAVENOUS at 05:42

## 2019-05-12 RX ADMIN — FAMOTIDINE 20 MG: 10 INJECTION, SOLUTION INTRAVENOUS at 07:53

## 2019-05-12 RX ADMIN — LEVOTHYROXINE SODIUM 75 MCG: 75 TABLET ORAL at 05:42

## 2019-05-12 RX ADMIN — ACETAMINOPHEN 650 MG: 325 TABLET ORAL at 17:30

## 2019-05-12 RX ADMIN — METHYLPREDNISOLONE SODIUM SUCCINATE 40 MG: 40 INJECTION, POWDER, FOR SOLUTION INTRAMUSCULAR; INTRAVENOUS at 23:59

## 2019-05-12 RX ADMIN — METHYLPREDNISOLONE SODIUM SUCCINATE 40 MG: 40 INJECTION, POWDER, FOR SOLUTION INTRAMUSCULAR; INTRAVENOUS at 07:52

## 2019-05-12 RX ADMIN — GUAIFENESIN 1200 MG: 600 TABLET, EXTENDED RELEASE ORAL at 21:28

## 2019-05-12 RX ADMIN — AZITHROMYCIN MONOHYDRATE 500 MG: 500 INJECTION, POWDER, LYOPHILIZED, FOR SOLUTION INTRAVENOUS at 23:59

## 2019-05-12 RX ADMIN — ENOXAPARIN SODIUM 40 MG: 40 INJECTION SUBCUTANEOUS at 07:53

## 2019-05-12 ASSESSMENT — PAIN DESCRIPTION - PAIN TYPE: TYPE: ACUTE PAIN

## 2019-05-12 ASSESSMENT — PAIN SCALES - GENERAL
PAINLEVEL_OUTOF10: 8
PAINLEVEL_OUTOF10: 0
PAINLEVEL_OUTOF10: 3

## 2019-05-12 ASSESSMENT — ENCOUNTER SYMPTOMS
VOMITING: 0
EYE DISCHARGE: 0
ABDOMINAL PAIN: 0
CHEST TIGHTNESS: 0
WHEEZING: 0
ABDOMINAL DISTENTION: 0
COUGH: 0
SHORTNESS OF BREATH: 1
PHOTOPHOBIA: 0
TROUBLE SWALLOWING: 0
SORE THROAT: 0

## 2019-05-12 ASSESSMENT — PAIN DESCRIPTION - LOCATION: LOCATION: HEAD

## 2019-05-12 NOTE — FLOWSHEET NOTE
Pt resting in bed. 3L NC. Pt states she's unable to cough up anything but feels like the mucus is stuck in her throat. Gave pt incentive spoirometer. No edema. ST on tele. A&O x4. SL #22 in L hand F&P. Denies pain, N/V. Up with steady gait. Call light in reach.

## 2019-05-12 NOTE — CONSULTS
breast lumpectomy and left snb    OTHER SURGICAL HISTORY  4/11/14    Placement drain, left axillary seroma    CA MASTECTOMY, SIMPLE, COMPLETE Right 9/6/2018    R modified radical mastectomy       Social History:     reports that she quit smoking about 30 years ago. Her smoking use included cigarettes. She has a 60.00 pack-year smoking history. She has never used smokeless tobacco. She reports that she does not drink alcohol or use drugs. Family History:       Problem Relation Age of Onset    Cancer Sister         breast    Cancer Maternal Uncle         pancreatic       Allergies:  Patient has no known allergies. MEDICATIONS during current hospitalization:    Continuous Infusions:   sodium chloride 75 mL/hr at 05/12/19 0542       Scheduled Meds:   sodium chloride flush  10 mL Intravenous 2 times per day    enoxaparin  40 mg Subcutaneous Daily    famotidine (PEPCID) injection  20 mg Intravenous BID    methylPREDNISolone  40 mg Intravenous Q8H    [START ON 5/13/2019] azithromycin  500 mg Intravenous Q24H    levothyroxine  75 mcg Oral Daily    metoprolol tartrate  50 mg Oral BID    ipratropium-albuterol  1 ampule Inhalation TID    guaiFENesin  1,200 mg Oral BID       PRN Meds:sodium chloride flush, magnesium hydroxide, ondansetron, acetaminophen, albuterol    REVIEW OF SYSTEMS:  As in history of present illness  Other 14 point review of system is negative. PHYSICAL EXAM:    Vitals:  BP (!) 184/86   Pulse 125   Temp 97.8 °F (36.6 °C)   Resp 16   Ht 5' 3\" (1.6 m)   Wt 138 lb (62.6 kg)   LMP  (LMP Unknown)   SpO2 96%   BMI 24.45 kg/m²   General:  Alert, awake, Oriented x3  .comfortable in bed, No distress. Head: Atraumatic , Normocephalic   Eyes: PERRL. No sclera icterus. No conjunctival injection. No discharge   ENT: No nasal  discharge. Pharynx clear. Neck:  Trachea midline. No thyromegaly, no JVD, No cervical adenopathy.   Chest : Bilaterally symmetrical ,Normal effort,  No accessory muscle use  Lung : Diminished BS bilateraly. No Rales. Faint expiratory wheezing. No rhonchi. Heart[de-identified] Normal  rate. Regular rhythm. No mumur ,  Rub or gallop  ABD: Non-tender. Non-distended. No masses. No organmegaly. Normal bowel sounds. No hernia. Musculoskeleton: normal range of motion in all extremites, strength and tone   Extremities: No pitting in both lower leg , No Cyanosis ,No clubbing  Neuro: no cranial nerve abnormality, normal reflex and sensation, no focal weakness   Skin: Warm and dry. No erythema rash on exposed extremities. Data Review  Recent Labs     05/11/19 2231 05/12/19  0609   WBC 7.6 6.4   HGB 13.0 12.9   HCT 39.4 39.6    248      Recent Labs     05/11/19 2231 05/12/19  0609    141   K 4.2 4.4   CL 95 97   CO2 26 25   BUN 11 10   CREATININE 0.69 0.76   GLUCOSE 101* 234*       MV Settings: ABGs: No results for input(s): PHART, PVX3SSN, PO2ART, YUT0NYL, BEART, T2PKWYGU, WFX9EZN in the last 72 hours. O2 Device: Nasal cannula  O2 Flow Rate (L/min): 3 L/min  Lab Results   Component Value Date    LACTA 1.6 11/07/2018    LACTA 2.8 11/05/2018    LACTA 0.8 08/18/2018       Radiology  Xr Chest Standard (2 Vw)    Result Date: 5/12/2019  EXAMINATION: Chest x-ray, 2 view HISTORY: Shortness of breath. Cough. TECHNIQUE: Frontal and lateral views of the chest COMPARISON: Chest radiograph from January 18, 2019 FINDINGS: Atherosclerotic calcification of the thoracic aorta. Cardiomediastinal silhouette is within normal limits. Lungs are hyperinflated. Coarsening of the pulmonary interstitium. No pneumothorax, pleural effusion, or focal consolidation. Degenerative changes of the spine. Bilateral axillary clips noted. No acute intrathoracic process. Findings compatible with COPD. Assessment and  plan:     1. Acute COPD exacerbation secondary to bronchitis  2. Chronic hypoxia on home O2  3. Chronic exertional shortness of breath  4. Hypertension  5.  Anxiety    Patient

## 2019-05-12 NOTE — ED TRIAGE NOTES
Patient c/o difficulty breathing, she feels like there is phlegm stuck in her throat and states she is \"breathing around it\", she denies pain, she wears 2-3l home o2, she is 100% on 3l, she was seen in er tonight and discharged with z-pack and prednisone. Patient is calm and taking in complete sentences at this time, she said that when she coughs it moves it a little and then she feels like it blocks her airway.

## 2019-05-12 NOTE — PROGRESS NOTES
INPATIENT PROGRESS NOTE    SERVICE DATE:  5/12/2019   SERVICE TIME:  3:38 PM      SUBJECTIVE    INTERVAL HPI: 66year old female who has been sleeping most of the afternoon. Arouses easily. Feels better.  Denies cp prod cough ha rash anx n v d f    MEDICATIONS:    Current Facility-Administered Medications   Medication Dose Route Frequency Provider Last Rate Last Dose    sodium chloride flush 0.9 % injection 10 mL  10 mL Intravenous 2 times per day Janie Villela MD   10 mL at 05/12/19 0800    sodium chloride flush 0.9 % injection 10 mL  10 mL Intravenous PRN Janie Villela MD        magnesium hydroxide (MILK OF MAGNESIA) 400 MG/5ML suspension 30 mL  30 mL Oral Daily PRN Janie Villela MD        ondansetron [facility]) injection 4 mg  4 mg Intravenous Q6H PRN Janie Villela MD        enoxaparin (LOVENOX) injection 40 mg  40 mg Subcutaneous Daily Janie Villela MD   40 mg at 05/12/19 0753    famotidine (PEPCID) injection 20 mg  20 mg Intravenous BID Janie Villela MD   20 mg at 05/12/19 0753    acetaminophen (TYLENOL) tablet 650 mg  650 mg Oral Q4H PRN Janie Villela MD        methylPREDNISolone sodium (SOLU-MEDROL) injection 40 mg  40 mg Intravenous Q8H Janie Villela MD   40 mg at 05/12/19 0752    [START ON 5/13/2019] azithromycin (ZITHROMAX) 500 mg in D5W 250ml addavial  500 mg Intravenous Q24H Janie Villela MD        levothyroxine (SYNTHROID) tablet 75 mcg  75 mcg Oral Daily Janie Villela MD   75 mcg at 05/12/19 0542    metoprolol tartrate (LOPRESSOR) tablet 50 mg  50 mg Oral BID Janie Villela MD   50 mg at 05/12/19 0753    0.9 % sodium chloride infusion   Intravenous Continuous Janie Villela MD 75 mL/hr at 05/12/19 0542      ipratropium-albuterol (DUONEB) nebulizer solution 1 ampule  1 ampule Inhalation TID Janie Villela MD   1 ampule at 05/12/19 1317    albuterol (PROVENTIL) nebulizer solution 2.5 mg  2.5 mg Nebulization Q2H PRN MD [facility] extended release tablet 1,200 mg  1,200 mg Oral BID Michela Burns MD   1,200 mg at 05/12/19 1022       OBJECTIVE  PHYSICAL EXAM:   /68   Pulse 105   Temp 98 °F (36.7 °C) (Oral)   Resp 16   Ht 5' 3\" (1.6 m)   Wt 138 lb (62.6 kg)   LMP  (LMP Unknown)   SpO2 96%   BMI 24.45 kg/m²   Body mass index is 24.45 kg/m². CONSTITUTIONAL:  awake, alert, cooperative, no apparent distress, and appears stated age  NECK:  Supple, symmetrical, trachea midline, no adenopathy, thyroid symmetric, not enlarged and no tenderness, skin normal  BACK:  Symmetric, no curvature, spinous processes are non-tender on palpation, paraspinous muscles are non-tender on palpation, no costal vertebral tenderness  LUNGS:  No increased work of breathing, good air exchange. Expiratory wheezing  CARDIOVASCULAR:  Normal apical impulse, regular rate and rhythm, normal S1 and S2, no S3 or S4, and no murmur noted  ABDOMEN:  No scars, normal bowel sounds, soft, non-distended, non-tender, no masses palpated, no hepatosplenomegally  MUSCULOSKELETAL:  There is no redness, warmth, or swelling of the joints. Full range of motion noted. Motor strength is 5 out of 5 all extremities bilaterally. Tone is normal.  NEUROLOGIC:  Awake, alert, oriented to name, place and time. Cranial nerves II-XII are grossly intact. Motor is 5 out of 5 bilaterally. Cerebellar finger to nose, heel to shin intact. Sensory is intact.   Babinski down going, Romberg negative, and gait is normal.  SKIN:  no bruising or bleeding, no lesions and jaundice noted    DATA:     Recent Results (from the past 24 hour(s))   Comprehensive Metabolic Panel    Collection Time: 05/11/19 10:31 PM   Result Value Ref Range    Sodium 136 135 - 144 mEq/L    Potassium 4.2 3.4 - 4.9 mEq/L    Chloride 95 95 - 107 mEq/L    CO2 26 20 - 31 mEq/L    Anion Gap 15 9 - 15 mEq/L    Glucose 101 (H) 70 - 99 mg/dL    BUN 11 8 - 23 mg/dL    CREATININE 0.69 0.50 - 0.90 mg/dL    GFR Non-African American >60.0 >60    GFR  >60.0 >60    Calcium 9.7 8.5 - 9.9 mg/dL    Total Protein 6.7 6.3 - 8.0 g/dL    Alb 4.1 3.5 - 4.6 g/dL    Total Bilirubin 0.3 0.2 - 0.7 mg/dL    Alkaline Phosphatase 63 40 - 130 U/L    ALT 11 0 - 33 U/L    AST 20 0 - 35 U/L    Globulin 2.6 2.3 - 3.5 g/dL   CBC Auto Differential    Collection Time: 05/11/19 10:31 PM   Result Value Ref Range    WBC 7.6 4.8 - 10.8 K/uL    RBC 4.24 4.20 - 5.40 M/uL    Hemoglobin 13.0 12.0 - 16.0 g/dL    Hematocrit 39.4 37.0 - 47.0 %    MCV 93.1 82.0 - 100.0 fL    MCH 30.6 27.0 - 31.3 pg    MCHC 32.8 (L) 33.0 - 37.0 %    RDW 14.0 11.5 - 14.5 %    Platelets 159 486 - 387 K/uL    Neutrophils % 63.5 %    Lymphocytes % 24.0 %    Monocytes % 8.6 %    Eosinophils % 2.8 %    Basophils % 1.1 %    Neutrophils # 4.8 1.4 - 6.5 K/uL    Lymphocytes # 1.8 1.0 - 4.8 K/uL    Monocytes # 0.7 0.2 - 0.8 K/uL    Eosinophils # 0.2 0.0 - 0.7 K/uL    Basophils # 0.1 0.0 - 0.2 K/uL   CBC auto differential    Collection Time: 05/12/19  6:09 AM   Result Value Ref Range    WBC 6.4 4.8 - 10.8 K/uL    RBC 4.23 4.20 - 5.40 M/uL    Hemoglobin 12.9 12.0 - 16.0 g/dL    Hematocrit 39.6 37.0 - 47.0 %    MCV 93.6 82.0 - 100.0 fL    MCH 30.5 27.0 - 31.3 pg    MCHC 32.6 (L) 33.0 - 37.0 %    RDW 14.1 11.5 - 14.5 %    Platelets 386 516 - 648 K/uL    Neutrophils % 93.0 %    Lymphocytes % 6.0 %    Monocytes % 0.5 %    Eosinophils % 0.1 %    Basophils % 0.4 %    Neutrophils # 6.0 1.4 - 6.5 K/uL    Lymphocytes # 0.4 (L) 1.0 - 4.8 K/uL    Monocytes # 0.0 (L) 0.2 - 0.8 K/uL    Eosinophils # 0.0 0.0 - 0.7 K/uL    Basophils # 0.0 0.0 - 0.2 K/uL   Comprehensive Metabolic Panel w/ Reflex to MG    Collection Time: 05/12/19  6:09 AM   Result Value Ref Range    Sodium 141 135 - 144 mEq/L    Potassium reflex Magnesium 4.4 3.4 - 4.9 mEq/L    Chloride 97 95 - 107 mEq/L    CO2 25 20 - 31 mEq/L    Anion Gap 19 (H) 9 - 15 mEq/L    Glucose 234 (H) 70 - 99 mg/dL    BUN 10 8 - 23 mg/dL    CREATININE 0.76 0.50 - 0.90 mg/dL    GFR Non- >60.0 >60    GFR  >60.0 >60    Calcium 9.7 8.5 - 9.9 mg/dL    Total Protein 6.6 6.3 - 8.0 g/dL    Alb 4.1 3.5 - 4.6 g/dL    Total Bilirubin <0.2 0.2 - 0.7 mg/dL    Alkaline Phosphatase 64 40 - 130 U/L    ALT 11 0 - 33 U/L    AST 19 0 - 35 U/L    Globulin 2.5 2.3 - 3.5 g/dL       ASSESSMENT AND PLAN   1.  AE COPD-continue iv steroids, oxygen and aerosol tx  2.   Sob-improved      SIGNATURE: Haider An PATIENT NAME: Margareth Cast   DATE: May 12, 2019 MRN: [id number]   TIME: 3:38 PM PAGER: [phone]     Dr. Hugh Sloan, [address] [place] [address] Physician

## 2019-05-12 NOTE — ED PROVIDER NOTES
self-injury. All other systems reviewed and are negative. Except as noted above the remainder of the review of systems was reviewed and negative.        PAST MEDICAL HISTORY     Past Medical History:   Diagnosis Date    Anxiety     Anxiety and depression     CAD (coronary artery disease)     Cancer ([facility] [address] 3/2014    Invasive ductal cancer left breast    Carotid artery stenosis and occlusion     COPD (chronic obstructive pulmonary disease) ([facility] [address]     Depression 1/15/2018    GERD (gastroesophageal reflux disease)     Headache(784.0)     Hyperlipidemia     Hypertension     Hypothyroidism     Insomnia     Osteoarthritis     RBBB 10/15/2014    Right carotid bruit 5/26/2015    S/P cardiac catheterization 7/13/2016         SURGICALHISTORY       Past Surgical History:   Procedure Laterality Date    BREAST BIOPSY Right 11/8/2016    US biopsy    BREAST SURGERY Left 2/26/14    U/S Guided core bx of the left breast    BREAST SURGERY Right 3/20/14    U/S of the lateral right breast    BREAST SURGERY Left 3/27/14    U/S Guided Left breast lumpectomy and left snb    OTHER SURGICAL HISTORY  4/11/14    Placement drain, left axillary seroma    VA MASTECTOMY, SIMPLE, COMPLETE Right 9/6/2018    R modified radical mastectomy         CURRENT MEDICATIONS       Previous Medications    ALBUTEROL SULFATE HFA (PROAIR HFA) 108 (90 BASE) MCG/ACT INHALER    Inhale 2 puffs into the lungs every 6 hours as needed for Wheezing or Shortness of Breath    AZITHROMYCIN (ZITHROMAX) 250 MG TABLET    2 TABS DAY 1 THEN 1 TAB DAYS 2-5    BUDESONIDE-FORMOTEROL (SYMBICORT) 160-4.5 MCG/ACT AERO    Inhale 2 puffs into the lungs 2 times daily    IPRATROPIUM-ALBUTEROL (DUONEB) 0.5-2.5 (3) MG/3ML SOLN NEBULIZER SOLUTION    Inhale 3 mLs into the lungs every 6 hours as needed for Shortness of Breath    LETROZOLE (FEMARA) 2.5 MG TABLET    Take 1 tablet by mouth daily    LEVOTHYROXINE (SYNTHROID) 75 MCG TABLET    Take 1 tablet by mouth Daily LEVOTHYROXINE (SYNTHROID) 75 MCG TABLET    Take 1 tablet by mouth Daily    LOVASTATIN (MEVACOR) 20 MG TABLET    TAKE 1 TABLET NIGHTLY    METOPROLOL TARTRATE (LOPRESSOR) 25 MG TABLET    Take 1 tablet by mouth 2 times daily    OMEPRAZOLE (PRILOSEC) 20 MG DELAYED RELEASE CAPSULE    TAKE 1 CAPSULE DAILY    PAROXETINE (PAXIL) 20 MG TABLET    Take 1 tablet by mouth daily    PREDNISONE (DELTASONE) 10 MG TABLET    5 tabs po qam for 2 days then 4,3,2,1 tabs qam for 2 days each total of 10 days    RESPIRATORY THERAPY SUPPLIES (NEBULIZER/TUBING/MOUTHPIECE) KIT    1 kit by Does not apply route daily as needed (wheezing)    SPIRIVA HANDIHALER 18 MCG INHALATION CAPSULE    INHALE THE CONTENTS OF 1 CAPSULE DAILY    THEOPHYLLINE (UNIPHYL) 400 MG EXTENDED RELEASE TABLET    TAKE 1 TABLET DAILY    TRAZODONE (DESYREL) 150 MG TABLET    TAKE 1 TABLET NIGHTLY    VITAMIN D (ERGOCALCIFEROL) 92901 UNITS CAPS CAPSULE    TAKE 1 CAPSULE ONCE A WEEK       ALLERGIES     Patient has no known allergies. FAMILY HISTORY       Family History   Problem Relation Age of Onset    Cancer Sister         breast    Cancer Maternal Uncle         pancreatic          SOCIAL HISTORY       Social History     Socioeconomic History    Marital status:       Spouse name: None    Number of children: None    Years of education: None    Highest education level: None   Occupational History    None   Social Needs    Financial resource strain: None    Food insecurity:     Worry: None     Inability: None    Transportation needs:     Medical: None     Non-medical: None   Tobacco Use    Smoking status: Former Smoker     Packs/day: 1.50     Years: 40.00     Pack years: 60.00     Types: Cigarettes     Last attempt to quit: 1989     Years since quittin.3    Smokeless tobacco: Never Used   Substance and Sexual Activity    Alcohol use: No    Drug use: No    Sexual activity: Never   Lifestyle    Physical activity:     Days per week: None read by the radiologist. Plain radiographic images are visualized and preliminarily interpreted by the emergency physician with the below findings:        Interpretation per the Radiologist below, if available at the time ofthis note:    No orders to display         ED BEDSIDE ULTRASOUND:   Performed by ED Physician - none    LABS:  Labs Reviewed - No data to display    All other labs were within normal range or not returned as of this dictation. EMERGENCY DEPARTMENT COURSE and DIFFERENTIAL DIAGNOSIS/MDM:   Vitals:    Vitals:    05/12/19 0125 05/12/19 0209   BP: 110/67 (!) 145/87   Pulse: 105 97   Resp: 18 20   Temp: 98.3 °F (36.8 °C)    TempSrc: Oral    SpO2: 100% 100%   Weight: 138 lb (62.6 kg)    Height: 5' 3\" (1.6 m)             MDM patient is normally O2 dependent on 2 L nasal cannula. She had a COPD exacerbation. Now with this tight feeling of her throat I feel she does need inpatient observation and perhaps ENT consultation. She is put on IV steroids and attempt to reduce the swelling feeling. She has no current stridor. So at this point don't think she needs any further imaging. CONSULTS:  None    PROCEDURES:  Unless otherwise noted below, none     Procedures    FINAL IMPRESSION      1. COPD exacerbation (HCC)          DISPOSITION/PLAN   DISPOSITION Decision To Admit 05/12/2019 02:17:17 AM      PATIENT REFERRED TO:  No follow-up provider specified.     DISCHARGE MEDICATIONS:  New Prescriptions    No medications on file          (Please note that portions of this note were completed with a voice recognition program.  Efforts were made to edit the dictations but occasionally words are mis-transcribed.)    Luciano Underwood MD (electronically signed)  Attending Emergency Physician          Luciano Underwood MD  05/12/19 0710

## 2019-05-12 NOTE — ED PROVIDER NOTES
[facility]  eMERGENCY dEPARTMENT eNCOUnter      Pt Name: Trena Russell  MRN: [id number]  [place] 1945  Date of evaluation: 5/11/2019  Provider: Berenice Bobo MD    CHIEF COMPLAINT       Chief Complaint   Patient presents with    Shortness of Breath         HISTORY OF PRESENT ILLNESS   (Location/Symptom, Timing/Onset,Context/Setting, Quality, Duration, Modifying Factors, Severity)  Note limiting factors. Trena Russell is a 66 y.o. female who presents to the emergency department for evaluation of shortness of breath. She describes cough with phlegm for the past 2 weeks. She has a history of COPD and wears 2 L of oxygen most of the time. She denies fever or chills. No related nausea vomiting or diarrhea. HPI    NursingNotes were reviewed. REVIEW OF SYSTEMS    (2-9 systems for level 4, 10 or more for level 5)     Review of Systems   Constitutional: Negative for chills and diaphoresis. HENT: Negative for congestion, ear pain, mouth sores and sore throat. Eyes: Negative for photophobia and discharge. Respiratory: Positive for cough and shortness of breath. Negative for chest tightness and wheezing. Cardiovascular: Negative for chest pain and palpitations. Gastrointestinal: Negative for abdominal distention, abdominal pain and vomiting. Endocrine: Negative for cold intolerance. Genitourinary: Negative for difficulty urinating. Musculoskeletal: Negative for arthralgias. Skin: Negative for pallor and rash. Allergic/Immunologic: Negative for immunocompromised state. Neurological: Negative for dizziness and syncope. Hematological: Negative for adenopathy. Psychiatric/Behavioral: Negative for agitation and hallucinations. The patient is not nervous/anxious. All other systems reviewed and are negative. Except as noted above the remainder of the review of systems was reviewed and negative.        PAST MEDICAL HISTORY     Past Medical History: Diagnosis Date    Anxiety     Anxiety and depression     CAD (coronary artery disease)     Cancer ([facility] [address] 3/2014    Invasive ductal cancer left breast    Carotid artery stenosis and occlusion     [facility] ([facility]     Depression 1/15/2018    GERD (gastroesophageal reflux disease)     Headache(784.0)     Hyperlipidemia     Hypertension     Hypothyroidism     Insomnia     Osteoarthritis     RBBB 10/15/2014    Right carotid bruit 5/26/2015    S/P cardiac catheterization 7/13/2016         SURGICALHISTORY       Past Surgical History:   Procedure Laterality Date    BREAST BIOPSY Right 11/8/2016    US biopsy    BREAST SURGERY Left 2/26/14    U/S Guided core bx of the left breast    BREAST SURGERY Right 3/20/14    U/S of the lateral right breast    BREAST SURGERY Left 3/27/14    U/S Guided Left breast lumpectomy and left snb    OTHER SURGICAL HISTORY  4/11/14    Placement drain, left axillary seroma    DC MASTECTOMY, SIMPLE, COMPLETE Right 9/6/2018    R modified radical mastectomy         CURRENT MEDICATIONS       Previous Medications    ALBUTEROL SULFATE HFA (PROAIR HFA) 108 (90 BASE) MCG/ACT INHALER    Inhale 2 puffs into the lungs every 6 hours as needed for Wheezing or Shortness of Breath    BUDESONIDE-FORMOTEROL (SYMBICORT) 160-4.5 MCG/ACT AERO    Inhale 2 puffs into the lungs 2 times daily    IPRATROPIUM-ALBUTEROL (DUONEB) 0.5-2.5 (3) MG/3ML SOLN NEBULIZER SOLUTION    Inhale 3 mLs into the lungs every 6 hours as needed for Shortness of Breath    LETROZOLE (FEMARA) 2.5 MG TABLET    Take 1 tablet by mouth daily    LEVOTHYROXINE (SYNTHROID) 75 MCG TABLET    Take 1 tablet by mouth Daily    LEVOTHYROXINE (SYNTHROID) 75 MCG TABLET    Take 1 tablet by mouth Daily    LOVASTATIN (MEVACOR) 20 MG TABLET    TAKE 1 TABLET NIGHTLY    METOPROLOL TARTRATE (LOPRESSOR) 25 MG TABLET    Take 1 tablet by mouth 2 times daily    OMEPRAZOLE (PRILOSEC) 20 MG DELAYED RELEASE CAPSULE Physically abused: None     Forced sexual activity: None   Other Topics Concern    None   Social History Narrative    None       SCREENINGS      @FLOW([id number])@      PHYSICAL EXAM    (up to 7 for level 4, 8 or more for level 5)     ED Triage Vitals   BP Temp Temp src Pulse Resp SpO2 Height Weight   -- -- -- -- -- -- -- --       Physical Exam   Constitutional: She is oriented to person, place, and time. She appears well-developed. HENT:   Head: Normocephalic. Nose: Nose normal.   Eyes: Pupils are equal, round, and reactive to light. Conjunctivae are normal. Right eye exhibits no discharge. Left eye exhibits no discharge. Neck: Normal range of motion. Neck supple. No JVD present. No tracheal deviation present. Cardiovascular: Normal rate, regular rhythm, normal heart sounds and intact distal pulses. Exam reveals no friction rub. No murmur heard. Pulmonary/Chest: Effort normal. No stridor. She has wheezes. She has no rales. Abdominal: Soft. Bowel sounds are normal. She exhibits no mass. There is no tenderness. There is no rebound and no guarding. No hernia. Musculoskeletal: Normal range of motion. Neurological: She is alert and oriented to person, place, and time. Skin: Skin is warm and dry. Capillary refill takes less than 2 seconds. Psychiatric: She has a normal mood and affect. Thought content normal.   Nursing note and vitals reviewed.       DIAGNOSTIC RESULTS     EKG: All EKG's are interpreted by the Emergency Department Physician who either signs or Co-signsthis chart in the absence of a cardiologist.        RADIOLOGY:   [facility] such as CT, Ultrasound and MRI are read by the radiologist. Plain radiographic images are visualized and preliminarily interpreted by the emergency physician with the below findings:    Chest x-ray is unremarkable    Interpretation per the Radiologist below, if available at the time ofthis note:    XR CHEST STANDARD (2 VW)    (Results Pending) ED BEDSIDE ULTRASOUND:   Performed by ED Physician - none    LABS:  Labs Reviewed   COMPREHENSIVE METABOLIC PANEL - Abnormal; Notable for the following components:       Result Value    Glucose 101 (*)     All other components within normal limits   CBC WITH AUTO DIFFERENTIAL - Abnormal; Notable for the following components:    MCHC 32.8 (*)     All other components within normal limits       All other labs were within normal range or not returned as of this dictation. EMERGENCY DEPARTMENT COURSE and DIFFERENTIAL DIAGNOSIS/MDM:   Vitals:    Vitals:    05/11/19 2213   BP: 134/86   Pulse: 96   Resp: 20   Temp: 98.7 °F (37.1 °C)   TempSrc: Oral   SpO2: 100%   Weight: 138 lb (62.6 kg)          MDM patient feeling better on recheck. Laboratory studies are normal.  Chest x-ray is unchanged. Symptoms are consistent with bronchitis. She is treated with oral antibiotics and discharged home      CONSULTS:  None    PROCEDURES:  Unless otherwise noted below, none     Procedures    FINAL IMPRESSION      1.  Bronchitis          DISPOSITION/PLAN   DISPOSITION Decision To Discharge 05/11/2019 11:42:16 PM      PATIENT REFERRED TO:  ROSALIND Woody - CNP  [facility] [address] [facility]  [phone]    In 3 days      Desi Fowler MD  [address]  [phone]    In 1 week        DISCHARGE MEDICATIONS:  New Prescriptions    AZITHROMYCIN (ZITHROMAX) 250 MG TABLET    2 TABS DAY 1 THEN 1 TAB DAYS 2-5    PREDNISONE (DELTASONE) 10 MG TABLET    5 tabs po qam for 2 days then 4,3,2,1 tabs qam for 2 days each total of 10 days          (Please note that portions of this note were completed with a voice recognition program.  Efforts were made to edit the dictations but occasionally words are mis-transcribed.)    Addison Stanford MD (electronically signed)  Attending Emergency Physician          Addison Stanford MD  05/11/19 8121       Addison Stanford MD  05/11/19 7835 Judi Stover MD  05/11/19 9602

## 2019-05-12 NOTE — ED NOTES
Discharge instructions given and reviewed. Patient verbalized understanding. Patient assisted via wheelchair to awaiting car. Patient home O2 used.      Tiff De Souza RN  05/12/19 6958

## 2019-05-12 NOTE — H&P
Medication Sig Start Date End Date Taking?  Authorizing Provider   azithromycin (ZITHROMAX) 250 MG tablet 2 TABS DAY 1 THEN 1 TAB DAYS 2-5 5/11/19 5/21/19 Yes Jeff Gaston MD   predniSONE (DELTASONE) 10 MG tablet 5 tabs po qam for 2 days then 4,3,2,1 tabs qam for 2 days each total of 10 days 5/11/19 5/21/19 Yes Jeff Gaston MD   traZODone (DESYREL) 150 MG tablet TAKE 1 TABLET NIGHTLY 4/29/19  Yes ROSALIND Llanos CNP   lovastatin (MEVACOR) 20 MG tablet TAKE 1 TABLET NIGHTLY 4/16/19  Yes ROSALIND Llanos CNP   budesonide-formoterol (SYMBICORT) 160-4.5 MCG/ACT AERO Inhale 2 puffs into the lungs 2 times daily 4/10/19  Yes Mt Ledesma MD   albuterol sulfate HFA (PROAIR HFA) 108 (90 Base) MCG/ACT inhaler Inhale 2 puffs into the lungs every 6 hours as needed for Wheezing or Shortness of Breath 4/10/19  Yes Mt Ledesma MD   levothyroxine (SYNTHROID) 75 MCG tablet Take 1 tablet by mouth Daily 2/28/19  Yes ROSALIND Llanos CNP   vitamin D (ERGOCALCIFEROL) 43512 units CAPS capsule TAKE 1 CAPSULE ONCE A WEEK 12/18/18  Yes ROSALIND Llanos CNP   omeprazole (PRILOSEC) 20 MG delayed release capsule TAKE 1 CAPSULE DAILY 12/17/18  Yes ROSALIND Llanos CNP   SPIRIVA HANDIHALER 18 MCG inhalation capsule INHALE THE CONTENTS OF 1 CAPSULE DAILY 12/10/18  Yes Jayda Mcgee MD   levothyroxine (SYNTHROID) 75 MCG tablet Take 1 tablet by mouth Daily 12/6/18  Yes ROSALIND Llanos CNP   Respiratory Therapy Supplies (NEBULIZER/TUBING/MOUTHPIECE) KIT 1 kit by Does not apply route daily as needed (wheezing) 11/5/18  Yes ROSALIND Llanos CNP   metoprolol tartrate (LOPRESSOR) 25 MG tablet Take 1 tablet by mouth 2 times daily  Patient taking differently: Take 50 mg by mouth 2 times daily  9/20/18  Yes ROSALIND Almanza - ORQUIDEA   ipratropium-albuterol (DUONEB) 0.5-2.5 (3) MG/3ML SOLN nebulizer solution Inhale 3 mLs into the lungs every 6 hours as needed for Shortness of Breath 17  Yes Eugene Jesus ROSALIND Avendano CNP   theophylline (UNIPHYL) 400 MG extended release tablet TAKE 1 TABLET DAILY 19   Eugene Jesus Corrinaermelinda, APRN - CNP   PARoxetine (PAXIL) 20 MG tablet Take 1 tablet by mouth daily 18   Eugene Jesus ROSALIND Avendano CNP   letrozole [facility]) 2.5 MG tablet Take 1 tablet by mouth daily 18   Dennie Salt, MD       No Known Allergies    Family History   Problem Relation Age of Onset    Cancer Sister         breast    Cancer Maternal Uncle         pancreatic       Social History     Socioeconomic History    Marital status:       Spouse name: Not on file    Number of children: Not on file    Years of education: Not on file    Highest education level: Not on file   Occupational History    Not on file   Social Needs    Financial resource strain: Not on file    Food insecurity:     Worry: Not on file     Inability: Not on file    Transportation needs:     Medical: Not on file     Non-medical: Not on file   Tobacco Use    Smoking status: Former Smoker     Packs/day: 1.50     Years: 40.00     Pack years: 60.00     Types: Cigarettes     Last attempt to quit: 1989     Years since quittin.3    Smokeless tobacco: Never Used   Substance and Sexual Activity    Alcohol use: No    Drug use: No    Sexual activity: Never   Lifestyle    Physical activity:     Days per week: Not on file     Minutes per session: Not on file    Stress: Not on file   Relationships    Social connections:     Talks on phone: Not on file     Gets together: Not on file     Attends Cheondoism service: Not on file     Active member of club or organization: Not on file     Attends meetings of clubs or organizations: Not on file     Relationship status: Not on file    Intimate partner violence:     Fear of current or ex partner: Not on file     Emotionally abused: Not on file     Physically abused: Not on file     Forced sexual activity: Not on file   Other Topics Concern    Not on file   Social History Narrative    Not on file       Review of Systems:   CONSTITUTIONAL:  negative for  fevers, chills, sweats, fatigue, malaise, anorexia and weight loss  EYES:  negative for  double vision, blurred vision, dry eyes, blind spots, eye discharge, visual disturbance  HEENT:  negative for  hearing loss, tinnitus, ear drainage,epistaxis, snoring,  sore throat  RESPIRATORY: Has dry cough and dyspnea with wheezing, no hemoptysis, has chest pain when coughing, no pleuritic pain and cyanosis  CARDIOVASCULAR:  Positive for  chest pain when coughing, dyspnea, no palpitations, orthopnea, PND, exertional chest pressure/discomfort, fatigue, early saiety, edema, syncope  GASTROINTESTINAL:  negative for nausea, vomiting, change in bowel habits, diarrhea, constipation, abdominal pain  GENITOURINARY:  negative for frequency, dysuria, nocturia, urinary incontinence, hesitancy, decreased stream and hematuria  INTEGUMENT:  negative for rash, skin lesions  HEMATOLOGIC/LYMPHATIC:  negative for easy bruising, bleeding, lymphadenopathy, petechiae and swelling/edema  ALLERGIC/IMMUNOLOGIC:  negative for recurrent infections, urticaria, hay fever, angioedema, anaphylaxis and drug reactions  ENDOCRINE:  negative for heat intolerance, cold intolerance, tremor, weight changes   MUSCULOSKELETAL:  negative for  myalgias, arthralgias, pain, joint swelling, stiff joints, decreased range of motion, muscle weakness and bone pain  NEUROLOGICAL:  Negative for weakness and sensory problems  BEHAVIOR/PSYCH:  negative for poor appetite, decreased energy level, depressed mood,  fatigue, agitated, increased agitation and anxiety    Physical Exam:  Vitals:    05/12/19 0125 05/12/19 0209 05/12/19 0233   BP: 110/67 (!) 145/87 (!) 152/82   Pulse: 105 97 100   Resp: 18 20 18   Temp: 98.3 °F (36.8 °C)  98.4 °F (36.9 °C)   TempSrc: Oral     SpO2: 100% 100% 100%   Weight: 138 lb (62.6 kg)     Height: 5' 3\" (1.6 m)         CONSTITUTIONAL:  awake, alert, cooperative, no apparent distress, and appears stated age  EYES:  Lids and lashes normal, pupils equal, round and reactive to light   ENT:  Normocephalic, without obvious abnormality, atraumatic, sinuses nontender on palpation , dry mucous membrane  NECK:  Supple, symmetrical, trachea midline, no adenopathy   HEMATOLOGIC/LYMPHATICS:  no cervical lymphadenopathy and no supraclavicular lymphadenopathy  BACK:  Symmetric, no curvature, spinous processes are non-tender on palpation   LUNGS:  No increased work of breathing, good air exchange, clear to auscultation bilaterally, no crackles or wheezing heard  CARDIOVASCULAR:  Normal apical impulse, regular rate and rhythm, normal S1 and S2, no S3 or S4, and no murmur noted  ABDOMEN:  No scars, normal bowel sounds, soft, non-distended, non-tender, no masses palpated, no hepatosplenomegally  CHEST: no masses palpated, no axillary or supraclavicular adenopathy  MUSCULOSKELETAL:  There is no redness, warmth, or swelling of the joints. Full range of motion noted. Motor strength is 5 out of 5 all extremities bilaterally. Tone is normal.  NEUROLOGIC:  Awake, alert, oriented to name, place and time. Cranial nerves II-XII are grossly intact.      SKIN:  no bruising or bleeding, normal skin color, texture, turgor, no redness, warmth, or swelling, no rashes     Labs:  Recent Results (from the past 24 hour(s))   Comprehensive Metabolic Panel    Collection Time: 05/11/19 10:31 PM   Result Value Ref Range    Sodium 136 135 - 144 mEq/L    Potassium 4.2 3.4 - 4.9 mEq/L    Chloride 95 95 - 107 mEq/L    CO2 26 20 - 31 mEq/L    Anion Gap 15 9 - 15 mEq/L    Glucose 101 (H) 70 - 99 mg/dL    BUN 11 8 - 23 mg/dL    CREATININE 0.69 0.50 - 0.90 mg/dL    GFR Non-African American >60.0 >60    GFR  >60.0 >60    Calcium 9.7 8.5 - 9.9 mg/dL    Total Protein 6.7 6.3 - 8.0 g/dL    Alb 4.1 3.5 - 4.6 g/dL    Total Bilirubin 0.3 0.2 - 0.7 mg/dL    Alkaline Phosphatase 63 40 - 130 U/L    ALT 11 0 - 33 U/L    AST 20 0 - 35 U/L    Globulin 2.6 2.3 - 3.5 g/dL   CBC Auto Differential    Collection Time: 05/11/19 10:31 PM   Result Value Ref Range    WBC 7.6 4.8 - 10.8 K/uL    RBC 4.24 4.20 - 5.40 M/uL    Hemoglobin 13.0 12.0 - 16.0 g/dL    Hematocrit 39.4 37.0 - 47.0 %    MCV 93.1 82.0 - 100.0 fL    MCH 30.6 27.0 - 31.3 pg    MCHC 32.8 (L) 33.0 - 37.0 %    RDW 14.0 11.5 - 14.5 %    Platelets 180 070 - 287 K/uL    Neutrophils % 63.5 %    Lymphocytes % 24.0 %    Monocytes % 8.6 %    Eosinophils % 2.8 %    Basophils % 1.1 %    Neutrophils # 4.8 1.4 - 6.5 K/uL    Lymphocytes # 1.8 1.0 - 4.8 K/uL    Monocytes # 0.7 0.2 - 0.8 K/uL    Eosinophils # 0.2 0.0 - 0.7 K/uL    Basophils # 0.1 0.0 - 0.2 K/uL     Radiology:  No orders to display       Assessment/Plan:  1. COPD exacerbation   Chronic shortness of breath and on 2 L O2 continuously   Her coughing has increased   Feels dry phlegm cannot get out and causing her anxiety   Previous treatment, steroids, humidified oxygen   Encouraged hydration   Pulmonology consult   2. Dehydration   Encouraged hydration   IV fluids  3.  Hypertension   Will monitor and resume home medication      Elsy Rooney   Seen on 05/12/19

## 2019-05-12 NOTE — PROGRESS NOTES
Patient admitted after midnight, seen and examined, resp status improved since admission. Cont steroid, nebs, resp care, pulm on c/s. Likely d/c home 5/13 if continues to improve.

## 2019-05-12 NOTE — PROGRESS NOTES
chronic)  []   Severe or Chronic w/ Exacerbation  [x]     Surgical Status No [x]   Surgeries     General []   Surgery Lower []   Abdominal Thoracic or []   Upper Abdominal Thoracic with  PulmonaryDisorder  []     Chest X-ray Clear/Not  Ordered     []  Chronic Changes  Results Pending  [x]  Infiltrates, atelectasis, pleural effusion, or edema  []  Infiltrates in more than one lobe []  Infiltrate + Atelectasis, &/or pleural effusion  []    Respiratory Pattern Regular,  RR = 12-20 [x]  Increased,  RR = 21-25 []  SOL, irregular,  or RR = 26-30 []  Decreased FEV1  or RR = 31-35 []  Severe SOB, use  of accessory muscles, or RR ? 35  []    Mental Status Alert, oriented,  Cooperative [x]  Confused but Follows commands []  Lethargic or unable to follow commands []  Obtunded  []  Comatose  []    Breath Sounds Clear to  auscultation  []  Decreased unilaterally or  in bases only [x]  Decreased  bilaterally  []  Crackles or intermittent wheezes []  Wheezes []    Cough Strong, Spontan., & nonproductive [x]  Strong,  spontaneous, &  productive []  Weak,  Nonproductive []  Weak, productive or  with wheezes []  No spontaneous  cough or may require suctioning []    Level of Activity Ambulatory [x]  Ambulatory w/ Assist  []  Non-ambulatory []  Paraplegic []  Quadriplegic []    Total    Score:____6___     Triage Score:___4_____      Tri       Triage:     1. (>20) Freq: Q3    2. (16-20) Freq: Q4   3. (11-15) Freq: QID & Albuterol Q2 PRN    4. (6-10) Freq: TID & Albuterol Q2 PRN    5. (0-5) Freq Q4prn

## 2019-05-13 ENCOUNTER — APPOINTMENT (OUTPATIENT)
Dept: ULTRASOUND IMAGING | Age: 74
DRG: 191 | End: 2019-05-13
Payer: MEDICARE

## 2019-05-13 LAB
ALBUMIN SERPL-MCNC: 3.4 G/DL (ref 3.5–4.6)
ALP BLD-CCNC: 53 U/L (ref 40–130)
ALT SERPL-CCNC: 8 U/L (ref 0–33)
ANION GAP SERPL CALCULATED.3IONS-SCNC: 14 MEQ/L (ref 9–15)
AST SERPL-CCNC: 14 U/L (ref 0–35)
BASOPHILS ABSOLUTE: 0 K/UL (ref 0–0.2)
BASOPHILS RELATIVE PERCENT: 0.1 %
BILIRUB SERPL-MCNC: <0.2 MG/DL (ref 0.2–0.7)
BUN BLDV-MCNC: 10 MG/DL (ref 8–23)
CALCIUM SERPL-MCNC: 8.9 MG/DL (ref 8.5–9.9)
CHLORIDE BLD-SCNC: 105 MEQ/L (ref 95–107)
CO2: 24 MEQ/L (ref 20–31)
CREAT SERPL-MCNC: 0.72 MG/DL (ref 0.5–0.9)
EOSINOPHILS ABSOLUTE: 0 K/UL (ref 0–0.7)
EOSINOPHILS RELATIVE PERCENT: 0 %
GFR AFRICAN AMERICAN: >60
GFR NON-AFRICAN AMERICAN: >60
GLOBULIN: 2.2 G/DL (ref 2.3–3.5)
GLUCOSE BLD-MCNC: 149 MG/DL (ref 70–99)
HCT VFR BLD CALC: 32 % (ref 37–47)
HEMOGLOBIN: 10.7 G/DL (ref 12–16)
LYMPHOCYTES ABSOLUTE: 0.8 K/UL (ref 1–4.8)
LYMPHOCYTES RELATIVE PERCENT: 8.5 %
MCH RBC QN AUTO: 30.8 PG (ref 27–31.3)
MCHC RBC AUTO-ENTMCNC: 33.3 % (ref 33–37)
MCV RBC AUTO: 92.4 FL (ref 82–100)
MONOCYTES ABSOLUTE: 0.3 K/UL (ref 0.2–0.8)
MONOCYTES RELATIVE PERCENT: 2.7 %
NEUTROPHILS ABSOLUTE: 8.4 K/UL (ref 1.4–6.5)
NEUTROPHILS RELATIVE PERCENT: 88.7 %
PDW BLD-RTO: 14 % (ref 11.5–14.5)
PLATELET # BLD: 237 K/UL (ref 130–400)
POTASSIUM REFLEX MAGNESIUM: 4.4 MEQ/L (ref 3.4–4.9)
RBC # BLD: 3.46 M/UL (ref 4.2–5.4)
SODIUM BLD-SCNC: 143 MEQ/L (ref 135–144)
TOTAL PROTEIN: 5.6 G/DL (ref 6.3–8)
WBC # BLD: 9.5 K/UL (ref 4.8–10.8)

## 2019-05-13 PROCEDURE — 93971 EXTREMITY STUDY: CPT

## 2019-05-13 PROCEDURE — 2580000003 HC RX 258: Performed by: INTERNAL MEDICINE

## 2019-05-13 PROCEDURE — 80053 COMPREHEN METABOLIC PANEL: CPT

## 2019-05-13 PROCEDURE — 94667 MNPJ CHEST WALL 1ST: CPT

## 2019-05-13 PROCEDURE — 6370000000 HC RX 637 (ALT 250 FOR IP): Performed by: INTERNAL MEDICINE

## 2019-05-13 PROCEDURE — 2500000003 HC RX 250 WO HCPCS: Performed by: INTERNAL MEDICINE

## 2019-05-13 PROCEDURE — 96372 THER/PROPH/DIAG INJ SC/IM: CPT

## 2019-05-13 PROCEDURE — 94669 MECHANICAL CHEST WALL OSCILL: CPT

## 2019-05-13 PROCEDURE — 2060000000 HC ICU INTERMEDIATE R&B

## 2019-05-13 PROCEDURE — 96376 TX/PRO/DX INJ SAME DRUG ADON: CPT

## 2019-05-13 PROCEDURE — 6360000002 HC RX W HCPCS: Performed by: INTERNAL MEDICINE

## 2019-05-13 PROCEDURE — 36415 COLL VENOUS BLD VENIPUNCTURE: CPT

## 2019-05-13 PROCEDURE — 94640 AIRWAY INHALATION TREATMENT: CPT

## 2019-05-13 PROCEDURE — 6370000000 HC RX 637 (ALT 250 FOR IP): Performed by: FAMILY MEDICINE

## 2019-05-13 PROCEDURE — 85025 COMPLETE CBC W/AUTO DIFF WBC: CPT

## 2019-05-13 PROCEDURE — 94668 MNPJ CHEST WALL SBSQ: CPT

## 2019-05-13 PROCEDURE — 6370000000 HC RX 637 (ALT 250 FOR IP): Performed by: NURSE PRACTITIONER

## 2019-05-13 PROCEDURE — 2700000000 HC OXYGEN THERAPY PER DAY

## 2019-05-13 RX ORDER — BUDESONIDE 0.5 MG/2ML
0.5 INHALANT ORAL 2 TIMES DAILY
Status: DISCONTINUED | OUTPATIENT
Start: 2019-05-13 | End: 2019-05-14 | Stop reason: HOSPADM

## 2019-05-13 RX ORDER — IPRATROPIUM BROMIDE AND ALBUTEROL SULFATE 2.5; .5 MG/3ML; MG/3ML
SOLUTION RESPIRATORY (INHALATION)
Status: DISCONTINUED
Start: 2019-05-13 | End: 2019-05-13 | Stop reason: WASHOUT

## 2019-05-13 RX ORDER — PREDNISONE 20 MG/1
40 TABLET ORAL DAILY
Status: DISCONTINUED | OUTPATIENT
Start: 2019-05-13 | End: 2019-05-14 | Stop reason: HOSPADM

## 2019-05-13 RX ADMIN — BUDESONIDE 500 MCG: 0.5 SUSPENSION RESPIRATORY (INHALATION) at 12:55

## 2019-05-13 RX ADMIN — FAMOTIDINE 20 MG: 10 INJECTION, SOLUTION INTRAVENOUS at 10:13

## 2019-05-13 RX ADMIN — BUDESONIDE 500 MCG: 0.5 SUSPENSION RESPIRATORY (INHALATION) at 20:01

## 2019-05-13 RX ADMIN — ACETAMINOPHEN 650 MG: 325 TABLET ORAL at 10:12

## 2019-05-13 RX ADMIN — METOPROLOL TARTRATE 50 MG: 50 TABLET ORAL at 19:48

## 2019-05-13 RX ADMIN — LEVOTHYROXINE SODIUM 75 MCG: 75 TABLET ORAL at 06:13

## 2019-05-13 RX ADMIN — TRAZODONE HYDROCHLORIDE 75 MG: 150 TABLET ORAL at 00:12

## 2019-05-13 RX ADMIN — METOPROLOL TARTRATE 50 MG: 50 TABLET ORAL at 10:12

## 2019-05-13 RX ADMIN — GUAIFENESIN 1200 MG: 600 TABLET, EXTENDED RELEASE ORAL at 19:48

## 2019-05-13 RX ADMIN — Medication 10 ML: at 19:49

## 2019-05-13 RX ADMIN — METHYLPREDNISOLONE SODIUM SUCCINATE 40 MG: 40 INJECTION, POWDER, FOR SOLUTION INTRAMUSCULAR; INTRAVENOUS at 10:13

## 2019-05-13 RX ADMIN — ENOXAPARIN SODIUM 40 MG: 40 INJECTION SUBCUTANEOUS at 10:13

## 2019-05-13 RX ADMIN — IPRATROPIUM BROMIDE AND ALBUTEROL SULFATE 1 AMPULE: .5; 3 SOLUTION RESPIRATORY (INHALATION) at 07:32

## 2019-05-13 RX ADMIN — TRAZODONE HYDROCHLORIDE 75 MG: 150 TABLET ORAL at 21:43

## 2019-05-13 RX ADMIN — GUAIFENESIN 1200 MG: 600 TABLET, EXTENDED RELEASE ORAL at 10:12

## 2019-05-13 RX ADMIN — ALBUTEROL SULFATE 2.5 MG: 2.5 SOLUTION RESPIRATORY (INHALATION) at 20:01

## 2019-05-13 ASSESSMENT — PAIN SCALES - GENERAL: PAINLEVEL_OUTOF10: 5

## 2019-05-13 NOTE — PROGRESS NOTES
famotidine (PEPCID) injection 20 mg  20 mg Intravenous BID Armand Turner MD   20 mg at 05/13/19 1013    acetaminophen (TYLENOL) tablet 650 mg  650 mg Oral Q4H PRN Armand Turner MD   650 mg at 05/13/19 1012    methylPREDNISolone sodium (SOLU-MEDROL) injection 40 mg  40 mg Intravenous Q8H Armand Turner MD   40 mg at 05/13/19 1013    azithromycin (ZITHROMAX) 500 mg in D5W 250ml addavial  500 mg Intravenous Q24H Armand Turner MD   Stopped at 05/13/19 0100    levothyroxine (SYNTHROID) tablet 75 mcg  75 mcg Oral Daily Armand Turner MD   75 mcg at 05/13/19 9740    metoprolol tartrate (LOPRESSOR) tablet 50 mg  50 mg Oral BID Armand Turner MD   50 mg at 05/13/19 1012    ipratropium-albuterol (DUONEB) nebulizer solution 1 ampule  1 ampule Inhalation TID Armand Turner MD   1 ampule at 05/13/19 0732    albuterol (PROVENTIL) nebulizer solution 2.5 mg  2.5 mg Nebulization Q2H PRN Armand Turner MD        guaiFENesin [facility]) extended release tablet 1,200 mg  1,200 mg Oral BID Fercho Olivarez MD   1,200 mg at 05/13/19 1012    ALPRAZolam (XANAX) tablet 0.25 mg  0.25 mg Oral Nightly PRN ROSALIND Man - CNP        traZODone (DESYREL) tablet 75 mg  75 mg Oral Nightly ROSALIND aMn CNP   75 mg at 05/13/19 0012     Assessment and Plan:          Acute Hospital issues:    # COPD exac- Chronic respiratory failure With hypoxia  - resume nebs, steroids and abx, resume home meds, pulm consult    # right breast mass on CT chest   -  CT showed right breast mass, and pulm nodule.  She has h/o left breast cancer s/p radiation, had biopsy 2016 by Dr Vásquez Deer did not show malignancy, has been afraid of getting f/u imaging.   - possible breast biopsy as outpatient   - her PCP already ordered f/u imaging, I educated her on importance of following up    # pulmonary nodule   - as above  - pulm on board    # CAD  - stable   - resume meds     # hypothyroid   - resume synthroid     DVT/PPx- ordered if appropriate DISCHARGE PLANNING  Dc tomorrow        Electronically signed by Dominic Hagen DO on 5/13/2019 at 12:28 PM

## 2019-05-13 NOTE — CARE COORDINATION
SPOKE WITH PATIENT AT LENGTH. PT FROM HOME WITH DAUGHTER. INDEPENDENT AND DRIVES. PT HAS 02 2L AND NEBS WITH MEDICAL SERVICES. PT HAS PORTABLE TANKS. PT DENIES NEEDS AND DECLINES PULMONARY REHAB.      Personalized Discharge Plan of Care for:  Boston Sood, 1945    Based on our assessment, your plan of care includes:     Patient has home oxygen set up      Patient has nebulizer and associated equipment      Patient has been assessed for medication affordability     Patient has been assessed for any transportation barriers    Respiratory therapy consult that includes bedside instructions on administration of nebulizers and/or inhalers, and assessment of oxygen and equipment needs in the home     Consult with a [address] [place] [address] with Pharmacy for medication assessment prior to discharge     Consult with our Transitional Care Nurses for education and follow up-ASSESS     Consult with [facility] Consult-N/A     Pulmonary Rehab Order for COPD, PN and CHF (if EF>35%)- DECLINED      Electronically by Lisa Uriostegui RN  on 5/13/2019 at 3:52 PM

## 2019-05-13 NOTE — PROGRESS NOTES
negative    CT chest November 2018 shows mediastinal lymphadenopathy, with large hiatal hernia, with small left lower lobe nodule versus atelectasis      IMPRESSION AND SUGGESTION:  Patient is at risk due to   ·  COPD with acute exacerbation  · And acute bronchitis  · Chronic hypoxic respiratory failure on oxygen at home at 2 L/m  · Severe emphysema on CAT scan, FEV1 32%  · Mediastinal lymphadenopathy and history of breast cancer  · Small left lower lobe nodule versus atelectasis  · Right lower extremity swelling    Recommendation  · Change to oral prednisone 40 mg daily  · Add budesonide nebs  · DC azithromycin  · Continue DuoNeb  · Discussed with the patient need for bronchoscopy and endobronchial ultrasound with biopsy of mediastinal lymph nodes in light of prior breast cancer history and lung nodule. However she declined at this point, she is not interested in any chemotherapy and making the diagnosis is not going to make any difference for her in regards to decision to treat.   · Incentive spirometry and flutter valve  · Ultrasound right lower extremity       Electronically signed by Ian Koch MD,  FCCP   on 5/13/2019 at 12:25 PM

## 2019-05-14 VITALS
OXYGEN SATURATION: 98 % | WEIGHT: 138.3 LBS | BODY MASS INDEX: 24.5 KG/M2 | HEART RATE: 88 BPM | HEIGHT: 63 IN | DIASTOLIC BLOOD PRESSURE: 72 MMHG | RESPIRATION RATE: 18 BRPM | SYSTOLIC BLOOD PRESSURE: 123 MMHG | TEMPERATURE: 97.4 F

## 2019-05-14 LAB
ALBUMIN SERPL-MCNC: 3.3 G/DL (ref 3.5–4.6)
ALP BLD-CCNC: 44 U/L (ref 40–130)
ALT SERPL-CCNC: 8 U/L (ref 0–33)
ANION GAP SERPL CALCULATED.3IONS-SCNC: 12 MEQ/L (ref 9–15)
AST SERPL-CCNC: 15 U/L (ref 0–35)
BASOPHILS ABSOLUTE: 0 K/UL (ref 0–0.2)
BASOPHILS RELATIVE PERCENT: 0.2 %
BILIRUB SERPL-MCNC: <0.2 MG/DL (ref 0.2–0.7)
BUN BLDV-MCNC: 15 MG/DL (ref 8–23)
CALCIUM SERPL-MCNC: 8.4 MG/DL (ref 8.5–9.9)
CHLORIDE BLD-SCNC: 104 MEQ/L (ref 95–107)
CO2: 25 MEQ/L (ref 20–31)
CREAT SERPL-MCNC: 0.68 MG/DL (ref 0.5–0.9)
EOSINOPHILS ABSOLUTE: 0 K/UL (ref 0–0.7)
EOSINOPHILS RELATIVE PERCENT: 0.1 %
GFR AFRICAN AMERICAN: >60
GFR NON-AFRICAN AMERICAN: >60
GLOBULIN: 1.9 G/DL (ref 2.3–3.5)
GLUCOSE BLD-MCNC: 96 MG/DL (ref 70–99)
HCT VFR BLD CALC: 32.7 % (ref 37–47)
HEMOGLOBIN: 10.6 G/DL (ref 12–16)
LYMPHOCYTES ABSOLUTE: 1.7 K/UL (ref 1–4.8)
LYMPHOCYTES RELATIVE PERCENT: 23.2 %
MCH RBC QN AUTO: 31.4 PG (ref 27–31.3)
MCHC RBC AUTO-ENTMCNC: 32.3 % (ref 33–37)
MCV RBC AUTO: 97.2 FL (ref 82–100)
MONOCYTES ABSOLUTE: 0.6 K/UL (ref 0.2–0.8)
MONOCYTES RELATIVE PERCENT: 8 %
NEUTROPHILS ABSOLUTE: 5.1 K/UL (ref 1.4–6.5)
NEUTROPHILS RELATIVE PERCENT: 68.5 %
PDW BLD-RTO: 14.6 % (ref 11.5–14.5)
PLATELET # BLD: 196 K/UL (ref 130–400)
POTASSIUM REFLEX MAGNESIUM: 3.7 MEQ/L (ref 3.4–4.9)
RBC # BLD: 3.36 M/UL (ref 4.2–5.4)
SODIUM BLD-SCNC: 141 MEQ/L (ref 135–144)
TOTAL PROTEIN: 5.2 G/DL (ref 6.3–8)
WBC # BLD: 7.4 K/UL (ref 4.8–10.8)

## 2019-05-14 PROCEDURE — 6370000000 HC RX 637 (ALT 250 FOR IP): Performed by: INTERNAL MEDICINE

## 2019-05-14 PROCEDURE — 94640 AIRWAY INHALATION TREATMENT: CPT

## 2019-05-14 PROCEDURE — 85025 COMPLETE CBC W/AUTO DIFF WBC: CPT

## 2019-05-14 PROCEDURE — 36415 COLL VENOUS BLD VENIPUNCTURE: CPT

## 2019-05-14 PROCEDURE — 2700000000 HC OXYGEN THERAPY PER DAY

## 2019-05-14 PROCEDURE — 99232 SBSQ HOSP IP/OBS MODERATE 35: CPT | Performed by: INTERNAL MEDICINE

## 2019-05-14 PROCEDURE — 80053 COMPREHEN METABOLIC PANEL: CPT

## 2019-05-14 PROCEDURE — 94668 MNPJ CHEST WALL SBSQ: CPT

## 2019-05-14 PROCEDURE — 6370000000 HC RX 637 (ALT 250 FOR IP): Performed by: FAMILY MEDICINE

## 2019-05-14 PROCEDURE — 6360000002 HC RX W HCPCS: Performed by: INTERNAL MEDICINE

## 2019-05-14 PROCEDURE — 94760 N-INVAS EAR/PLS OXIMETRY 1: CPT

## 2019-05-14 RX ORDER — PREDNISONE 10 MG/1
TABLET ORAL
Qty: 40 TABLET | Refills: 0 | Status: SHIPPED | OUTPATIENT
Start: 2019-05-14 | End: 2019-06-05

## 2019-05-14 RX ADMIN — LEVOTHYROXINE SODIUM 75 MCG: 75 TABLET ORAL at 06:19

## 2019-05-14 RX ADMIN — PREDNISONE 40 MG: 20 TABLET ORAL at 09:38

## 2019-05-14 RX ADMIN — METOPROLOL TARTRATE 50 MG: 50 TABLET ORAL at 09:38

## 2019-05-14 RX ADMIN — BUDESONIDE 500 MCG: 0.5 SUSPENSION RESPIRATORY (INHALATION) at 08:55

## 2019-05-14 RX ADMIN — GUAIFENESIN 1200 MG: 600 TABLET, EXTENDED RELEASE ORAL at 09:38

## 2019-05-14 RX ADMIN — ENOXAPARIN SODIUM 40 MG: 40 INJECTION SUBCUTANEOUS at 09:38

## 2019-05-14 RX ADMIN — ALBUTEROL SULFATE 2.5 MG: 2.5 SOLUTION RESPIRATORY (INHALATION) at 08:55

## 2019-05-14 ASSESSMENT — PAIN SCALES - GENERAL
PAINLEVEL_OUTOF10: 0

## 2019-05-14 NOTE — DISCHARGE SUMMARY
Hospital Medicine Discharge Summary    Camryn Newell  :  1945  MRN:  [id number]    Admit date:  2019  Discharge date:  2019    Admitting Physician: Arti Yen MD  Primary Care Physician:  Ivone Vilchis APRN - CNP      Discharge Diagnoses: Active Problems:    COPD exacerbation ([facility]  Resolved Problems:    * No resolved hospital problems. *    Chief Complaint   Patient presents with    Shortness of Breath     cough     Hospital Course:   Camryn Newell is a 66 y.o. female that was admitted and treated at [facility] for the following medical issues: Active Problems:    COPD exacerbation ([facility]  Resolved Problems:    * No resolved hospital problems. *        Admitted for COPD exacerbation. She was treated with steroids and nebulizer treatments. She was seen by pulm. She was discharged with plan to follow up with pulm. Pt was discharge in a stable condition. Exam on discharge:   /72   Pulse 88   Temp 97.4 °F (36.3 °C) (Oral)   Resp 19   Ht 5' 3\" (1.6 m)   Wt 138 lb 4.8 oz (62.7 kg)   LMP  (LMP Unknown)   SpO2 100%   BMI 24.50 kg/m²   General appearance: No apparent distress, appears stated age and cooperative. HEENT: Pupils equal, round, and reactive to light. Conjunctivae/corneas clear. Neck: Supple, with full range of motion. No jugular venous distention. Trachea midline. Respiratory:  Normal respiratory effort. Clear to auscultation, bilaterally without Rales/Wheezes/Rhonchi. Cardiovascular: Regular rate and rhythm with normal S1/S2 without murmurs, rubs or gallops. Abdomen: Soft, non-tender, non-distended with normal bowel sounds. Musculoskeletal: No clubbing, cyanosis or edema bilaterally. Full range of motion without deformity. Skin: Skin color, texture, turgor normal.  No rashes or lesions. Neuro: Non Focal. Symetrical motor and tone. Nl Comprehension, Alert,awake and oriented. NL CN. Symetrical tone and reflexes.   Psychiatric: Alert and oriented, thought content appropriate, normal insight  Capillary Refill: Brisk,< 3 seconds   Peripheral Pulses: +2 palpable, equal bilaterally     Patient was seen by the following consultants while admitted to [facility]:   Consults:  IP CONSULT TO PULMONOLOGY    Significant Diagnostic Studies:    Us Dup Lower Extremity Right Taco    Result Date: 5/13/2019  EXAMINATION: US DUP LOWER EXTREMITY RIGHT TACO CLINICAL HISTORY:  swelling COMPARISONS: None available. FINDINGS: Right lower extremity venous duplex sonogram was performed. Deep veins of the right lower extremity are compressible. Normal deep venous blood flow is documented with color Doppler images and pulsed Doppler waveform images. CONCLUSION: NO DEEP VEIN THROMBOSIS.       Discharge Medications:       María Cavanaugh   Home Medication Instructions HYR:[id number]    Printed on:05/14/19 5015   Medication Information                      albuterol sulfate HFA (PROAIR HFA) 108 (90 Base) MCG/ACT inhaler  Inhale 2 puffs into the lungs every 6 hours as needed for Wheezing or Shortness of Breath             budesonide-formoterol (SYMBICORT) 160-4.5 MCG/ACT AERO  Inhale 2 puffs into the lungs 2 times daily             guaiFENesin (MUCINEX) 600 MG extended release tablet  Take 1,200 mg by mouth 2 times daily             ipratropium-albuterol (DUONEB) 0.5-2.5 (3) MG/3ML SOLN nebulizer solution  Inhale 3 mLs into the lungs every 6 hours as needed for Shortness of Breath             letrozole (FEMARA) 2.5 MG tablet  Take 1 tablet by mouth daily             levothyroxine (SYNTHROID) 75 MCG tablet  Take 1 tablet by mouth Daily             lovastatin (MEVACOR) 20 MG tablet  TAKE 1 TABLET NIGHTLY             metoprolol tartrate (LOPRESSOR) 25 MG tablet  Take 1 tablet by mouth 2 times daily             omeprazole (PRILOSEC) 20 MG delayed release capsule  TAKE 1 CAPSULE DAILY             PARoxetine (PAXIL) 20 MG tablet  Take 1 tablet by mouth daily             predniSONE (DELTASONE) 10 MG tablet  Take 4 tablet daily X 4 days then 3 tablets daily x 4 days then 2 tablets daily x 4 days then 1 tablet daily x 4 days then stop             Respiratory Therapy Supplies (NEBULIZER/TUBING/MOUTHPIECE) KIT  1 kit by Does not apply route daily as needed (wheezing)             SPIRIVA HANDIHALER 18 MCG inhalation capsule  INHALE THE CONTENTS OF 1 CAPSULE DAILY             theophylline (UNIPHYL) 400 MG extended release tablet  TAKE 1 TABLET DAILY             traZODone (DESYREL) 150 MG tablet  TAKE 1 TABLET NIGHTLY             vitamin D (ERGOCALCIFEROL) 47364 units CAPS capsule  TAKE 1 CAPSULE ONCE A WEEK                 Disposition:   If discharged to Home, Any [place] [address] needs that were indicated and/or required as been addressed and set up by Social Work. Condition at discharge: Pt was medically stable at the time of discharge. Activity: activity as tolerated    Total time taken for discharging this patient: 40 minutes. Greater than 70% of time was spent focused exclusively on this patient. Time was taken to review chart, discuss plans with consultants, reconciling medications, discussing plan answering questions with patient.      Dale Arenas  5/14/2019, 10:36 AM  ----------------------------------------------------------------------------------------------------------------------    Margareth Cast

## 2019-05-14 NOTE — PROGRESS NOTES
Patient is alert and oriented x 4. Sitting at side of bed. Patient is independent. Lungs diminished, respirations even and unlabored, dyspnea with exertion. RR 16 sp02 98% on 2L NC. Heart NSR. No edema.

## 2019-05-14 NOTE — CARE COORDINATION
Care transition Nurse gave patient COPD book and and zone sheet. Stressed importance of contacting physician with any symptoms in YELLOW zone and seeking emergency treatment for any symptoms in red zone. Pt was being discharged when I entered room, brief COPD educations review. Reminded pt about avoiding respiratory infection, good handwashing, keeping nebulizer set up clean, avoiding inhaled irritants,and energy conservation. Pt states her mucus is very thick and she is having trouble clearing, she states that is why she returned to hospital, she felt like mucus was stuck in her throat. Pt's discharge instructions include Muccinex, and I reminded pt to increase fluid intake to thin out secretions. Patient is very knowledgeable regarding COPD. Pt declined pulmonary rehab. Pt has home O2, declined C follow up. Reminded pt of importance of follow up with PCP within 7 days of discharge, follow up scheduled with Dr. Tammy Morgan for May 17. I gave patient my contact information and requested she contact me with any questions or concerns.

## 2019-05-14 NOTE — PROGRESS NOTES
INPATIENT PROGRESS NOTES    PATIENT NAME: Sulema Isabel  MRN: [id number]  SERVICE DATE:  May 14, 2019   SERVICE TIME:  10:19 AM      PRIMARY SERVICE: Pulmonary Disease    CHIEF COMPLAINTS: SOB     INTERVAL HPI: Patient seen and examined at bedside, Interval Notes, orders reviewed. Nursing notes noted    Feels better today, mild shortness of breath with exertion however close to her baseline and feels ready to go home, no chest pain, no coughing, no fever, slept well no dizziness or lightheadedness. Review of system:     GI Abdominal pain No  Skin Rash No    Social History     Tobacco Use    Smoking status: Former Smoker     Packs/day: 1.50     Years: 40.00     Pack years: 60.00     Types: Cigarettes     Last attempt to quit: 1989     Years since quittin.3    Smokeless tobacco: Never Used   Substance Use Topics    Alcohol use: No         Problem Relation Age of Onset    Cancer Sister         breast    Cancer Maternal Uncle         pancreatic         OBJECTIVE    Body mass index is 24.5 kg/m². PHYSICAL EXAM:  Vitals:  /72   Pulse 88   Temp 97.4 °F (36.3 °C) (Oral)   Resp 19   Ht 5' 3\" (1.6 m)   Wt 138 lb 4.8 oz (62.7 kg)   LMP  (LMP Unknown)   SpO2 100%   BMI 24.50 kg/m²     General: alert, cooperative, no distress  Head: normocephalic, atraumatic  Eyes:No gross abnormalities. , PERRL and Sclera nonicteric  ENT:  MMM no lesions  Neck:  supple and no masses  Chest : clear to auscultation bilaterally- no wheezes, rales or rhonchi, normal air movement, no respiratory distress  Heart[de-identified] Heart sounds are normal.  Regular rate and rhythm without murmur, gallop or rub. ABD:  symmetric, soft, non-tender  Musculoskeletal : no cyanosis, no clubbing and right lower extremity swollen compared to left, with no pitting edema bilaterally  Neuro:  Grossly normal  Skin: No rashes or nodules noted.   Lymph node:  no cervical nodes  Urology: No Georges   Psychiatric: appropriate    DATA:   Recent Labs 05/13/19  0545 05/14/19  0613   WBC 9.5 7.4   HGB 10.7* 10.6*   HCT 32.0* 32.7*   MCV 92.4 97.2    196     Recent Labs     05/13/19  0545 05/14/19  0613    141   K 4.4 3.7    104   CO2 24 25   BUN 10 15   CREATININE 0.72 0.68   GLUCOSE 149* 96   CALCIUM 8.9 8.4*   PROT 5.6* 5.2*   LABALBU 3.4* 3.3*   BILITOT <0.2 <0.2   ALKPHOS 53 44   AST 14 15   ALT 8 8   LABGLOM >60.0 >60.0   GFRAA >60.0 >60.0   GLOB 2.2* 1.9*       MV Settings:          No results for input(s): PHART, YOA0MDT, PO2ART, FKN2JML, BEART, H2HDDTQA in the last 72 hours. O2 Device: Nasal cannula  O2 Flow Rate (L/min): 2 L/min    DIET CARDIAC;     MEDICATIONS during current hospitalization:    Continuous Infusions:    Scheduled Meds:   predniSONE  40 mg Oral Daily    budesonide  0.5 mg Nebulization BID    sodium chloride flush  10 mL Intravenous 2 times per day    enoxaparin  40 mg Subcutaneous Daily    levothyroxine  75 mcg Oral Daily    metoprolol tartrate  50 mg Oral BID    guaiFENesin  1,200 mg Oral BID    traZODone  75 mg Oral Nightly       PRN Meds:sodium chloride flush, magnesium hydroxide, ondansetron, acetaminophen, albuterol, ALPRAZolam    Radiology  Xr Chest Standard (2 Vw)    Result Date: 5/12/2019  EXAMINATION: Chest x-ray, 2 view HISTORY: Shortness of breath. Cough. TECHNIQUE: Frontal and lateral views of the chest COMPARISON: Chest radiograph from January 18, 2019 FINDINGS: Atherosclerotic calcification of the thoracic aorta. Cardiomediastinal silhouette is within normal limits. Lungs are hyperinflated. Coarsening of the pulmonary interstitium. No pneumothorax, pleural effusion, or focal consolidation. Degenerative changes of the spine. Bilateral axillary clips noted. No acute intrathoracic process. Findings compatible with COPD.       Chest x-ray negative    CT chest November 2018 shows mediastinal lymphadenopathy, with large hiatal hernia, with small left lower lobe nodule versus atelectasis      IMPRESSION AND SUGGESTION:  Patient is at risk due to   ·  COPD with acute exacerbation, patient improving  · And acute bronchitis, improving  · Chronic hypoxic respiratory failure on oxygen at home at 2 L/m  · Severe emphysema on CAT scan, FEV1 32%  · Mediastinal lymphadenopathy and history of breast cancer  · Small left lower lobe nodule versus atelectasis  · Right lower extremity swelling, ultrasound is negative    Recommendation  · Prednisone 40 mg daily and start taper  · Resume home inhalers  · Patient has PFT scheduled for Thursday of this week, will reschedule for next week  · Discussed with the patient need for bronchoscopy and endobronchial ultrasound with biopsy of mediastinal lymph nodes in light of prior breast cancer history and lung nodule. However she declined at this point, she is not interested in any chemotherapy and making the diagnosis is not going to make any difference for her in regards to decision to treat.   · Incentive spirometry and flutter valve  · Follow-up with me in 2 weeks  · Okay to DC from pulmonary point of view      Maira Benson MD,  FCCP   on 5/14/2019 at 10:19 AM

## 2019-05-15 ENCOUNTER — TELEPHONE (OUTPATIENT)
Dept: PHARMACY | Facility: CLINIC | Age: 74
End: 2019-05-15

## 2019-05-15 ENCOUNTER — CARE COORDINATION (OUTPATIENT)
Dept: CASE MANAGEMENT | Age: 74
End: 2019-05-15

## 2019-05-15 DIAGNOSIS — J44.1 COPD EXACERBATION (HCC): Primary | ICD-10-CM

## 2019-05-15 PROCEDURE — 1111F DSCHRG MED/CURRENT MED MERGE: CPT

## 2019-05-15 NOTE — CARE COORDINATION
[facility] Transitions Initial Follow Up Call    Call within 2 business days of discharge: Yes    Patient: Cristóbal Teresa Patient : 1945   MRN: [id number]  Reason for Admission: -2019 [address] IP COPD  Discharge Date: 19 RARS: Readmission Risk Score: 27  CM: 13  [facility]  PCP: Laura Bashir CNP    Last Discharge [address] [place] [address]       Complaint Diagnosis Description Type Department Provider    19 Shortness of Breath COPD exacerbation [facility]) ED to Hosp-Admission (Discharged) (ADMITTED) Denny Arrington MD; Joao Mathew. .. Spoke with: Jam Altamirano. Pt reports she continues to have exertional SOB she feels is at baseline at this time. Denies any cough, congestion, or dizziness. Wears O2 as directed and has nebulizer at home. Reviewed s/s exacerbation to report to physician, v/u. Denies any home, [place] [address], or [place] needs at this time. Pt declined outpatient Pulmonary Rehab at discharge. Pt declined [address] services. Pt attended TCM 19 9:00 w/ Dr Clarissa Warner 19 1:30, Pending PFTs 19 8:30. Pt drives. Med rec & 1111F completed at [facility]. Denies any medication concerns. CHANGE how you take:  levothyroxine (SYNTHROID)  predniSONE (DELTASONE)  STOP taking:  azithromycin 250 MG tablet (Dione Chaparro)    Non-face-to-face services provided:  Obtained and reviewed discharge summary and/or continuity of care documents  Education of patient/family/caregiver/guardian to support self-management-Reviewed s/s COPD exacerbation to report to physician.      Care Transitions 24 Hour Call    Do you have any ongoing symptoms?:  Yes  Patient-reported symptoms:  Shortness of Breath  Do you have a copy of your discharge instructions?:  Yes  Do you have all of your prescriptions and are they filled?:  Yes  Have you been contacted by a [facility] Pharmacist?:  No  Have you scheduled your follow up appointment?:  Yes  How are you going to get to your appointment?:  Car - drive self  Were you discharged with any Home Care or Post Acute Services:  No  Patient DME:  Mirna cane  Patient Home Equipment:  Nebulizer, Oxygen  Do you have support at home?:  Child  Do you feel like you have everything you need to keep you well at home?:  Yes  Are you an active caregiver in your home?:  No  Care Transitions Interventions         Follow Up  Future Appointments   Date Time Provider Darlene Walsh   5/17/2019  9:00 AM Laisha Caba MD [place] [address]   5/24/2019  8:30 AM [place] PFT ROOM 1 [address] [facility]   5/28/2019  1:30 PM Ashlie Grier MD [facility]   6/10/2019  3:20 PM Perez Arnold, APRN -  [facility], [address] [place] [address]

## 2019-05-15 NOTE — TELEPHONE ENCOUNTER
guaiFENesin (MUCINEX) 600 MG extended release tablet Take 1,200 mg by mouth 2 times daily    traZODone (DESYREL) 150 MG tablet TAKE 1 TABLET NIGHTLY    lovastatin (MEVACOR) 20 MG tablet TAKE 1 TABLET NIGHTLY    budesonide-formoterol (SYMBICORT) 160-4.5 MCG/ACT AERO Inhale 2 puffs into the lungs 2 times daily    albuterol sulfate HFA (PROAIR HFA) 108 (90 Base) MCG/ACT inhaler Inhale 2 puffs into the lungs every 6 hours as needed for Wheezing or Shortness of Breath    theophylline (UNIPHYL) 400 MG extended release tablet TAKE 1 TABLET DAILY  - no longer taking    vitamin D (ERGOCALCIFEROL) 05932 units CAPS capsule TAKE 1 CAPSULE ONCE A WEEK    omeprazole (PRILOSEC) 20 MG delayed release capsule TAKE 1 CAPSULE DAILY   takes daily prn    SPIRIVA HANDIHALER 18 MCG inhalation capsule INHALE THE CONTENTS OF 1 CAPSULE DAILY    PARoxetine (PAXIL) 20 MG tablet Take 1 tablet by mouth daily  - no longer taking    Respiratory Therapy Supplies (NEBULIZER/TUBING/MOUTHPIECE) KIT 1 kit by Does not apply route daily as needed (wheezing)    ipratropium-albuterol (DUONEB) 0.5-2.5 (3) MG/3ML SOLN nebulizer solution Inhale 3 mLs into the lungs every 6 hours as needed for Shortness of Breath       These are the medications you have told us you were taking at home, STOP taking them after you leave the hospital:  Did stop taking:   azithromycin 250 MG tablet  Commonly known as: ZITHROMAX    TCM Call Made?: Yes       Additional Medications:  na      Estimated Creatinine Clearance: 64 mL/min (based on SCr of 0.68 mg/dL). Assessment/Plan:    - Pt is not taking medications as directed by discharging physician. Number of discrepancies: 2. Instructions per discharge list provided except per below documentation. Identified medication discrepancies/issues:   · Category 3 (1):  1. Patient was not taking prednisone correctly and was not aware she had to go get more prednisone from the pharmacy. Patient educated.    · Category 4 (1): 1. Updated med list- see comments above     - Identified Potential Medication Interactions: No clinically significant interactions identified via LexicoBrndstr Interaction Analysis as category D or higher.    - Renal Dosing: No renal adjustments necessary.     Thank you,    Joao Shea, PharmD, [facility] Pharmacist  Direct: [phone], Ext [phone]  =============================  CLINICAL PHARMACY NOTE   POST-DISCHARGE TELEPHONE FOLLOW-UP ADDENDUM    For Pharmacy Admin Tracking Only    TCM Call Made?: Yes  [facility] Select Patient?: Yes  Total # of Interventions Recommended: 2 - Increased Dose #: 1  - Updated Order #: 1  Total # Interventions Accepted: 2  Intervention Severity:   - Level 1 Intervention Present?: No   - Level 2 #: 0   - Level 3 #: 1  Outreach Status: Review Complete  Care Coordinator Outreach to Patient?: Yes  Provider Contacted?: No  Time Spent (min): 45    Additional Documentation:

## 2019-05-16 ENCOUNTER — CARE COORDINATION (OUTPATIENT)
Dept: CARE COORDINATION | Age: 74
End: 2019-05-16

## 2019-05-16 NOTE — TELEPHONE ENCOUNTER
Also states that you talked to her in the hospital about a saline for the nebulizer. Would like to have a script for that.

## 2019-05-16 NOTE — CARE COORDINATION
I called to speak with Christiano Gomez about care coordination. I discussed my role and the process of care coordination. She tells me that she is aware of the services that I provide and she feels that this is a good service. However, she feels at this point she \"will pass. \"  She tells me that she was diagnosed with breast cancer that had spread in the lymph nodes and she opted not to have chemotherapy. She states that her breathing is troublesome at times and she is afraid when her breathing becomes difficult. I spoke with her about her medications, inhalers, and using the nebulizer. She tells me that she has a good understanding about her lung care. I also spoke with her about the COPD zone sheet and she states that she does have a copy of this stating that she spoke with someone in the hospital about this before discharge. I have asked her to call the office with any questions, concerns, or if she changes her mind about care coordination.

## 2019-05-17 ENCOUNTER — HOSPITAL ENCOUNTER (OUTPATIENT)
Dept: ULTRASOUND IMAGING | Age: 74
Discharge: HOME | End: 2019-05-19
Payer: MEDICARE

## 2019-05-17 ENCOUNTER — OFFICE VISIT (OUTPATIENT)
Dept: FAMILY MEDICINE CLINIC | Age: 74
End: 2019-05-17
Payer: MEDICARE

## 2019-05-17 VITALS
TEMPERATURE: 96.9 F | HEIGHT: 63 IN | OXYGEN SATURATION: 96 % | SYSTOLIC BLOOD PRESSURE: 166 MMHG | HEART RATE: 87 BPM | BODY MASS INDEX: 25.16 KG/M2 | WEIGHT: 142 LBS | DIASTOLIC BLOOD PRESSURE: 80 MMHG | RESPIRATION RATE: 18 BRPM

## 2019-05-17 DIAGNOSIS — M79.89 PAIN AND SWELLING OF LOWER EXTREMITY, UNSPECIFIED LATERALITY: ICD-10-CM

## 2019-05-17 DIAGNOSIS — M79.606 PAIN AND SWELLING OF LOWER EXTREMITY, UNSPECIFIED LATERALITY: ICD-10-CM

## 2019-05-17 DIAGNOSIS — R60.0 BILATERAL LOWER EXTREMITY EDEMA: ICD-10-CM

## 2019-05-17 DIAGNOSIS — J44.9 CHRONIC OBSTRUCTIVE PULMONARY DISEASE, UNSPECIFIED COPD TYPE (HCC): Primary | ICD-10-CM

## 2019-05-17 PROBLEM — Z87.891 FORMER SMOKER: Status: ACTIVE | Noted: 2019-03-20

## 2019-05-17 PROBLEM — H01.02A SQUAMOUS BLEPHARITIS OF UPPER AND LOWER EYELIDS OF BOTH EYES: Status: ACTIVE | Noted: 2019-03-12

## 2019-05-17 PROBLEM — H02.834 DERMATOCHALASIS OF BOTH UPPER EYELIDS: Status: ACTIVE | Noted: 2019-03-12

## 2019-05-17 PROBLEM — H02.831 DERMATOCHALASIS OF BOTH UPPER EYELIDS: Status: ACTIVE | Noted: 2019-03-12

## 2019-05-17 PROBLEM — H01.02B SQUAMOUS BLEPHARITIS OF UPPER AND LOWER EYELIDS OF BOTH EYES: Status: ACTIVE | Noted: 2019-03-12

## 2019-05-17 PROBLEM — Z96.1 PSEUDOPHAKIA OF BOTH EYES: Status: ACTIVE | Noted: 2019-03-12

## 2019-05-17 PROCEDURE — 99496 TRANSJ CARE MGMT HIGH F2F 7D: CPT | Performed by: FAMILY MEDICINE

## 2019-05-17 PROCEDURE — 93970 EXTREMITY STUDY: CPT

## 2019-05-17 PROCEDURE — 1111F DSCHRG MED/CURRENT MED MERGE: CPT | Performed by: FAMILY MEDICINE

## 2019-05-17 RX ORDER — OMEPRAZOLE 20 MG/1
20 CAPSULE, DELAYED RELEASE ORAL DAILY PRN
COMMUNITY
End: 2020-01-02 | Stop reason: SDUPTHER

## 2019-05-17 RX ORDER — METOPROLOL TARTRATE 50 MG/1
50 TABLET, FILM COATED ORAL 2 TIMES DAILY
COMMUNITY

## 2019-05-17 NOTE — PROGRESS NOTES
Subjective:      Patient ID: Sonia Jessica is a 66 y.o. female who presents today for:     Chief Complaint   Patient presents with    Follow-Up from Hospital     Patient presents with follow up from [facility] for COPD. HPI    Past Medical History:   Diagnosis Date    Anxiety     Anxiety and depression     CAD (coronary artery disease)     Cancer ([address] 3/2014    Invasive ductal cancer left breast    Carotid artery stenosis and occlusion     COPD (chronic obstructive pulmonary disease) (HCC)     Depression 1/15/2018    GERD (gastroesophageal reflux disease)     Headache(784.0)     Hyperlipidemia     Hypertension     Hypothyroidism     Insomnia     Osteoarthritis     RBBB 10/15/2014    Right carotid bruit 5/26/2015    S/P cardiac catheterization 7/13/2016     Past Surgical History:   Procedure Laterality Date    BREAST BIOPSY Right 11/8/2016    US biopsy    BREAST SURGERY Left 2/26/14    U/S Guided core bx of the left breast    BREAST SURGERY Right 3/20/14    U/S of the lateral right breast    BREAST SURGERY Left 3/27/14    U/S Guided Left breast lumpectomy and left snb    OTHER SURGICAL HISTORY  4/11/14    Placement drain, left axillary seroma    [place] MASTECTOMY, SIMPLE, COMPLETE Right 9/6/2018    R modified radical mastectomy     Family History   Problem Relation Age of Onset    Cancer Sister         breast    Cancer Maternal Uncle         pancreatic     Social History     Socioeconomic History    Marital status:       Spouse name: Not on file    Number of children: Not on file    Years of education: Not on file    Highest education level: Not on file   Occupational History    Not on file   Social Needs    Financial resource strain: Not on file    Food insecurity:     Worry: Not on file     Inability: Not on file    Transportation needs:     Medical: Not on file     Non-medical: Not on file   Tobacco Use    Smoking status: Former Smoker     Packs/day: 1.50     Years: 40.00 MG delayed release capsule TAKE 1 CAPSULE DAILY 90 capsule 3    SPIRIVA HANDIHALER 18 MCG inhalation capsule INHALE THE CONTENTS OF 1 CAPSULE DAILY 90 capsule 0    levothyroxine (SYNTHROID) 75 MCG tablet Take 1 tablet by mouth Daily 90 tablet 1    PARoxetine (PAXIL) 20 MG tablet Take 1 tablet by mouth daily 30 tablet 3    letrozole (FEMARA) 2.5 MG tablet Take 1 tablet by mouth daily 30 tablet 3    Respiratory Therapy Supplies (NEBULIZER/TUBING/MOUTHPIECE) KIT 1 kit by Does not apply route daily as needed (wheezing) 1 kit 5    metoprolol tartrate (LOPRESSOR) 25 MG tablet Take 1 tablet by mouth 2 times daily (Patient taking differently: Take 50 mg by mouth 2 times daily ) 60 tablet 3    ipratropium-albuterol (DUONEB) 0.5-2.5 (3) MG/3ML SOLN nebulizer solution Inhale 3 mLs into the lungs every 6 hours as needed for Shortness of Breath 200 vial 3     No current facility-administered medications on file prior to visit. Allergies:  Patient has no known allergies. Review of Systems    Objective:   BP (!) 166/80 (Site: Left Upper Arm, Position: Sitting, Cuff Size: Small Adult)   Pulse 87   Temp 96.9 °F (36.1 °C) (Temporal)   Resp 18   Ht 5' 3\" (1.6 m)   Wt 142 lb (64.4 kg)   LMP  (LMP Unknown)   SpO2 96%   BMI 25.15 kg/m²     Physical Exam    Assessment & Plan:     There are no diagnoses linked to this encounter. No follow-ups on file.     Jing Hooks MD

## 2019-05-20 RX ORDER — IPRATROPIUM BROMIDE AND ALBUTEROL SULFATE 2.5; .5 MG/3ML; MG/3ML
1 SOLUTION RESPIRATORY (INHALATION) EVERY 4 HOURS
Qty: 360 ML | Refills: 1 | Status: SHIPPED | OUTPATIENT
Start: 2019-05-20 | End: 2019-08-22 | Stop reason: SDUPTHER

## 2019-05-24 ENCOUNTER — HOSPITAL ENCOUNTER (OUTPATIENT)
Dept: PULMONOLOGY | Age: 74
Discharge: HOME | End: 2019-05-24
Payer: MEDICARE

## 2019-05-24 DIAGNOSIS — J44.9 CHRONIC OBSTRUCTIVE PULMONARY DISEASE, UNSPECIFIED COPD TYPE (HCC): ICD-10-CM

## 2019-05-24 DIAGNOSIS — J96.11 CHRONIC RESPIRATORY FAILURE WITH HYPOXIA (HCC): ICD-10-CM

## 2019-05-24 LAB
BASE EXCESS ARTERIAL: 7 (ref -3–3)
HCO3 ARTERIAL: 30.9 MMOL/L (ref 21–29)
O2 SAT, ARTERIAL: 93 % (ref 93–100)
PCO2 ARTERIAL: 47 MM HG (ref 35–45)
PERFORMED ON: ABNORMAL
PH ARTERIAL: 7.42 (ref 7.35–7.45)
PO2 ARTERIAL: 65 MM HG (ref 75–108)
POC FIO2: 2
POC SAMPLE TYPE: ABNORMAL
TCO2 ARTERIAL: 32 (ref 22–29)

## 2019-05-24 PROCEDURE — 6360000002 HC RX W HCPCS: Performed by: INTERNAL MEDICINE

## 2019-05-24 PROCEDURE — 94060 EVALUATION OF WHEEZING: CPT | Performed by: INTERNAL MEDICINE

## 2019-05-24 PROCEDURE — 94726 PLETHYSMOGRAPHY LUNG VOLUMES: CPT

## 2019-05-24 PROCEDURE — 94729 DIFFUSING CAPACITY: CPT

## 2019-05-24 PROCEDURE — 36600 WITHDRAWAL OF ARTERIAL BLOOD: CPT

## 2019-05-24 PROCEDURE — 94729 DIFFUSING CAPACITY: CPT | Performed by: INTERNAL MEDICINE

## 2019-05-24 PROCEDURE — 94060 EVALUATION OF WHEEZING: CPT

## 2019-05-24 PROCEDURE — 82803 BLOOD GASES ANY COMBINATION: CPT

## 2019-05-24 PROCEDURE — 94726 PLETHYSMOGRAPHY LUNG VOLUMES: CPT | Performed by: INTERNAL MEDICINE

## 2019-05-24 RX ORDER — ALBUTEROL SULFATE 2.5 MG/3ML
2.5 SOLUTION RESPIRATORY (INHALATION) ONCE
Status: COMPLETED | OUTPATIENT
Start: 2019-05-24 | End: 2019-05-24

## 2019-05-24 RX ADMIN — ALBUTEROL SULFATE 2.5 MG: 2.5 SOLUTION RESPIRATORY (INHALATION) at 09:22

## 2019-05-26 ASSESSMENT — ENCOUNTER SYMPTOMS
VOMITING: 0
CONSTIPATION: 0
ABDOMINAL PAIN: 0
COUGH: 0
SHORTNESS OF BREATH: 1
STRIDOR: 0
WHEEZING: 0
NAUSEA: 0
CHEST TIGHTNESS: 0
BLOOD IN STOOL: 0
DIARRHEA: 0
APNEA: 0

## 2019-05-27 NOTE — PROGRESS NOTES
Post-Discharge Transitional Care Management Services or Hospital Follow Up      Nory Wick   YOB: 1945    Date of Office Visit:  5/17/2019  Date of Hospital Admission: 5/12/19  Date of Hospital Discharge: 5/14/19  Readmission Risk Score(high >=14%.  Medium >=10%):Readmission Risk Score: 27      Care management risk score Rising risk (score 2-5) and Complex Care (Scores >=6): 13     Non face to face  following discharge, date last encounter closed (first attempt may have been earlier): 5/20/2019 10:54 AM 5/20/2019 10:54 AM    Call initiated 2 business days of discharge: Yes     Patient Active Problem List   Diagnosis    Hyperlipidemia    Hypothyroidism    COPD (chronic obstructive pulmonary disease) ([facility]- Dr. Emily Johnson and depression    Insomnia    Osteoarthritis    S/P carotid endarterectomy    Dyspepsia    CAD (coronary artery disease)- Dr. Josh Angulo    History of tobacco abuse    GERD (gastroesophageal reflux disease)    Breast cancer ([facility]    Osteoporosis    Vitamin D deficiency    HTN (hypertension)    RBBB    Cholelithiasis    Right carotid bruit    Congenital hypothyroidism without goiter    Dyslipidemia    Hiatal hernia    Disturbance of smell and taste    Abnormal cardiovascular stress test    Myocardial perfusion defect, homogeneous    S/P cardiac catheterization    Abnormal ultrasound of breast    Bilateral carotid artery stenosis- Dr. Antoine Andrade Decreased GFR    Diastolic dysfunction    Depression    Moderate episode of recurrent major depressive disorder (HCC)    History of breast cancer- left    Acute exacerbation of chronic obstructive pulmonary disease (COPD) (HCC)    Pulmonary nodule    Breast mass, right- Dr. Sara Javed acquired pneumonia of right lower lobe of lung ([facility]    Chronic respiratory failure with hypoxia ([address]    Breast cancer metastasized to axillary lymph node, right ([address]    COPD exacerbation ([address]    Dermatochalasis of both upper eyelids    Former smoker    Pseudophakia of both eyes    Squamous blepharitis of upper and lower eyelids of both eyes       No Known Allergies    Medications listed as ordered at the time of discharge from hospitals   Orin Witt Medication Instructions AKSHAT:    Printed on:05/26/19 9613   Medication Information                      albuterol sulfate HFA (PROAIR HFA) 108 (90 Base) MCG/ACT inhaler  Inhale 2 puffs into the lungs every 6 hours as needed for Wheezing or Shortness of Breath             budesonide-formoterol (SYMBICORT) 160-4.5 MCG/ACT AERO  Inhale 2 puffs into the lungs 2 times daily             guaiFENesin (MUCINEX) 600 MG extended release tablet  Take 1,200 mg by mouth 2 times daily             ipratropium-albuterol (DUONEB) 0.5-2.5 (3) MG/3ML SOLN nebulizer solution  Inhale 3 mLs into the lungs every 6 hours as needed for Shortness of Breath             ipratropium-albuterol (DUONEB) 0.5-2.5 (3) MG/3ML SOLN nebulizer solution  Inhale 3 mLs into the lungs every 4 hours             levothyroxine (SYNTHROID) 75 MCG tablet  Take 1 tablet by mouth Daily             lovastatin (MEVACOR) 20 MG tablet  TAKE 1 TABLET NIGHTLY             metoprolol tartrate (LOPRESSOR) 50 MG tablet  Take 50 mg by mouth 2 times daily             omeprazole (PRILOSEC) 20 MG delayed release capsule  Take 20 mg by mouth daily as needed (heartburn)             predniSONE (DELTASONE) 10 MG tablet  Take 4 tablet daily X 4 days then 3 tablets daily x 4 days then 2 tablets daily x 4 days then 1 tablet daily x 4 days then stop             Respiratory Therapy Supplies (NEBULIZER/TUBING/MOUTHPIECE) KIT  1 kit by Does not apply route daily as needed (wheezing)             SPIRIVA HANDIHALER 18 MCG inhalation capsule  INHALE THE CONTENTS OF 1 CAPSULE DAILY             traZODone (DESYREL) 150 MG tablet  TAKE 1 TABLET NIGHTLY             vitamin D (ERGOCALCIFEROL) 25379 units CAPS capsule  TAKE 1 CAPSULE ONCE A WEEK                   Medications marked \"taking\" at this time  Outpatient Medications Marked as Taking for the 5/17/19 encounter (Office Visit) with Denise Anand MD   Medication Sig Dispense Refill    predniSONE (DELTASONE) 10 MG tablet Take 4 tablet daily X 4 days then 3 tablets daily x 4 days then 2 tablets daily x 4 days then 1 tablet daily x 4 days then stop 40 tablet 0    guaiFENesin (MUCINEX) 600 MG extended release tablet Take 1,200 mg by mouth 2 times daily      traZODone (DESYREL) 150 MG tablet TAKE 1 TABLET NIGHTLY 90 tablet 0    lovastatin (MEVACOR) 20 MG tablet TAKE 1 TABLET NIGHTLY 90 tablet 1    albuterol sulfate HFA (PROAIR HFA) 108 (90 Base) MCG/ACT inhaler Inhale 2 puffs into the lungs every 6 hours as needed for Wheezing or Shortness of Breath 3 Inhaler 3    [DISCONTINUED] budesonide-formoterol (SYMBICORT) 160-4.5 MCG/ACT AERO Inhale 2 puffs into the lungs 2 times daily 1 Inhaler 3    [DISCONTINUED] theophylline (UNIPHYL) 400 MG extended release tablet TAKE 1 TABLET DAILY 90 tablet 1    vitamin D (ERGOCALCIFEROL) 29009 units CAPS capsule TAKE 1 CAPSULE ONCE A WEEK 12 capsule 3    [DISCONTINUED] omeprazole (PRILOSEC) 20 MG delayed release capsule TAKE 1 CAPSULE DAILY 90 capsule 3    SPIRIVA HANDIHALER 18 MCG inhalation capsule INHALE THE CONTENTS OF 1 CAPSULE DAILY 90 capsule 0    levothyroxine (SYNTHROID) 75 MCG tablet Take 1 tablet by mouth Daily 90 tablet 1    [DISCONTINUED] PARoxetine (PAXIL) 20 MG tablet Take 1 tablet by mouth daily 30 tablet 3    [DISCONTINUED] letrozole (FEMARA) 2.5 MG tablet Take 1 tablet by mouth daily 30 tablet 3    Respiratory Therapy Supplies (NEBULIZER/TUBING/MOUTHPIECE) KIT 1 kit by Does not apply route daily as needed (wheezing) 1 kit 5    [DISCONTINUED] metoprolol tartrate (LOPRESSOR) 25 MG tablet Take 1 tablet by mouth 2 times daily (Patient taking differently: Take 50 mg by mouth 2 times daily ) 60 tablet 3    ipratropium-albuterol (DUONEB) 0.5-2.5 (3) MG/3ML SOLN nebulizer solution Inhale 3 mLs into the lungs every 6 hours as needed for Shortness of Breath 200 vial 3        Medications patient taking as of now reconciled against medications ordered at time of hospital discharge: Yes    Chief Complaint   Patient presents with   [address] from Hospital     Patient presents with follow up from [facility] for COPD. HPI  This is a 61-year-old female presents today after recent hospitalization for COPD exacerbation. Treated with antibiotics and steroids and repeated nebulizer treatments, she was evaluated by pulmonology while in hospital and was discharged in stable improved condition. Since discharge she states she is improved with each passing day. If he needs to have mild exertional dyspnea but states it is not worse than her baseline. As mentioned bilateral lower extremity edema that is new since admission. Denies any chest pain, palpitations or worsening shortness of breath or increased oxygen requirement  Inpatient course: Discharge summary reviewed- see chart. Interval history/Current status: improved    Review of Systems   Constitutional: Negative for activity change, appetite change and fatigue. Respiratory: Positive for shortness of breath. Negative for apnea, cough, chest tightness, wheezing and stridor. Cardiovascular: Positive for leg swelling. Negative for chest pain and palpitations. Gastrointestinal: Negative for abdominal pain, blood in stool, constipation, diarrhea, nausea and vomiting. Musculoskeletal: Negative for arthralgias. Neurological: Negative for seizures and headaches. Psychiatric/Behavioral: Negative for hallucinations and suicidal ideas.        Vitals:    05/17/19 0901 05/17/19 0912   BP: (!) 196/96 (!) 166/80   Site: Left Upper Arm Left Upper Arm   Position: Sitting Sitting   Cuff Size: Small Adult Small Adult   Pulse: 72 87   Resp: 18    Temp: 96.9 °F (36.1 °C)    TempSrc: Temporal    SpO2: 95% 96% Weight: 142 lb (64.4 kg)    Height: 5' 3\" (1.6 m)      Body mass index is 25.15 kg/m². Wt Readings from Last 3 Encounters:   05/17/19 142 lb (64.4 kg)   05/14/19 138 lb 4.8 oz (62.7 kg)   05/11/19 138 lb (62.6 kg)     BP Readings from Last 3 Encounters:   05/17/19 (!) 166/80   05/14/19 123/72   05/12/19 133/72       Physical Exam   Constitutional: She is oriented to person, place, and time. She appears well-developed and well-nourished. No distress. HENT:   Head: Normocephalic and atraumatic. Eyes: Pupils are equal, round, and reactive to light. Conjunctivae and EOM are normal.   Neck: Normal range of motion. Cardiovascular: Normal rate, regular rhythm and normal heart sounds. Exam reveals no gallop and no friction rub. No murmur heard. Pulmonary/Chest: Effort normal. No respiratory distress. She has decreased breath sounds (Baseline). She has no wheezes. She has no rales. She exhibits no tenderness. Neurological: She is alert and oriented to person, place, and time. Skin: Skin is warm and dry. She is not diaphoretic. Psychiatric: She has a normal mood and affect. Her behavior is normal. Judgment and thought content normal.   Nursing note and vitals reviewed. Assessment/Plan:  1. Chronic obstructive pulmonary disease, unspecified COPD type ([facility]  Continue current course of treatment  - [place] DISCHARGE MEDS RECONCILED W/ CURRENT OUTPATIENT MED LIST    2. Bilateral lower extremity edema  See below    3.  Pain and swelling of lower extremity, unspecified laterality  We will need to rule out DVT due to new onset lower extremity edema  - US DUP LOWER EXTREMITIES BILATERAL VENOUS; Future        Medical Decision Making: high complexity

## 2019-05-28 ENCOUNTER — OFFICE VISIT (OUTPATIENT)
Dept: PULMONOLOGY | Age: 74
End: 2019-05-28
Payer: MEDICARE

## 2019-05-28 VITALS
SYSTOLIC BLOOD PRESSURE: 132 MMHG | TEMPERATURE: 98.4 F | BODY MASS INDEX: 25.45 KG/M2 | WEIGHT: 143.6 LBS | HEIGHT: 63 IN | DIASTOLIC BLOOD PRESSURE: 86 MMHG | OXYGEN SATURATION: 98 % | HEART RATE: 79 BPM

## 2019-05-28 DIAGNOSIS — R59.0 MEDIASTINAL LYMPHADENOPATHY: ICD-10-CM

## 2019-05-28 DIAGNOSIS — R60.0 EDEMA, LOWER EXTREMITY: ICD-10-CM

## 2019-05-28 DIAGNOSIS — J96.11 CHRONIC HYPOXEMIC RESPIRATORY FAILURE (HCC): ICD-10-CM

## 2019-05-28 DIAGNOSIS — J44.9 CHRONIC OBSTRUCTIVE PULMONARY DISEASE, UNSPECIFIED COPD TYPE (HCC): Primary | ICD-10-CM

## 2019-05-28 PROCEDURE — 4040F PNEUMOC VAC/ADMIN/RCVD: CPT | Performed by: INTERNAL MEDICINE

## 2019-05-28 PROCEDURE — 1036F TOBACCO NON-USER: CPT | Performed by: INTERNAL MEDICINE

## 2019-05-28 PROCEDURE — 1090F PRES/ABSN URINE INCON ASSESS: CPT | Performed by: INTERNAL MEDICINE

## 2019-05-28 PROCEDURE — 1111F DSCHRG MED/CURRENT MED MERGE: CPT | Performed by: INTERNAL MEDICINE

## 2019-05-28 PROCEDURE — G8598 ASA/ANTIPLAT THER USED: HCPCS | Performed by: INTERNAL MEDICINE

## 2019-05-28 PROCEDURE — G8926 SPIRO NO PERF OR DOC: HCPCS | Performed by: INTERNAL MEDICINE

## 2019-05-28 PROCEDURE — 1123F ACP DISCUSS/DSCN MKR DOCD: CPT | Performed by: INTERNAL MEDICINE

## 2019-05-28 PROCEDURE — G8427 DOCREV CUR MEDS BY ELIG CLIN: HCPCS | Performed by: INTERNAL MEDICINE

## 2019-05-28 PROCEDURE — G8417 CALC BMI ABV UP PARAM F/U: HCPCS | Performed by: INTERNAL MEDICINE

## 2019-05-28 PROCEDURE — 3023F SPIROM DOC REV: CPT | Performed by: INTERNAL MEDICINE

## 2019-05-28 PROCEDURE — 99214 OFFICE O/P EST MOD 30 MIN: CPT | Performed by: INTERNAL MEDICINE

## 2019-05-28 PROCEDURE — G8399 PT W/DXA RESULTS DOCUMENT: HCPCS | Performed by: INTERNAL MEDICINE

## 2019-05-28 RX ORDER — FUROSEMIDE 20 MG/1
20 TABLET ORAL DAILY
Qty: 3 TABLET | Refills: 3 | Status: ON HOLD | OUTPATIENT
Start: 2019-05-28 | End: 2019-06-11 | Stop reason: HOSPADM

## 2019-05-28 RX ORDER — ALBUTEROL SULFATE 90 UG/1
2 AEROSOL, METERED RESPIRATORY (INHALATION) EVERY 6 HOURS PRN
Qty: 3 INHALER | Refills: 3 | Status: SHIPPED | OUTPATIENT
Start: 2019-05-28 | End: 2020-09-29 | Stop reason: SDUPTHER

## 2019-05-28 NOTE — PROGRESS NOTES
Subjective:     Nory Wick is a 66 y.o. female whocomplains today of:     Chief Complaint   Patient presents with    Follow-up     [facility] COPD    Results     PFT       HPI  Patient presents for COPD   Was recently discharged from hospital , her breathing is much improved, no chest pain, no fever,  No cough, active and does her daily activity with no limitation , no weight lower  Lower ext with worsening swelling     Allergies:  Patient has no known allergies. Past Medical History:   Diagnosis Date    Anxiety     Anxiety and depression     CAD (coronary artery disease)     Cancer ([facility] 3/2014    Invasive ductal cancer left breast    Carotid artery stenosis and occlusion     [facility]ch[facility] obstructive [facility] disease) ([facility]     Depression 1/15/2018    GERD (gastroesophageal reflux disease)     Headache(784.0)     Hyperlipidemia     Hypertension     Hypothyroidism     Insomnia     Osteoarthritis     RBBB 10/15/2014    Right carotid bruit 5/26/2015    S/P cardiac catheterization 7/13/2016     Past Surgical History:   Procedure Laterality Date    BREAST BIOPSY Right 11/8/2016    US biopsy    BREAST SURGERY Left 2/26/14    U/S Guided core bx of the left breast    BREAST SURGERY Right 3/20/14    U/S of the lateral right breast    BREAST SURGERY Left 3/27/14    U/S Guided Left breast lumpectomy and left snb    OTHER SURGICAL HISTORY  4/11/14    Placement drain, left axillary seroma    [place] MASTECTOMY, SIMPLE, COMPLETE Right 9/6/2018    R modified radical mastectomy     Family History   Problem Relation Age of Onset    Cancer Sister         breast    Cancer Maternal Uncle         pancreatic     Social History     Socioeconomic History    Marital status:       Spouse name: Not on file    Number of children: Not on file    Years of education: Not on file    Highest education level: Not on file   Occupational History    Not on file   Social Needs    Financial resource strain: Not on file    Food insecurity:     Worry: Not on file     Inability: Not on file    Transportation needs:     Medical: Not on file     Non-medical: Not on file   Tobacco Use    Smoking status: Former Smoker     Packs/day: 1.50     Years: 40.00     Pack years: 60.00     Types: Cigarettes     Last attempt to quit: 1989     Years since quittin.3    Smokeless tobacco: Never Used   Substance and Sexual Activity    Alcohol use: No    Drug use: No    Sexual activity: Never   Lifestyle    Physical activity:     Days per week: Not on file     Minutes per session: Not on file    Stress: Not on file   Relationships    Social connections:     Talks on phone: Not on file     Gets together: Not on file     Attends Worship service: Not on file     Active member of club or organization: Not on file     Attends meetings of clubs or organizations: Not on file     Relationship status: Not on file    Intimate partner violence:     Fear of current or ex partner: Not on file     Emotionally abused: Not on file     Physically abused: Not on file     Forced sexual activity: Not on file   Other Topics Concern    Not on file   Social History Narrative    Not on file         Review of Systems      ROS: 10 organs review of system is done including general, psychological, ENT, hematological, endocrine, respiratory, cardiovascular, gastrointestinal,musculoskeletal, neurological,  allergy and Immunology is done and is otherwise negative.     Current Outpatient Medications   Medication Sig Dispense Refill    ipratropium-albuterol (DUONEB) 0.5-2.5 (3) MG/3ML SOLN nebulizer solution Inhale 3 mLs into the lungs every 4 hours 360 mL 1    budesonide-formoterol (SYMBICORT) 160-4.5 MCG/ACT AERO Inhale 2 puffs into the lungs 2 times daily 1 Inhaler 0    metoprolol tartrate (LOPRESSOR) 50 MG tablet Take 50 mg by mouth 2 times daily      omeprazole (PRILOSEC) 20 MG delayed release capsule Take 20 mg by mouth daily as needed (heartburn)  predniSONE (DELTASONE) 10 MG tablet Take 4 tablet daily X 4 days then 3 tablets daily x 4 days then 2 tablets daily x 4 days then 1 tablet daily x 4 days then stop 40 tablet 0    guaiFENesin (MUCINEX) 600 MG extended release tablet Take 1,200 mg by mouth 2 times daily      traZODone (DESYREL) 150 MG tablet TAKE 1 TABLET NIGHTLY 90 tablet 0    lovastatin (MEVACOR) 20 MG tablet TAKE 1 TABLET NIGHTLY 90 tablet 1    albuterol sulfate HFA (PROAIR HFA) 108 (90 Base) MCG/ACT inhaler Inhale 2 puffs into the lungs every 6 hours as needed for Wheezing or Shortness of Breath 3 Inhaler 3    vitamin D (ERGOCALCIFEROL) 62689 units CAPS capsule TAKE 1 CAPSULE ONCE A WEEK 12 capsule 3    SPIRIVA HANDIHALER 18 MCG inhalation capsule INHALE THE CONTENTS OF 1 CAPSULE DAILY 90 capsule 0    levothyroxine (SYNTHROID) 75 MCG tablet Take 1 tablet by mouth Daily 90 tablet 1    Respiratory Therapy Supplies (NEBULIZER/TUBING/MOUTHPIECE) KIT 1 kit by Does not apply route daily as needed (wheezing) 1 kit 5    ipratropium-albuterol (DUONEB) 0.5-2.5 (3) MG/3ML SOLN nebulizer solution Inhale 3 mLs into the lungs every 6 hours as needed for Shortness of Breath 200 vial 3     No current facility-administered medications for this visit. Objective:     Vitals:    05/28/19 1333   BP: 132/86   Site: Left Upper Arm   Pulse: 79   Temp: 98.4 °F (36.9 °C)   TempSrc: Tympanic   SpO2: 98%   Weight: 143 lb 9.6 oz (65.1 kg)   Height: 5' 3\" (1.6 m)         Physical Exam   Constitutional: She is oriented to person, place, and time. She appears well-developed and well-nourished. No distress. HENT:   Head: Normocephalic and atraumatic. Eyes: Pupils are equal, round, and reactive to light. Conjunctivae are normal.   Neck: Normal range of motion. Neck supple. Cardiovascular: Normal rate and regular rhythm. Exam reveals no gallop and no friction rub. No murmur heard.   Pulmonary/Chest: Effort normal and breath sounds normal. No respiratory distress. She has no wheezes. She has no rales. She exhibits no tenderness. Abdominal: Soft. She exhibits no distension. There is no tenderness. There is no rebound. Musculoskeletal: She exhibits edema (2+ edema ). She exhibits no tenderness. Lymphadenopathy:     She has no cervical adenopathy. Neurological: She is alert and oriented to person, place, and time. Skin: Skin is warm and dry. She is not diaphoretic. No erythema. Psychiatric: She has a normal mood and affect. Judgment normal.     Imaging studies reviewed by me mediastinal lymphadenopathy     Lab results reviewed in chart  PFT FEV1 26%   ECHO: normal systolic function      Assessment and Plan       Diagnosis Orders   1. Chronic obstructive pulmonary disease, unspecified COPD type [facility])  [facility] [place]   2. Mediastinal lymphadenopathy     3. Edema, lower extremity  furosemide (LASIX) 20 MG tablet    Basic Metabolic Panel   4.  Chronic hypoxemic respiratory failure (HCC)       · Cont Symbicort and Spiriva   · pulm rehab   · Yearly flu shot   · Patient declined any further workup or imaging for mediastinal lymphadenopathy   · Patient volume overloaded, will start diuretics and obtain BMP next week   · Cont O2 to keep sat 88-90%, will give inogen        Orders Placed This Encounter   Procedures    Basic Metabolic Panel     Standing Status:   Future     Standing Expiration Date:   5/28/2020   [facility] [place]     Referral Priority:   Routine     Referral Type:   Eval and Treat     Referral Reason:   Specialty Services Required     Requested Specialty:   Pulmonology     Number of Visits Requested:   1     Orders Placed This Encounter   Medications    furosemide (LASIX) 20 MG tablet     Sig: Take 1 tablet by mouth daily for 3 days     Dispense:  3 tablet     Refill:  3    albuterol sulfate HFA (VENTOLIN HFA) 108 (90 Base) MCG/ACT inhaler     Sig: Inhale 2 puffs into the lungs every 6 hours as needed for Wheezing

## 2019-05-30 DIAGNOSIS — J44.9 CHRONIC OBSTRUCTIVE PULMONARY DISEASE, UNSPECIFIED COPD TYPE (HCC): ICD-10-CM

## 2019-05-30 RX ORDER — BUDESONIDE AND FORMOTEROL FUMARATE DIHYDRATE 160; 4.5 UG/1; UG/1
AEROSOL RESPIRATORY (INHALATION)
Qty: 10.2 G | Refills: 0 | Status: SHIPPED | OUTPATIENT
Start: 2019-05-30 | End: 2019-08-29 | Stop reason: SDUPTHER

## 2019-05-31 ENCOUNTER — TELEPHONE (OUTPATIENT)
Dept: PULMONOLOGY | Age: 74
End: 2019-05-31

## 2019-05-31 NOTE — TELEPHONE ENCOUNTER
PATIENT CALLED REGARDING THE WATER PILL YOU PRESCRIBED FOR HER. SHE TOOK THE 3 PILLS AND THERE WERE REFILLS ON THE BOTTLE. SHE IS WONDERING IF SHE SHOULD CONTINUE TAKING THE REFILLS. PER DR SOLIS SHE IS  NOT TO TAKE THE REFILLS AT THIS TIME. SHE SHOULD HAVE BLOOD DONE AND IF SWELLING COMES BACK SHE IS TO CALL THE OFFICE. LMOM FOR PATIENT.

## 2019-06-04 ENCOUNTER — HOSPITAL ENCOUNTER (EMERGENCY)
Age: 74
Discharge: HOME | DRG: 190 | End: 2019-06-05
Payer: MEDICARE

## 2019-06-04 ENCOUNTER — TELEPHONE (OUTPATIENT)
Dept: OTHER | Facility: CLINIC | Age: 74
End: 2019-06-04

## 2019-06-04 DIAGNOSIS — J44.1 COPD EXACERBATION (HCC): Primary | ICD-10-CM

## 2019-06-04 LAB
EKG ATRIAL RATE: 82 BPM
EKG P AXIS: 71 DEGREES
EKG P-R INTERVAL: 170 MS
EKG Q-T INTERVAL: 352 MS
EKG QRS DURATION: 112 MS
EKG QTC CALCULATION (BAZETT): 411 MS
EKG R AXIS: 86 DEGREES
EKG T AXIS: 69 DEGREES
EKG VENTRICULAR RATE: 82 BPM

## 2019-06-04 PROCEDURE — 99285 EMERGENCY DEPT VISIT HI MDM: CPT

## 2019-06-04 PROCEDURE — 6370000000 HC RX 637 (ALT 250 FOR IP): Performed by: PHYSICIAN ASSISTANT

## 2019-06-04 PROCEDURE — 94640 AIRWAY INHALATION TREATMENT: CPT

## 2019-06-04 PROCEDURE — 93005 ELECTROCARDIOGRAM TRACING: CPT

## 2019-06-04 RX ORDER — IPRATROPIUM BROMIDE AND ALBUTEROL SULFATE 2.5; .5 MG/3ML; MG/3ML
1 SOLUTION RESPIRATORY (INHALATION) ONCE
Status: COMPLETED | OUTPATIENT
Start: 2019-06-04 | End: 2019-06-04

## 2019-06-04 RX ORDER — METHYLPREDNISOLONE SODIUM SUCCINATE 125 MG/2ML
125 INJECTION, POWDER, LYOPHILIZED, FOR SOLUTION INTRAMUSCULAR; INTRAVENOUS ONCE
Status: COMPLETED | OUTPATIENT
Start: 2019-06-04 | End: 2019-06-05

## 2019-06-04 RX ORDER — ALBUTEROL SULFATE 2.5 MG/3ML
2.5 SOLUTION RESPIRATORY (INHALATION)
Status: DISCONTINUED | OUTPATIENT
Start: 2019-06-04 | End: 2019-06-05 | Stop reason: HOSPADM

## 2019-06-04 RX ADMIN — IPRATROPIUM BROMIDE AND ALBUTEROL SULFATE 1 AMPULE: .5; 3 SOLUTION RESPIRATORY (INHALATION) at 23:55

## 2019-06-05 ENCOUNTER — TELEPHONE (OUTPATIENT)
Dept: OTHER | Facility: CLINIC | Age: 74
End: 2019-06-05

## 2019-06-05 ENCOUNTER — APPOINTMENT (OUTPATIENT)
Dept: GENERAL RADIOLOGY | Age: 74
DRG: 190 | End: 2019-06-05
Payer: MEDICARE

## 2019-06-05 VITALS
SYSTOLIC BLOOD PRESSURE: 108 MMHG | BODY MASS INDEX: 25.16 KG/M2 | RESPIRATION RATE: 24 BRPM | DIASTOLIC BLOOD PRESSURE: 53 MMHG | TEMPERATURE: 99.4 F | OXYGEN SATURATION: 83 % | WEIGHT: 142 LBS | HEIGHT: 63 IN | HEART RATE: 82 BPM

## 2019-06-05 LAB
ALBUMIN SERPL-MCNC: 3.9 G/DL (ref 3.5–4.6)
ALP BLD-CCNC: 77 U/L (ref 40–130)
ALT SERPL-CCNC: 12 U/L (ref 0–33)
ANION GAP SERPL CALCULATED.3IONS-SCNC: 9 MEQ/L (ref 9–15)
AST SERPL-CCNC: 21 U/L (ref 0–35)
BASOPHILS ABSOLUTE: 0.1 K/UL (ref 0–0.2)
BASOPHILS RELATIVE PERCENT: 0.9 %
BILIRUB SERPL-MCNC: 0.3 MG/DL (ref 0.2–0.7)
BUN BLDV-MCNC: 9 MG/DL (ref 8–23)
CALCIUM SERPL-MCNC: 9.3 MG/DL (ref 8.5–9.9)
CHLORIDE BLD-SCNC: 91 MEQ/L (ref 95–107)
CO2: 33 MEQ/L (ref 20–31)
CREAT SERPL-MCNC: 0.73 MG/DL (ref 0.5–0.9)
EOSINOPHILS ABSOLUTE: 0.2 K/UL (ref 0–0.7)
EOSINOPHILS RELATIVE PERCENT: 3.2 %
GFR AFRICAN AMERICAN: >60
GFR NON-AFRICAN AMERICAN: >60
GLOBULIN: 2.3 G/DL (ref 2.3–3.5)
GLUCOSE BLD-MCNC: 93 MG/DL (ref 70–99)
HCT VFR BLD CALC: 34 % (ref 37–47)
HEMOGLOBIN: 11.8 G/DL (ref 12–16)
LACTIC ACID: 1 MMOL/L (ref 0.5–2.2)
LYMPHOCYTES ABSOLUTE: 0.9 K/UL (ref 1–4.8)
LYMPHOCYTES RELATIVE PERCENT: 16.2 %
MCH RBC QN AUTO: 31.6 PG (ref 27–31.3)
MCHC RBC AUTO-ENTMCNC: 34.8 % (ref 33–37)
MCV RBC AUTO: 90.9 FL (ref 82–100)
MONOCYTES ABSOLUTE: 0.6 K/UL (ref 0.2–0.8)
MONOCYTES RELATIVE PERCENT: 12.1 %
NEUTROPHILS ABSOLUTE: 3.6 K/UL (ref 1.4–6.5)
NEUTROPHILS RELATIVE PERCENT: 67.6 %
PDW BLD-RTO: 13.8 % (ref 11.5–14.5)
PLATELET # BLD: 187 K/UL (ref 130–400)
POTASSIUM SERPL-SCNC: 4.8 MEQ/L (ref 3.4–4.9)
PRO-BNP: 143 PG/ML
RBC # BLD: 3.74 M/UL (ref 4.2–5.4)
SODIUM BLD-SCNC: 133 MEQ/L (ref 135–144)
TOTAL CK: 56 U/L (ref 0–170)
TOTAL PROTEIN: 6.2 G/DL (ref 6.3–8)
TROPONIN: <0.01 NG/ML (ref 0–0.01)
WBC # BLD: 5.4 K/UL (ref 4.8–10.8)

## 2019-06-05 PROCEDURE — 96374 THER/PROPH/DIAG INJ IV PUSH: CPT

## 2019-06-05 PROCEDURE — 82550 ASSAY OF CK (CPK): CPT

## 2019-06-05 PROCEDURE — 87040 BLOOD CULTURE FOR BACTERIA: CPT

## 2019-06-05 PROCEDURE — 85025 COMPLETE CBC W/AUTO DIFF WBC: CPT

## 2019-06-05 PROCEDURE — 80053 COMPREHEN METABOLIC PANEL: CPT

## 2019-06-05 PROCEDURE — 36415 COLL VENOUS BLD VENIPUNCTURE: CPT

## 2019-06-05 PROCEDURE — 83605 ASSAY OF LACTIC ACID: CPT

## 2019-06-05 PROCEDURE — 83880 ASSAY OF NATRIURETIC PEPTIDE: CPT

## 2019-06-05 PROCEDURE — 84484 ASSAY OF TROPONIN QUANT: CPT

## 2019-06-05 PROCEDURE — 71045 X-RAY EXAM CHEST 1 VIEW: CPT

## 2019-06-05 PROCEDURE — 6370000000 HC RX 637 (ALT 250 FOR IP): Performed by: PHYSICIAN ASSISTANT

## 2019-06-05 PROCEDURE — 6360000002 HC RX W HCPCS: Performed by: PHYSICIAN ASSISTANT

## 2019-06-05 RX ORDER — PREDNISONE 10 MG/1
TABLET ORAL
Qty: 30 TABLET | Refills: 0 | Status: SHIPPED | OUTPATIENT
Start: 2019-06-05 | End: 2019-06-06

## 2019-06-05 RX ORDER — ACETAMINOPHEN 500 MG
1000 TABLET ORAL ONCE
Status: COMPLETED | OUTPATIENT
Start: 2019-06-05 | End: 2019-06-05

## 2019-06-05 RX ADMIN — ACETAMINOPHEN 1000 MG: 500 TABLET ORAL at 01:16

## 2019-06-05 RX ADMIN — METHYLPREDNISOLONE SODIUM SUCCINATE 125 MG: 125 INJECTION, POWDER, FOR SOLUTION INTRAMUSCULAR; INTRAVENOUS at 01:16

## 2019-06-05 ASSESSMENT — ENCOUNTER SYMPTOMS
ABDOMINAL PAIN: 0
ALLERGIC/IMMUNOLOGIC NEGATIVE: 1
EYE PAIN: 0
SHORTNESS OF BREATH: 1
TROUBLE SWALLOWING: 0
APNEA: 0
COLOR CHANGE: 0
COUGH: 1

## 2019-06-05 ASSESSMENT — PAIN SCALES - GENERAL: PAINLEVEL_OUTOF10: 6

## 2019-06-05 NOTE — ED PROVIDER NOTES
[address] [facility] ED  eMERGENCY dEPARTMENTeNCOUnter      Pt Name: José Kenney  MRN: [id number]  [place] 1945  Date ofevaluation: 6/4/2019  Provider: Saw Mcintosh PA-C    CHIEF COMPLAINT       Chief Complaint   Patient presents with    Shortness of Breath     cough         HISTORY OF PRESENT ILLNESS   (Location/Symptom, Timing/Onset,Context/Setting, Quality, Duration, Modifying Factors, Severity)  Note limiting factors. José Kenney is a 66 y.o. female who presents to the emergency department with complaints of progressive worsening of shortness of breath over the past several days. Patient has a history of COPD and chronic respiratory failure and is on oxygen 24 hours a day. Patient recently finished a round of steroids approximately one week ago and states that her symptoms seem to recur after the steroids were done. Patient does state that she's had some loose phlegm that she's been trying to cough up. Patient denies any fevers. Patient denies any chest pain. Patient has had a frontal headache for the past 2 days but no dizziness, change or loss of vision or hearing. Patient denies any abdominal pain, nausea, vomiting or diarrhea. HPI    NursingNotes were reviewed. REVIEW OF SYSTEMS    (2-9 systems for level 4, 10 or more for level 5)     Review of Systems   Constitutional: Negative for diaphoresis and fever. HENT: Negative for hearing loss and trouble swallowing. Eyes: Negative for pain. Respiratory: Positive for cough and shortness of breath. Negative for apnea. Cardiovascular: Negative for chest pain. Gastrointestinal: Negative for abdominal pain. Endocrine: Negative. Genitourinary: Negative for hematuria. Musculoskeletal: Negative for neck pain and neck stiffness. Skin: Negative for color change. Allergic/Immunologic: Negative. Neurological: Positive for headaches. Negative for dizziness and numbness. Hematological: Negative. LEVOTHYROXINE (SYNTHROID) 75 MCG TABLET    Take 1 tablet by mouth Daily    LOVASTATIN (MEVACOR) 20 MG TABLET    TAKE 1 TABLET NIGHTLY    METOPROLOL TARTRATE (LOPRESSOR) 50 MG TABLET    Take 50 mg by mouth 2 times daily    OMEPRAZOLE (PRILOSEC) 20 MG DELAYED RELEASE CAPSULE    Take 20 mg by mouth daily as needed (heartburn)    RESPIRATORY THERAPY SUPPLIES (NEBULIZER/TUBING/MOUTHPIECE) KIT    1 kit by Does not apply route daily as needed (wheezing)    SPIRIVA HANDIHALER 18 MCG INHALATION CAPSULE    INHALE THE CONTENTS OF 1 CAPSULE DAILY    SYMBICORT 160-4.5 MCG/ACT AERO    USE 2 INHALATIONS TWICE A DAY    TRAZODONE (DESYREL) 150 MG TABLET    TAKE 1 TABLET NIGHTLY    VITAMIN D (ERGOCALCIFEROL) 21083 UNITS CAPS CAPSULE    TAKE 1 CAPSULE ONCE A WEEK       ALLERGIES     Patient has no known allergies. FAMILY HISTORY       Family History   Problem Relation Age of Onset    Cancer Sister         breast    Cancer Maternal Uncle         pancreatic          SOCIAL HISTORY       Social History     Socioeconomic History    Marital status:       Spouse name: None    Number of children: None    Years of education: None    Highest education level: None   Occupational History    None   Social Needs    Financial resource strain: None    Food insecurity:     Worry: None     Inability: None    Transportation needs:     Medical: None     Non-medical: None   Tobacco Use    Smoking status: Former Smoker     Packs/day: 1.50     Years: 40.00     Pack years: 60.00     Types: Cigarettes     Last attempt to quit: 1989     Years since quittin.3    Smokeless tobacco: Never Used   Substance and Sexual Activity    Alcohol use: No    Drug use: No    Sexual activity: Never   Lifestyle    Physical activity:     Days per week: None     Minutes per session: None    Stress: None   Relationships    Social connections:     Talks on phone: None     Gets together: None     Attends Oriental orthodox service: None     Active member of club or organization: None     Attends meetings of clubs or organizations: None     Relationship status: None    Intimate partner violence:     Fear of current or ex partner: None     Emotionally abused: None     Physically abused: None     Forced sexual activity: None   Other Topics Concern    None   Social History Narrative    None       SCREENINGS              PHYSICAL EXAM    (up to 7 for level 4, 8 or more for level 5)     ED Triage Vitals [06/04/19 2331]   BP Temp Temp Source Pulse Resp SpO2 Height Weight   (!) 121/91 99.4 °F (37.4 °C) Oral 86 28 96 % 5' 3\" (1.6 m) 142 lb (64.4 kg)       Physical Exam   Constitutional: She is oriented to person, place, and time. She appears well-developed and well-nourished. No distress. HENT:   Head: Normocephalic and atraumatic. Mouth/Throat: No oropharyngeal exudate. Eyes: Pupils are equal, round, and reactive to light. Conjunctivae and EOM are normal. No scleral icterus. Neck: Normal range of motion. Neck supple. No tracheal deviation present. Cardiovascular: Normal rate, normal heart sounds and intact distal pulses. Pulmonary/Chest: Effort normal. Tachypnea noted. No respiratory distress. She has decreased breath sounds in the right lower field and the left lower field. She has wheezes. Abdominal: Soft. Bowel sounds are normal. She exhibits no distension. Musculoskeletal: Normal range of motion. Neurological: She is alert and oriented to person, place, and time. No cranial nerve deficit. She exhibits normal muscle tone. Skin: Skin is warm and dry. No rash noted. She is not diaphoretic. No erythema. Psychiatric: She has a normal mood and affect. Her behavior is normal. Judgment and thought content normal.   Nursing note and vitals reviewed.       DIAGNOSTIC RESULTS     EKG: All EKG's are interpreted by the Emergency Department Physician who either signs or Co-signsthis chart in the absence of a cardiologist.    NSR @ 82, RBBB    RADIOLOGY: Non-plain filmimages such as CT, Ultrasound and MRI are read by the radiologist. Plain radiographic images are visualized and preliminarily interpreted by the emergency physician with the below findings:  NAD    Interpretation per the Radiologist below, if available at the time ofthis note:    XR CHEST PORTABLE    (Results Pending)         ED BEDSIDE ULTRASOUND:   Performed by ED Physician - none    LABS:  Labs Reviewed   COMPREHENSIVE METABOLIC PANEL - Abnormal; Notable for the following components:       Result Value    Sodium 133 (*)     Chloride 91 (*)     CO2 33 (*)     Total Protein 6.2 (*)     All other components within normal limits   CBC WITH AUTO DIFFERENTIAL - Abnormal; Notable for the following components:    RBC 3.74 (*)     Hemoglobin 11.8 (*)     Hematocrit 34.0 (*)     MCH 31.6 (*)     Lymphocytes # 0.9 (*)     All other components within normal limits   CULTURE BLOOD #1   CULTURE BLOOD #2   TROPONIN   LACTIC ACID, PLASMA   BRAIN NATRIURETIC PEPTIDE   CK       All other labs were within normal range or not returned as of this dictation. EMERGENCY DEPARTMENT COURSE and DIFFERENTIAL DIAGNOSIS/MDM:   Vitals:    Vitals:    06/04/19 2331 06/04/19 2355 06/05/19 0130   BP: (!) 121/91  (!) 121/55   Pulse: 86     Resp: 28 24    Temp: 99.4 °F (37.4 °C)     TempSrc: Oral     SpO2: 96% 99%    Weight: 142 lb (64.4 kg)     Height: 5' 3\" (1.6 m)         Patient presented the emergency department with shortness of breath secondary COPD. Patient received IV steroids and SVN treatments with improvement in her breathing. I offer the patient admission for her symptoms and she declined, stating that she will continue with her home oxygen and breathing treatments and return for any worsening symptoms.     MDM      REASSESSMENT          CONSULTS:  None    PROCEDURES:  Unless otherwise noted below, none     Procedures    FINAL IMPRESSION      1. COPD exacerbation (HCC)          DISPOSITION/PLAN   DISPOSITION Decision To Discharge 06/05/2019 02:01:48 AM      PATIENT REFERREDTO:  Rosey Cheng MD  [address]  [phone]    Call in 1 day        DISCHARGEMEDICATIONS:  New Prescriptions    PREDNISONE (DELTASONE) 10 MG TABLET    5 tabs po qam for 2 days then 4,3,2,1 tabs qam for 2 days each total of 10 days     Controlled Substances Monitoring:     RX Monitoring 1/7/2019   Attestation The Prescription Monitoring Report for this patient was reviewed today. Periodic Controlled Substance Monitoring Possible medication side effects, risk of tolerance/dependence & alternative treatments discussed. ;No signs of potential drug abuse or diversion identified.        (Please note that portions of this note were completed with a voice recognition program.  Efforts were made to edit the dictations but occasionally words are mis-transcribed.)    Kellie Rose PA-C (electronically signed)  Attending Emergency Physician          Kellie Rose PA-C  06/05/19 0223

## 2019-06-05 NOTE — ED TRIAGE NOTES
Patient c/o increased shortness of breath x 1 hour, she wears 2.5l o2 at home at all times, she was admitted last week to hospital for copd exac, she had a masectomy 6 months ago (right sided) and left lumpectomy in the past, she reports the cancer has spread to 5 lymph nodes, she has opted to not get any more treatment for the cancer. She denies any pain, has had a cough but unable to get any phlegm up, she was discharged with prednisone and was doing good while she was taking that but now she has finished it and feels like that is why she is getting worse.

## 2019-06-05 NOTE — ED NOTES
Discharge instructions and medication reviewed w/ pt. Pt is A&OX4 and has no questions or concerns, resps are even and unlabored, skin p/w/d, and no acute distress. Pt is ambulatory w/ a stable gait as observed by this RN.      Drake Griffin RN  06/05/19 9736

## 2019-06-05 NOTE — TELEPHONE ENCOUNTER
Writer attempted to contact Dr. Sylvester Pham office x2 to schedule ED f/u appt. Attempt unsuccessful. Will follow up.

## 2019-06-05 NOTE — TELEPHONE ENCOUNTER
Writer attempted to contact pt to inform her of ED f/u appt. Appt scheduled for Wednesday 6-19 @3:00pm with Dr. Tejas Carter. Attempt unsuccessful. Voicemail left stating time and date of appt.

## 2019-06-05 NOTE — TELEPHONE ENCOUNTER
Writer attempted to contact Dr. Lupe Geiger office to schedule ED f/u appt. Attempt unsuccessful. Will follow up.

## 2019-06-06 ENCOUNTER — APPOINTMENT (OUTPATIENT)
Dept: INTERVENTIONAL RADIOLOGY/VASCULAR | Age: 74
DRG: 190 | End: 2019-06-06
Payer: MEDICARE

## 2019-06-06 ENCOUNTER — APPOINTMENT (OUTPATIENT)
Dept: GENERAL RADIOLOGY | Age: 74
DRG: 190 | End: 2019-06-06
Payer: MEDICARE

## 2019-06-06 ENCOUNTER — HOSPITAL ENCOUNTER (INPATIENT)
Age: 74
LOS: 5 days | Discharge: HOME | DRG: 190 | End: 2019-06-11
Attending: INTERNAL MEDICINE | Admitting: INTERNAL MEDICINE
Payer: MEDICARE

## 2019-06-06 DIAGNOSIS — J44.1 COPD EXACERBATION (HCC): Primary | ICD-10-CM

## 2019-06-06 DIAGNOSIS — R09.02 HYPOXIA: ICD-10-CM

## 2019-06-06 PROBLEM — J96.21 ACUTE ON CHRONIC RESPIRATORY FAILURE WITH HYPOXIA (HCC): Status: ACTIVE | Noted: 2019-06-06

## 2019-06-06 PROBLEM — J96.21 ACUTE ON CHRONIC RESPIRATORY FAILURE WITH HYPOXIA (HCC): Chronic | Status: ACTIVE | Noted: 2019-06-06

## 2019-06-06 LAB
ALBUMIN SERPL-MCNC: 3.8 G/DL (ref 3.5–4.6)
ALP BLD-CCNC: 74 U/L (ref 40–130)
ALT SERPL-CCNC: 12 U/L (ref 0–33)
ANION GAP SERPL CALCULATED.3IONS-SCNC: 13 MEQ/L (ref 9–15)
AST SERPL-CCNC: 20 U/L (ref 0–35)
BASE EXCESS ARTERIAL: 9 (ref -3–3)
BASOPHILS ABSOLUTE: 0 K/UL (ref 0–0.2)
BASOPHILS RELATIVE PERCENT: 0.2 %
BILIRUB SERPL-MCNC: 0.3 MG/DL (ref 0.2–0.7)
BUN BLDV-MCNC: 11 MG/DL (ref 8–23)
CALCIUM SERPL-MCNC: 9.8 MG/DL (ref 8.5–9.9)
CHLORIDE BLD-SCNC: 89 MEQ/L (ref 95–107)
CO2: 30 MEQ/L (ref 20–31)
CREAT SERPL-MCNC: 0.59 MG/DL (ref 0.5–0.9)
EKG ATRIAL RATE: 88 BPM
EKG P AXIS: 74 DEGREES
EKG P-R INTERVAL: 166 MS
EKG Q-T INTERVAL: 372 MS
EKG QRS DURATION: 120 MS
EKG QTC CALCULATION (BAZETT): 450 MS
EKG R AXIS: 87 DEGREES
EKG T AXIS: 69 DEGREES
EKG VENTRICULAR RATE: 88 BPM
EOSINOPHILS ABSOLUTE: 0 K/UL (ref 0–0.7)
EOSINOPHILS RELATIVE PERCENT: 0.1 %
GFR AFRICAN AMERICAN: >60
GFR NON-AFRICAN AMERICAN: >60
GLOBULIN: 2.5 G/DL (ref 2.3–3.5)
GLUCOSE BLD-MCNC: 109 MG/DL (ref 70–99)
HCO3 ARTERIAL: 33.2 MMOL/L (ref 21–29)
HCT VFR BLD CALC: 33.7 % (ref 37–47)
HEMOGLOBIN: 11.6 G/DL (ref 12–16)
LACTATE: 1.47 MMOL/L (ref 0.4–2)
LACTIC ACID: 1.9 MMOL/L (ref 0.5–2.2)
LYMPHOCYTES ABSOLUTE: 0.7 K/UL (ref 1–4.8)
LYMPHOCYTES RELATIVE PERCENT: 9.2 %
MAGNESIUM: 2.1 MG/DL (ref 1.7–2.4)
MCH RBC QN AUTO: 31.4 PG (ref 27–31.3)
MCHC RBC AUTO-ENTMCNC: 34.5 % (ref 33–37)
MCV RBC AUTO: 90.9 FL (ref 82–100)
MONOCYTES ABSOLUTE: 0.9 K/UL (ref 0.2–0.8)
MONOCYTES RELATIVE PERCENT: 10.5 %
NEUTROPHILS ABSOLUTE: 6.5 K/UL (ref 1.4–6.5)
NEUTROPHILS RELATIVE PERCENT: 80 %
O2 SAT, ARTERIAL: 98 % (ref 93–100)
PCO2 ARTERIAL: 49 MM HG (ref 35–45)
PDW BLD-RTO: 13.6 % (ref 11.5–14.5)
PERFORMED ON: ABNORMAL
PH ARTERIAL: 7.44 (ref 7.35–7.45)
PLATELET # BLD: 242 K/UL (ref 130–400)
PO2 ARTERIAL: 97 MM HG (ref 75–108)
POC FIO2: 2
POC SAMPLE TYPE: ABNORMAL
POTASSIUM SERPL-SCNC: 4.7 MEQ/L (ref 3.4–4.9)
RBC # BLD: 3.71 M/UL (ref 4.2–5.4)
SODIUM BLD-SCNC: 132 MEQ/L (ref 135–144)
TCO2 ARTERIAL: 35 (ref 22–29)
TOTAL CK: 62 U/L (ref 0–170)
TOTAL PROTEIN: 6.3 G/DL (ref 6.3–8)
TROPONIN: <0.01 NG/ML (ref 0–0.01)
VITAMIN D 25-HYDROXY: 49.6 NG/ML (ref 30–100)
WBC # BLD: 8.1 K/UL (ref 4.8–10.8)

## 2019-06-06 PROCEDURE — 99222 1ST HOSP IP/OBS MODERATE 55: CPT | Performed by: INTERNAL MEDICINE

## 2019-06-06 PROCEDURE — C1751 CATH, INF, PER/CENT/MIDLINE: HCPCS

## 2019-06-06 PROCEDURE — 2580000003 HC RX 258: Performed by: NURSE PRACTITIONER

## 2019-06-06 PROCEDURE — 82803 BLOOD GASES ANY COMBINATION: CPT

## 2019-06-06 PROCEDURE — 05HY33Z INSERTION OF INFUSION DEVICE INTO UPPER VEIN, PERCUTANEOUS APPROACH: ICD-10-PCS | Performed by: INTERNAL MEDICINE

## 2019-06-06 PROCEDURE — 82550 ASSAY OF CK (CPK): CPT

## 2019-06-06 PROCEDURE — 84484 ASSAY OF TROPONIN QUANT: CPT

## 2019-06-06 PROCEDURE — 80053 COMPREHEN METABOLIC PANEL: CPT

## 2019-06-06 PROCEDURE — 6360000002 HC RX W HCPCS: Performed by: INTERNAL MEDICINE

## 2019-06-06 PROCEDURE — 36415 COLL VENOUS BLD VENIPUNCTURE: CPT

## 2019-06-06 PROCEDURE — 6370000000 HC RX 637 (ALT 250 FOR IP): Performed by: INTERNAL MEDICINE

## 2019-06-06 PROCEDURE — 36410 VNPNXR 3YR/> PHY/QHP DX/THER: CPT | Performed by: RADIOLOGY

## 2019-06-06 PROCEDURE — 2500000003 HC RX 250 WO HCPCS: Performed by: INTERNAL MEDICINE

## 2019-06-06 PROCEDURE — 94150 VITAL CAPACITY TEST: CPT

## 2019-06-06 PROCEDURE — 99285 EMERGENCY DEPT VISIT HI MDM: CPT

## 2019-06-06 PROCEDURE — 93005 ELECTROCARDIOGRAM TRACING: CPT | Performed by: INTERNAL MEDICINE

## 2019-06-06 PROCEDURE — 2580000003 HC RX 258: Performed by: PHYSICIAN ASSISTANT

## 2019-06-06 PROCEDURE — 76937 US GUIDE VASCULAR ACCESS: CPT | Performed by: RADIOLOGY

## 2019-06-06 PROCEDURE — 87186 SC STD MICRODIL/AGAR DIL: CPT

## 2019-06-06 PROCEDURE — 82306 VITAMIN D 25 HYDROXY: CPT

## 2019-06-06 PROCEDURE — 87070 CULTURE OTHR SPECIMN AEROBIC: CPT

## 2019-06-06 PROCEDURE — 6370000000 HC RX 637 (ALT 250 FOR IP): Performed by: NURSE PRACTITIONER

## 2019-06-06 PROCEDURE — 6370000000 HC RX 637 (ALT 250 FOR IP): Performed by: PHYSICIAN ASSISTANT

## 2019-06-06 PROCEDURE — 6360000002 HC RX W HCPCS: Performed by: NURSE PRACTITIONER

## 2019-06-06 PROCEDURE — 83735 ASSAY OF MAGNESIUM: CPT

## 2019-06-06 PROCEDURE — 94667 MNPJ CHEST WALL 1ST: CPT

## 2019-06-06 PROCEDURE — 1210000000 HC MED SURG R&B

## 2019-06-06 PROCEDURE — 87205 SMEAR GRAM STAIN: CPT

## 2019-06-06 PROCEDURE — 85025 COMPLETE CBC W/AUTO DIFF WBC: CPT

## 2019-06-06 PROCEDURE — 94640 AIRWAY INHALATION TREATMENT: CPT

## 2019-06-06 PROCEDURE — 94760 N-INVAS EAR/PLS OXIMETRY 1: CPT

## 2019-06-06 PROCEDURE — 87040 BLOOD CULTURE FOR BACTERIA: CPT

## 2019-06-06 PROCEDURE — 87077 CULTURE AEROBIC IDENTIFY: CPT

## 2019-06-06 PROCEDURE — 96374 THER/PROPH/DIAG INJ IV PUSH: CPT

## 2019-06-06 PROCEDURE — 2580000003 HC RX 258: Performed by: INTERNAL MEDICINE

## 2019-06-06 PROCEDURE — 2500000003 HC RX 250 WO HCPCS: Performed by: PHYSICIAN ASSISTANT

## 2019-06-06 PROCEDURE — 71046 X-RAY EXAM CHEST 2 VIEWS: CPT

## 2019-06-06 PROCEDURE — 93010 ELECTROCARDIOGRAM REPORT: CPT | Performed by: INTERNAL MEDICINE

## 2019-06-06 PROCEDURE — 94664 DEMO&/EVAL PT USE INHALER: CPT

## 2019-06-06 PROCEDURE — 83605 ASSAY OF LACTIC ACID: CPT

## 2019-06-06 PROCEDURE — 36600 WITHDRAWAL OF ARTERIAL BLOOD: CPT

## 2019-06-06 RX ORDER — TRAZODONE HYDROCHLORIDE 150 MG/1
150 TABLET ORAL NIGHTLY PRN
Status: DISCONTINUED | OUTPATIENT
Start: 2019-06-06 | End: 2019-06-11 | Stop reason: HOSPADM

## 2019-06-06 RX ORDER — LEVOTHYROXINE SODIUM 0.07 MG/1
75 TABLET ORAL DAILY
Status: DISCONTINUED | OUTPATIENT
Start: 2019-06-06 | End: 2019-06-11 | Stop reason: HOSPADM

## 2019-06-06 RX ORDER — BUDESONIDE 0.5 MG/2ML
1 INHALANT ORAL 2 TIMES DAILY
Status: DISCONTINUED | OUTPATIENT
Start: 2019-06-06 | End: 2019-06-06

## 2019-06-06 RX ORDER — SODIUM CHLORIDE 0.9 % (FLUSH) 0.9 %
10 SYRINGE (ML) INJECTION PRN
Status: DISCONTINUED | OUTPATIENT
Start: 2019-06-06 | End: 2019-06-11 | Stop reason: HOSPADM

## 2019-06-06 RX ORDER — SIMVASTATIN 10 MG
10 TABLET ORAL NIGHTLY
Status: DISCONTINUED | OUTPATIENT
Start: 2019-06-06 | End: 2019-06-11 | Stop reason: HOSPADM

## 2019-06-06 RX ORDER — IPRATROPIUM BROMIDE AND ALBUTEROL SULFATE 2.5; .5 MG/3ML; MG/3ML
1 SOLUTION RESPIRATORY (INHALATION) 4 TIMES DAILY
Status: DISCONTINUED | OUTPATIENT
Start: 2019-06-06 | End: 2019-06-11 | Stop reason: HOSPADM

## 2019-06-06 RX ORDER — SODIUM CHLORIDE 9 MG/ML
250 INJECTION, SOLUTION INTRAVENOUS ONCE
Status: DISCONTINUED | OUTPATIENT
Start: 2019-06-06 | End: 2019-06-11 | Stop reason: HOSPADM

## 2019-06-06 RX ORDER — METHYLPREDNISOLONE SODIUM SUCCINATE 40 MG/ML
40 INJECTION, POWDER, LYOPHILIZED, FOR SOLUTION INTRAMUSCULAR; INTRAVENOUS EVERY 8 HOURS
Status: COMPLETED | OUTPATIENT
Start: 2019-06-06 | End: 2019-06-08

## 2019-06-06 RX ORDER — SODIUM CHLORIDE 0.9 % (FLUSH) 0.9 %
10 SYRINGE (ML) INJECTION EVERY 12 HOURS
Status: DISCONTINUED | OUTPATIENT
Start: 2019-06-06 | End: 2019-06-11 | Stop reason: HOSPADM

## 2019-06-06 RX ORDER — IPRATROPIUM BROMIDE AND ALBUTEROL SULFATE 2.5; .5 MG/3ML; MG/3ML
1 SOLUTION RESPIRATORY (INHALATION)
Status: DISCONTINUED | OUTPATIENT
Start: 2019-06-06 | End: 2019-06-06

## 2019-06-06 RX ORDER — IPRATROPIUM BROMIDE AND ALBUTEROL SULFATE 2.5; .5 MG/3ML; MG/3ML
1 SOLUTION RESPIRATORY (INHALATION) PRN
Status: DISCONTINUED | OUTPATIENT
Start: 2019-06-06 | End: 2019-06-06

## 2019-06-06 RX ORDER — ACETAMINOPHEN 325 MG/1
650 TABLET ORAL EVERY 4 HOURS PRN
Status: DISCONTINUED | OUTPATIENT
Start: 2019-06-06 | End: 2019-06-11 | Stop reason: HOSPADM

## 2019-06-06 RX ORDER — ACETYLCYSTEINE 200 MG/ML
600 SOLUTION ORAL; RESPIRATORY (INHALATION) 2 TIMES DAILY
Status: DISCONTINUED | OUTPATIENT
Start: 2019-06-06 | End: 2019-06-07

## 2019-06-06 RX ORDER — ONDANSETRON 2 MG/ML
4 INJECTION INTRAMUSCULAR; INTRAVENOUS EVERY 6 HOURS PRN
Status: DISCONTINUED | OUTPATIENT
Start: 2019-06-06 | End: 2019-06-11 | Stop reason: HOSPADM

## 2019-06-06 RX ORDER — MAGNESIUM SULFATE 1 G/100ML
1 INJECTION INTRAVENOUS PRN
Status: DISCONTINUED | OUTPATIENT
Start: 2019-06-06 | End: 2019-06-11 | Stop reason: HOSPADM

## 2019-06-06 RX ORDER — ACETYLCYSTEINE 200 MG/ML
600 SOLUTION ORAL; RESPIRATORY (INHALATION) 2 TIMES DAILY
Status: DISCONTINUED | OUTPATIENT
Start: 2019-06-06 | End: 2019-06-06

## 2019-06-06 RX ORDER — PREDNISONE 10 MG/1
40 TABLET ORAL DAILY
Status: DISCONTINUED | OUTPATIENT
Start: 2019-06-08 | End: 2019-06-09

## 2019-06-06 RX ORDER — LIDOCAINE HYDROCHLORIDE 20 MG/ML
5 INJECTION, SOLUTION INFILTRATION; PERINEURAL ONCE
Status: COMPLETED | OUTPATIENT
Start: 2019-06-06 | End: 2019-06-06

## 2019-06-06 RX ORDER — ALPRAZOLAM 0.25 MG/1
0.25 TABLET ORAL 3 TIMES DAILY PRN
Status: ON HOLD | COMMUNITY
End: 2019-06-11 | Stop reason: HOSPADM

## 2019-06-06 RX ORDER — METOPROLOL TARTRATE 50 MG/1
50 TABLET, FILM COATED ORAL 2 TIMES DAILY
Status: DISCONTINUED | OUTPATIENT
Start: 2019-06-06 | End: 2019-06-11 | Stop reason: HOSPADM

## 2019-06-06 RX ORDER — GUAIFENESIN 600 MG/1
1200 TABLET, EXTENDED RELEASE ORAL 2 TIMES DAILY
Status: DISCONTINUED | OUTPATIENT
Start: 2019-06-06 | End: 2019-06-07

## 2019-06-06 RX ORDER — SODIUM CHLORIDE 0.9 % (FLUSH) 0.9 %
3 SYRINGE (ML) INJECTION EVERY 8 HOURS
Status: DISCONTINUED | OUTPATIENT
Start: 2019-06-06 | End: 2019-06-06

## 2019-06-06 RX ORDER — METHYLPREDNISOLONE SODIUM SUCCINATE 125 MG/2ML
125 INJECTION, POWDER, LYOPHILIZED, FOR SOLUTION INTRAMUSCULAR; INTRAVENOUS EVERY 6 HOURS
Status: DISCONTINUED | OUTPATIENT
Start: 2019-06-06 | End: 2019-06-06

## 2019-06-06 RX ORDER — DEXTROSE, SODIUM CHLORIDE, AND POTASSIUM CHLORIDE 5; .45; .15 G/100ML; G/100ML; G/100ML
INJECTION INTRAVENOUS CONTINUOUS
Status: DISCONTINUED | OUTPATIENT
Start: 2019-06-06 | End: 2019-06-07

## 2019-06-06 RX ORDER — FAMOTIDINE 20 MG/1
20 TABLET, FILM COATED ORAL 2 TIMES DAILY
Status: DISCONTINUED | OUTPATIENT
Start: 2019-06-06 | End: 2019-06-11 | Stop reason: HOSPADM

## 2019-06-06 RX ORDER — 0.9 % SODIUM CHLORIDE 0.9 %
1000 INTRAVENOUS SOLUTION INTRAVENOUS ONCE
Status: DISCONTINUED | OUTPATIENT
Start: 2019-06-06 | End: 2019-06-11 | Stop reason: HOSPADM

## 2019-06-06 RX ORDER — ERGOCALCIFEROL 1.25 MG/1
50000 CAPSULE ORAL WEEKLY
Status: DISCONTINUED | OUTPATIENT
Start: 2019-06-09 | End: 2019-06-11 | Stop reason: HOSPADM

## 2019-06-06 RX ORDER — SODIUM CHLORIDE 0.9 % (FLUSH) 0.9 %
10 SYRINGE (ML) INJECTION EVERY 12 HOURS SCHEDULED
Status: DISCONTINUED | OUTPATIENT
Start: 2019-06-06 | End: 2019-06-11 | Stop reason: HOSPADM

## 2019-06-06 RX ORDER — SENNA AND DOCUSATE SODIUM 50; 8.6 MG/1; MG/1
1 TABLET, FILM COATED ORAL NIGHTLY
Status: DISCONTINUED | OUTPATIENT
Start: 2019-06-06 | End: 2019-06-11 | Stop reason: HOSPADM

## 2019-06-06 RX ORDER — BUDESONIDE 0.5 MG/2ML
0.5 INHALANT ORAL 2 TIMES DAILY
Status: DISCONTINUED | OUTPATIENT
Start: 2019-06-06 | End: 2019-06-11 | Stop reason: HOSPADM

## 2019-06-06 RX ORDER — ALBUTEROL SULFATE 2.5 MG/3ML
2.5 SOLUTION RESPIRATORY (INHALATION)
Status: DISCONTINUED | OUTPATIENT
Start: 2019-06-06 | End: 2019-06-11 | Stop reason: HOSPADM

## 2019-06-06 RX ADMIN — CEFTRIAXONE SODIUM 1 G: 1 INJECTION, POWDER, FOR SOLUTION INTRAMUSCULAR; INTRAVENOUS at 11:26

## 2019-06-06 RX ADMIN — GUAIFENESIN 1200 MG: 600 TABLET, EXTENDED RELEASE ORAL at 11:28

## 2019-06-06 RX ADMIN — Medication 3 ML: at 06:14

## 2019-06-06 RX ADMIN — Medication 10 ML: at 11:26

## 2019-06-06 RX ADMIN — METHYLPREDNISOLONE SODIUM SUCCINATE 125 MG: 125 INJECTION, POWDER, FOR SOLUTION INTRAMUSCULAR; INTRAVENOUS at 06:14

## 2019-06-06 RX ADMIN — FAMOTIDINE 20 MG: 20 TABLET, FILM COATED ORAL at 11:28

## 2019-06-06 RX ADMIN — IPRATROPIUM BROMIDE AND ALBUTEROL SULFATE 1 AMPULE: .5; 3 SOLUTION RESPIRATORY (INHALATION) at 11:43

## 2019-06-06 RX ADMIN — LEVOTHYROXINE SODIUM 75 MCG: 75 TABLET ORAL at 11:28

## 2019-06-06 RX ADMIN — ACETYLCYSTEINE 600 MG: 200 SOLUTION ORAL; RESPIRATORY (INHALATION) at 19:40

## 2019-06-06 RX ADMIN — IPRATROPIUM BROMIDE AND ALBUTEROL SULFATE 1 AMPULE: .5; 3 SOLUTION RESPIRATORY (INHALATION) at 19:40

## 2019-06-06 RX ADMIN — ZINC 1 TABLET: TAB ORAL at 11:28

## 2019-06-06 RX ADMIN — LIDOCAINE HYDROCHLORIDE 5 ML: 20 INJECTION, SOLUTION INFILTRATION; PERINEURAL at 09:00

## 2019-06-06 RX ADMIN — METHYLPREDNISOLONE SODIUM SUCCINATE 40 MG: 40 INJECTION, POWDER, FOR SOLUTION INTRAMUSCULAR; INTRAVENOUS at 21:33

## 2019-06-06 RX ADMIN — IPRATROPIUM BROMIDE AND ALBUTEROL SULFATE 1 AMPULE: .5; 3 SOLUTION RESPIRATORY (INHALATION) at 15:46

## 2019-06-06 RX ADMIN — POTASSIUM CHLORIDE, DEXTROSE MONOHYDRATE AND SODIUM CHLORIDE: 150; 5; 450 INJECTION, SOLUTION INTRAVENOUS at 12:07

## 2019-06-06 RX ADMIN — METHYLPREDNISOLONE SODIUM SUCCINATE 40 MG: 40 INJECTION, POWDER, FOR SOLUTION INTRAMUSCULAR; INTRAVENOUS at 13:58

## 2019-06-06 RX ADMIN — SIMVASTATIN 10 MG: 10 TABLET, FILM COATED ORAL at 21:32

## 2019-06-06 RX ADMIN — GUAIFENESIN 1200 MG: 600 TABLET, EXTENDED RELEASE ORAL at 21:32

## 2019-06-06 RX ADMIN — BUDESONIDE 500 MCG: 0.5 SUSPENSION RESPIRATORY (INHALATION) at 19:40

## 2019-06-06 RX ADMIN — METOPROLOL TARTRATE 50 MG: 50 TABLET ORAL at 21:32

## 2019-06-06 RX ADMIN — METOPROLOL TARTRATE 50 MG: 50 TABLET ORAL at 13:57

## 2019-06-06 RX ADMIN — FAMOTIDINE 20 MG: 20 TABLET, FILM COATED ORAL at 21:32

## 2019-06-06 RX ADMIN — SENNOSIDES AND DOCUSATE SODIUM 1 TABLET: 8.6; 5 TABLET ORAL at 21:32

## 2019-06-06 RX ADMIN — IPRATROPIUM BROMIDE AND ALBUTEROL SULFATE 1 AMPULE: .5; 3 SOLUTION RESPIRATORY (INHALATION) at 05:42

## 2019-06-06 RX ADMIN — AZITHROMYCIN MONOHYDRATE 500 MG: 500 INJECTION, POWDER, LYOPHILIZED, FOR SOLUTION INTRAVENOUS at 11:27

## 2019-06-06 RX ADMIN — ACETAMINOPHEN 650 MG: 325 TABLET ORAL at 17:07

## 2019-06-06 ASSESSMENT — PAIN DESCRIPTION - LOCATION: LOCATION: CHEST

## 2019-06-06 ASSESSMENT — ENCOUNTER SYMPTOMS
COLOR CHANGE: 0
VOICE CHANGE: 0
CHEST TIGHTNESS: 1
VOMITING: 0
ALLERGIC/IMMUNOLOGIC NEGATIVE: 1
COUGH: 1
GASTROINTESTINAL NEGATIVE: 1
COUGH: 0
EYE PAIN: 0
NAUSEA: 0
ABDOMINAL PAIN: 0
EYES NEGATIVE: 1
WHEEZING: 1
SORE THROAT: 0
SHORTNESS OF BREATH: 1
RHINORRHEA: 0
EYE REDNESS: 0
EYE ITCHING: 0
TROUBLE SWALLOWING: 0
BACK PAIN: 0
SORE THROAT: 1
DIARRHEA: 0

## 2019-06-06 ASSESSMENT — PAIN SCALES - GENERAL
PAINLEVEL_OUTOF10: 6
PAINLEVEL_OUTOF10: 0
PAINLEVEL_OUTOF10: 0

## 2019-06-06 ASSESSMENT — PAIN DESCRIPTION - ONSET: ONSET: ON-GOING

## 2019-06-06 ASSESSMENT — PAIN DESCRIPTION - ORIENTATION: ORIENTATION: LEFT

## 2019-06-06 ASSESSMENT — PAIN DESCRIPTION - FREQUENCY: FREQUENCY: INTERMITTENT

## 2019-06-06 ASSESSMENT — PAIN DESCRIPTION - PAIN TYPE: TYPE: ACUTE PAIN

## 2019-06-06 ASSESSMENT — PAIN DESCRIPTION - DESCRIPTORS: DESCRIPTORS: ACHING

## 2019-06-06 NOTE — ED NOTES
Assumed care of patient. Placed patient on monitor. States her breathing is ok while she's laying there. Very moist cough noted. No pedal edema noted. Lungs diminished bilat. No acute distress noted while laying here. Pt states she became very winded when they got her up to walk earlier. Pt made aware of bed assignment.      Royal Minnie RN  06/06/19 8267

## 2019-06-06 NOTE — ED PROVIDER NOTES
[address] [facility] ED  eMERGENCY dEPARTMENT eNCOUnter      Pt Name: Darlyn Bagley  MRN: [id number]  [place] 1945  Date of evaluation: 6/6/2019  Provider: Anil Escamilla PA-C    CHIEF COMPLAINT       Chief Complaint   Patient presents with    Shortness of Breath     seen earlier in ER, feels worse          HISTORY OF PRESENT ILLNESS   (Location/Symptom, Timing/Onset,Context/Setting, Quality, Duration, Modifying Factors, Severity)  Note limiting factors. Darlyn Bagley is a 66 y.o. female who presents to the emergency department with complaints of worsening shortness of breath and now development of a \"burning sensation\" in her chest from frequently coughing and sneezing. She was seen in the ER late evening on June 4 and was discharged the early morning hours of June 5. Was offered to be admitted due to repeat visit of COPD exacerbation and shortness of breath patient declined and stated she would use her treatments at home. She reports that she does feel worse now despite taking prednisone burst that she is using at home currently. She states that she took a DuoNeb just about an hour and a half to discharge prior to arrival with some improvement. She is concerned because she feels that her immune system is down and she is not getting better. She denies any palpitations, dizziness, lightheadedness. She wears continuous home O2. She reports she has had wheezing and states that she normally always wheezes with her COPD. Denies any fever or chills. She reports the \"burning\" sensation in her chest is moderate and grades it at a 6 out of 10. Denies any modifying or relieving improvement other than the DuoNeb prior to arrival.  Aggravating factors include coughing and sneezing which makes her pain in her chest worse. She denies any other associated symptoms. She denies any history of seasonal allergies. Denies any nasal congestion, runny nose, ear pain, or sore throat.           Nursing Notes lumpectomy and left snb    OTHER SURGICAL HISTORY  14    Placement drain, left axillary seroma    NM MASTECTOMY, SIMPLE, COMPLETE Right 2018    R modified radical mastectomy     Social History     Socioeconomic History    Marital status:      Spouse name: None    Number of children: None    Years of education: None    Highest education level: None   Occupational History    None   Social Needs    Financial resource strain: None    Food insecurity:     Worry: None     Inability: None    Transportation needs:     Medical: None     Non-medical: None   Tobacco Use    Smoking status: Former Smoker     Packs/day: 1.50     Years: 40.00     Pack years: 60.00     Types: Cigarettes     Last attempt to quit: 1989     Years since quittin.3    Smokeless tobacco: Never Used   Substance and Sexual Activity    Alcohol use: No    Drug use: No    Sexual activity: Never   Lifestyle    Physical activity:     Days per week: None     Minutes per session: None    Stress: None   Relationships    Social connections:     Talks on phone: None     Gets together: None     Attends Evangelical service: None     Active member of club or organization: None     Attends meetings of clubs or organizations: None     Relationship status: None    Intimate partner violence:     Fear of current or ex partner: None     Emotionally abused: None     Physically abused: None     Forced sexual activity: None   Other Topics Concern    None   Social History Narrative    None       SCREENINGS      @FLOW([id number])@      PHYSICAL EXAM    (up to 7 for level 4, 8 or more for level 5)     ED Triage Vitals [19 0430]   BP Temp Temp Source Pulse Resp SpO2 Height Weight   (!) 168/81 -- Oral 90 24 100 % 5' 3\" (1.6 m) 142 lb (64.4 kg)       Physical Exam   Constitutional: She is oriented to person, place, and time. She appears well-developed and well-nourished. No distress. HENT:   Head: Normocephalic and atraumatic.    Nose: No mucosal edema, rhinorrhea or nasal deformity. Mouth/Throat: Oropharynx is clear and moist and mucous membranes are normal. No oropharyngeal exudate. Neck: Normal range of motion. Neck supple. Cardiovascular: Normal rate, regular rhythm, normal heart sounds and intact distal pulses. Pulmonary/Chest: Effort normal. No stridor. No respiratory distress. She has decreased breath sounds. She has wheezes. She has no rhonchi. She has no rales. She exhibits no tenderness. Significantly diminished throughout. Very faint wheezes noted to bilateral lower lobes. Abdominal: Soft. Bowel sounds are normal. She exhibits no distension. There is no tenderness. Lymphadenopathy:     She has no cervical adenopathy. Neurological: She is alert and oriented to person, place, and time. Skin: Skin is warm and dry. Capillary refill takes less than 2 seconds. She is not diaphoretic. Psychiatric: She has a normal mood and affect. Her behavior is normal. Judgment and thought content normal.   Nursing note and vitals reviewed. DIAGNOSTIC RESULTS     EKG: All EKG's are interpreted by the Emergency Department Physician who either signs or Co-signsthis chart in the absence of a cardiologist.    EKG normal sinus rhythm with ventricular heart rate 88 bpm.  Right bundle-branch block noted. No ST segment elevation.     RADIOLOGY:   Non-plain filmimages such as CT, Ultrasound and MRI are read by the radiologist. Plain radiographic images are visualized and preliminarily interpreted by the emergency physician with the below findings:        Interpretation per the Radiologist below, if available at the time ofthis note:    XR CHEST STANDARD (2 VW)    (Results Pending)         ED BEDSIDE ULTRASOUND:   Performed by ED Physician - none    LABS:  Labs Reviewed   COMPREHENSIVE METABOLIC PANEL - Abnormal; Notable for the following components:       Result Value    Sodium 132 (*)     Chloride 89 (*)     Glucose 109 (*)     All other components within normal limits   CBC WITH AUTO DIFFERENTIAL - Abnormal; Notable for the following components:    RBC 3.71 (*)     Hemoglobin 11.6 (*)     Hematocrit 33.7 (*)     MCH 31.4 (*)     Lymphocytes # 0.7 (*)     Monocytes # 0.9 (*)     All other components within normal limits   CULTURE BLOOD #1   CULTURE BLOOD #2   TROPONIN   LACTIC ACID, PLASMA   CK   BLOOD GAS, ARTERIAL       All other labs were within normal range or not returned as of this dictation. EMERGENCY DEPARTMENT COURSE and DIFFERENTIAL DIAGNOSIS/MDM:   Vitals:    Vitals:    06/06/19 0430 06/06/19 0526 06/06/19 0542   BP: (!) 168/81 (!) 152/72    Pulse: 90 82    Resp: 24 20 18   Temp:  98.5 °F (36.9 °C)    TempSrc: Oral     SpO2: 100% 100% 100%   Weight: 142 lb (64.4 kg)     Height: 5' 3\" (1.6 m)              MDM  Number of Diagnoses or Management Options  [facility] Hypoxia:   Diagnosis management comments: Patient had to present to the emergency last evening for COPD exacerbation after receiving respiratory treatments on a Medrol patient states she felt better and wanted to be discharged home even though the care provider wanted to admit patient. She returns this morning with again complained of increasing shortness of breath not resolving at home medications. She was given additional Solu-Medrol, along with a DuoNeb respiratory treatment she did seem to make some improvement while at rest was very comfortable with the try to ambulate her on her normal home oxygen she desaturated became very tachypneic and short of breath or saturation levels are felt approximately 88% on her regular home O2. At this time patient will be admitted to the hospital for further evaluation and management of COPD exacerbation. I spoke with Dr Jadyn Granado she will accept admission.       0600 - report given to Rachel Syed PA-C who will assume care at this time      CRITICAL CARE TIME       CONSULTS:  IP CONSULT TO HOSPITALIST    PROCEDURES:  Unless otherwise noted below, none     Procedures    FINAL IMPRESSION      1. COPD exacerbation ([facility]    2. Hypoxia          DISPOSITION/PLAN   DISPOSITION        PATIENT REFERRED TO:  No follow-up provider specified.     DISCHARGE MEDICATIONS:  New Prescriptions    No medications on file          (Please notethat portions of this note were completed with a voice recognition program.  Efforts were made to edit the dictations but occasionally words are mis-transcribed.)    Padmini Esteves PA-C (electronically signed)  Attending Emergency Physician          Padmini Esteves PA-C  06/06/19 0130  Rd Gt Olmedo PA-C  06/06/19 1075

## 2019-06-06 NOTE — H&P
Soft. Bowel sounds are normal.   Musculoskeletal: Normal range of motion. Neurological: She is alert and oriented to person, place, and time. Skin: Skin is warm and dry. Capillary refill takes less than 2 seconds. Psychiatric: She has a normal mood and affect. Her behavior is normal. Judgment and thought content normal.       Review of Systems   Constitutional: Positive for fatigue. HENT: Positive for sore throat. Eyes: Negative. Respiratory: Positive for cough, chest tightness and wheezing. Cardiovascular: Negative. Gastrointestinal: Negative. Endocrine: Negative. Genitourinary: Negative. Musculoskeletal: Negative. Skin: Negative. Allergic/Immunologic: Negative. Neurological: Negative. Hematological: Negative. Psychiatric/Behavioral: Negative.         Medications:  Reviewed    Past Medical History:   Diagnosis Date    Anxiety     Anxiety and depression     CAD (coronary artery disease)     Cancer ([facility] [address] 3/2014    Invasive ductal cancer left breast, spread to 5 lymph nodes    Carotid artery stenosis and occlusion     COPD (chronic obstructive pulmonary disease) ([facility] [address]     Depression 1/15/2018    GERD (gastroesophageal reflux disease)     Headache(784.0)     Hyperlipidemia     Hypertension     Hypothyroidism     Insomnia     Osteoarthritis     RBBB 10/15/2014    Right carotid bruit 5/26/2015    S/P cardiac catheterization 7/13/2016       Past Surgical History:   Procedure Laterality Date    BREAST BIOPSY Right 11/8/2016    US biopsy    BREAST SURGERY Left 2/26/14    U/S Guided core bx of the left breast    BREAST SURGERY Right 3/20/14    U/S of the lateral right breast    BREAST SURGERY Left 3/27/14    U/S Guided Left breast lumpectomy and left snb    OTHER SURGICAL HISTORY  4/11/14    Placement drain, left axillary seroma    AL MASTECTOMY, SIMPLE, COMPLETE Right 9/6/2018    R modified radical mastectomy        Family History   Problem Relation Age of  famotidine  20 mg Oral BID    enoxaparin  40 mg Subcutaneous Daily    ipratropium-albuterol  1 ampule Inhalation Q4H WA    cefTRIAXone (ROCEPHIN) IV  1 g Intravenous Q24H    azithromycin  500 mg Intravenous Q24H    sennosides-docusate sodium  1 tablet Oral Nightly    methylPREDNISolone  40 mg Intravenous Q8H    Followed by   Addi Nick ON 6/8/2019] predniSONE  40 mg Oral Daily    budesonide  1 mg Nebulization BID    sodium chloride  1,000 mL Intravenous Once    b complex-C-E-zinc  1 tablet Oral Daily    sodium chloride flush  10 mL Intravenous Q12H     PRN Meds: sodium chloride flush, ondansetron, magnesium sulfate, sodium chloride flush    Labs:   Recent Labs     06/05/19  0000 06/06/19  0430   WBC 5.4 8.1   HGB 11.8* 11.6*   HCT 34.0* 33.7*    242     Recent Labs     06/05/19  0000 06/06/19  0430   * 132*   K 4.8 4.7   CL 91* 89*   CO2 33* 30   BUN 9 11   CREATININE 0.73 0.59   CALCIUM 9.3 9.8     Recent Labs     06/05/19  0000 06/06/19  0430   AST 21 20   ALT 12 12   BILITOT 0.3 0.3   ALKPHOS 77 74     No results for input(s): INR in the last 72 hours. Recent Labs     06/05/19  0000 06/06/19  0430   CKTOTAL 56 62   TROPONINI <0.010 <0.010       Urinalysis:   Lab Results   Component Value Date    NITRU Negative 01/18/2019    BLOODU Negative 01/18/2019    SPECGRAV 1.008 01/18/2019    GLUCOSEU Negative 01/18/2019       Radiology:   Most recent    Chest CT      WITH CONTRAST:  Results for orders placed during the hospital encounter of 06/12/18   CT Chest W Contrast    Narrative EXAMINATION:  CT CHEST W CONTRAST    CLINICAL HISTORY: Abnormal chest x-ray.  .    COMPARISONS:  August 29, 2015    TECHNIQUE:  Multiple serial axial images from the base  The upper abdomen with both sagittal coronal reconstruction was performed finding the intravenous administration of 75 mL of Isovue-370    FINDINGS:  There is moderate to severe bullous emphysematous disease within the upper 1/3-2/3 of the lung parenchyma. There is biapical areas of scarring fibrosis left greater than right. There is a noncalcified nodular density seen within the left upper lobe, image 41 measuring 9 mm. With some associated scarring fibrosis. Similar to the previous study of 2015. This may account for the shadow seen by the plain film. There is areas of   atelectasis, scarring seen at the base of the left upper lobe. No pleural effusions. No pneumothoraces. There is pretracheal adenopathy. In the field-of-view of the abdomen there is a moderate hiatal hernia. There is a soft tissue mass seen at the seen at the inferomedial aspect of the right breast. It measures approximately 2.3 cm. There is a radiopaque density also noted within the medial aspect of the right breast.    Visualized osseous structures show multilevel degenerative changes superimposed upon a moderate dorsal kyphosis. Impression 1. THERE IS A MASS WITHIN THE RIGHT BREAST AS DESCRIBED ABOVE. FURTHER EVALUATION IS WARRANTED AND CORRELATION MAMMOGRAPHIC STUDY IS RECOMMENDED. 2. MODERATE TO SEVERE BULLOUS EMPHYSEMATOUS DISEASE. 3. THERE IS A NONCALCIFIED NODULE IN THE LEFT UPPER LOBE THAT APPEARS TO CORRESPOND TO THAT SHADOW ON THE PLAIN FILM IS UNCHANGED THE PREVIOUS STUDY OF 2015. FOLLOW-UP AS PER CRITERIA BELOW  PRETRACHEAL ADENOPATHY.   OTHER FINDINGS DETAILED ABOVE      ____________________________________________________________    Fleischner Society 2017 Guidelines for Management of Incidentally Detected Pulmonary Nodules in Adults    A: Solid Nodules*         Single                   Low risk**                       <6 mm     No routine follow-up                                 6-8 mm     CT at 6-12 months, then consider CT at 18-24 months                   >8 mm     Consider CT at 3 months, PET/CT, or tissue sampling  Nodules <6 mm do not require routine follow-up, but certain patients at high risk with suspicious nodule morphology, upper lobe mm do not usually require follow-up. Persistent part-solid nodules with solid components >=6 mm should be considered highly suspicious (recommendations 4A-4C)         Multiple                  <6 mm     CT at 3-6 months. If stable, consider CT at 2 and 4 years. >=6 mm     CT at 3-6 months. Subsequent management based  on the most suspicious nodule(s). Multiple <6 mm pure ground-glass nodules are usually benign, but consider follow-up in selected patients at high risk at 2 and 4 years (recommendation 5A). Note. --These recommendations do not apply to lung cancer screening, patients with immunosuppression, or patients with known primary cancer. * Dimensions are average of long and short axes, rounded to the nearest millimeter. ** Consider all relevant risk factors (see Risk Factors). ____________________________________________________________ I        WITHOUT CONTRAST: No results found for this or any previous visit. CXR      2-view:   Results for orders placed during the hospital encounter of 06/06/19   XR CHEST STANDARD (2 VW)    Narrative EXAMINATION: XR CHEST (2 VW)    CLINICAL HISTORY: SHORTNESS OF BREATH    COMPARISONS: JUNE 5, 2019    FINDINGS: Osteopenia. Right breast absent. Surgical clips right axilla. Additional surgical lips left upper chest anteriorly. Cardiopericardial silhouette normal. Aorta calcified and tortuous. Lungs clear and hyperexpanded. Diaphragms flattened. Impression NO ACUTE CARDIOPULMONARY DISEASE. RIGHT MASTECTOMY. EMPHYSEMA. Portable:   Results for orders placed during the hospital encounter of 06/04/19   XR CHEST PORTABLE    Narrative Portable chest radiograph    History: Shortness of breath    Technique: AP portable view of the chest obtained. Comparison: Chest radiographs from May 11, 2019    Findings:    Atherosclerotic calcification of the thoracic aorta. The cardiomediastinal silhouette is within normal limits.  No pneumothorax, pleural effusion, or focal consolidation. Lungs are hyperinflated. Coarsening of the pulmonary interstitium. Osseous   structures of the thorax appear intact. Bilateral axillary surgical clips noted. Impression No acute intrathoracic process. Findings compatible with COPD. Echo No results found for this or any previous visit. Assessment/Plan:    1 Acute COPD exacerbation with chronic hypoxia: Pulmonology consult, IV Steroids with taper to PO once exp wheezes resolve, IV antibiotics, Chest PT, Acapella, Incentive spirometry, Duonebs Q4hr, Guaneficine. Ordered sputum to be sent for culture/ sensitivities and gram stain. Supplemental O2 with goal SPO2 of 92% or greater. Wean down O2 if/when possible. If continues to need O2, will order home O2 evaluation prior to discharge to evaluate for home O2 needs. 2. Hyponatremia: likely d/t dehydration from lasix, Will replete with D51/2 and monitor for fluid overload     3. Hypertension: resume home medications          I personally spent estimated 25 minutes with this patient today. Additional work up or/and treatment plan may be added today or then after based on clinical progression. I am managing a portion of pt care. Some medical issues are handled by other specialists. Additional work up and treatment should be done in out pt setting by pt PCP and other out pt providers. In addition to examining and evaluating pt, I spent additional time explaining care, normaland abnormal findings, and treatment plan. All of pt questions were answered. Counseling, diet and education were provided. Case will be discussed with nursing staff when appropriate. Family will be updated if and whenappropriate.       Electronically signed by ROSALIND Beckham CNP on 6/6/2019 at 11:43 AM

## 2019-06-06 NOTE — ED NOTES
Called Lab to come down and draw lab work      Eden Vargas, [address] [facility]  06/06/19 [id number]

## 2019-06-06 NOTE — PROGRESS NOTES
Vitals:    06/06/19 0934   BP: (!) 181/84   Pulse: 103   Resp:    Temp: 98.1 °F (36.7 °C)   SpO2: 98%     Patient admitted to floor. Pt assessment completed. Alert & Oriented X 4. Lung sounds diminished all lobes, 2L at home, 3L NC currently. S1, S2 audible, no adventitious sounds. Regular, rate, and rhythm. Peripheral pulses palpable. Trace edema BLE. Hospitalist notified of BP. Bowel sounds active x 4 quadrants. Soft, non-tender. Skin Intact. Patient denies dyspnea at rest, SOB, CP and any pain at this time.      Will continue to monitor and hourly round

## 2019-06-06 NOTE — PROGRESS NOTES
Hospitalist Progress Note  6/8/2019 10:44 AM    Assessment and Plan:   1. Acute on Chronic Respiratory Failure ( on home O2 at baseline) desatted while on 2L needed 4L O2 via NC on presentation due to Acute Pseudomonas aureginousa Tracheobronchitis and COPD exacerbation: Pulmonology consult, IV Steroids with taper to PO once exp wheezes resolve, IV antibiotics, Chest PT, Acapella, Incentive spirometry, Duonebs Q4hr, Guaneficine. Ordered sputum to be sent for culture/ sensitivities and gram stain. Supplemental O2 with goal SPO2 of 92% or greater. Wean down O2 if/when possible. If continues to need O2, will order home O2 evaluation prior to discharge to evaluate for home O2 needs On Zosyn. ID Consulted and Contact isolation for Pseudomonas likely colonized. Sensitivities pending. Mucomyst discontinued by pulm. 2. Hypothyroidism: On home dose Synthroid. 3. Hx of Breast Ca: Not on treatment at this time. 4. Depression and Anxiety: Home meds  5. Vit D Deficiency: Supplemental Vit D ordered. 6. Generalized weakness Gait instability and Decreased Functional Status: Fall precautions. PT OT to evaluate. Maximize nutrition status. Assessing if needs DME at home. SW on board. 7. Bowel Regimen and GI PPx: stool softners PRN ordered with hold parameters for loose stools or diarrhea. On antiacid  8. Diet: DIET GENERAL;  9. Advance Directive: Full Code   10. Nutrition status: Supplemental Vitamins ordered. Dietitian assessment  11. Vaccinations: Immunization records reviewed. If has not received appropriate vaccinations, will order to be given prior to discharge. 12. DVT prophylaxis: Chemical prophylaxis SQ  13. Discharge planning: MANINDER on board. Will discharge once medically stable and cleared by all consultants. 14. High Risk Readmission Screening Tool Score Noted.      Additionally, the following hospital problems were addressed:  Principal Problem:    Acute on chronic respiratory failure with hypoxia [facility])  Active Problems:    Hypothyroidism    Anxiety and depression    CAD (coronary artery disease)- Dr. Josh Angulo    Breast cancer [facility])    Acute exacerbation of chronic obstructive pulmonary disease (COPD) ([facility] [address]    Chronic respiratory failure with hypoxia ([facility])    Breast cancer metastasized to axillary lymph node, right ([facility] [address]  Resolved Problems:    * No resolved hospital problems. *      ** Total time spent reviewing medical records, evaluating patient, speaking with RN's and consultants where I was focused exclusively on this patient: 35 minutes. This time is excluding time spent performing procedures or significant events occurring earlier or later in the day requiring my attention and focus. Subjective:   Admit Date: 6/6/2019  PCP: ROSALIND Torres - CNP    No acute events overnight. Afebrile  Telemetry reviewed. Still SOB with minimal exertion. Describes mid chest pleuritic discomfort with cough  Pt denies chest pain, N/V, fevers or chills. Objective:     Vitals:    06/08/19 0541 06/08/19 0708 06/08/19 0720 06/08/19 0841   BP:   (!) 160/78 (!) 160/78   Pulse:  89 87 87   Resp:  16     Temp:   97.5 °F (36.4 °C)    TempSrc:   Oral    SpO2:  100% 100%    Weight: 145 lb 12.8 oz (66.1 kg)      Height:         General appearance: Mild acute distress, lethargic + O x 3. Mod conversational dyspnea noted. Dentition intact. Answers questions appropriately  Lungs: Diminished (+)  exp wheezes, Diffuse course rales mild retractions; mod use of accessory muscles  Heart:  S1, S2 normal, RRR, no MRG appreciated  Abdomen: (+) BS, soft, non-tender; non distended no guarding or rigidity. Extremities:  no cyanosis,  no edema bilat lower exts, no calf tenderness bilaterally.  Dry skin noted       Medications:      sodium chloride        piperacillin-tazobactam  3.375 g Intravenous Q8H    levothyroxine  75 mcg Oral Daily    [START ON 6/9/2019] vitamin D  50,000 Units Oral Weekly    simvastatin  10 mg Oral

## 2019-06-06 NOTE — ED TRIAGE NOTES
Pt reports to ED from home with c/o SOB. Pt was seen earlier today for same and D/C home. Pt feels as if her SOB was increasing and she was having left sided chest pain on inspiration. Pt is A/O x 4, resps even and unlabored.  Pt was rx'd prednisone and states she did get it filled and took it

## 2019-06-07 LAB
BASOPHILS ABSOLUTE: 0 K/UL (ref 0–0.2)
BASOPHILS RELATIVE PERCENT: 0 %
EOSINOPHILS ABSOLUTE: 0 K/UL (ref 0–0.7)
EOSINOPHILS RELATIVE PERCENT: 0 %
HCT VFR BLD CALC: 33 % (ref 37–47)
HEMOGLOBIN: 11.1 G/DL (ref 12–16)
LYMPHOCYTES ABSOLUTE: 0.7 K/UL (ref 1–4.8)
LYMPHOCYTES RELATIVE PERCENT: 6.9 %
MCH RBC QN AUTO: 30.7 PG (ref 27–31.3)
MCHC RBC AUTO-ENTMCNC: 33.8 % (ref 33–37)
MCV RBC AUTO: 90.8 FL (ref 82–100)
MONOCYTES ABSOLUTE: 0.5 K/UL (ref 0.2–0.8)
MONOCYTES RELATIVE PERCENT: 5 %
NEUTROPHILS ABSOLUTE: 8.5 K/UL (ref 1.4–6.5)
NEUTROPHILS RELATIVE PERCENT: 88.1 %
PDW BLD-RTO: 14 % (ref 11.5–14.5)
PLATELET # BLD: 257 K/UL (ref 130–400)
PROCALCITONIN: 0.02 NG/ML (ref 0–0.15)
RBC # BLD: 3.63 M/UL (ref 4.2–5.4)
WBC # BLD: 9.6 K/UL (ref 4.8–10.8)

## 2019-06-07 PROCEDURE — 2580000003 HC RX 258: Performed by: INTERNAL MEDICINE

## 2019-06-07 PROCEDURE — 85025 COMPLETE CBC W/AUTO DIFF WBC: CPT

## 2019-06-07 PROCEDURE — 6370000000 HC RX 637 (ALT 250 FOR IP): Performed by: INTERNAL MEDICINE

## 2019-06-07 PROCEDURE — 6360000002 HC RX W HCPCS: Performed by: INTERNAL MEDICINE

## 2019-06-07 PROCEDURE — 94668 MNPJ CHEST WALL SBSQ: CPT

## 2019-06-07 PROCEDURE — 1210000000 HC MED SURG R&B

## 2019-06-07 PROCEDURE — 36415 COLL VENOUS BLD VENIPUNCTURE: CPT

## 2019-06-07 PROCEDURE — 97161 PT EVAL LOW COMPLEX 20 MIN: CPT

## 2019-06-07 PROCEDURE — 97165 OT EVAL LOW COMPLEX 30 MIN: CPT

## 2019-06-07 PROCEDURE — 2700000000 HC OXYGEN THERAPY PER DAY

## 2019-06-07 PROCEDURE — 99232 SBSQ HOSP IP/OBS MODERATE 35: CPT | Performed by: INTERNAL MEDICINE

## 2019-06-07 PROCEDURE — 94640 AIRWAY INHALATION TREATMENT: CPT

## 2019-06-07 PROCEDURE — 84145 PROCALCITONIN (PCT): CPT

## 2019-06-07 PROCEDURE — 2580000003 HC RX 258: Performed by: PHYSICIAN ASSISTANT

## 2019-06-07 RX ADMIN — Medication 10 ML: at 22:14

## 2019-06-07 RX ADMIN — METOPROLOL TARTRATE 50 MG: 50 TABLET ORAL at 22:14

## 2019-06-07 RX ADMIN — LEVOTHYROXINE SODIUM 75 MCG: 75 TABLET ORAL at 05:52

## 2019-06-07 RX ADMIN — AZITHROMYCIN MONOHYDRATE 500 MG: 500 INJECTION, POWDER, LYOPHILIZED, FOR SOLUTION INTRAVENOUS at 11:09

## 2019-06-07 RX ADMIN — METHYLPREDNISOLONE SODIUM SUCCINATE 40 MG: 40 INJECTION, POWDER, FOR SOLUTION INTRAMUSCULAR; INTRAVENOUS at 22:14

## 2019-06-07 RX ADMIN — METOPROLOL TARTRATE 50 MG: 50 TABLET ORAL at 08:31

## 2019-06-07 RX ADMIN — IPRATROPIUM BROMIDE AND ALBUTEROL SULFATE 1 AMPULE: .5; 3 SOLUTION RESPIRATORY (INHALATION) at 11:18

## 2019-06-07 RX ADMIN — IPRATROPIUM BROMIDE AND ALBUTEROL SULFATE 1 AMPULE: .5; 3 SOLUTION RESPIRATORY (INHALATION) at 18:45

## 2019-06-07 RX ADMIN — ACETYLCYSTEINE 600 MG: 200 SOLUTION ORAL; RESPIRATORY (INHALATION) at 07:43

## 2019-06-07 RX ADMIN — GUAIFENESIN 1200 MG: 600 TABLET, EXTENDED RELEASE ORAL at 08:31

## 2019-06-07 RX ADMIN — CEFTRIAXONE SODIUM 1 G: 1 INJECTION, POWDER, FOR SOLUTION INTRAMUSCULAR; INTRAVENOUS at 13:19

## 2019-06-07 RX ADMIN — TRAZODONE HYDROCHLORIDE 150 MG: 150 TABLET ORAL at 23:00

## 2019-06-07 RX ADMIN — BUDESONIDE 500 MCG: 0.5 SUSPENSION RESPIRATORY (INHALATION) at 18:44

## 2019-06-07 RX ADMIN — METHYLPREDNISOLONE SODIUM SUCCINATE 40 MG: 40 INJECTION, POWDER, FOR SOLUTION INTRAMUSCULAR; INTRAVENOUS at 13:19

## 2019-06-07 RX ADMIN — ZINC 1 TABLET: TAB ORAL at 08:31

## 2019-06-07 RX ADMIN — FAMOTIDINE 20 MG: 20 TABLET, FILM COATED ORAL at 08:31

## 2019-06-07 RX ADMIN — PIPERACILLIN SODIUM,TAZOBACTAM SODIUM 3.38 G: 3; .375 INJECTION, POWDER, FOR SOLUTION INTRAVENOUS at 16:37

## 2019-06-07 RX ADMIN — IPRATROPIUM BROMIDE AND ALBUTEROL SULFATE 1 AMPULE: .5; 3 SOLUTION RESPIRATORY (INHALATION) at 07:43

## 2019-06-07 RX ADMIN — IPRATROPIUM BROMIDE AND ALBUTEROL SULFATE 1 AMPULE: .5; 3 SOLUTION RESPIRATORY (INHALATION) at 16:04

## 2019-06-07 RX ADMIN — FAMOTIDINE 20 MG: 20 TABLET, FILM COATED ORAL at 22:14

## 2019-06-07 RX ADMIN — Medication 10 ML: at 22:19

## 2019-06-07 RX ADMIN — BUDESONIDE 500 MCG: 0.5 SUSPENSION RESPIRATORY (INHALATION) at 07:43

## 2019-06-07 RX ADMIN — SIMVASTATIN 10 MG: 10 TABLET, FILM COATED ORAL at 22:14

## 2019-06-07 RX ADMIN — METHYLPREDNISOLONE SODIUM SUCCINATE 40 MG: 40 INJECTION, POWDER, FOR SOLUTION INTRAMUSCULAR; INTRAVENOUS at 05:52

## 2019-06-07 ASSESSMENT — PAIN SCALES - GENERAL
PAINLEVEL_OUTOF10: 0
PAINLEVEL_OUTOF10: 2
PAINLEVEL_OUTOF10: 0
PAINLEVEL_OUTOF10: 0

## 2019-06-07 ASSESSMENT — ENCOUNTER SYMPTOMS
ALLERGIC/IMMUNOLOGIC NEGATIVE: 1
GASTROINTESTINAL NEGATIVE: 1
EYES NEGATIVE: 1
SHORTNESS OF BREATH: 1

## 2019-06-07 ASSESSMENT — PAIN DESCRIPTION - PAIN TYPE
TYPE: ACUTE PAIN
TYPE: ACUTE PAIN

## 2019-06-07 ASSESSMENT — PAIN DESCRIPTION - LOCATION
LOCATION: ARM
LOCATION: ARM

## 2019-06-07 ASSESSMENT — PAIN DESCRIPTION - ORIENTATION
ORIENTATION: LEFT
ORIENTATION: LEFT

## 2019-06-07 ASSESSMENT — PAIN DESCRIPTION - DESCRIPTORS: DESCRIPTORS: ACHING

## 2019-06-07 NOTE — PROGRESS NOTES
Nutrition Assessment (Low Risk)    Type and Reason for Visit: Consult(supplement recommendations)    Nutrition Recommendations: Continue General diet     Nutrition Assessment:  Patient assessed for nutritional risk. Deemed to be at low risk at this time. Will continue to monitor for changes in status. Consult recieved for supplement recommendations. Pt states her appetite/oral intake is very good and pta. Denies wt loss. No nutrition supplements indicated at this time. Will continue to monitor.     Malnutrition Assessment:  · Malnutrition Status: No malnutrition    Nutrition Risk Level   Risk Level: Low    Nutrition Diagnosis:   · Problem: No nutrition diagnosis at this time    Nutrition Intervention:  Food and/or Delivery: Continue current diet  Nutrition Education/Counseling/Coordination of Care:  Continued Inpatient Monitoring, Education Not Indicated      Electronically signed by Hussein Chance RD, LD on 6/7/19 at 3:37 PM

## 2019-06-07 NOTE — PROGRESS NOTES
Patient states after her antibiotics she is having trouble hearing. I know the mycins can cause ototoxicity and told her that but that med was discontinued. She was wondering how long it would last and I don't know but I will send Dr. Aruna Charels a message.

## 2019-06-07 NOTE — PROGRESS NOTES
oriented to person, place, and time. Skin: Skin is warm and dry. Capillary refill takes less than 2 seconds. Psychiatric: She has a normal mood and affect. Her behavior is normal. Judgment and thought content normal.       Review of Systems   Constitutional: Negative. HENT: Negative. Eyes: Negative. Respiratory: Positive for shortness of breath. Cardiovascular: Negative. Gastrointestinal: Negative. Endocrine: Negative. Genitourinary: Negative. Musculoskeletal: Negative. Skin: Negative. Allergic/Immunologic: Negative. Neurological: Negative. Hematological: Negative. Psychiatric/Behavioral: Negative.         Medications:  Reviewed    Infusion Medications:    sodium chloride       Scheduled Medications:    levothyroxine  75 mcg Oral Daily    [START ON 6/9/2019] vitamin D  50,000 Units Oral Weekly    simvastatin  10 mg Oral Nightly    guaiFENesin  1,200 mg Oral BID    sodium chloride flush  10 mL Intravenous 2 times per day    famotidine  20 mg Oral BID    enoxaparin  40 mg Subcutaneous Daily    cefTRIAXone (ROCEPHIN) IV  1 g Intravenous Q24H    azithromycin  500 mg Intravenous Q24H    sennosides-docusate sodium  1 tablet Oral Nightly    methylPREDNISolone  40 mg Intravenous Q8H    Followed by   Florencio Friedman ON 6/8/2019] predniSONE  40 mg Oral Daily    sodium chloride  1,000 mL Intravenous Once    b complex-C-E-zinc  1 tablet Oral Daily    sodium chloride flush  10 mL Intravenous Q12H    budesonide  0.5 mg Nebulization BID    ipratropium-albuterol  1 ampule Inhalation 4x daily    metoprolol tartrate  50 mg Oral BID    acetylcysteine  600 mg Inhalation BID     PRN Meds: sodium chloride flush, ondansetron, magnesium sulfate, sodium chloride flush, albuterol, traZODone, acetaminophen    Past Medical History:   Diagnosis Date    Anxiety     Anxiety and depression     CAD (coronary artery disease)     [facility]    Invasive ductal cancer left breast, spread to 5 lymph nodes    Carotid artery stenosis and occlusion     [facility]     Depression 1/15/2018    GERD (gastroesophageal reflux disease)     Headache(784.0)     Hyperlipidemia     Hypertension     Hypothyroidism     Insomnia     Osteoarthritis     RBBB 10/15/2014    Right carotid bruit 2015    S/P cardiac catheterization 2016       Past Surgical History:   Procedure Laterality Date    BREAST BIOPSY Right 2016    US biopsy    BREAST SURGERY Left 14    U/S Guided core bx of the left breast    BREAST SURGERY Right 3/20/14    U/S of the lateral right breast    BREAST SURGERY Left 3/27/14    U/S Guided Left breast lumpectomy and left snb    OTHER SURGICAL HISTORY  14    Placement drain, left axillary seroma    LA MASTECTOMY, SIMPLE, COMPLETE Right 2018    R modified radical mastectomy        Family History   Problem Relation Age of Onset    Cancer Sister         breast    Cancer Maternal Uncle         pancreatic        Social History     Socioeconomic History    Marital status:       Spouse name: Not on file    Number of children: Not on file    Years of education: Not on file    Highest education level: Not on file   Occupational History    Not on file   Social Needs    Financial resource strain: Not on file    Food insecurity:     Worry: Not on file     Inability: Not on file    Transportation needs:     Medical: Not on file     Non-medical: Not on file   Tobacco Use    Smoking status: Former Smoker     Packs/day: 1.50     Years: 40.00     Pack years: 60.00     Types: Cigarettes     Last attempt to quit: 1989     Years since quittin.3    Smokeless tobacco: Never Used   Substance and Sexual Activity    Alcohol use: No    Drug use: No    Sexual activity: Never   Lifestyle    Physical activity:     Days per week: Not on file     Minutes per session: Not on file    Stress: Not on file Relationships    Social connections:     Talks on phone: Not on file     Gets together: Not on file     Attends Holiness service: Not on file     Active member of club or organization: Not on file     Attends meetings of clubs or organizations: Not on file     Relationship status: Not on file    Intimate partner violence:     Fear of current or ex partner: Not on file     Emotionally abused: Not on file     Physically abused: Not on file     Forced sexual activity: Not on file   Other Topics Concern    Not on file   Social History Narrative    Not on file       Labs:   Recent Labs     06/05/19  0000 06/06/19  0430 06/07/19  0617   WBC 5.4 8.1 9.6   HGB 11.8* 11.6* 11.1*   HCT 34.0* 33.7* 33.0*    242 257     Recent Labs     06/05/19  0000 06/06/19  0430   * 132*   K 4.8 4.7   CL 91* 89*   CO2 33* 30   BUN 9 11   CREATININE 0.73 0.59   CALCIUM 9.3 9.8     Recent Labs     06/05/19  0000 06/06/19  0430   AST 21 20   ALT 12 12   BILITOT 0.3 0.3   ALKPHOS 77 74     No results for input(s): INR in the last 72 hours. Recent Labs     06/05/19  0000 06/06/19  0430   CKTOTAL 56 62   TROPONINI <0.010 <0.010       Urinalysis:   Lab Results   Component Value Date    NITRU Negative 01/18/2019    BLOODU Negative 01/18/2019    SPECGRAV 1.008 01/18/2019    GLUCOSEU Negative 01/18/2019       Radiology:   Most recent    Chest CT      WITH CONTRAST:  Results for orders placed during the hospital encounter of 06/12/18   CT Chest W Contrast    Narrative EXAMINATION:  CT CHEST W CONTRAST    CLINICAL HISTORY: Abnormal chest x-ray. .    COMPARISONS:  August 29, 2015    TECHNIQUE:  Multiple serial axial images from the base  The upper abdomen with both sagittal coronal reconstruction was performed finding the intravenous administration of 75 mL of Isovue-370    FINDINGS:  There is moderate to severe bullous emphysematous disease within the upper 1/3-2/3 of the lung parenchyma.  There is biapical areas of scarring fibrosis left greater than right. There is a noncalcified nodular density seen within the left upper lobe, image 41 measuring 9 mm. With some associated scarring fibrosis. Similar to the previous study of 2015. This may account for the shadow seen by the plain film. There is areas of   atelectasis, scarring seen at the base of the left upper lobe. No pleural effusions. No pneumothoraces. There is pretracheal adenopathy. In the field-of-view of the abdomen there is a moderate hiatal hernia. There is a soft tissue mass seen at the seen at the inferomedial aspect of the right breast. It measures approximately 2.3 cm. There is a radiopaque density also noted within the medial aspect of the right breast.    Visualized osseous structures show multilevel degenerative changes superimposed upon a moderate dorsal kyphosis. Impression 1. THERE IS A MASS WITHIN THE RIGHT BREAST AS DESCRIBED ABOVE. FURTHER EVALUATION IS WARRANTED AND CORRELATION MAMMOGRAPHIC STUDY IS RECOMMENDED. 2. MODERATE TO SEVERE BULLOUS EMPHYSEMATOUS DISEASE. 3. THERE IS A NONCALCIFIED NODULE IN THE LEFT UPPER LOBE THAT APPEARS TO CORRESPOND TO THAT SHADOW ON THE PLAIN FILM IS UNCHANGED THE PREVIOUS STUDY OF 2015. FOLLOW-UP AS PER CRITERIA BELOW  PRETRACHEAL ADENOPATHY.   OTHER FINDINGS DETAILED ABOVE      ____________________________________________________________    Fleischner Society 2017 Guidelines for Management of Incidentally Detected Pulmonary Nodules in Adults    A: Solid Nodules*         Single                   Low risk**                       <6 mm     No routine follow-up                                 6-8 mm     CT at 6-12 months, then consider CT at 18-24 months                   >8 mm     Consider CT at 3 months, PET/CT, or tissue sampling  Nodules <6 mm do not require routine follow-up, but certain patients at high risk with suspicious nodule morphology, upper lobe location, or both may warrant 12-month follow-up (recommendation 1A).               High risk**                  <6 mm     Optional CT at 12 months                 6-8 mm     CT at 6-12 months, then CT at 18-24 months                   >8 mm     Consider CT at 3 months, PET/CT, or tissue sampling  Nodules <6 mm do not require routine follow-up, but certain patients at high risk with suspicious nodule morphology, upper lobe location, or both may warrant 12-month follow-up (recommendation 1A). Multiple                            Low risk**                  <6 mm     No routine follow-up                 6-8 mm     CT at 3-6 months, then consider CT at 18-24 months                  >8 mm     CT at 3-6 months, then  consider CT at 18-24 months  Use most suspicious nodule as guide to management. Follow-up intervals may vary according to size and risk (recommendation 2A). High risk**                  <6 mm     Optional CT at 12 months                 6-8 mm     CT at 3-6 months, then at 18-24 months                 >8 mm     CT at 3-6 months, then at 18-24 months  Use most suspicious nodule as guide to management. Follow-up intervals may vary according to size and risk (recommendation 2A). B: Subsolid Nodules*         Single                      Ground glass                  <6 mm     No routine follow-up                >=6 mm     CT at 6-12 months to confirm persistence, then CT  every 2 years until 5 years  In certain suspicious nodules <6 mm, consider follow-up at 2 and 4 years. If solid component(s) or growth develops, consider resection. (Recommendations 3A and 4A). Part solid                  <6 mm     No routine follow-up                >=6 mm     CT at 3-6 months to confirm persistence. If unchanged and solid component remains <6 mm, annual CT  should be performed for 5 years. In practice, part-solid nodules cannot be defined as such until >=6 mm, and nodules <6 mm do not usually require follow-up.  Persistent part-solid nodules with solid components >=6 mm should be considered highly suspicious (recommendations 4A-4C)         Multiple                  <6 mm     CT at 3-6 months. If stable, consider CT at 2 and 4 years. >=6 mm     CT at 3-6 months. Subsequent management based  on the most suspicious nodule(s). Multiple <6 mm pure ground-glass nodules are usually benign, but consider follow-up in selected patients at high risk at 2 and 4 years (recommendation 5A). Note. --These recommendations do not apply to lung cancer screening, patients with immunosuppression, or patients with known primary cancer. * Dimensions are average of long and short axes, rounded to the nearest millimeter. ** Consider all relevant risk factors (see Risk Factors). ____________________________________________________________ I        WITHOUT CONTRAST: No results found for this or any previous visit. CXR      2-view:   Results for orders placed during the hospital encounter of 06/06/19   XR CHEST STANDARD (2 VW)    Narrative EXAMINATION: XR CHEST (2 VW)    CLINICAL HISTORY: SHORTNESS OF BREATH    COMPARISONS: JUNE 5, 2019    FINDINGS: Osteopenia. Right breast absent. Surgical clips right axilla. Additional surgical lips left upper chest anteriorly. Cardiopericardial silhouette normal. Aorta calcified and tortuous. Lungs clear and hyperexpanded. Diaphragms flattened. Impression NO ACUTE CARDIOPULMONARY DISEASE. RIGHT MASTECTOMY. EMPHYSEMA. Portable:   Results for orders placed during the hospital encounter of 06/04/19   XR CHEST PORTABLE    Narrative Portable chest radiograph    History: Shortness of breath    Technique: AP portable view of the chest obtained. Comparison: Chest radiographs from May 11, 2019    Findings:    Atherosclerotic calcification of the thoracic aorta. The cardiomediastinal silhouette is within normal limits. No pneumothorax, pleural effusion, or focal consolidation.  Lungs are

## 2019-06-07 NOTE — CARE COORDINATION
Discussed with Care Coordination,  teaching done last admission 5/14/19, 5 admissions in past year, referral made for follow up with Antoinette Paz last admission. Pt declined care coordiantion services. Pt has breast cancer and documention reflects she is refusing treatment. Per cancer center, pt has not been seen since October 2018 and has failed to participate in treatment plan. Discussed with Kh[address] Coordinator regarding possibly offering [facility] and/or review of discharge options including end of life care. Will follow as needed.

## 2019-06-07 NOTE — CARE COORDINATION
MET WITH PATIENT, FREEDOM OF CHOICE OFFERED. STATES FROM HOME. HAS NEBULIZER AND HOME 02 2LNC. PATIENT STATES SHE LIVES WITH HER DAUGHTER. PATIENT HAS HX OF BREAST CANCER AND HER CHOICE IS NO TREATMENT. PATIENT ALSO DECLINES PALL. CARE OR FURTHER ASSISTANCE IN THE HOME AT THIS TIME.

## 2019-06-07 NOTE — PROGRESS NOTES
Physical Therapy Med Surg Initial Assessment  Facility/Department: [facility] MED SURG UNIT  [facility]       NAME: Peri Seay  : 1945 (66 y.o.)  MRN: [id number]  CODE STATUS: Full Code    Date of Service: 2019    Patient Diagnosis(es): Acute on chronic respiratory failure with hypoxia [facility]) [J96.21]   Chief Complaint   Patient presents with    Shortness of Breath     seen earlier in ER, feels worse      Patient Active Problem List    Diagnosis Date Noted    Acute on chronic respiratory failure with hypoxia ([address] 2019    COPD exacerbation ([address] 2019    Former smoker 2019    Dermatochalasis of both upper eyelids 2019    Pseudophakia of both eyes 2019    Squamous blepharitis of upper and lower eyelids of both eyes 2019    Breast cancer metastasized to axillary lymph node, right ([address] 2018    Chronic respiratory failure with hypoxia ([address] 2018    Community acquired pneumonia of right lower lobe of lung ([address] 08/15/2018    Breast mass, right- Dr. Gino Martin Acute exacerbation of chronic obstructive pulmonary disease (COPD) ([address] 2018    Pulmonary nodule     Moderate episode of recurrent major depressive disorder ([address] 2018    History of breast cancer- left 2018    Depression     Diastolic dysfunction     Decreased GFR 2017    Bilateral carotid artery stenosis- Dr. Manuela Stoll 2016    Abnormal ultrasound of breast 2016    Abnormal cardiovascular stress test 2016    Myocardial perfusion defect, homogeneous 2016    S/P cardiac catheterization 2016    Hiatal hernia 09/10/2015    Dyslipidemia 2015    Congenital hypothyroidism without goiter 08/10/2015    Right carotid bruit 2015    Cholelithiasis 2014    RBBB 10/15/2014    HTN (hypertension) 2014    Vitamin D deficiency 2014    Osteoporosis 2014    Breast cancer ([address] 03/18/2014    GERD (gastroesophageal reflux disease)     CAD (coronary artery disease)- Dr. Reina Ledezma 04/23/2013    History of tobacco abuse 04/23/2013    Dyspepsia 03/26/2013    Hyperlipidemia     Hypothyroidism     [facility]- Dr. Naldo Allison and depression     Insomnia     Osteoarthritis     S/P carotid endarterectomy     Disturbance of smell and taste 12/12/2005        Past Medical History:   Diagnosis Date    Anxiety     Anxiety and depression     CAD (coronary artery disease)     Cancer [facility] 3[facility]2014    Invasive ductal cancer left breast, spread to 5 lymph nodes    Carotid artery stenosis and occlusion     COPD (chronic obstructive pulmonary disease) ([facility]     Depression 1/15/2018    GERD (gastroesophageal reflux disease)     Headache(784.0)     Hyperlipidemia     Hypertension     Hypothyroidism     Insomnia     Osteoarthritis     RBBB 10/15/2014    Right carotid bruit 5/26/2015    S/P cardiac catheterization 7/13/2016     Past Surgical History:   Procedure Laterality Date    BREAST BIOPSY Right 11/8/2016    US biopsy    BREAST SURGERY Left 2/26/14    U/S Guided core bx of the left breast    BREAST SURGERY Right 3/20/14    U/S of the lateral right breast    BREAST SURGERY Left 3/27/14    U/S Guided Left breast lumpectomy and left snb    OTHER SURGICAL HISTORY  4/11/14    Placement drain, left axillary seroma    [place] MASTECTOMY, SIMPLE, COMPLETE Right 9/6/2018    R modified radical mastectomy       Chart Reviewed: Yes  Patient assessed for rehabilitation services?: Yes  Family / Caregiver Present: No  General Comment  Comments: Pt awake in bed, was observed ambulating around room prior to PT entry, agreeable to eval.    Restrictions:  Restrictions/Precautions: Up Ad Sherri     SUBJECTIVE: Subjective: Pt reports feeling SOB but better than during admission.   Pre Treatment Pain Screening  Pain at present: 2  Intervention List: Patient able to continue with treatment    Post Treatment Pain Screening:   Pain Assessment  Pain Level: 2  Pain Type: Acute pain  Pain Location: Arm  Pain Orientation: Left(burning in IV site)    Prior Level of Function:  Social/Functional History  Lives With: Daughter  Type of Home: House  Home Layout: Laundry in basement(dtr does laundry )  Home Access: Stairs to enter with rails  Entrance Stairs - Number of Steps: 1  Bathroom Shower/Tub: Tub/Shower unit  Bathroom Equipment: Grab bars in shower, Shower chair, Hand-held shower  Home Equipment: Oxygen, Roll About(home 2L )  ADL Assistance: Independent  Homemaking Assistance: Independent  Ambulation Assistance: Independent(no AD )  Transfer Assistance: Independent  Active : Yes  Additional Comments: hx CA, wears 2L 02 at home    OBJECTIVE:   Vision/Hearing:  Vision: Impaired  Vision Exceptions: Wears glasses for reading  [facility]  Hearing Exceptions: Hard of hearing/hearing concerns(pt reported )    Cognition:  Overall Orientation Status: Within Functional Limits  Follows Commands: Within Functional Limits         ROM:  RLE AROM: WFL  LLE AROM : WFL    Strength:  Strength RLE  Strength RLE: WFL  Strength LLE  Strength LLE: WFL    Neuro:  Balance  Sitting - Static: Good  Sitting - Dynamic: Good  Standing - Static: Good  Standing - Dynamic: Good  Comments: WFL, FR >10 inches, reaching to ground, SLS no difficulty             Bed mobility  Supine to Sit: Unable to assess  Sit to Supine: Unable to assess  Comment: Pt reports no difficulty    Transfers  Sit to Stand: Independent  Stand to sit:  Independent  Bed to Chair: Independent    Ambulation  Ambulation?: Yes  Ambulation 1  Surface: level tile  Device: No Device  Other Apparatus: O2  Assistance: Independent  Quality of Gait: WFL  Distance: short in-room distances only, takes appropriate rest breaks    Activity Tolerance  Activity Tolerance: Patient limited by endurance          ASSESSMENT:   Body

## 2019-06-07 NOTE — PROGRESS NOTES
INPATIENT PROGRESS NOTES    PATIENT NAME: Sulema Isabel  MRN: [id number]  SERVICE DATE:  2019   SERVICE TIME:  2:36 PM      PRIMARY SERVICE: Pulmonary Disease    CHIEF COMPLAINTS: SOB    INTERVAL HPI: Patient seen and examined at bedside, Interval Notes, orders reviewed. Nursing notes noted    Patient report  patient reports shortness of breath mainly exertional, she feels improved, congestion and cough is getting better however she still feels she needs to bring up more and clear her chest, no fever, no chest pain, no nausea no vomiting. Review of system:     GI Abdominal pain No  Skin Rash No    Social History     Tobacco Use    Smoking status: Former Smoker     Packs/day: 1.50     Years: 40.00     Pack years: 60.00     Types: Cigarettes     Last attempt to quit: 1989     Years since quittin.3    Smokeless tobacco: Never Used   Substance Use Topics    Alcohol use: No         Problem Relation Age of Onset    Cancer Sister         breast    Cancer Maternal Uncle         pancreatic         OBJECTIVE    Body mass index is 25.51 kg/m². PHYSICAL EXAM:  Vitals:  BP (!) 143/76   Pulse 116   Temp 98.8 °F (37.1 °C)   Resp 16   Ht 5' 3\" (1.6 m)   Wt 144 lb (65.3 kg)   LMP  (LMP Unknown)   SpO2 94%   BMI 25.51 kg/m²     General: alert, cooperative, no distress  Head: normocephalic, atraumatic  Eyes:No gross abnormalities. , PERRL and Sclera nonicteric  ENT:  MMM no lesions  Neck:  supple and no masses  Chest : Air movement is good, has diffuse wheezing, nontender, tympanic  Heart[de-identified] Heart sounds are normal.  Regular rate and rhythm without murmur, gallop or rub. ABD:  symmetric, liver span normal to percussion, soft, non-tender, spleen non-palpable  Musculoskeletal : no cyanosis, no clubbing and no edema  Neuro:  Grossly normal  Skin: No rashes or nodules noted.   Lymph node:  no cervical nodes  Urology: No Georges   Psychiatric: appropriate    DATA:   Recent Labs     19  0430 06/07/19  0617   WBC 8.1 9.6   HGB 11.6* 11.1*   HCT 33.7* 33.0*   MCV 90.9 90.8    257     Recent Labs     06/05/19  0000 06/06/19  0430   * 132*   K 4.8 4.7   CL 91* 89*   CO2 33* 30   BUN 9 11   CREATININE 0.73 0.59   GLUCOSE 93 109*   CALCIUM 9.3 9.8   PROT 6.2* 6.3   LABALBU 3.9 3.8   BILITOT 0.3 0.3   ALKPHOS 77 74   AST 21 20   ALT 12 12   LABGLOM >60.0 >60.0   GFRAA >60.0 >60.0   GLOB 2.3 2.5       MV Settings:          Recent Labs     06/06/19  0721   PHART 7.441   XAN0GIS 49*   PO2ART 97*   DTL7FUX 33.2*   BEART 9*   A3OWTTGO 98*       O2 Device: Nasal cannula  O2 Flow Rate (L/min): 3 L/min    DIET GENERAL;     MEDICATIONS during current hospitalization:    Continuous Infusions:   sodium chloride         Scheduled Meds:   levothyroxine  75 mcg Oral Daily    [START ON 6/9/2019] vitamin D  50,000 Units Oral Weekly    simvastatin  10 mg Oral Nightly    guaiFENesin  1,200 mg Oral BID    sodium chloride flush  10 mL Intravenous 2 times per day    famotidine  20 mg Oral BID    enoxaparin  40 mg Subcutaneous Daily    cefTRIAXone (ROCEPHIN) IV  1 g Intravenous Q24H    azithromycin  500 mg Intravenous Q24H    sennosides-docusate sodium  1 tablet Oral Nightly    methylPREDNISolone  40 mg Intravenous Q8H    Followed by   Fernando Giron ON 6/8/2019] predniSONE  40 mg Oral Daily    sodium chloride  1,000 mL Intravenous Once    b complex-C-E-zinc  1 tablet Oral Daily    sodium chloride flush  10 mL Intravenous Q12H    budesonide  0.5 mg Nebulization BID    ipratropium-albuterol  1 ampule Inhalation 4x daily    metoprolol tartrate  50 mg Oral BID    acetylcysteine  600 mg Inhalation BID       PRN Meds:sodium chloride flush, ondansetron, magnesium sulfate, sodium chloride flush, albuterol, traZODone, acetaminophen    Radiology  Xr Chest Standard (2 Vw)    Result Date: 6/6/2019  EXAMINATION: XR CHEST (2 VW) CLINICAL HISTORY: SHORTNESS OF BREATH COMPARISONS: JUNE 5, 2019 FINDINGS: Osteopenia.  Right breast absent. Surgical clips right axilla. Additional surgical lips left upper chest anteriorly. Cardiopericardial silhouette normal. Aorta calcified and tortuous. Lungs clear and hyperexpanded. Diaphragms flattened. NO ACUTE CARDIOPULMONARY DISEASE. RIGHT MASTECTOMY. EMPHYSEMA. Xr Chest Standard (2 Vw)    Result Date: 5/12/2019  EXAMINATION: Chest x-ray, 2 view HISTORY: Shortness of breath. Cough. TECHNIQUE: Frontal and lateral views of the chest COMPARISON: Chest radiograph from January 18, 2019 FINDINGS: Atherosclerotic calcification of the thoracic aorta. Cardiomediastinal silhouette is within normal limits. Lungs are hyperinflated. Coarsening of the pulmonary interstitium. No pneumothorax, pleural effusion, or focal consolidation. Degenerative changes of the spine. Bilateral axillary clips noted. No acute intrathoracic process. Findings compatible with COPD. Xr Chest Portable    Result Date: 6/5/2019  Portable chest radiograph History: Shortness of breath Technique: AP portable view of the chest obtained. Comparison: Chest radiographs from May 11, 2019 Findings: Atherosclerotic calcification of the thoracic aorta. The cardiomediastinal silhouette is within normal limits. No pneumothorax, pleural effusion, or focal consolidation. Lungs are hyperinflated. Coarsening of the pulmonary interstitium. Osseous structures of the thorax appear intact. Bilateral axillary surgical clips noted. No acute intrathoracic process. Findings compatible with COPD. Us Dup Lower Extremity Right Taco    Result Date: 5/13/2019  EXAMINATION: US DUP LOWER EXTREMITY RIGHT TACO CLINICAL HISTORY:  swelling COMPARISONS: None available. FINDINGS: Right lower extremity venous duplex sonogram was performed. Deep veins of the right lower extremity are compressible. Normal deep venous blood flow is documented with color Doppler images and pulsed Doppler waveform images. CONCLUSION: NO DEEP VEIN THROMBOSIS.     Ir Ultrasound Guidance Vascular Access    Result Date: 6/6/2019  NO FILMS. NO DICTATION. Us Dup Lower Extremities Bilateral Venous    Result Date: 5/17/2019  COMPARISON: Right leg, May 13, 2019. HISTORY: M79.606 Pain and swelling of lower extremity, unspecified laterality ICD10 TECHNIQUE: US DUP LOWER EXTREMITIES BILATERAL VENOUS FINDINGS: Ultrasound of the right and left lower extremities was performed from the groin to the ankle. Bilaterally the common femoral, femoral and popliteal veins are augmentable and compressible. The Doppler images show blood flow. The calf veins are limited by the edema. Blood flow is  demonstrated. No deep venous thrombosis seen in the right or left lower extremities. Ir Midline Cath    Result Date: 6/6/2019  NO FILMS. NO DICTATION.      Chest x-ray reviewed by me no infiltrate    Sputum culture positive for Pseudomonas    IMPRESSION AND SUGGESTION:  Patient is at risk due to   · COPD exacerbation  · Acute bronchitis with retained secretions  · History of mediastinal lymphadenopathy, patient declined repeat CAT scan and any further workup  · Has large hiatal hernia with dilated esophagus, could be causing recurrent GERD and above exacerbations      Recommendations  · Cont prednisone 40 mg po daily   · Cont current neb   · Check pro calcitonin  · Change antibiotic to Zosyn pending sensitivity  · Cont pepcid   ·  Vest TID   · IS   · Flutter   · O2 to keep sat 88-90%        Electronically signed by Marisa Davies MD,  [facility]P   on 6/7/2019 at 2:36 PM

## 2019-06-07 NOTE — PROGRESS NOTES
Independent  Grooming: Independent  UE Bathing: Independent  LE Bathing: Independent  UE Dressing: Independent  LE Dressing: Independent  Toileting: Independent  Additional Comments: ADL's simulated, pt able to pull up socks seated, simulated pants managment standing without LOB  Toilet Transfers  Toilet Transfer: Unable to assess  Toilet Transfers Comments: anticipated IND       Functional Mobility:  Functional Mobility  Functional - Mobility Device: No device  Activity: Other(pt able to walk away from bed without LOB)  Assist Level: Independent  Transfers  Sit to stand: Independent  Stand to sit: Independent    Bed Mobility  Bed mobility  Supine to Sit: Unable to assess  Sit to Supine: Unable to assess  Comment: Pt seated EOB prior and after OT arrival     Seated and Standing Balance:  Balance  Sitting Balance: Independent  Standing Balance: Independent    Functional Endurance:  Activity Tolerance  Activity Tolerance: Fair +     D/C Recommendations:    Equipment Recommendations:      OT Follow Up:  OT D/C RECOMMENDATIONS  REQUIRES OT FOLLOW UP: No       Assessment/Discharge Disposition:  Assessment: Pt is a 66 y.o female admitted for COPD exacerbation. Pt reported dtr at home and able to assist PRN. Pt IND in room. Prognosis: Good  Discharge Recommendations: Continue to assess pending progress  History: Multi comorb   Exam: IND  Assistance / Modification: IND    Six Click Score   How much help for putting on and taking off regular lower body clothing?: None  How much help for Bathing?: None  How much help for Toileting?: None  How much help for putting on and taking off regular upper body clothing?: None  How much help for taking care of personal grooming?: None  How much help for eating meals?: None  AM-PAC Inpatient Daily Activity Raw Score: 24  AM-PAC Inpatient ADL T-Scale Score : 57.54  ADL Inpatient CMS 0-100% Score: 0    Plan:  Plan  Times per week: No acute OT recommended at this time.      Goals: N/A    Patient Goal:   Home   Discussed and agreed upon: Yes Comments:     Therapy Time:   OT Individual Minutes  Time In: 5448  Time Out: 6898  Minutes: 15    Electronically signed by:     REKHA Hogue  6/7/2019, 12:13 PM

## 2019-06-07 NOTE — CONSULTS
Consult to Pulmonology  Consult performed by: Dajuan Lozada MD  Consult ordered by: Jolene Rothman MD        Pulmonary Medicine  Consult Note      Reason for consultation: COPD exacerbation    HISTORY OF PRESENT ILLNESS:      This is 75-year-old female with past medical history significant for COPD, chronic hypoxia on home O2 congestive heart failure, coronary artery disease and hypertension who was presented to emergency room with complaint of progressive worsening of shortness of breath. She was seen in ER 2 days ago and treated with steroid. She felt like her chest was burning, or upper airway is raw and having more shortness of breath. She is on Symbicort at home but she is not ranging her mouth that time. She denies any chest pain or palpitation. No dizziness or lightheadedness. No fevers chills. No nausea vomiting diarrhea or abdominal pain. She was evaluated in ER and found having expiratory wheezing. She was admitted for further care. Chest x-ray shows no acute cardiopulmonary disease,right  mastectomy and Emphysema.         Past Medical History:        Diagnosis Date    Anxiety     Anxiety and depression     CAD (coronary artery disease)     Cancer ([address] 3/2014    Invasive ductal cancer left breast, spread to 5 lymph nodes    Carotid artery stenosis and occlusion     COPD (chronic obstructive pulmonary disease) ([address]     Depression 1/15/2018    GERD (gastroesophageal reflux disease)     Headache(784.0)     Hyperlipidemia     Hypertension     Hypothyroidism     Insomnia     Osteoarthritis     RBBB 10/15/2014    Right carotid bruit 5/26/2015    S/P cardiac catheterization 7/13/2016       Past Surgical History:        Procedure Laterality Date    BREAST BIOPSY Right 11/8/2016     biopsy    BREAST SURGERY Left 2/26/14    U/S Guided core bx of the left breast    BREAST SURGERY Right 3/20/14    U/S of the lateral right breast    BREAST SURGERY Left 3/27/14    U/S Guided Left breast lumpectomy and left snb    OTHER SURGICAL HISTORY  4/11/14    Placement drain, left axillary seroma    [place] MASTECTOMY, SIMPLE, COMPLETE Right 9/6/2018    R modified radical mastectomy       Social History:     reports that she quit smoking about 30 years ago. Her smoking use included cigarettes. She has a 60.00 pack-year smoking history. She has never used smokeless tobacco. She reports that she does not drink alcohol or use drugs. Family History:       Problem Relation Age of Onset    Cancer Sister         breast    Cancer Maternal Uncle         pancreatic       Allergies:  Patient has no known allergies. MEDICATIONS during current hospitalization:    Continuous Infusions:   dextrose 5% and 0.45% NaCl with KCl 20 mEq 100 mL/hr at 06/06/19 1207    sodium chloride         Scheduled Meds:   levothyroxine  75 mcg Oral Daily    [START ON 6/9/2019] vitamin D  50,000 Units Oral Weekly    simvastatin  10 mg Oral Nightly    guaiFENesin  1,200 mg Oral BID    sodium chloride flush  10 mL Intravenous 2 times per day    famotidine  20 mg Oral BID    enoxaparin  40 mg Subcutaneous Daily    cefTRIAXone (ROCEPHIN) IV  1 g Intravenous Q24H    azithromycin  500 mg Intravenous Q24H    sennosides-docusate sodium  1 tablet Oral Nightly    methylPREDNISolone  40 mg Intravenous Q8H    Followed by   Allegra Montelongo ON 6/8/2019] predniSONE  40 mg Oral Daily    sodium chloride  1,000 mL Intravenous Once    b complex-C-E-zinc  1 tablet Oral Daily    sodium chloride flush  10 mL Intravenous Q12H    budesonide  0.5 mg Nebulization BID    ipratropium-albuterol  1 ampule Inhalation 4x daily    metoprolol tartrate  50 mg Oral BID    acetylcysteine  600 mg Inhalation BID       PRN Meds:sodium chloride flush, ondansetron, magnesium sulfate, sodium chloride flush, albuterol, traZODone, acetaminophen    REVIEW OF SYSTEMS:  As in history of present illness  Other 14 point review of system is negative.      PHYSICAL Additional surgical lips left upper chest anteriorly. Cardiopericardial silhouette normal. Aorta calcified and tortuous. Lungs clear and hyperexpanded. Diaphragms flattened. NO ACUTE CARDIOPULMONARY DISEASE. RIGHT MASTECTOMY. EMPHYSEMA. Xr Chest Standard (2 Vw)    Result Date: 5/12/2019  EXAMINATION: Chest x-ray, 2 view HISTORY: Shortness of breath. Cough. TECHNIQUE: Frontal and lateral views of the chest COMPARISON: Chest radiograph from January 18, 2019 FINDINGS: Atherosclerotic calcification of the thoracic aorta. Cardiomediastinal silhouette is within normal limits. Lungs are hyperinflated. Coarsening of the pulmonary interstitium. No pneumothorax, pleural effusion, or focal consolidation. Degenerative changes of the spine. Bilateral axillary clips noted. No acute intrathoracic process. Findings compatible with COPD. Xr Chest Portable    Result Date: 6/5/2019  Portable chest radiograph History: Shortness of breath Technique: AP portable view of the chest obtained. Comparison: Chest radiographs from May 11, 2019 Findings: Atherosclerotic calcification of the thoracic aorta. The cardiomediastinal silhouette is within normal limits. No pneumothorax, pleural effusion, or focal consolidation. Lungs are hyperinflated. Coarsening of the pulmonary interstitium. Osseous structures of the thorax appear intact. Bilateral axillary surgical clips noted. No acute intrathoracic process. Findings compatible with COPD. Us Dup Lower Extremity Right Taco    Result Date: 5/13/2019  EXAMINATION: US DUP LOWER EXTREMITY RIGHT TACO CLINICAL HISTORY:  swelling COMPARISONS: None available. FINDINGS: Right lower extremity venous duplex sonogram was performed. Deep veins of the right lower extremity are compressible. Normal deep venous blood flow is documented with color Doppler images and pulsed Doppler waveform images. CONCLUSION: NO DEEP VEIN THROMBOSIS.     Ir Ultrasound Guidance Vascular Access    Result Date:

## 2019-06-08 LAB
ANION GAP SERPL CALCULATED.3IONS-SCNC: 11 MEQ/L (ref 9–15)
BASOPHILS ABSOLUTE: 0 K/UL (ref 0–0.2)
BASOPHILS RELATIVE PERCENT: 0.4 %
BUN BLDV-MCNC: 12 MG/DL (ref 8–23)
CALCIUM SERPL-MCNC: 9.4 MG/DL (ref 8.5–9.9)
CHLORIDE BLD-SCNC: 97 MEQ/L (ref 95–107)
CO2: 26 MEQ/L (ref 20–31)
CREAT SERPL-MCNC: 0.63 MG/DL (ref 0.5–0.9)
CULTURE, RESPIRATORY: ABNORMAL
CULTURE, RESPIRATORY: ABNORMAL
EOSINOPHILS ABSOLUTE: 0.1 K/UL (ref 0–0.7)
EOSINOPHILS RELATIVE PERCENT: 1.5 %
GFR AFRICAN AMERICAN: >60
GFR NON-AFRICAN AMERICAN: >60
GLUCOSE BLD-MCNC: 138 MG/DL (ref 70–99)
GRAM STAIN RESULT: ABNORMAL
HCT VFR BLD CALC: 36.2 % (ref 37–47)
HEMOGLOBIN: 12.2 G/DL (ref 12–16)
LYMPHOCYTES ABSOLUTE: 0.8 K/UL (ref 1–4.8)
LYMPHOCYTES RELATIVE PERCENT: 8.7 %
MCH RBC QN AUTO: 31.4 PG (ref 27–31.3)
MCHC RBC AUTO-ENTMCNC: 33.8 % (ref 33–37)
MCV RBC AUTO: 93 FL (ref 82–100)
MONOCYTES ABSOLUTE: 0.2 K/UL (ref 0.2–0.8)
MONOCYTES RELATIVE PERCENT: 2.8 %
NEUTROPHILS ABSOLUTE: 7.7 K/UL (ref 1.4–6.5)
NEUTROPHILS RELATIVE PERCENT: 86.6 %
ORGANISM: ABNORMAL
PDW BLD-RTO: 14.5 % (ref 11.5–14.5)
PLATELET # BLD: 245 K/UL (ref 130–400)
PLATELET SLIDE REVIEW: ADEQUATE
POTASSIUM SERPL-SCNC: 5.2 MEQ/L (ref 3.4–4.9)
RBC # BLD: 3.9 M/UL (ref 4.2–5.4)
SLIDE REVIEW: ABNORMAL
SODIUM BLD-SCNC: 134 MEQ/L (ref 135–144)
WBC # BLD: 8.9 K/UL (ref 4.8–10.8)

## 2019-06-08 PROCEDURE — 94640 AIRWAY INHALATION TREATMENT: CPT

## 2019-06-08 PROCEDURE — 6360000002 HC RX W HCPCS: Performed by: INTERNAL MEDICINE

## 2019-06-08 PROCEDURE — 1210000000 HC MED SURG R&B

## 2019-06-08 PROCEDURE — 2700000000 HC OXYGEN THERAPY PER DAY

## 2019-06-08 PROCEDURE — 2580000003 HC RX 258: Performed by: PHYSICIAN ASSISTANT

## 2019-06-08 PROCEDURE — 36415 COLL VENOUS BLD VENIPUNCTURE: CPT

## 2019-06-08 PROCEDURE — 6370000000 HC RX 637 (ALT 250 FOR IP): Performed by: INTERNAL MEDICINE

## 2019-06-08 PROCEDURE — 80048 BASIC METABOLIC PNL TOTAL CA: CPT

## 2019-06-08 PROCEDURE — 2580000003 HC RX 258: Performed by: INTERNAL MEDICINE

## 2019-06-08 PROCEDURE — 99222 1ST HOSP IP/OBS MODERATE 55: CPT | Performed by: INTERNAL MEDICINE

## 2019-06-08 PROCEDURE — 6370000000 HC RX 637 (ALT 250 FOR IP): Performed by: PHYSICIAN ASSISTANT

## 2019-06-08 PROCEDURE — 99232 SBSQ HOSP IP/OBS MODERATE 35: CPT | Performed by: INTERNAL MEDICINE

## 2019-06-08 PROCEDURE — 85025 COMPLETE CBC W/AUTO DIFF WBC: CPT

## 2019-06-08 PROCEDURE — 94760 N-INVAS EAR/PLS OXIMETRY 1: CPT

## 2019-06-08 RX ORDER — CIPROFLOXACIN 500 MG/1
500 TABLET, FILM COATED ORAL EVERY 12 HOURS SCHEDULED
Status: DISCONTINUED | OUTPATIENT
Start: 2019-06-08 | End: 2019-06-11 | Stop reason: HOSPADM

## 2019-06-08 RX ORDER — ALPRAZOLAM 0.25 MG/1
0.25 TABLET ORAL 3 TIMES DAILY PRN
Status: DISCONTINUED | OUTPATIENT
Start: 2019-06-08 | End: 2019-06-11 | Stop reason: HOSPADM

## 2019-06-08 RX ADMIN — BUDESONIDE 500 MCG: 0.5 SUSPENSION RESPIRATORY (INHALATION) at 19:19

## 2019-06-08 RX ADMIN — LEVOTHYROXINE SODIUM 75 MCG: 75 TABLET ORAL at 05:43

## 2019-06-08 RX ADMIN — METOPROLOL TARTRATE 50 MG: 50 TABLET ORAL at 22:05

## 2019-06-08 RX ADMIN — IPRATROPIUM BROMIDE AND ALBUTEROL SULFATE 1 AMPULE: .5; 3 SOLUTION RESPIRATORY (INHALATION) at 07:08

## 2019-06-08 RX ADMIN — PREDNISONE 40 MG: 10 TABLET ORAL at 15:29

## 2019-06-08 RX ADMIN — FAMOTIDINE 20 MG: 20 TABLET, FILM COATED ORAL at 22:05

## 2019-06-08 RX ADMIN — METOPROLOL TARTRATE 50 MG: 50 TABLET ORAL at 08:41

## 2019-06-08 RX ADMIN — IPRATROPIUM BROMIDE AND ALBUTEROL SULFATE 1 AMPULE: .5; 3 SOLUTION RESPIRATORY (INHALATION) at 19:19

## 2019-06-08 RX ADMIN — BUDESONIDE 500 MCG: 0.5 SUSPENSION RESPIRATORY (INHALATION) at 07:08

## 2019-06-08 RX ADMIN — FAMOTIDINE 20 MG: 20 TABLET, FILM COATED ORAL at 08:41

## 2019-06-08 RX ADMIN — ALPRAZOLAM 0.25 MG: 0.25 TABLET ORAL at 15:29

## 2019-06-08 RX ADMIN — CARBAMIDE PEROXIDE 6.5% 5 DROP: 6.5 LIQUID AURICULAR (OTIC) at 22:13

## 2019-06-08 RX ADMIN — Medication 10 ML: at 22:09

## 2019-06-08 RX ADMIN — Medication 10 ML: at 22:10

## 2019-06-08 RX ADMIN — CIPROFLOXACIN HYDROCHLORIDE 500 MG: 500 TABLET, FILM COATED ORAL at 22:06

## 2019-06-08 RX ADMIN — IPRATROPIUM BROMIDE AND ALBUTEROL SULFATE 1 AMPULE: .5; 3 SOLUTION RESPIRATORY (INHALATION) at 11:05

## 2019-06-08 RX ADMIN — PIPERACILLIN SODIUM,TAZOBACTAM SODIUM 3.38 G: 3; .375 INJECTION, POWDER, FOR SOLUTION INTRAVENOUS at 18:35

## 2019-06-08 RX ADMIN — ZINC 1 TABLET: TAB ORAL at 08:41

## 2019-06-08 RX ADMIN — METHYLPREDNISOLONE SODIUM SUCCINATE 40 MG: 40 INJECTION, POWDER, FOR SOLUTION INTRAMUSCULAR; INTRAVENOUS at 05:43

## 2019-06-08 RX ADMIN — SIMVASTATIN 10 MG: 10 TABLET, FILM COATED ORAL at 22:06

## 2019-06-08 RX ADMIN — PIPERACILLIN SODIUM,TAZOBACTAM SODIUM 3.38 G: 3; .375 INJECTION, POWDER, FOR SOLUTION INTRAVENOUS at 08:42

## 2019-06-08 RX ADMIN — PIPERACILLIN SODIUM,TAZOBACTAM SODIUM 3.38 G: 3; .375 INJECTION, POWDER, FOR SOLUTION INTRAVENOUS at 01:25

## 2019-06-08 RX ADMIN — SENNOSIDES AND DOCUSATE SODIUM 1 TABLET: 8.6; 5 TABLET ORAL at 22:06

## 2019-06-08 RX ADMIN — ACETAMINOPHEN 650 MG: 325 TABLET ORAL at 05:54

## 2019-06-08 RX ADMIN — IPRATROPIUM BROMIDE AND ALBUTEROL SULFATE 1 AMPULE: .5; 3 SOLUTION RESPIRATORY (INHALATION) at 15:22

## 2019-06-08 ASSESSMENT — PAIN SCALES - GENERAL
PAINLEVEL_OUTOF10: 3
PAINLEVEL_OUTOF10: 3

## 2019-06-08 NOTE — CONSULTS
Inpatient consult to Infectious Diseases  Consult performed by: Caridad Phillips DO  Consult ordered by: Amie Boone MD        Lazara Turk  1945  female  Medical Record Number: [id number]    Patient informed that I am an Infectious Disease physician and permission obtained from the patient to speak in front of any visitors prior to any discussion for HIPPA purposes. HPI: Patient with COPD exac and acute bronchitis    Subjectively, no new complaints at this time. Review of Systems: All 14 review of systems were discussed with the patient and are negative other than as stated above      Past Medical History:   Diagnosis Date    Anxiety     Anxiety and depression     CAD (coronary artery disease)     [address] ([facility] [address] 3/2014    Invasive ductal cancer left breast, spread to 5 lymph nodes    Carotid artery stenosis and occlusion     COPD (chronic obstructive pulmonary disease) ([facility]     Depression 1/15/2018    GERD (gastroesophageal reflux disease)     Headache(784.0)     Hyperlipidemia     Hypertension     Hypothyroidism     Insomnia     Osteoarthritis     RBBB 10/15/2014    Right carotid bruit 5/26/2015    S/P cardiac catheterization 7/13/2016       Past Surgical History:   Procedure Laterality Date    BREAST BIOPSY Right 11/8/2016    US biopsy    BREAST SURGERY Left 2/26/14    U/S Guided core bx of the left breast    BREAST SURGERY Right 3/20/14    U/S of the lateral right breast    BREAST SURGERY Left 3/27/14    U/S Guided Left breast lumpectomy and left snb    OTHER SURGICAL HISTORY  4/11/14    Placement drain, left axillary seroma    [place] MASTECTOMY, SIMPLE, COMPLETE Right 9/6/2018    R modified radical mastectomy       Social History     Socioeconomic History    Marital status:       Spouse name: Not on file    Number of children: Not on file    Years of education: Not on file    Highest education level: Not on file   Occupational History    Not on file   Social Needs    Financial resource strain: Not on file    Food insecurity:     Worry: Not on file     Inability: Not on file    Transportation needs:     Medical: Not on file     Non-medical: Not on file   Tobacco Use    Smoking status: Former Smoker     Packs/day: 1.50     Years: 40.00     Pack years: 60.00     Types: Cigarettes     Last attempt to quit: 1989     Years since quittin.3    Smokeless tobacco: Never Used   Substance and Sexual Activity    Alcohol use: No    Drug use: No    Sexual activity: Never   Lifestyle    Physical activity:     Days per week: Not on file     Minutes per session: Not on file    Stress: Not on file   Relationships    Social connections:     Talks on phone: Not on file     Gets together: Not on file     Attends Voodoo service: Not on file     Active member of club or organization: Not on file     Attends meetings of clubs or organizations: Not on file     Relationship status: Not on file    Intimate partner violence:     Fear of current or ex partner: Not on file     Emotionally abused: Not on file     Physically abused: Not on file     Forced sexual activity: Not on file   Other Topics Concern    Not on file   Social History Narrative    Not on file       Family History   Problem Relation Age of Onset    Cancer Sister         breast    Cancer Maternal Uncle         pancreatic       No current facility-administered medications on file prior to encounter. Current Outpatient Medications on File Prior to Encounter   Medication Sig Dispense Refill    ALPRAZolam (XANAX) 0.25 MG tablet Take 0.25 mg by mouth 3 times daily as needed for Anxiety.       SYMBICORT 160-4.5 MCG/ACT AERO USE 2 INHALATIONS TWICE A DAY 10.2 g 0    albuterol sulfate HFA (VENTOLIN HFA) 108 (90 Base) MCG/ACT inhaler Inhale 2 puffs into the lungs every 6 hours as needed for Wheezing 3 Inhaler 3    ipratropium-albuterol (DUONEB) 0.5-2.5 (3) MG/3ML SOLN nebulizer solution Inhale 3 mLs into the lungs every 4 hours 360 mL 1    metoprolol tartrate (LOPRESSOR) 50 MG tablet Take 50 mg by mouth 2 times daily      omeprazole (PRILOSEC) 20 MG delayed release capsule Take 20 mg by mouth daily as needed (heartburn)      guaiFENesin (MUCINEX) 600 MG extended release tablet Take 1,200 mg by mouth 2 times daily      traZODone (DESYREL) 150 MG tablet TAKE 1 TABLET NIGHTLY 90 tablet 0    lovastatin (MEVACOR) 20 MG tablet TAKE 1 TABLET NIGHTLY 90 tablet 1    vitamin D (ERGOCALCIFEROL) 57954 units CAPS capsule TAKE 1 CAPSULE ONCE A WEEK 12 capsule 3    SPIRIVA HANDIHALER 18 MCG inhalation capsule INHALE THE CONTENTS OF 1 CAPSULE DAILY 90 capsule 0    levothyroxine (SYNTHROID) 75 MCG tablet Take 1 tablet by mouth Daily 90 tablet 1    Respiratory Therapy Supplies (NEBULIZER/TUBING/MOUTHPIECE) KIT 1 kit by Does not apply route daily as needed (wheezing) 1 kit 5    furosemide (LASIX) 20 MG tablet Take 1 tablet by mouth daily for 3 days 3 tablet 3       Physical Exam:  No conversational dyspnea  General: Patient appears in no acute distress, cooperative  Skin: no new rashes or erythema or induration  HEENT: EOMI, MMM, Neck is supple  Heart: S1 S2  Lungs: bilaterally clear to auscultation  Abdomen: soft, ND, NTTP, +BS  Extrem:+pulses, no calf pain, no asymmetry of the upper or lower extremities  Neuro exam: CN II-XII intact, facial expressions symmertrical bilaterally, no slurred speech      Labs: I have reviewed all lab results by electronic record, including most recent CBC, metabolic panel, and pertinent abnormalities were addressed from an infectious disease perspective. WBC trends are being monitored.     Lab Results   Component Value Date     06/08/2019    K 5.2 06/08/2019    K 3.7 05/14/2019    CL 97 06/08/2019    CO2 26 06/08/2019    BUN 12 06/08/2019    CREATININE 0.63 06/08/2019    GLUCOSE 138 06/08/2019    GLUCOSE 112 04/26/2012    CALCIUM 9.4 06/08/2019      Lab Results   Component Value Date WBC 8.9 06/08/2019    HGB 12.2 06/08/2019    HCT 36.2 (L) 06/08/2019    MCV 93.0 06/08/2019     06/08/2019       Radiology:   I have reviewed imaging results per electronic record and most pertinent abnormalities are being addressed from an infectious disease standpoint. Assessment:  Respiratory failure with sputum + pseudomonas  COPD exacerbation  Former smoker    Plan:  Patient has negative pro-calcitonin  Zosyn started but pseudomonas can be a colonizer in a patient with chronic lung disease. No allergies  Change to Cipro PO    Would not restart Azithromycin. Cases of extremely rare hearing los associated with Azithro in high or prolonged doses. Hearing loss can be irreversible in some of these cases. Will have to have audiology follow patient over time. Would also recommend ENT as a follow up to look for other causes.            Jacque Nelson D.O.

## 2019-06-08 NOTE — PROGRESS NOTES
During the administration of Solu-Medrol, I noticed the pts midline double lumen started leaking. Pt stated it burns and is painful when the flush was inserted. Dr. German Borrego notified.  Electronically signed by Ratna Hwang RN on 6/7/19 at 10:54 PM

## 2019-06-08 NOTE — PROGRESS NOTES
06/06/19  0430 06/08/19  0745   * 134*   K 4.7 5.2*   CL 89* 97   CO2 30 26   BUN 11 12   CREATININE 0.59 0.63   GLUCOSE 109* 138*   CALCIUM 9.8 9.4   PROT 6.3  --    LABALBU 3.8  --    BILITOT 0.3  --    ALKPHOS 74  --    AST 20  --    ALT 12  --    LABGLOM >60.0 >60.0   GFRAA >60.0 >60.0   GLOB 2.5  --        MV Settings:          Recent Labs     06/06/19  0721   PHART 7.441   REI9BIL 49*   PO2ART 97*   CIT4TTM 33.2*   BEART 9*   O4BOLZDO 98*       O2 Device: Nasal cannula  O2 Flow Rate (L/min): 3 L/min    DIET GENERAL;     MEDICATIONS during current hospitalization:    Continuous Infusions:   sodium chloride         Scheduled Meds:   piperacillin-tazobactam  3.375 g Intravenous Q8H    levothyroxine  75 mcg Oral Daily    [START ON 6/9/2019] vitamin D  50,000 Units Oral Weekly    simvastatin  10 mg Oral Nightly    sodium chloride flush  10 mL Intravenous 2 times per day    famotidine  20 mg Oral BID    enoxaparin  40 mg Subcutaneous Daily    sennosides-docusate sodium  1 tablet Oral Nightly    predniSONE  40 mg Oral Daily    sodium chloride  1,000 mL Intravenous Once    b complex-C-E-zinc  1 tablet Oral Daily    sodium chloride flush  10 mL Intravenous Q12H    budesonide  0.5 mg Nebulization BID    ipratropium-albuterol  1 ampule Inhalation 4x daily    metoprolol tartrate  50 mg Oral BID       PRN Meds:sodium chloride flush, ondansetron, magnesium sulfate, sodium chloride flush, albuterol, traZODone, acetaminophen    Radiology  Xr Chest Standard (2 Vw)    Result Date: 6/6/2019  EXAMINATION: XR CHEST (2 VW) CLINICAL HISTORY: SHORTNESS OF BREATH COMPARISONS: JUNE 5, 2019 FINDINGS: Osteopenia. Right breast absent. Surgical clips right axilla. Additional surgical lips left upper chest anteriorly. Cardiopericardial silhouette normal. Aorta calcified and tortuous. Lungs clear and hyperexpanded. Diaphragms flattened. NO ACUTE CARDIOPULMONARY DISEASE. RIGHT MASTECTOMY. EMPHYSEMA.     Xr Chest Standard (2 Vw)    Result Date: 5/12/2019  EXAMINATION: Chest x-ray, 2 view HISTORY: Shortness of breath. Cough. TECHNIQUE: Frontal and lateral views of the chest COMPARISON: Chest radiograph from January 18, 2019 FINDINGS: Atherosclerotic calcification of the thoracic aorta. Cardiomediastinal silhouette is within normal limits. Lungs are hyperinflated. Coarsening of the pulmonary interstitium. No pneumothorax, pleural effusion, or focal consolidation. Degenerative changes of the spine. Bilateral axillary clips noted. No acute intrathoracic process. Findings compatible with COPD. Xr Chest Portable    Result Date: 6/5/2019  Portable chest radiograph History: Shortness of breath Technique: AP portable view of the chest obtained. Comparison: Chest radiographs from May 11, 2019 Findings: Atherosclerotic calcification of the thoracic aorta. The cardiomediastinal silhouette is within normal limits. No pneumothorax, pleural effusion, or focal consolidation. Lungs are hyperinflated. Coarsening of the pulmonary interstitium. Osseous structures of the thorax appear intact. Bilateral axillary surgical clips noted. No acute intrathoracic process. Findings compatible with COPD. Us Dup Lower Extremity Right Taco    Result Date: 5/13/2019  EXAMINATION: US DUP LOWER EXTREMITY RIGHT TACO CLINICAL HISTORY:  swelling COMPARISONS: None available. FINDINGS: Right lower extremity venous duplex sonogram was performed. Deep veins of the right lower extremity are compressible. Normal deep venous blood flow is documented with color Doppler images and pulsed Doppler waveform images. CONCLUSION: NO DEEP VEIN THROMBOSIS. Ir Ultrasound Guidance Vascular Access    Result Date: 6/6/2019  NO FILMS. NO DICTATION. Us Dup Lower Extremities Bilateral Venous    Result Date: 5/17/2019  COMPARISON: Right leg, May 13, 2019.  HISTORY: M79.606 Pain and swelling of lower extremity, unspecified laterality ICD10 TECHNIQUE: US DUP LOWER EXTREMITIES BILATERAL VENOUS FINDINGS: Ultrasound of the right and left lower extremities was performed from the groin to the ankle. Bilaterally the common femoral, femoral and popliteal veins are augmentable and compressible. The Doppler images show blood flow. The calf veins are limited by the edema. Blood flow is  demonstrated. No deep venous thrombosis seen in the right or left lower extremities. Ir Midline Cath    Result Date: 6/6/2019  NO FILMS. NO DICTATION. Chest x-ray reviewed by me no infiltrate    Sputum culture positive for Pseudomonas    IMPRESSION AND SUGGESTION:  Patient is at risk due to   · COPD exacerbation  · Acute bronchitis with retained secretions  · History of mediastinal lymphadenopathy, patient declined repeat CAT scan and any further workup  · Has large hiatal hernia with dilated esophagus, could be causing recurrent GERD and above exacerbations  · Hearing loss, etiology is not clear she is concerned this could be due to azithromycin since she has been on it frequently over the last year.   However it is possible but rare and she will need ENT evaluation to rule out other etiologies      Recommendations  · Cont prednisone 40 mg po daily   · Cont current neb   · Pro-calcitonin is negative and chest x-ray with no infiltrate, pseudomonas could be colonization vs tracheobronchitis , follow up sensitivity   · Cont pepcid   ·  Vest TID   · IS   · Flutter valve   · Will need ENT eval can be done as outpatient  · Stop azithromycin  · O2 to keep sat 88-90%        Electronically signed by Renan Mendoza MD,  FCCP   on 6/8/2019 at 9:38 AM

## 2019-06-09 PROBLEM — C77.3 BREAST CANCER METASTASIZED TO AXILLARY LYMPH NODE, RIGHT (HCC): Chronic | Status: ACTIVE | Noted: 2018-09-06

## 2019-06-09 PROBLEM — H74.09 TYMPANOSCLEROSIS INVOLVING TYMPANIC MEMBRANE ONLY: Chronic | Status: ACTIVE | Noted: 2019-06-09

## 2019-06-09 PROBLEM — H90.2 CONDUCTIVE HEARING LOSS, TYMPANIC MEMBRANE: Chronic | Status: ACTIVE | Noted: 2019-06-09

## 2019-06-09 PROBLEM — J96.11 CHRONIC RESPIRATORY FAILURE WITH HYPOXIA (HCC): Chronic | Status: ACTIVE | Noted: 2018-08-18

## 2019-06-09 PROBLEM — H61.21 CERUMEN DEBRIS ON TYMPANIC MEMBRANE OF RIGHT EAR: Chronic | Status: ACTIVE | Noted: 2019-06-09

## 2019-06-09 PROBLEM — C50.911 BREAST CANCER METASTASIZED TO AXILLARY LYMPH NODE, RIGHT (HCC): Chronic | Status: ACTIVE | Noted: 2018-09-06

## 2019-06-09 LAB
ANION GAP SERPL CALCULATED.3IONS-SCNC: 12 MEQ/L (ref 9–15)
BASOPHILS ABSOLUTE: 0 K/UL (ref 0–0.2)
BASOPHILS RELATIVE PERCENT: 0.1 %
BUN BLDV-MCNC: 14 MG/DL (ref 8–23)
CALCIUM SERPL-MCNC: 8.7 MG/DL (ref 8.5–9.9)
CHLORIDE BLD-SCNC: 96 MEQ/L (ref 95–107)
CO2: 29 MEQ/L (ref 20–31)
CREAT SERPL-MCNC: 0.71 MG/DL (ref 0.5–0.9)
EOSINOPHILS ABSOLUTE: 0 K/UL (ref 0–0.7)
EOSINOPHILS RELATIVE PERCENT: 0 %
GFR AFRICAN AMERICAN: >60
GFR NON-AFRICAN AMERICAN: >60
GLUCOSE BLD-MCNC: 92 MG/DL (ref 70–99)
HCT VFR BLD CALC: 32.3 % (ref 37–47)
HEMOGLOBIN: 10.9 G/DL (ref 12–16)
LYMPHOCYTES ABSOLUTE: 1 K/UL (ref 1–4.8)
LYMPHOCYTES RELATIVE PERCENT: 14.8 %
MCH RBC QN AUTO: 30.7 PG (ref 27–31.3)
MCHC RBC AUTO-ENTMCNC: 33.7 % (ref 33–37)
MCV RBC AUTO: 91.1 FL (ref 82–100)
MONOCYTES ABSOLUTE: 0.5 K/UL (ref 0.2–0.8)
MONOCYTES RELATIVE PERCENT: 7.2 %
NEUTROPHILS ABSOLUTE: 5.4 K/UL (ref 1.4–6.5)
NEUTROPHILS RELATIVE PERCENT: 77.9 %
PDW BLD-RTO: 13.7 % (ref 11.5–14.5)
PLATELET # BLD: 288 K/UL (ref 130–400)
POTASSIUM SERPL-SCNC: 4.1 MEQ/L (ref 3.4–4.9)
RBC # BLD: 3.55 M/UL (ref 4.2–5.4)
SODIUM BLD-SCNC: 137 MEQ/L (ref 135–144)
WBC # BLD: 7 K/UL (ref 4.8–10.8)

## 2019-06-09 PROCEDURE — 2700000000 HC OXYGEN THERAPY PER DAY

## 2019-06-09 PROCEDURE — 6370000000 HC RX 637 (ALT 250 FOR IP): Performed by: INTERNAL MEDICINE

## 2019-06-09 PROCEDURE — 36415 COLL VENOUS BLD VENIPUNCTURE: CPT

## 2019-06-09 PROCEDURE — 99232 SBSQ HOSP IP/OBS MODERATE 35: CPT | Performed by: INTERNAL MEDICINE

## 2019-06-09 PROCEDURE — 6360000002 HC RX W HCPCS: Performed by: PHYSICIAN ASSISTANT

## 2019-06-09 PROCEDURE — 80048 BASIC METABOLIC PNL TOTAL CA: CPT

## 2019-06-09 PROCEDURE — 85025 COMPLETE CBC W/AUTO DIFF WBC: CPT

## 2019-06-09 PROCEDURE — 2580000003 HC RX 258: Performed by: PHYSICIAN ASSISTANT

## 2019-06-09 PROCEDURE — 6360000002 HC RX W HCPCS: Performed by: INTERNAL MEDICINE

## 2019-06-09 PROCEDURE — 1210000000 HC MED SURG R&B

## 2019-06-09 PROCEDURE — 94640 AIRWAY INHALATION TREATMENT: CPT

## 2019-06-09 PROCEDURE — 94664 DEMO&/EVAL PT USE INHALER: CPT

## 2019-06-09 PROCEDURE — 94760 N-INVAS EAR/PLS OXIMETRY 1: CPT

## 2019-06-09 PROCEDURE — 2580000003 HC RX 258: Performed by: INTERNAL MEDICINE

## 2019-06-09 RX ORDER — METHYLPREDNISOLONE SODIUM SUCCINATE 40 MG/ML
40 INJECTION, POWDER, LYOPHILIZED, FOR SOLUTION INTRAMUSCULAR; INTRAVENOUS EVERY 8 HOURS
Status: DISCONTINUED | OUTPATIENT
Start: 2019-06-09 | End: 2019-06-10

## 2019-06-09 RX ADMIN — IPRATROPIUM BROMIDE AND ALBUTEROL SULFATE 1 AMPULE: .5; 3 SOLUTION RESPIRATORY (INHALATION) at 10:58

## 2019-06-09 RX ADMIN — ERGOCALCIFEROL 50000 UNITS: 1.25 CAPSULE ORAL at 08:10

## 2019-06-09 RX ADMIN — SIMVASTATIN 10 MG: 10 TABLET, FILM COATED ORAL at 20:14

## 2019-06-09 RX ADMIN — CIPROFLOXACIN HYDROCHLORIDE 500 MG: 500 TABLET, FILM COATED ORAL at 08:10

## 2019-06-09 RX ADMIN — LEVOTHYROXINE SODIUM 75 MCG: 75 TABLET ORAL at 06:05

## 2019-06-09 RX ADMIN — TRAZODONE HYDROCHLORIDE 75 MG: 150 TABLET ORAL at 00:05

## 2019-06-09 RX ADMIN — CARBAMIDE PEROXIDE 6.5% 5 DROP: 6.5 LIQUID AURICULAR (OTIC) at 20:14

## 2019-06-09 RX ADMIN — CIPROFLOXACIN HYDROCHLORIDE 500 MG: 500 TABLET, FILM COATED ORAL at 20:14

## 2019-06-09 RX ADMIN — IPRATROPIUM BROMIDE AND ALBUTEROL SULFATE 1 AMPULE: .5; 3 SOLUTION RESPIRATORY (INHALATION) at 07:27

## 2019-06-09 RX ADMIN — PREDNISONE 40 MG: 10 TABLET ORAL at 08:10

## 2019-06-09 RX ADMIN — SENNOSIDES AND DOCUSATE SODIUM 1 TABLET: 8.6; 5 TABLET ORAL at 20:13

## 2019-06-09 RX ADMIN — ALPRAZOLAM 0.25 MG: 0.25 TABLET ORAL at 08:10

## 2019-06-09 RX ADMIN — METHYLPREDNISOLONE SODIUM SUCCINATE 40 MG: 40 INJECTION, POWDER, FOR SOLUTION INTRAMUSCULAR; INTRAVENOUS at 20:14

## 2019-06-09 RX ADMIN — BUDESONIDE 500 MCG: 0.5 SUSPENSION RESPIRATORY (INHALATION) at 07:27

## 2019-06-09 RX ADMIN — Medication 10 ML: at 20:18

## 2019-06-09 RX ADMIN — FAMOTIDINE 20 MG: 20 TABLET, FILM COATED ORAL at 08:10

## 2019-06-09 RX ADMIN — ALPRAZOLAM 0.25 MG: 0.25 TABLET ORAL at 22:01

## 2019-06-09 RX ADMIN — FAMOTIDINE 20 MG: 20 TABLET, FILM COATED ORAL at 20:13

## 2019-06-09 RX ADMIN — ZINC 1 TABLET: TAB ORAL at 08:11

## 2019-06-09 RX ADMIN — METOPROLOL TARTRATE 50 MG: 50 TABLET ORAL at 20:14

## 2019-06-09 RX ADMIN — IPRATROPIUM BROMIDE AND ALBUTEROL SULFATE 1 AMPULE: .5; 3 SOLUTION RESPIRATORY (INHALATION) at 16:40

## 2019-06-09 RX ADMIN — METOPROLOL TARTRATE 50 MG: 50 TABLET ORAL at 08:11

## 2019-06-09 RX ADMIN — CARBAMIDE PEROXIDE 6.5% 5 DROP: 6.5 LIQUID AURICULAR (OTIC) at 08:14

## 2019-06-09 ASSESSMENT — PAIN SCALES - GENERAL: PAINLEVEL_OUTOF10: 0

## 2019-06-09 NOTE — PROGRESS NOTES
Hospitalist Progress Note  6/9/2019 10:20 AM    Assessment and Plan:   1. Acute on Chronic Respiratory Failure ( on home O2 at baseline) desatted while on 2L needed 4L O2 via NC on presentation due to Acute Pseudomonas aureginousa Tracheobronchitis and COPD exacerbation: Pulmonology consult, IV Steroids with taper to PO once exp wheezes resolve, IV antibiotics, Chest PT, Acapella, Incentive spirometry, Duonebs Q4hr, Guaneficine. Ordered sputum to be sent for culture/ sensitivities and gram stain. Supplemental O2 with goal SPO2 of 92% or greater. Wean down O2 if/when possible. If continues to need O2, will order home O2 evaluation prior to discharge to evaluate for home O2 needs On Zosyn. ID Consulted and Contact isolation for Pseudomonas likely colonized. Sensitivities pending. Mucomyst discontinued by pulm. 2. 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 3. Hypothyroidism: On home dose Synthroid. 4. Hx of Breast Ca: Not on treatment at this time. 5. Depression and Anxiety: Home meds  6. Vit D Deficiency: Supplemental Vit D ordered. 7. Generalized weakness Gait instability and Decreased Functional Status: Fall precautions. PT OT to evaluate. Maximize nutrition status. Assessing if needs DME at home. SW on board. 8. Bowel Regimen and GI PPx: stool softners PRN ordered with hold parameters for loose stools or diarrhea. On antiacid  9. Diet: DIET GENERAL;  10. Advance Directive: Full Code   11. Nutrition status: Supplemental Vitamins ordered. Dietitian assessment  12. Vaccinations: Immunization records reviewed. If has not received appropriate vaccinations, will order to be given prior to discharge. 13. DVT prophylaxis: Chemical prophylaxis SQ  14. Discharge planning: SW on board. Will discharge once medically stable and cleared by all consultants. 15. High Risk Readmission Screening Tool Score Noted. Additionally, the following hospital problems were addressed:  Principal Problem:    Acute on chronic respiratory failure with hypoxia ([facility] [address]  Active Problems:    Hypothyroidism    Anxiety and depression    CAD (coronary artery disease)- Dr. Devora Jurado    Breast cancer [facility])    Acute exacerbation of chronic obstructive pulmonary disease (COPD) ([facility] [address]    Chronic respiratory failure with hypoxia (HCC)    Breast cancer metastasized to axillary lymph node, right (HCC)    Pseudomonas aeruginosa colonization  Resolved Problems:    * No resolved hospital problems. *      ** Total time spent reviewing medical records, evaluating patient, speaking with RN's and consultants where I was focused exclusively on this patient: 35 minutes. This time is excluding time spent performing procedures or significant events occurring earlier or later in the day requiring my attention and focus. Subjective:   Admit Date: 6/6/2019  PCP: ROSALIND Booker - CNP    No acute events overnight. Afebrile  Telemetry reviewed. Still SOB with minimal exertion specifically walking to bathroom. Worried about her hearing . Was told by RN that Zithromax can cause deafness and she is ruminating on this. Very worried and recounting every ER visit she was given Z pac. Wears ear plugs at night when using CPAP Pt denies chest pain, N/V, fevers or chills. Objective:     Vitals:    06/08/19 2209 06/09/19 0714 06/09/19 0727 06/09/19 0810   BP: (!) 143/78 (!) 157/86  (!) 157/86   Pulse: 106 83  94   Resp:       Temp:  98.2 °F (36.8 °C)     TempSrc:  Oral     SpO2:  98% 98%    Weight:       Height:         General appearance: Mild acute distress, lethargic + O x 3. Mild conversational dyspnea noted. Lungs: Diminished (+)  exp wheezes, Diffuse course rales mild retractions; mod use of accessory muscles  Heart:  S1, S2 normal, RRR, no MRG appreciated  Abdomen: (+) BS, soft, non-tender; non distended no guarding or rigidity.    Extremities:  no cyanosis,  no edema bilat lower exts, no calf tenderness bilaterally. Dry skin noted   Ears: Bilat external auditory canal Patent however copious cerumen in both R>L light reflex seen bilat, tympanic membrane opacification seen.     Medications:      sodium chloride        carbamide peroxide  5 drop Both Ears BID    ciprofloxacin  500 mg Oral 2 times per day    levothyroxine  75 mcg Oral Daily    vitamin D  50,000 Units Oral Weekly    simvastatin  10 mg Oral Nightly    sodium chloride flush  10 mL Intravenous 2 times per day    famotidine  20 mg Oral BID    enoxaparin  40 mg Subcutaneous Daily    sennosides-docusate sodium  1 tablet Oral Nightly    predniSONE  40 mg Oral Daily    sodium chloride  1,000 mL Intravenous Once    b complex-C-E-zinc  1 tablet Oral Daily    sodium chloride flush  10 mL Intravenous Q12H    budesonide  0.5 mg Nebulization BID    ipratropium-albuterol  1 ampule Inhalation 4x daily    metoprolol tartrate  50 mg Oral BID       LABS Reviewed    IMAGING Reviewed    Pepito Argueta MD  [facility] Hospitalist

## 2019-06-09 NOTE — PROGRESS NOTES
Randy Felix  1945  female  Medical Record Number: [id number]    Patient informed that I am an Infectious Disease physician and permission obtained from the patient to speak in front of any visitors prior to any discussion for HIPPA purposes. HPI: Patient with COPD exac and acute bronchitis    Subjectively, feels tight. Changed to oral pred overnight. Review of Systems: All 14 review of systems were discussed with the patient and are negative other than as stated above      Past Medical History:   Diagnosis Date    Anxiety     Anxiety and depression     CAD (coronary artery disease)     Cancer ([facility] [address] 3/2014    Invasive ductal cancer left breast, spread to 5 lymph nodes    Carotid artery stenosis and occlusion     COPD (chronic obstructive pulmonary disease) ([facility] [address]     Depression 1/15/2018    GERD (gastroesophageal reflux disease)     Headache(784.0)     Hyperlipidemia     Hypertension     Hypothyroidism     Insomnia     Osteoarthritis     RBBB 10/15/2014    Right carotid bruit 5/26/2015    S/P cardiac catheterization 7/13/2016       Past Surgical History:   Procedure Laterality Date    BREAST BIOPSY Right 11/8/2016    US biopsy    BREAST SURGERY Left 2/26/14    U/S Guided core bx of the left breast    BREAST SURGERY Right 3/20/14    U/S of the lateral right breast    BREAST SURGERY Left 3/27/14    U/S Guided Left breast lumpectomy and left snb    OTHER SURGICAL HISTORY  4/11/14    Placement drain, left axillary seroma    KS MASTECTOMY, SIMPLE, COMPLETE Right 9/6/2018    R modified radical mastectomy       Social History     Socioeconomic History    Marital status:       Spouse name: Not on file    Number of children: Not on file    Years of education: Not on file    Highest education level: Not on file   Occupational History    Not on file   Social Needs    Financial resource strain: Not on file    Food insecurity:     Worry: Not on file     Inability: Not on file   Jhoan Grimm omeprazole (PRILOSEC) 20 MG delayed release capsule Take 20 mg by mouth daily as needed (heartburn)      guaiFENesin (MUCINEX) 600 MG extended release tablet Take 1,200 mg by mouth 2 times daily      traZODone (DESYREL) 150 MG tablet TAKE 1 TABLET NIGHTLY 90 tablet 0    lovastatin (MEVACOR) 20 MG tablet TAKE 1 TABLET NIGHTLY 90 tablet 1    vitamin D (ERGOCALCIFEROL) 84796 units CAPS capsule TAKE 1 CAPSULE ONCE A WEEK 12 capsule 3    SPIRIVA HANDIHALER 18 MCG inhalation capsule INHALE THE CONTENTS OF 1 CAPSULE DAILY 90 capsule 0    levothyroxine (SYNTHROID) 75 MCG tablet Take 1 tablet by mouth Daily 90 tablet 1    Respiratory Therapy Supplies (NEBULIZER/TUBING/MOUTHPIECE) KIT 1 kit by Does not apply route daily as needed (wheezing) 1 kit 5    furosemide (LASIX) 20 MG tablet Take 1 tablet by mouth daily for 3 days 3 tablet 3       Physical Exam:  No conversational dyspnea  General: Patient appears in no acute distress, cooperative  Skin: no new rashes or erythema or induration  HEENT: EOMI, MMM, Neck is supple  Heart: S1 S2  Lungs: bilaterally sounds tight. Abdomen: soft, ND, NTTP, +BS  Extrem:+pulses, no calf pain, no asymmetry of the upper or lower extremities  Neuro exam: CN II-XII intact, facial expressions symmertrical bilaterally, no slurred speech      Labs: I have reviewed all lab results by electronic record, including most recent CBC, metabolic panel, and pertinent abnormalities were addressed from an infectious disease perspective. WBC trends are being monitored.     Lab Results   Component Value Date     06/09/2019    K 4.1 06/09/2019    K 3.7 05/14/2019    CL 96 06/09/2019    CO2 29 06/09/2019    BUN 14 06/09/2019    CREATININE 0.71 06/09/2019    GLUCOSE 92 06/09/2019    GLUCOSE 112 04/26/2012    CALCIUM 8.7 06/09/2019      Lab Results   Component Value Date    WBC 7.0 06/09/2019    HGB 10.9 (L) 06/09/2019    HCT 32.3 (L) 06/09/2019    MCV 91.1 06/09/2019     06/09/2019 Radiology:   I have reviewed imaging results per electronic record and most pertinent abnormalities are being addressed from an infectious disease standpoint. Assessment:  Respiratory failure with sputum + pseudomonas  COPD exacerbation  Former smoker    Plan:  Primary informed regarding wheezing. Patient has negative pro-calcitonin  Changed to Cipro PO    Would not restart Azithromycin. Cases of extremely rare hearing los associated with Azithro in high or prolonged doses. Hearing loss can be irreversible in some of these cases. Will have to have audiology follow patient over time. Would also recommend ENT as a follow up to look for other causes. Feels congested - now with rhinorrhea and bilateral ear \" stuffiness\"   Maxillary sinus pain as well.        Jacque Nelson D.O.

## 2019-06-09 NOTE — PROGRESS NOTES
In speaking with patient she still feels she is not ready to go home.   Passed this information along to Dr. Vanna Manzo

## 2019-06-09 NOTE — PROGRESS NOTES
Pt assessment is complete. Vitals are stable. O2 98% on 3l NC. She is resting comfortably. Will continue to monitor.    Electronically signed by Jefe Aguillon RN on 6/9/2019 at 3:07 AM

## 2019-06-09 NOTE — PROGRESS NOTES
INPATIENT PROGRESS NOTES    PATIENT NAME: Fran Gonzalez  MRN: [id number]  SERVICE DATE:  June 9, 2019   SERVICE TIME:  12:28 PM      PRIMARY SERVICE: Pulmonary Disease    CHIEF COMPLAIN: Shortness of breath      INTERVAL HPI: Patient seen and examined at bedside, Interval Notes, orders reviewed. Nursing notes noted  She has some cough but not able to bring mucus out. She does have a wheezing today no chest pain or pleuritic pain no nausea vomiting or diarrhea. She does have short of breath with exertion. OBJECTIVE    Body mass index is 25.83 kg/m². PHYSICAL EXAM:  Vitals:  BP (!) 157/86   Pulse 94   Temp 98.2 °F (36.8 °C) (Oral)   Resp 22   Ht 5' 3\" (1.6 m)   Wt 145 lb 12.8 oz (66.1 kg)   LMP  (LMP Unknown)   SpO2 97%   BMI 25.83 kg/m²   General:  Alert, awake . comfortable in bed, No distress. appears stated age and cooperative  Head: Atraumatic , Normocephalic   Eyes: PERRL. No sclera icterus. No conjunctival injection. No discharge   ENT: No nasal  discharge. Pharynx clear. lips, teeth, mucosa and gums are normal, tongue protrudes in the midline  Neck:  Trachea midline. No thyromegaly, no JVD, No cervical adenopathy. Chest : Bilaterally symmetrical ,Normal effort,  No accessory muscle use  Lung : . Fair BS bilateral, decreased BS at bases. No Rales. Bilateral wheezing mostly posteriorly. No rhonchi. No dullness on percussion. Heart[de-identified] Normal  rate. Regular rhythm. No mumur ,  Rub or gallop  ABD: Non-tender. Non-distended. No masses. No organmegaly. Normal bowel sounds. No hernia.   Ext : No Pitting both leg , No Cyanosis No clubbing  Neuro: no focal weakness          DATA:   Recent Labs     06/08/19  0745 06/09/19  0714   WBC 8.9 7.0   HGB 12.2 10.9*   HCT 36.2* 32.3*   MCV 93.0 91.1    288     Recent Labs     06/08/19  0745 06/09/19  0714   * 137   K 5.2* 4.1   CL 97 96   CO2 26 29   BUN 12 14   CREATININE 0.63 0.71   GLUCOSE 138* 92   CALCIUM 9.4 8.7   LABGLOM >60.0 >60.0   GFRAA >60.0 >60.0       MV Settings:          No results for input(s): PHART, ELS3ACV, PO2ART, IMO8EIL, BEART, V8FFWVJR in the last 72 hours. O2 Device: Nasal cannula  O2 Flow Rate (L/min): 3 L/min    DIET GENERAL;     MEDICATIONS during current hospitalization:    Continuous Infusions:   sodium chloride         Scheduled Meds:   carbamide peroxide  5 drop Both Ears BID    ciprofloxacin  500 mg Oral 2 times per day    levothyroxine  75 mcg Oral Daily    vitamin D  50,000 Units Oral Weekly    simvastatin  10 mg Oral Nightly    sodium chloride flush  10 mL Intravenous 2 times per day    famotidine  20 mg Oral BID    enoxaparin  40 mg Subcutaneous Daily    sennosides-docusate sodium  1 tablet Oral Nightly    predniSONE  40 mg Oral Daily    sodium chloride  1,000 mL Intravenous Once    b complex-C-E-zinc  1 tablet Oral Daily    sodium chloride flush  10 mL Intravenous Q12H    budesonide  0.5 mg Nebulization BID    ipratropium-albuterol  1 ampule Inhalation 4x daily    metoprolol tartrate  50 mg Oral BID       PRN Meds:ALPRAZolam, sodium chloride flush, ondansetron, magnesium sulfate, sodium chloride flush, albuterol, traZODone, acetaminophen    Radiology  Xr Chest Standard (2 Vw)    Result Date: 6/6/2019  EXAMINATION: XR CHEST (2 VW) CLINICAL HISTORY: SHORTNESS OF BREATH COMPARISONS: JUNE 5, 2019 FINDINGS: Osteopenia. Right breast absent. Surgical clips right axilla. Additional surgical lips left upper chest anteriorly. Cardiopericardial silhouette normal. Aorta calcified and tortuous. Lungs clear and hyperexpanded. Diaphragms flattened. NO ACUTE CARDIOPULMONARY DISEASE. RIGHT MASTECTOMY. EMPHYSEMA. Xr Chest Standard (2 Vw)    Result Date: 5/12/2019  EXAMINATION: Chest x-ray, 2 view HISTORY: Shortness of breath. Cough. TECHNIQUE: Frontal and lateral views of the chest COMPARISON: Chest radiograph from January 18, 2019 FINDINGS: Atherosclerotic calcification of the thoracic aorta.  Cardiomediastinal edema. Blood flow is  demonstrated. No deep venous thrombosis seen in the right or left lower extremities. Ir Midline Cath    Result Date: 6/6/2019  NO FILMS. NO DICTATION. IMPRESSION AND SUGGESTION:  1. COPD with exacerbation  2. Acute bronchitis with retained secretion  3. History of mediastinal adenopathy declined further workup    Continue present treatment plan. Bronchodilator therapy with DuoNeb every 4 hours while awake and albuterol every 2 hours when necessary. She is on systemic steroid with prednisone 40 mg daily.   Continue present treatment plan        Electronically signed by Perez Hickman MD, FCCP on 6/9/2019 at 12:28 PM

## 2019-06-10 DIAGNOSIS — J44.9 CHRONIC OBSTRUCTIVE PULMONARY DISEASE, UNSPECIFIED COPD TYPE (HCC): Primary | ICD-10-CM

## 2019-06-10 LAB
ANION GAP SERPL CALCULATED.3IONS-SCNC: 10 MEQ/L (ref 9–15)
BASOPHILS ABSOLUTE: 0 K/UL (ref 0–0.2)
BASOPHILS RELATIVE PERCENT: 0.1 %
BLOOD CULTURE, ROUTINE: NORMAL
BUN BLDV-MCNC: 15 MG/DL (ref 8–23)
CALCIUM SERPL-MCNC: 8.6 MG/DL (ref 8.5–9.9)
CHLORIDE BLD-SCNC: 99 MEQ/L (ref 95–107)
CO2: 28 MEQ/L (ref 20–31)
CREAT SERPL-MCNC: 0.66 MG/DL (ref 0.5–0.9)
CULTURE, BLOOD 2: NORMAL
EOSINOPHILS ABSOLUTE: 0 K/UL (ref 0–0.7)
EOSINOPHILS RELATIVE PERCENT: 0 %
GFR AFRICAN AMERICAN: >60
GFR NON-AFRICAN AMERICAN: >60
GLUCOSE BLD-MCNC: 156 MG/DL (ref 70–99)
HCT VFR BLD CALC: 33 % (ref 37–47)
HEMOGLOBIN: 11.2 G/DL (ref 12–16)
LYMPHOCYTES ABSOLUTE: 0.4 K/UL (ref 1–4.8)
LYMPHOCYTES RELATIVE PERCENT: 7.3 %
MCH RBC QN AUTO: 30.8 PG (ref 27–31.3)
MCHC RBC AUTO-ENTMCNC: 34.1 % (ref 33–37)
MCV RBC AUTO: 90.3 FL (ref 82–100)
MONOCYTES ABSOLUTE: 0.2 K/UL (ref 0.2–0.8)
MONOCYTES RELATIVE PERCENT: 3.4 %
NEUTROPHILS ABSOLUTE: 4.6 K/UL (ref 1.4–6.5)
NEUTROPHILS RELATIVE PERCENT: 89.2 %
PDW BLD-RTO: 14 % (ref 11.5–14.5)
PLATELET # BLD: 294 K/UL (ref 130–400)
POTASSIUM SERPL-SCNC: 4.3 MEQ/L (ref 3.4–4.9)
RBC # BLD: 3.65 M/UL (ref 4.2–5.4)
SODIUM BLD-SCNC: 137 MEQ/L (ref 135–144)
WBC # BLD: 5.2 K/UL (ref 4.8–10.8)

## 2019-06-10 PROCEDURE — 6370000000 HC RX 637 (ALT 250 FOR IP): Performed by: INTERNAL MEDICINE

## 2019-06-10 PROCEDURE — 2700000000 HC OXYGEN THERAPY PER DAY

## 2019-06-10 PROCEDURE — 6360000002 HC RX W HCPCS: Performed by: INTERNAL MEDICINE

## 2019-06-10 PROCEDURE — 2580000003 HC RX 258: Performed by: PHYSICIAN ASSISTANT

## 2019-06-10 PROCEDURE — 99232 SBSQ HOSP IP/OBS MODERATE 35: CPT | Performed by: INTERNAL MEDICINE

## 2019-06-10 PROCEDURE — 2580000003 HC RX 258: Performed by: INTERNAL MEDICINE

## 2019-06-10 PROCEDURE — 85025 COMPLETE CBC W/AUTO DIFF WBC: CPT

## 2019-06-10 PROCEDURE — 1210000000 HC MED SURG R&B

## 2019-06-10 PROCEDURE — 6360000002 HC RX W HCPCS: Performed by: PHYSICIAN ASSISTANT

## 2019-06-10 PROCEDURE — 94640 AIRWAY INHALATION TREATMENT: CPT

## 2019-06-10 PROCEDURE — 80048 BASIC METABOLIC PNL TOTAL CA: CPT

## 2019-06-10 PROCEDURE — 36415 COLL VENOUS BLD VENIPUNCTURE: CPT

## 2019-06-10 RX ORDER — PREDNISONE 10 MG/1
40 TABLET ORAL DAILY
Status: DISCONTINUED | OUTPATIENT
Start: 2019-06-10 | End: 2019-06-11 | Stop reason: HOSPADM

## 2019-06-10 RX ADMIN — METOPROLOL TARTRATE 50 MG: 50 TABLET ORAL at 19:33

## 2019-06-10 RX ADMIN — BUDESONIDE 500 MCG: 0.5 SUSPENSION RESPIRATORY (INHALATION) at 07:20

## 2019-06-10 RX ADMIN — IPRATROPIUM BROMIDE AND ALBUTEROL SULFATE 1 AMPULE: .5; 3 SOLUTION RESPIRATORY (INHALATION) at 16:07

## 2019-06-10 RX ADMIN — LEVOTHYROXINE SODIUM 75 MCG: 75 TABLET ORAL at 06:14

## 2019-06-10 RX ADMIN — Medication 10 ML: at 22:15

## 2019-06-10 RX ADMIN — SIMVASTATIN 10 MG: 10 TABLET, FILM COATED ORAL at 19:33

## 2019-06-10 RX ADMIN — IPRATROPIUM BROMIDE AND ALBUTEROL SULFATE 1 AMPULE: .5; 3 SOLUTION RESPIRATORY (INHALATION) at 19:11

## 2019-06-10 RX ADMIN — SENNOSIDES AND DOCUSATE SODIUM 1 TABLET: 8.6; 5 TABLET ORAL at 19:33

## 2019-06-10 RX ADMIN — Medication 10 ML: at 22:14

## 2019-06-10 RX ADMIN — IPRATROPIUM BROMIDE AND ALBUTEROL SULFATE 1 AMPULE: .5; 3 SOLUTION RESPIRATORY (INHALATION) at 11:09

## 2019-06-10 RX ADMIN — METHYLPREDNISOLONE SODIUM SUCCINATE 40 MG: 40 INJECTION, POWDER, FOR SOLUTION INTRAMUSCULAR; INTRAVENOUS at 03:22

## 2019-06-10 RX ADMIN — FAMOTIDINE 20 MG: 20 TABLET, FILM COATED ORAL at 19:33

## 2019-06-10 RX ADMIN — FAMOTIDINE 20 MG: 20 TABLET, FILM COATED ORAL at 08:15

## 2019-06-10 RX ADMIN — METOPROLOL TARTRATE 50 MG: 50 TABLET ORAL at 08:15

## 2019-06-10 RX ADMIN — CIPROFLOXACIN HYDROCHLORIDE 500 MG: 500 TABLET, FILM COATED ORAL at 08:15

## 2019-06-10 RX ADMIN — IPRATROPIUM BROMIDE AND ALBUTEROL SULFATE 1 AMPULE: .5; 3 SOLUTION RESPIRATORY (INHALATION) at 07:20

## 2019-06-10 RX ADMIN — BUDESONIDE 500 MCG: 0.5 SUSPENSION RESPIRATORY (INHALATION) at 19:11

## 2019-06-10 RX ADMIN — ZINC 1 TABLET: TAB ORAL at 08:15

## 2019-06-10 RX ADMIN — TRAZODONE HYDROCHLORIDE 150 MG: 150 TABLET ORAL at 23:38

## 2019-06-10 RX ADMIN — CIPROFLOXACIN HYDROCHLORIDE 500 MG: 500 TABLET, FILM COATED ORAL at 19:32

## 2019-06-10 RX ADMIN — PREDNISONE 40 MG: 10 TABLET ORAL at 11:44

## 2019-06-10 RX ADMIN — Medication 10 ML: at 08:15

## 2019-06-10 RX ADMIN — ALPRAZOLAM 0.25 MG: 0.25 TABLET ORAL at 19:32

## 2019-06-10 ASSESSMENT — PAIN SCALES - GENERAL
PAINLEVEL_OUTOF10: 0
PAINLEVEL_OUTOF10: 0

## 2019-06-10 NOTE — PROGRESS NOTES
INPATIENT PROGRESS NOTES    PATIENT NAME: Nafisa Olmedo  MRN: [id number]  SERVICE DATE:  Brit 10, 2019   SERVICE TIME:  11:16 AM      PRIMARY SERVICE: Pulmonary Disease    CHIEF COMPLAINTS: SOB    INTERVAL HPI: Patient seen and examined at bedside, Interval Notes, orders reviewed. Nursing notes noted    SOB improved and minimal , no chest pain, hearing is same , no fever, no nausea or vomiting, she does have anxiety she tends to cry whenever she talks about her situation. Review of system:     GI Abdominal pain No  Skin Rash No    Social History     Tobacco Use    Smoking status: Former Smoker     Packs/day: 1.50     Years: 40.00     Pack years: 60.00     Types: Cigarettes     Last attempt to quit: 1989     Years since quittin.3    Smokeless tobacco: Never Used   Substance Use Topics    Alcohol use: No         Problem Relation Age of Onset    Cancer Sister         breast    Cancer Maternal Uncle         pancreatic         OBJECTIVE    Body mass index is 25.83 kg/m². PHYSICAL EXAM:  Vitals:  BP (!) 171/86   Pulse 109   Temp 98.6 °F (37 °C) (Oral)   Resp 18   Ht 5' 3\" (1.6 m)   Wt 145 lb 12.8 oz (66.1 kg)   LMP  (LMP Unknown)   SpO2 98%   BMI 25.83 kg/m²     General: alert, cooperative, no distress  Head: normocephalic, atraumatic  Eyes:No gross abnormalities. , PERRL and Sclera nonicteric  ENT:  MMM no lesions  Neck:  supple and no masses  Chest : improved air movement, no wheezing, no rales, no wheezing   Heart[de-identified] Heart sounds are normal.  Regular rate and rhythm without murmur, gallop or rub. ABD:  symmetric, liver span normal to percussion, soft, non-tender, spleen non-palpable  Musculoskeletal : no cyanosis, no clubbing and no edema  Neuro:  Grossly normal  Skin: No rashes or nodules noted.   Lymph node:  no cervical nodes  Urology: No Georges   Psychiatric: appropriate    DATA:   Recent Labs     19  0714 06/10/19  0708   WBC 7.0 5.2   HGB 10.9* 11.2*   HCT 32.3* 33.0*   MCV 91.1 90.3    294     Recent Labs     06/09/19  0714 06/10/19  0708    137   K 4.1 4.3   CL 96 99   CO2 29 28   BUN 14 15   CREATININE 0.71 0.66   GLUCOSE 92 156*   CALCIUM 8.7 8.6   LABGLOM >60.0 >60.0   GFRAA >60.0 >60.0       MV Settings:          No results for input(s): PHART, LFC4NHK, PO2ART, IKH5MHN, BEART, M8MSGSQK in the last 72 hours. O2 Device: Nasal cannula  O2 Flow Rate (L/min): 3 L/min    DIET GENERAL;     MEDICATIONS during current hospitalization:    Continuous Infusions:   sodium chloride         Scheduled Meds:   methylPREDNISolone  40 mg Intravenous Q8H    carbamide peroxide  5 drop Both Ears BID    ciprofloxacin  500 mg Oral 2 times per day    levothyroxine  75 mcg Oral Daily    vitamin D  50,000 Units Oral Weekly    simvastatin  10 mg Oral Nightly    sodium chloride flush  10 mL Intravenous 2 times per day    famotidine  20 mg Oral BID    enoxaparin  40 mg Subcutaneous Daily    sennosides-docusate sodium  1 tablet Oral Nightly    sodium chloride  1,000 mL Intravenous Once    b complex-C-E-zinc  1 tablet Oral Daily    sodium chloride flush  10 mL Intravenous Q12H    budesonide  0.5 mg Nebulization BID    ipratropium-albuterol  1 ampule Inhalation 4x daily    metoprolol tartrate  50 mg Oral BID       PRN Meds:ALPRAZolam, sodium chloride flush, ondansetron, magnesium sulfate, sodium chloride flush, albuterol, traZODone, acetaminophen    Radiology  Xr Chest Standard (2 Vw)    Result Date: 6/6/2019  EXAMINATION: XR CHEST (2 VW) CLINICAL HISTORY: SHORTNESS OF BREATH COMPARISONS: JUNE 5, 2019 FINDINGS: Osteopenia. Right breast absent. Surgical clips right axilla. Additional surgical lips left upper chest anteriorly. Cardiopericardial silhouette normal. Aorta calcified and tortuous. Lungs clear and hyperexpanded. Diaphragms flattened. NO ACUTE CARDIOPULMONARY DISEASE. RIGHT MASTECTOMY. EMPHYSEMA.     Xr Chest Standard (2 Vw)    Result Date: 5/12/2019  EXAMINATION: Chest x-ray, 2 view HISTORY: Shortness of breath. Cough. TECHNIQUE: Frontal and lateral views of the chest COMPARISON: Chest radiograph from January 18, 2019 FINDINGS: Atherosclerotic calcification of the thoracic aorta. Cardiomediastinal silhouette is within normal limits. Lungs are hyperinflated. Coarsening of the pulmonary interstitium. No pneumothorax, pleural effusion, or focal consolidation. Degenerative changes of the spine. Bilateral axillary clips noted. No acute intrathoracic process. Findings compatible with COPD. Xr Chest Portable    Result Date: 6/5/2019  Portable chest radiograph History: Shortness of breath Technique: AP portable view of the chest obtained. Comparison: Chest radiographs from May 11, 2019 Findings: Atherosclerotic calcification of the thoracic aorta. The cardiomediastinal silhouette is within normal limits. No pneumothorax, pleural effusion, or focal consolidation. Lungs are hyperinflated. Coarsening of the pulmonary interstitium. Osseous structures of the thorax appear intact. Bilateral axillary surgical clips noted. No acute intrathoracic process. Findings compatible with COPD. Us Dup Lower Extremity Right Taco    Result Date: 5/13/2019  EXAMINATION: US DUP LOWER EXTREMITY RIGHT TACO CLINICAL HISTORY:  swelling COMPARISONS: None available. FINDINGS: Right lower extremity venous duplex sonogram was performed. Deep veins of the right lower extremity are compressible. Normal deep venous blood flow is documented with color Doppler images and pulsed Doppler waveform images. CONCLUSION: NO DEEP VEIN THROMBOSIS. Ir Ultrasound Guidance Vascular Access    Result Date: 6/6/2019  NO FILMS. NO DICTATION. Us Dup Lower Extremities Bilateral Venous    Result Date: 5/17/2019  COMPARISON: Right leg, May 13, 2019.  HISTORY: M79.606 Pain and swelling of lower extremity, unspecified laterality ICD10 TECHNIQUE: US DUP LOWER EXTREMITIES BILATERAL VENOUS FINDINGS: Ultrasound of the right

## 2019-06-10 NOTE — CARE COORDINATION
Met with patient at bedside to discuss COPD and discharge plan. Patients daughter lives with her, is an LPN and is able to help out as needed. She has friends that also help as needed. She feels overwhelmed with all the Dr appointments and follow up appointments for blood work and diagnostics. She feels the time is wasted and can be better spent. She anticipates discharge to home tomorrow on PO antibiotics. Discussed with Dr. Sumeet Prieto, psych has been consulted. Care Coordination will continue to follow.   Electronically signed by Mario Boogie RN on 6/10/2019 at 2:40 PM

## 2019-06-10 NOTE — PROGRESS NOTES
Hospitalist Progress Note      PCP: Katty Johnson APRN - CNP    Date of Admission: 6/6/2019    Chief Complaint:  No acute events overnight,afebrile,stable HD,feels anxious    Medications:  Reviewed    Infusion Medications    sodium chloride       Scheduled Medications    predniSONE  40 mg Oral Daily    carbamide peroxide  5 drop Both Ears BID    ciprofloxacin  500 mg Oral 2 times per day    levothyroxine  75 mcg Oral Daily    vitamin D  50,000 Units Oral Weekly    simvastatin  10 mg Oral Nightly    sodium chloride flush  10 mL Intravenous 2 times per day    famotidine  20 mg Oral BID    enoxaparin  40 mg Subcutaneous Daily    sennosides-docusate sodium  1 tablet Oral Nightly    sodium chloride  1,000 mL Intravenous Once    b complex-C-E-zinc  1 tablet Oral Daily    sodium chloride flush  10 mL Intravenous Q12H    budesonide  0.5 mg Nebulization BID    ipratropium-albuterol  1 ampule Inhalation 4x daily    metoprolol tartrate  50 mg Oral BID     PRN Meds: ALPRAZolam, sodium chloride flush, ondansetron, magnesium sulfate, sodium chloride flush, albuterol, traZODone, acetaminophen      Intake/Output Summary (Last 24 hours) at 6/10/2019 1401  Last data filed at 6/10/2019 0616  Gross per 24 hour   Intake 1730 ml   Output 2300 ml   Net -570 ml       Exam:    BP (!) 171/86   Pulse 109   Temp 98.6 °F (37 °C) (Oral)   Resp 18   Ht 5' 3\" (1.6 m)   Wt 145 lb 12.8 oz (66.1 kg)   LMP  (LMP Unknown)   SpO2 98%   BMI 25.83 kg/m²     General appearance: No apparent distress, appears stated age and cooperative. Respiratory:  Diminished BS, no Rales/Wheezes/Rhonchi. Cardiovascular: Regular rate and rhythm with normal S1/S2. Abdomen: Soft, non-tender, non-distended with normal bowel sounds. Musculoskeletal: No cyanosis or edema bilaterally.       Labs:   Recent Labs     06/08/19  0745 06/09/19  0714 06/10/19  0708   WBC 8.9 7.0 5.2   HGB 12.2 10.9* 11.2*   HCT 36.2* 32.3* 33.0*    288 294 Recent Labs     06/08/19  0745 06/09/19  0714 06/10/19  0708   * 137 137   K 5.2* 4.1 4.3   CL 97 96 99   CO2 26 29 28   BUN 12 14 15   CREATININE 0.63 0.71 0.66   CALCIUM 9.4 8.7 8.6     No results for input(s): AST, ALT, BILIDIR, BILITOT, ALKPHOS in the last 72 hours. No results for input(s): INR in the last 72 hours. No results for input(s): Berhane Stapleton in the last 72 hours. Urinalysis:      Lab Results   Component Value Date    NITRU Negative 01/18/2019    BLOODU Negative 01/18/2019    SPECGRAV 1.008 01/18/2019    GLUCOSEU Negative 01/18/2019       Radiology:  IR MIDLINE CATH         IR ULTRASOUND GUIDANCE VASCULAR ACCESS         XR CHEST STANDARD (2 VW)   Final Result   NO ACUTE CARDIOPULMONARY DISEASE. RIGHT MASTECTOMY. EMPHYSEMA.               Assessment/Plan:    COPD exacerbation  - due to acute bronchitis   - sputum culture grew Pseudomonas, treated with Zosyn   - on Cipro and prednisone  - clinically improved  - Ok to d/c home on prednisone taper per pulmonology  - continue PO cipro on d/c per ID    History of mediastinal lymphadenopathy  - patient declined repeat CT and any further workup    Large hiatal hernia with dilated esophagus  - that could be causing recurrent GERD and above exacerbations per pulmonology  - continue PPI therapy  - follow up with GI as outpatient    Hearing loss  - avoid Zithromax per ID  - needs ENT eval as outpatient    Hyponatremia / hyperkalemia  - resolved    Anxiety   - consulted psychiatry                       Electronically signed by Alyx Alonzo MD on 6/10/2019 at 2:01 PM

## 2019-06-11 VITALS
BODY MASS INDEX: 25.83 KG/M2 | TEMPERATURE: 97.9 F | RESPIRATION RATE: 17 BRPM | OXYGEN SATURATION: 97 % | SYSTOLIC BLOOD PRESSURE: 170 MMHG | HEART RATE: 94 BPM | WEIGHT: 145.8 LBS | HEIGHT: 63 IN | DIASTOLIC BLOOD PRESSURE: 90 MMHG

## 2019-06-11 LAB
ANION GAP SERPL CALCULATED.3IONS-SCNC: 12 MEQ/L (ref 9–15)
BASOPHILS ABSOLUTE: 0 K/UL (ref 0–0.2)
BASOPHILS RELATIVE PERCENT: 0.1 %
BLOOD CULTURE, ROUTINE: NORMAL
BUN BLDV-MCNC: 18 MG/DL (ref 8–23)
C-REACTIVE PROTEIN, HIGH SENSITIVITY: 0.4 MG/L (ref 0–5)
CALCIUM SERPL-MCNC: 8.7 MG/DL (ref 8.5–9.9)
CHLORIDE BLD-SCNC: 98 MEQ/L (ref 95–107)
CO2: 29 MEQ/L (ref 20–31)
CREAT SERPL-MCNC: 0.76 MG/DL (ref 0.5–0.9)
CULTURE, BLOOD 2: NORMAL
EOSINOPHILS ABSOLUTE: 0.1 K/UL (ref 0–0.7)
EOSINOPHILS RELATIVE PERCENT: 0.7 %
GFR AFRICAN AMERICAN: >60
GFR NON-AFRICAN AMERICAN: >60
GLUCOSE BLD-MCNC: 108 MG/DL (ref 70–99)
HCT VFR BLD CALC: 34.7 % (ref 37–47)
HEMOGLOBIN: 11.6 G/DL (ref 12–16)
LYMPHOCYTES ABSOLUTE: 1.7 K/UL (ref 1–4.8)
LYMPHOCYTES RELATIVE PERCENT: 24.6 %
MCH RBC QN AUTO: 31.1 PG (ref 27–31.3)
MCHC RBC AUTO-ENTMCNC: 33.5 % (ref 33–37)
MCV RBC AUTO: 92.7 FL (ref 82–100)
MONOCYTES ABSOLUTE: 0.6 K/UL (ref 0.2–0.8)
MONOCYTES RELATIVE PERCENT: 8.5 %
NEUTROPHILS ABSOLUTE: 4.5 K/UL (ref 1.4–6.5)
NEUTROPHILS RELATIVE PERCENT: 66.1 %
PDW BLD-RTO: 14 % (ref 11.5–14.5)
PLATELET # BLD: 330 K/UL (ref 130–400)
POTASSIUM SERPL-SCNC: 4.3 MEQ/L (ref 3.4–4.9)
PROCALCITONIN: 0.04 NG/ML (ref 0–0.15)
RBC # BLD: 3.74 M/UL (ref 4.2–5.4)
SLIDE REVIEW: ABNORMAL
SODIUM BLD-SCNC: 139 MEQ/L (ref 135–144)
WBC # BLD: 6.8 K/UL (ref 4.8–10.8)

## 2019-06-11 PROCEDURE — 36415 COLL VENOUS BLD VENIPUNCTURE: CPT

## 2019-06-11 PROCEDURE — 6370000000 HC RX 637 (ALT 250 FOR IP): Performed by: PSYCHIATRY & NEUROLOGY

## 2019-06-11 PROCEDURE — 6370000000 HC RX 637 (ALT 250 FOR IP): Performed by: INTERNAL MEDICINE

## 2019-06-11 PROCEDURE — 86141 C-REACTIVE PROTEIN HS: CPT

## 2019-06-11 PROCEDURE — 84145 PROCALCITONIN (PCT): CPT

## 2019-06-11 PROCEDURE — 80048 BASIC METABOLIC PNL TOTAL CA: CPT

## 2019-06-11 PROCEDURE — 85025 COMPLETE CBC W/AUTO DIFF WBC: CPT

## 2019-06-11 PROCEDURE — 94640 AIRWAY INHALATION TREATMENT: CPT

## 2019-06-11 PROCEDURE — 99222 1ST HOSP IP/OBS MODERATE 55: CPT | Performed by: PSYCHIATRY & NEUROLOGY

## 2019-06-11 PROCEDURE — 6360000002 HC RX W HCPCS: Performed by: INTERNAL MEDICINE

## 2019-06-11 PROCEDURE — 2700000000 HC OXYGEN THERAPY PER DAY

## 2019-06-11 PROCEDURE — 2580000003 HC RX 258: Performed by: PHYSICIAN ASSISTANT

## 2019-06-11 RX ORDER — CITALOPRAM 10 MG/1
10 TABLET ORAL DAILY
Qty: 30 TABLET | Refills: 3 | Status: SHIPPED | OUTPATIENT
Start: 2019-06-11 | End: 2019-12-05 | Stop reason: ALTCHOICE

## 2019-06-11 RX ORDER — PREDNISONE 20 MG/1
40 TABLET ORAL DAILY
Qty: 10 TABLET | Refills: 0 | Status: SHIPPED | OUTPATIENT
Start: 2019-06-12 | End: 2019-06-16

## 2019-06-11 RX ORDER — CITALOPRAM 10 MG/1
10 TABLET ORAL DAILY
Status: DISCONTINUED | OUTPATIENT
Start: 2019-06-11 | End: 2019-06-11 | Stop reason: HOSPADM

## 2019-06-11 RX ORDER — CIPROFLOXACIN 500 MG/1
500 TABLET, FILM COATED ORAL EVERY 12 HOURS SCHEDULED
Qty: 6 TABLET | Refills: 0 | Status: SHIPPED | OUTPATIENT
Start: 2019-06-11 | End: 2019-06-14

## 2019-06-11 RX ADMIN — IPRATROPIUM BROMIDE AND ALBUTEROL SULFATE 1 AMPULE: .5; 3 SOLUTION RESPIRATORY (INHALATION) at 15:02

## 2019-06-11 RX ADMIN — IPRATROPIUM BROMIDE AND ALBUTEROL SULFATE 1 AMPULE: .5; 3 SOLUTION RESPIRATORY (INHALATION) at 10:24

## 2019-06-11 RX ADMIN — CIPROFLOXACIN HYDROCHLORIDE 500 MG: 500 TABLET, FILM COATED ORAL at 08:08

## 2019-06-11 RX ADMIN — PREDNISONE 40 MG: 10 TABLET ORAL at 08:08

## 2019-06-11 RX ADMIN — FAMOTIDINE 20 MG: 20 TABLET, FILM COATED ORAL at 08:08

## 2019-06-11 RX ADMIN — IPRATROPIUM BROMIDE AND ALBUTEROL SULFATE 1 AMPULE: .5; 3 SOLUTION RESPIRATORY (INHALATION) at 05:24

## 2019-06-11 RX ADMIN — CITALOPRAM HYDROBROMIDE 10 MG: 10 TABLET ORAL at 15:30

## 2019-06-11 RX ADMIN — BUDESONIDE 500 MCG: 0.5 SUSPENSION RESPIRATORY (INHALATION) at 05:23

## 2019-06-11 RX ADMIN — Medication 10 ML: at 08:09

## 2019-06-11 RX ADMIN — ALPRAZOLAM 0.25 MG: 0.25 TABLET ORAL at 08:16

## 2019-06-11 RX ADMIN — LEVOTHYROXINE SODIUM 75 MCG: 75 TABLET ORAL at 06:11

## 2019-06-11 RX ADMIN — CARBAMIDE PEROXIDE 6.5% 5 DROP: 6.5 LIQUID AURICULAR (OTIC) at 08:12

## 2019-06-11 RX ADMIN — METOPROLOL TARTRATE 50 MG: 50 TABLET ORAL at 08:08

## 2019-06-11 RX ADMIN — ZINC 1 TABLET: TAB ORAL at 08:08

## 2019-06-11 ASSESSMENT — PAIN SCALES - GENERAL
PAINLEVEL_OUTOF10: 0
PAINLEVEL_OUTOF10: 0

## 2019-06-11 NOTE — CONSULTS
Department of Psychiatry  Behavioral Health Consult    REASON FOR CONSULT: Anxiety attack    CONSULTING PHYSICIAN: Dr Mcgrath Race    History obtained from: Patient    HISTORY OF PRESENT ILLNESS:     The patient is a 66 y.o. female with significant past psychiatric history of CINDY   Pt has been noticing worsening of anxiety and panic feeling last few month  Pt denies depressed feeling  No hopeless or worthless feeling  Denies SI thoughts  Pt takes Xanax as needed  The patient is not currently receiving care for the above psychiatric illness.       Psychiatric Review of Systems       Depression: no     Liliane or Hypomania:  no     Panic Attacks:  yes     Phobias:  no     Obsessions and Compulsions:  no     PTSD : no     Hallucinations:  no     Delusions:  no      Substance Abuse History:  ETOH: no  Marijuana: no  Opiates: no  Other Drugs: no      Past Psychiatric History:  Prior Diagnosis: CINDY  Psychiatrist: no  Therapist:no  Hospitalization: no  Hx of Suicidal Attempts: no  Hx of violence:  no  ECT: no    Past Medical History:        Diagnosis Date    Anxiety     Anxiety and depression     CAD (coronary artery disease)     Cancer [facility] [address] 3/2014    Invasive ductal cancer left breast, spread to 5 lymph nodes    Carotid artery stenosis and occlusion     [facility]     Depression 1/15/2018    GERD (gastroesophageal reflux disease)     Headache(784.0)     Hyperlipidemia     Hypertension     Hypothyroidism     Insomnia     Osteoarthritis     RBBB 10/15/2014    Right carotid bruit 5/26/2015    S/P cardiac catheterization 7/13/2016       Past Surgical History:        Procedure Laterality Date    BREAST BIOPSY Right 11/8/2016     biopsy    BREAST SURGERY Left 2/26/14    U/S Guided core bx of the left breast    BREAST SURGERY Right 3/20/14    U/S of the lateral right breast    BREAST SURGERY Left 3/27/14    U/S Guided Left breast lumpectomy and left snb    OTHER SURGICAL HISTORY  4/11/14    Placement drain, left axillary seroma    SC MASTECTOMY, SIMPLE, COMPLETE Right 9/6/2018    R modified radical mastectomy       Medications Prior to Admission:   Medications Prior to Admission: ALPRAZolam (XANAX) 0.25 MG tablet, Take 0.25 mg by mouth 3 times daily as needed for Anxiety. SYMBICORT 160-4.5 MCG/ACT AERO, USE 2 INHALATIONS TWICE A DAY  albuterol sulfate HFA (VENTOLIN HFA) 108 (90 Base) MCG/ACT inhaler, Inhale 2 puffs into the lungs every 6 hours as needed for Wheezing  ipratropium-albuterol (DUONEB) 0.5-2.5 (3) MG/3ML SOLN nebulizer solution, Inhale 3 mLs into the lungs every 4 hours  metoprolol tartrate (LOPRESSOR) 50 MG tablet, Take 50 mg by mouth 2 times daily  omeprazole (PRILOSEC) 20 MG delayed release capsule, Take 20 mg by mouth daily as needed (heartburn)  guaiFENesin (MUCINEX) 600 MG extended release tablet, Take 1,200 mg by mouth 2 times daily  traZODone (DESYREL) 150 MG tablet, TAKE 1 TABLET NIGHTLY  lovastatin (MEVACOR) 20 MG tablet, TAKE 1 TABLET NIGHTLY  vitamin D (ERGOCALCIFEROL) 83685 units CAPS capsule, TAKE 1 CAPSULE ONCE A WEEK  SPIRIVA HANDIHALER 18 MCG inhalation capsule, INHALE THE CONTENTS OF 1 CAPSULE DAILY  levothyroxine (SYNTHROID) 75 MCG tablet, Take 1 tablet by mouth Daily  Respiratory Therapy Supplies (NEBULIZER/TUBING/MOUTHPIECE) KIT, 1 kit by Does not apply route daily as needed (wheezing)  furosemide (LASIX) 20 MG tablet, Take 1 tablet by mouth daily for 3 days  [DISCONTINUED] ipratropium-albuterol (DUONEB) 0.5-2.5 (3) MG/3ML SOLN nebulizer solution, Inhale 3 mLs into the lungs every 6 hours as needed for Shortness of Breath    Allergies:  Patient has no known allergies. FAMILY/SOCIAL HISTORY:  Family History   Problem Relation Age of Onset    Cancer Sister         breast    Cancer Maternal Uncle         pancreatic     Social History     Socioeconomic History    Marital status:       Spouse name: Not on file    Number of children: Not on file    Years of education: Not on file    Highest education level: Not on file   Occupational History    Not on file   Social Needs    Financial resource strain: Not on file    Food insecurity:     Worry: Not on file     Inability: Not on file    Transportation needs:     Medical: Not on file     Non-medical: Not on file   Tobacco Use    Smoking status: Former Smoker     Packs/day: 1.50     Years: 40.00     Pack years: 60.00     Types: Cigarettes     Last attempt to quit: 1989     Years since quittin.3    Smokeless tobacco: Never Used   Substance and Sexual Activity    Alcohol use: No    Drug use: No    Sexual activity: Never   Lifestyle    Physical activity:     Days per week: Not on file     Minutes per session: Not on file    Stress: Not on file   Relationships    Social connections:     Talks on phone: Not on file     Gets together: Not on file     Attends Scientologist service: Not on file     Active member of club or organization: Not on file     Attends meetings of clubs or organizations: Not on file     Relationship status: Not on file    Intimate partner violence:     Fear of current or ex partner: Not on file     Emotionally abused: Not on file     Physically abused: Not on file     Forced sexual activity: Not on file   Other Topics Concern    Not on file   Social History Narrative    Not on file       REVIEW OF SYSTEMS    Constitutional: [] fever  [] chills  [] weight loss  []weakness [] Other:  Eyes:  [] photophobia  [] discharge [] acuity change   [] Diplopia   [] Other:  HENT:  [] sore throat  [] ear pain [] Tinnitus   [] Other  Respiratory:  [] Cough  [] Shortness of breath   [] Sputum   [] Other:   Cardiac: []Chest pain   []Palpitations []Edema  []PND  [] Other:  GI:  []Abdominal pain   []Nausea  []Vomiting  []Diarrhea  [] Other:  :  [] Dysuria   []Frequency  []Hematuria  []Discharge  [] Other:  Possible Pregnancy: []Yes   []No   LMP:   Musculoskeletal:  []Back pain  []Neck pain  []Recent Injury   Skin:  []Rash  [] Itching  [] Other:  Neurologic:  [] Headache  [] Focal weakness  [] Sensory changes []Other:  Endocrine:  [] Polyuria  [] Polydipsia  [] Hair Loss  [] Other:  Lymphatic:   [] Swollen glands   Psychiatric:  As per HPI      All other systems negative except as marked or mentioned/indicated in the HPI. Lucíaobed Isai PHYSICAL EXAM:  Vitals:  BP (!) 170/90   Pulse 94   Temp 97.9 °F (36.6 °C) (Oral)   Resp 17   Ht 5' 3\" (1.6 m)   Wt 145 lb 12.8 oz (66.1 kg)   LMP  (LMP Unknown)   SpO2 97%   BMI 25.83 kg/m²      Neuro Exam:   Muscle Strength & Tone: full ROM  Gait: normal gait   Involuntary Movements: No    Mental Status Examination:    Level of consciousness:  within normal limits   Appearance:  ill-appearing  Behavior/Motor:  psychomotor retardation  Attitude toward examiner:  cooperative  Speech:  slow   Mood: anxious  Affect:  mood congruent  Thought processes:  slow   Thought content:  Suicidal Ideation:  denies suicidal ideation  Delusions:  no evidence of delusions  Perceptual Disturbance:  denies any perceptual disturbance  Cognition:  oriented to person, place, and time   Concentration distractible  Memory intact  Mini Mental Status 30/30  Insight fair   Judgement fair   Fund of Knowledge adequate      DIAGNOSIS:     Panic attack       RISK ASSESSMENT:        LABS: REVIEWED TODAY:  Recent Labs     06/09/19  0714 06/10/19  0708 06/11/19  0639   WBC 7.0 5.2 6.8   HGB 10.9* 11.2* 11.6*    294 330     Recent Labs     06/09/19  0714 06/10/19  0708 06/11/19  0638    137 139   K 4.1 4.3 4.3   CL 96 99 98   CO2 29 28 29   BUN 14 15 18   CREATININE 0.71 0.66 0.76   GLUCOSE 92 156* 108*     No results for input(s): BILITOT, ALKPHOS, AST, ALT in the last 72 hours.   Lab Results   Component Value Date    LABAMPH Negative 06/23/2017    BARBSCNU Negative 06/23/2017    LABBENZ Negative 06/23/2017    LABMETH Negative 06/23/2017    OPIATESCREENURINE Negative 06/23/2017 PHENCYCLIDINESCREENURINE Negative 06/23/2017     Lab Results   Component Value Date    TSH 6.880 11/07/2018     No results found for: LITHIUM  No results found for: VALPROATE, CBMZ  No results found for: LITHIUM, VALPROATE    FURTHER LABS ORDERED :      Radiology   Xr Chest Standard (2 Vw)    Result Date: 6/6/2019  EXAMINATION: XR CHEST (2 VW) CLINICAL HISTORY: SHORTNESS OF BREATH COMPARISONS: JUNE 5, 2019 FINDINGS: Osteopenia. Right breast absent. Surgical clips right axilla. Additional surgical lips left upper chest anteriorly. Cardiopericardial silhouette normal. Aorta calcified and tortuous. Lungs clear and hyperexpanded. Diaphragms flattened. NO ACUTE CARDIOPULMONARY DISEASE. RIGHT MASTECTOMY. EMPHYSEMA. Xr Chest Portable    Result Date: 6/5/2019  Portable chest radiograph History: Shortness of breath Technique: AP portable view of the chest obtained. Comparison: Chest radiographs from May 11, 2019 Findings: Atherosclerotic calcification of the thoracic aorta. The cardiomediastinal silhouette is within normal limits. No pneumothorax, pleural effusion, or focal consolidation. Lungs are hyperinflated. Coarsening of the pulmonary interstitium. Osseous structures of the thorax appear intact. Bilateral axillary surgical clips noted. No acute intrathoracic process. Findings compatible with COPD. Us Dup Lower Extremity Right Taco    Result Date: 5/13/2019  EXAMINATION: US DUP LOWER EXTREMITY RIGHT TACO CLINICAL HISTORY:  swelling COMPARISONS: None available. FINDINGS: Right lower extremity venous duplex sonogram was performed. Deep veins of the right lower extremity are compressible. Normal deep venous blood flow is documented with color Doppler images and pulsed Doppler waveform images. CONCLUSION: NO DEEP VEIN THROMBOSIS. Ir Ultrasound Guidance Vascular Access    Result Date: 6/6/2019  NO FILMS. NO DICTATION.      Us Dup Lower Extremities Bilateral Venous    Result Date: 5/17/2019  COMPARISON: Right leg, May 13, 2019. HISTORY: M79.606 Pain and swelling of lower extremity, unspecified laterality ICD10 TECHNIQUE: US DUP LOWER EXTREMITIES BILATERAL VENOUS FINDINGS: Ultrasound of the right and left lower extremities was performed from the groin to the ankle. Bilaterally the common femoral, femoral and popliteal veins are augmentable and compressible. The Doppler images show blood flow. The calf veins are limited by the edema. Blood flow is  demonstrated. No deep venous thrombosis seen in the right or left lower extremities. Ir Midline Cath    Result Date: 6/6/2019  NO FILMS. NO DICTATION. EKG: TRACING REVIEWED    RECOMMENDATIONS    Risk Management:  routine:  no special precautions necessary    Medications:  Celexa 10 mg started  Not use Xanax  Discussed with the treating physician/ team about the patient and treatment plan  Reviewed the chart    Discussed with the patient risk, benefit, alternative and common side effects for the  proposed medication treatment. Patient is consenting to the treatment. Thanks for the consult. Please call me if needed.     Electronically signed by Antonietta Torrez MD on 6/11/2019 at 2:05 PM

## 2019-06-11 NOTE — PROGRESS NOTES
IV heplock removed. Catheter tip intact. Pt discharge instructions given. Pt verbalized understanding with no further questions. Pt to main lobby via w/c with belongings and d/c instructions. Electronically signed by Fannie Lemus.  Bethany Crabtree on 6/11/2019 at 3:22 PM

## 2019-06-11 NOTE — PROGRESS NOTES
06/09/19  0714 06/10/19  0708 06/11/19  0638    137 139   K 4.1 4.3 4.3   CL 96 99 98   CO2 29 28 29   BUN 14 15 18   CREATININE 0.71 0.66 0.76   CALCIUM 8.7 8.6 8.7     No results for input(s): AST, ALT, BILIDIR, BILITOT, ALKPHOS in the last 72 hours. No results for input(s): INR in the last 72 hours. No results for input(s): Garcia Barn in the last 72 hours. Urinalysis:      Lab Results   Component Value Date    NITRU Negative 01/18/2019    BLOODU Negative 01/18/2019    SPECGRAV 1.008 01/18/2019    GLUCOSEU Negative 01/18/2019       Radiology:  IR MIDLINE CATH         IR ULTRASOUND GUIDANCE VASCULAR ACCESS         XR CHEST STANDARD (2 VW)   Final Result   NO ACUTE CARDIOPULMONARY DISEASE. RIGHT MASTECTOMY. EMPHYSEMA.               Assessment/Plan:    COPD exacerbation  - due to acute bronchitis   - sputum culture grew Pseudomonas, treated with Zosyn   - on Cipro and prednisone  - clinically improved  - Ok to d/c home on prednisone taper per pulmonology  - continue PO cipro on d/c per ID    History of mediastinal lymphadenopathy  - patient declined repeat CT and any further workup    Large hiatal hernia with dilated esophagus  - that could be causing recurrent GERD and above exacerbations per pulmonology  - continue PPI therapy  - follow up with GI as outpatient    Hearing loss  - avoid Zithromax per ID  - needs ENT eval as outpatient    Hyponatremia / hyperkalemia  - resolved    Anxiety with panic attacks  - started on Celexa, avoid xanax per psychiatry      Disposition - home today          Electronically signed by Taina Wolf MD on 6/11/2019 at 12:37 PM

## 2019-06-11 NOTE — DISCHARGE SUMMARY
Hospital Medicine Discharge Summary    Domingo Bailon  :  1945  MRN:  [id number]    Admit date:  2019  Discharge date:  2019    Admitting Physician:  Al Waterman MD  Primary Care Physician:  ROSALIND Swanson - CNP      Discharge Diagnoses:    Principal Problem:    Acute on chronic respiratory failure with hypoxia [facility])  Active Problems:    Hypothyroidism    Anxiety and depression    CAD (coronary artery disease)- Dr. Anni Persaud    Breast cancer [facility])    Acute exacerbation of chronic obstructive pulmonary disease (COPD) ([facility] [place] [address]    Chronic respiratory failure with hypoxia ([facility] [place] [address]    Breast cancer metastasized to axillary lymph node, right (HCC)    Pseudomonas aeruginosa colonization    Conductive hearing loss, tympanic membrane    Cerumen debris on tympanic membrane of right ear    Tympanosclerosis involving tympanic membrane only    Rhinorrhea  Resolved Problems:    * No resolved hospital problems.  *      Hospital Course:   Domingo Bailon is a 66 y.o. female that was admitted and treated at [facility] for the following medical issues:     COPD exacerbation  - due to acute bronchitis   - sputum culture grew Pseudomonas, treated with Zosyn   - on Cipro and prednisone  - clinically improved  - Ok to d/c home on prednisone taper per pulmonology  - continued PO cipro on d/c per ID    History of mediastinal lymphadenopathy  - patient declined repeat CT and any further workup    Large hiatal hernia with dilated esophagus  - that could be causing recurrent GERD and above exacerbations per pulmonology  - continue PPI therapy  - follow up with GI as outpatient    Hearing loss  - avoid Zithromax per ID  - needs ENT eval as outpatient    Hyponatremia / hyperkalemia  - resolved    Anxiety with panic attacks  - started on Celexa, avoid xanax per psychiatry      Disposition - home    Patient was seen by the following consultants while admitted to [facility]: Consults:  IP CONSULT TO HOSPITALIST  IP CONSULT TO PULMONOLOGY  IP CONSULT TO DIETITIAN  IP CONSULT TO PALLIATIVE CARE  IP CONSULT TO SOCIAL WORK  IP CONSULT TO PALLIATIVE CARE  IP CONSULT TO INFECTIOUS DISEASES  IP CONSULT TO PSYCHIATRY    Significant Diagnostic Studies:    Xr Chest Standard (2 Vw)    Result Date: 6/6/2019  EXAMINATION: XR CHEST (2 VW) CLINICAL HISTORY: SHORTNESS OF BREATH COMPARISONS: JUNE 5, 2019 FINDINGS: Osteopenia. Right breast absent. Surgical clips right axilla. Additional surgical lips left upper chest anteriorly. Cardiopericardial silhouette normal. Aorta calcified and tortuous. Lungs clear and hyperexpanded. Diaphragms flattened. NO ACUTE CARDIOPULMONARY DISEASE. RIGHT MASTECTOMY. EMPHYSEMA. Ir Ultrasound Guidance Vascular Access    Result Date: 6/6/2019  NO FILMS. NO DICTATION. Ir Midline Cath    Result Date: 6/6/2019  NO FILMS. NO DICTATION.        Discharge Medications:       Bernardo Robbins   [facility] Medication Instructions [id number]    Printed on:06/11/19 8714   Medication Information                      albuterol sulfate HFA (VENTOLIN HFA) 108 (90 Base) MCG/ACT inhaler  Inhale 2 puffs into the lungs every 6 hours as needed for Wheezing             carbamide peroxide (DEBROX) 6.5 % otic solution  Place 5 drops into both ears 2 times daily             ciprofloxacin (CIPRO) 500 MG tablet  Take 1 tablet by mouth every 12 hours for 3 days             citalopram (CELEXA) 10 MG tablet  Take 1 tablet by mouth daily             guaiFENesin (MUCINEX) 600 MG extended release tablet  Take 1,200 mg by mouth 2 times daily             ipratropium-albuterol (DUONEB) 0.5-2.5 (3) MG/3ML SOLN nebulizer solution  Inhale 3 mLs into the lungs every 4 hours             levothyroxine (SYNTHROID) 75 MCG tablet  Take 1 tablet by mouth Daily             lovastatin (MEVACOR) 20 MG tablet  TAKE 1 TABLET NIGHTLY             metoprolol tartrate (LOPRESSOR) 50 MG tablet  Take 50 mg by mouth 2 times daily             omeprazole (PRILOSEC) 20 MG delayed release capsule  Take 20 mg by mouth daily as needed (heartburn)             OXYGEN  Oxygen on 2L with exertion and Nocturnal             predniSONE (DELTASONE) 20 MG tablet  Take 2 tablets by mouth daily for 5 days             Respiratory Therapy Supplies (NEBULIZER/TUBING/MOUTHPIECE) KIT  1 kit by Does not apply route daily as needed (wheezing)             SPIRIVA HANDIHALER 18 MCG inhalation capsule  INHALE THE CONTENTS OF 1 CAPSULE DAILY             SYMBICORT 160-4.5 MCG/ACT AERO  USE 2 INHALATIONS TWICE A DAY             traZODone (DESYREL) 150 MG tablet  TAKE 1 TABLET NIGHTLY             vitamin D (ERGOCALCIFEROL) 38107 units CAPS capsule  TAKE 1 CAPSULE ONCE A WEEK                 Disposition:   Discharged to Home. Any [facility] needs that were indicated and/or required as been addressed and set up by Social Work. Condition at discharge: Pt was medically stable at the time of discharge. Significant improvement in clinical condition compared to initial condition at presentation to hospital    Activity: activity as tolerated, fall precautions. Total time taken for discharging this patient: 40 minutes. Greater than 70% of time was spent focused exclusively on this patient. Time was taken to review chart, discuss plans with consultants, reconciling medications, discussing plan answering questions with patient.      SignedAlan Foster  6/11/2019, 2:22 PM  ----------------------------------------------------------------------------------------------------------------------    Latosha Dimas,     Please return to ER or call 911 if you develop any significant signs or symptoms.     I may not have addressed all of your medical illnesses or the abnormal blood work or imaging therefore please ask your PCP, Gracie Boxer Riedy, ROSALIND - CNP ,  to obtain [facility] record to follow up on all of the abnormal labs, imaging and findings that I have and have not addressed during your hospitalization.      Discharging you from the hospital does not mean that your medical care ends here and now. You may still need additional work up, investigation, monitoring, and treatment to be handled from this point on by outside providers including your PCP, ROSALIND Angulo CNP , Specialists and other healthcare providers.      Please review your list of discharge medications prior to resuming medications you might still have at home, as the medications you need to be taking, dosages or how often you must take them may have changed. For medication questions, contact your retail pharmacy and your PCP, Gracie Boxer Riedy, APRN - CNP .     ** I STRONGLY RECOMMEND that you follow up with ROSALIND Angulo CNP within 3 to 5 days for a post hospitalization evaluation. This specific office visit is covered by your insurance, and is not the same as your annual doctor visit/ check up. This office visit is important, as it may prevent need for repeat and/or future hospitalizations. **    Your medical team at [facility] ([facility]) appreciates the opportunity to work with you to get well!     Sincerely,  Magnus Allen

## 2019-06-12 ENCOUNTER — CARE COORDINATION (OUTPATIENT)
Dept: CASE MANAGEMENT | Age: 74
End: 2019-06-12

## 2019-06-12 DIAGNOSIS — J44.1 COPD EXACERBATION (HCC): Primary | ICD-10-CM

## 2019-06-12 PROCEDURE — 1111F DSCHRG MED/CURRENT MED MERGE: CPT | Performed by: NURSE PRACTITIONER

## 2019-06-12 NOTE — CARE COORDINATION
[facility] Transitions Initial Follow Up Call    Call within 2 business days of discharge: Yes    Patient: Anai Swenson Patient : 1945   MRN: <[id number]>  Reason for Admission: Acute on chronic respiratory failure with hypoxia  Discharge Date: 19 RARS: Readmission Risk Score: 34      Last Discharge [facility]       Complaint Diagnosis Description Type Department Provider    19 Shortness of Breath COPD exacerbation ([address] . .. ED to Hosp-Admission (Discharged) (ADMITTED) Anni Meyers MD; Gerber Brower. .. Facility: [facility]    1st attempt to reach patient for Care Transitions. [facility] requesting return call. Contact information provided. [phone]    Attempted to call Hayde Butler and her boyfriend Amy Maria. No answer. Voicemail left and contact information given.   Pharmacy called and pt picked up all medications prescribed, except the Debrox, because according pharm, it was  cheaper to buy OTC      Follow Up  Future Appointments   Date Time Provider Darlene Walsh   2019  3:00 PM [address]   2019  3:00 PM Nery Loja MD [address]   10/1/2019  3:45 PM Nery Loja MD . [address] [facility]
filled?:  Yes  Have you been contacted by a [address]?:  No  Have you scheduled your follow up appointment?:  Yes  How are you going to get to your appointment?:  Car - family or friend to transport  Were you discharged with any Home Care or Post Acute Services:  No  Patient DME:  Mirna cane  Patient Home Equipment:  Nebulizer, Oxygen  Do you have support at home?:  Child  Do you feel like you have everything you need to keep you well at home?:  Yes  Are you an active caregiver in your home?:  No  Care Transitions Interventions         Follow Up  Future Appointments   Date Time Provider Darlene Walsh   6/18/2019  3:00 PM [id number] [place] [address] [address]   6/19/2019  3:00 PM Tha Steinberg MD [address]   10/1/2019  3:45 PM Tha Steinberg MD . [address], [address] [facility]

## 2019-06-13 ENCOUNTER — TELEPHONE (OUTPATIENT)
Dept: FAMILY MEDICINE CLINIC | Age: 74
End: 2019-06-13

## 2019-06-13 ENCOUNTER — HOSPITAL ENCOUNTER (EMERGENCY)
Age: 74
Discharge: HOME | End: 2019-06-13
Attending: EMERGENCY MEDICINE
Payer: MEDICARE

## 2019-06-13 ENCOUNTER — CARE COORDINATION (OUTPATIENT)
Dept: CASE MANAGEMENT | Age: 74
End: 2019-06-13

## 2019-06-13 ENCOUNTER — TELEPHONE (OUTPATIENT)
Dept: OTHER | Facility: CLINIC | Age: 74
End: 2019-06-13

## 2019-06-13 VITALS
HEART RATE: 93 BPM | TEMPERATURE: 98.1 F | OXYGEN SATURATION: 97 % | WEIGHT: 140 LBS | RESPIRATION RATE: 18 BRPM | HEIGHT: 63 IN | DIASTOLIC BLOOD PRESSURE: 72 MMHG | SYSTOLIC BLOOD PRESSURE: 155 MMHG | BODY MASS INDEX: 24.8 KG/M2

## 2019-06-13 DIAGNOSIS — J96.10 CHRONIC RESPIRATORY FAILURE, UNSPECIFIED WHETHER WITH HYPOXIA OR HYPERCAPNIA (HCC): Primary | ICD-10-CM

## 2019-06-13 DIAGNOSIS — F41.9 ANXIETY AND DEPRESSION: Chronic | ICD-10-CM

## 2019-06-13 DIAGNOSIS — F32.A ANXIETY AND DEPRESSION: Chronic | ICD-10-CM

## 2019-06-13 PROCEDURE — 99284 EMERGENCY DEPT VISIT MOD MDM: CPT

## 2019-06-13 PROCEDURE — 6370000000 HC RX 637 (ALT 250 FOR IP): Performed by: EMERGENCY MEDICINE

## 2019-06-13 PROCEDURE — 94640 AIRWAY INHALATION TREATMENT: CPT

## 2019-06-13 RX ORDER — ALPRAZOLAM 0.5 MG/1
0.5 TABLET ORAL ONCE
Status: DISCONTINUED | OUTPATIENT
Start: 2019-06-13 | End: 2019-06-13

## 2019-06-13 RX ORDER — HYDROXYZINE HYDROCHLORIDE 25 MG/1
50 TABLET, FILM COATED ORAL ONCE
Status: COMPLETED | OUTPATIENT
Start: 2019-06-13 | End: 2019-06-13

## 2019-06-13 RX ORDER — IPRATROPIUM BROMIDE AND ALBUTEROL SULFATE 2.5; .5 MG/3ML; MG/3ML
1 SOLUTION RESPIRATORY (INHALATION) PRN
Status: DISCONTINUED | OUTPATIENT
Start: 2019-06-13 | End: 2019-06-13 | Stop reason: HOSPADM

## 2019-06-13 RX ORDER — ALPRAZOLAM 0.25 MG/1
0.25 TABLET ORAL 3 TIMES DAILY PRN
Qty: 10 TABLET | Refills: 0 | Status: SHIPPED | OUTPATIENT
Start: 2019-06-13 | End: 2019-06-16

## 2019-06-13 RX ORDER — HYDROXYZINE PAMOATE 25 MG/1
50 CAPSULE ORAL ONCE
Status: DISCONTINUED | OUTPATIENT
Start: 2019-06-13 | End: 2019-06-13

## 2019-06-13 RX ADMIN — IPRATROPIUM BROMIDE AND ALBUTEROL SULFATE 1 AMPULE: .5; 3 SOLUTION RESPIRATORY (INHALATION) at 17:36

## 2019-06-13 RX ADMIN — HYDROXYZINE HYDROCHLORIDE 50 MG: 25 TABLET, FILM COATED ORAL at 17:27

## 2019-06-13 ASSESSMENT — ENCOUNTER SYMPTOMS
EYE PAIN: 0
CHEST TIGHTNESS: 0
SORE THROAT: 0
VOMITING: 0
NAUSEA: 0
ABDOMINAL PAIN: 0
SHORTNESS OF BREATH: 1

## 2019-06-13 NOTE — TELEPHONE ENCOUNTER
Is the patient going to be following with psychiatry as outpatient? I am unfortunately not able to prescribe additional xanax without seeing her due to change in law. The celexa would likely help her anxiety more than the xanax. We can set her up an appointment with me in the future to discuss medication but for now I would advise that she start the celexa. Does she have an Rx for this?

## 2019-06-13 NOTE — TELEPHONE ENCOUNTER
Called Leonides Tariq back and informed her that Pam Landaverde CNP would like for her to start the Clexa. Leonides Tariq from transition of care informed me that the patient does have a prescription for this medication. I also contacted the patient and informed her of the decision that Yi Simons made for her medication.  Patient was made aware and while on the phone also made an appointment with Yi Simons for July 24,2019 at 1:40PM.

## 2019-06-13 NOTE — ED NOTES
Patient given DC instructions, education, and scripts. Patient up with steady gait at time of DC to wheelchair. Patient to FU with pcp and pulm doctor. Denies having any questions.       Dick Soriano RN  06/13/19 4811

## 2019-06-13 NOTE — CARE COORDINATION
[facility]    2019    Patient: Latosha Dimas  Patient : 1945   MRN: <[id number]>  Reason for Admission: Acute on chronic respiratory failure. Discharge Date: 19 RARS: Readmission Risk Score: 34         Spoke with: Jefferson Avendano [facility] office called. Talked with Marialuisa Wilson at the office. Requested that he message Zena Zepeda, and tell her that Vaishnavi Beltran is requesting a prescription for Xanax. Explained that the pt was put on Celexa by psychiatrist in the hospital for her anxiety and panic attacks  He also discontinued her Xanax. When she returned home, she looked up the Celexa and noted that it was an antidepressant. She does not want to take it and would like to resume her Xanax prn. Vaishnavi Beltran stated that her original prescription for Xanax was written by Sergio Sat. Marialuisa Wilson stated that he would message Zena Zepeda and my contact information given for any further questions. Vaishnavi Beltran was called and she was informed that a call was made to the office and message was being sent about her request.  Vaishnavi Beltran was grateful. Informed her, she will be updated when information is available. At 1043 am received a call from Marialuisa Wilson from [facility] office. He stated that Zena Zepeda can not prescribe Xanax. Vaishnavi Beltran would need to come in for an appointment. Zena Zepeda also advised through Marialuisa Wilson, that Vaishnavi Beltran would received better benefits from the Celexa than the Xanax. Marialuisa Wilson stated he would call Vaishnavi Beltran and update her on Cely's response. Care Transitions Subsequent and Final Call    Subsequent and Final Calls  Do you have any ongoing symptoms?:  Yes  Onset of Patient-reported symptoms:   In the past 7 days  Patient-reported symptoms:  Shortness of Breath, Other  Interventions for patient-reported symptoms:  Notified PCP/Physician  Have your medications changed?:  No  Do you have any questions related to your medications?:  No  Do you currently have any active services?:

## 2019-06-13 NOTE — ED PROVIDER NOTES
[address] [facility] ED  eMERGENCY dEPARTMENTeNCOUnter      Pt Name: Sonia Jessica  MRN: [id number]  Ikegfkarina 1945  Date ofevaluation: 6/13/2019  Provider: Maximiliano Hemphill DO    CHIEF COMPLAINT       Chief Complaint   Patient presents with    Shortness of Breath         HISTORY OF PRESENT ILLNESS   (Location/Symptom, Timing/Onset,Context/Setting, Quality, Duration, Modifying Factors, Severity)  Note limiting factors. Sonia Jessica is a 66 y.o. female who presents to the emergency department . Patient comes in with shortness of breath. Patient has chronic respiratory failure and COPD. She just left the hospital 2 days ago. Patient admits that she gets extremely anxious because she cannot get a deep breath. She admits that nothing is really changed. She was on Xanax which seemed to help her with her anxiety regarding her breathing. She knows that her COPD is never going to get better. In the last several months she has severe dyspnea on exertion and she knows that that is not getting get better either. She is on oxygen 24/7. He does not want palliative care    HPI    NursingNotes were reviewed. REVIEW OF SYSTEMS    (2-9 systems for level 4, 10 or more for level 5)     Review of Systems   Constitutional: Negative for activity change, appetite change, fatigue and fever. HENT: Negative for congestion and sore throat. Eyes: Negative for pain and visual disturbance. Respiratory: Positive for shortness of breath. Negative for chest tightness. Cardiovascular: Negative for chest pain. Gastrointestinal: Negative for abdominal pain, nausea and vomiting. Endocrine: Negative for polydipsia. Genitourinary: Negative for flank pain and urgency. Musculoskeletal: Negative for gait problem and neck stiffness. Skin: Negative for rash. Neurological: Negative for weakness, light-headedness and headaches. Psychiatric/Behavioral: Negative for confusion and sleep disturbance.  The patient is nervous/anxious. Except as noted above the remainder of the review of systems was reviewed and negative.        PAST MEDICAL HISTORY     Past Medical History:   Diagnosis Date    Anxiety     Anxiety and depression     CAD (coronary artery disease)     Cancer [facility] [address] 3/2014    Invasive ductal cancer left breast, spread to 5 lymph nodes    Carotid artery stenosis and occlusion     COPD (chronic obstructive pulmonary disease) ([facility] [address]     Depression 1/15/2018    GERD (gastroesophageal reflux disease)     Headache(784.0)     Hyperlipidemia     Hypertension     Hypothyroidism     Insomnia     Osteoarthritis     RBBB 10/15/2014    Right carotid bruit 5/26/2015    S/P cardiac catheterization 7/13/2016         SURGICALHISTORY       Past Surgical History:   Procedure Laterality Date    BREAST BIOPSY Right 11/8/2016    US biopsy    BREAST SURGERY Left 2/26/14    U/S Guided core bx of the left breast    BREAST SURGERY Right 3/20/14    U/S of the lateral right breast    BREAST SURGERY Left 3/27/14    U/S Guided Left breast lumpectomy and left snb    OTHER SURGICAL HISTORY  4/11/14    Placement drain, left axillary seroma    LA MASTECTOMY, SIMPLE, COMPLETE Right 9/6/2018    R modified radical mastectomy         CURRENT MEDICATIONS       Previous Medications    ALBUTEROL SULFATE HFA (VENTOLIN HFA) 108 (90 BASE) MCG/ACT INHALER    Inhale 2 puffs into the lungs every 6 hours as needed for Wheezing    CARBAMIDE PEROXIDE (DEBROX) 6.5 % OTIC SOLUTION    Place 5 drops into both ears 2 times daily    CIPROFLOXACIN (CIPRO) 500 MG TABLET    Take 1 tablet by mouth every 12 hours for 3 days    CITALOPRAM (CELEXA) 10 MG TABLET    Take 1 tablet by mouth daily    GUAIFENESIN (MUCINEX) 600 MG EXTENDED RELEASE TABLET    Take 1,200 mg by mouth 2 times daily    IPRATROPIUM-ALBUTEROL (DUONEB) 0.5-2.5 (3) MG/3ML SOLN NEBULIZER SOLUTION    Inhale 3 mLs into the lungs every 4 hours    LEVOTHYROXINE (SYNTHROID) 75 MCG TABLET Talks on phone: None     Gets together: None     Attends [employer] service: None     Active member of club or organization: None     Attends meetings of clubs or organizations: None     Relationship status: None    Intimate partner violence:     Fear of current or ex partner: None     Emotionally abused: None     Physically abused: None     Forced sexual activity: None   Other Topics Concern    None   Social History Narrative    None       SCREENINGS              PHYSICAL EXAM    (up to 7 for level 4, 8 or more for level 5)     ED Triage Vitals [06/13/19 1654]   BP Temp Temp Source Pulse Resp SpO2 Height Weight   (!) 156/89 98.1 °F (36.7 °C) Oral 86 22 96 % 5' 3\" (1.6 m) 140 lb (63.5 kg)       Physical Exam   Constitutional: She is oriented to person, place, and time. She appears well-developed and well-nourished. No distress. HENT:   Head: Normocephalic and atraumatic. Right Ear: External ear normal.   Left Ear: External ear normal.   Mouth/Throat: No oropharyngeal exudate. Eyes: Pupils are equal, round, and reactive to light. Conjunctivae are normal.   Neck: Normal range of motion. Neck supple. No JVD present. No tracheal deviation present. No thyromegaly present. Cardiovascular: Normal rate and normal heart sounds. No murmur heard. Pulmonary/Chest: Effort normal. No respiratory distress. She has wheezes. Abdominal: Soft. Bowel sounds are normal. There is no tenderness. There is no guarding. Musculoskeletal: Normal range of motion. She exhibits no edema. Neurological: She is alert and oriented to person, place, and time. No cranial nerve deficit. Skin: Skin is warm and dry. No rash noted. She is not diaphoretic. Psychiatric: Her behavior is normal.   Anxiety   Nursing note and vitals reviewed.       DIAGNOSTIC RESULTS     EKG: All EKG's are interpreted by the Emergency Department Physician who either signs or Co-signsthis chart in the absence of a cardiologist.        RADIOLOGY:   Freddie Cid filmimages such as CT, Ultrasound and MRI are read by the radiologist. Plain radiographic images are visualized and preliminarily interpreted by the emergency physician with the below findings:        Interpretation per the Radiologist below, if available at the time ofthis note:    No orders to display         ED BEDSIDE ULTRASOUND:   Performed by ED Physician - none    LABS:  Labs Reviewed - No data to display    All other labs were within normal range or not returned as of this dictation. EMERGENCY DEPARTMENT COURSE and DIFFERENTIAL DIAGNOSIS/MDM:   Vitals:    Vitals:    06/13/19 1654 06/13/19 1715 06/13/19 1736   BP: (!) 156/89 (!) 155/72    Pulse: 86 93    Resp: 22 24 18   Temp: 98.1 °F (36.7 °C)     TempSrc: Oral     SpO2: 96% 96% 97%   Weight: 140 lb (63.5 kg)     Height: 5' 3\" (1.6 m)         Patient has chronic respiratory failure. Her COPD is severe. I believe that the only reason she is here is because she gets so anxious in Due  to her condition. I will give her 10 Xanax pills to use judiciously. She does not want palliative care consult nor does she want to be admitted again. MDM      REASSESSMENT          CRITICAL CARE TIME   Total Critical Care time was 0 minutes, excluding separatelyreportable procedures. There was a high probability ofclinically significant/life threatening deterioration in the patient's condition which required my urgent intervention. CONSULTS:  None    PROCEDURES:  Unless otherwise noted below, none     Procedures    FINAL IMPRESSION      1. Chronic respiratory failure, unspecified whether with hypoxia or hypercapnia ([address]    2.  Anxiety and depression          DISPOSITION/PLAN   DISPOSITION Decision To Discharge 06/13/2019 06:07:34 PM      PATIENT REFERREDTO:  Joycelyn Caballero MD  [address] [facility]            DISCHARGEMEDICATIONS:  New Prescriptions    ALPRAZOLAM (XANAX) 0.25 MG TABLET    Take 1 tablet by mouth 3 times daily as needed for Anxiety for up to 30 days. Controlled Substances Monitoring:     RX Monitoring 1/7/2019   Attestation The Prescription Monitoring Report for this patient was reviewed today. Periodic Controlled Substance Monitoring Possible medication side effects, risk of tolerance/dependence & alternative treatments discussed. ;No signs of potential drug abuse or diversion identified.        (Please note that portions of this note were completed with a voice recognition program.  Efforts were made to edit the dictations but occasionally words are mis-transcribed.)    Екатерина Chance DO (electronically signed)  Attending Emergency Physician          Екатерина Chance DO  06/13/19 1248

## 2019-06-13 NOTE — CARE COORDINATION
SPOKE WITH PATIENT,  AT [address] WHEN ASKED WHAT DO YOU NEED, WHAT CAN  WE DO TO HELP YOU\". SHE RESPONDED THAT SHE NEEDS HELP WITH HER ANXIETY AND FEAR WHEN SHE FEELS LIKE SHE CAN'T BREATHE. PT REPORTS SHE USES HER OXYGEN AT HOME 24/7, DOES NOT HAVE TO NAVIGATE THE STAIRS, THAT HER DAUGHTER AND  ARE PRESENT AND HELP WHEN NEEDED. STATES \" I DON'T LIKE THE CELEXA THAT THE PSYCHIATRIST PRESCRIBED AND I DON'T WANT IT. THE XANAX HELPED THE MOST WITH MY ANXIETY. \" SHE REPORTS THAT SHE SOMETIMES CUT THEM IN HALF AND WOULD ONLY TAKE IT ONCE A DAY BUT NOT EVERY DAY. OFFERED PALLIATIVE CARE, TAUGHT PURPOSE OF PALLIATIVE. PT DECLINES. ALTHOUGH  INDICATED SHE SHOULD ACCEPT. OFFERED [facility] PALLIATIVE CARE AS EDUCATIONAL TOOL. SHE ACCEPTED [facility]. UPDATE TO DR. FREDERICK.

## 2019-06-16 ENCOUNTER — TELEPHONE (OUTPATIENT)
Dept: OTHER | Facility: CLINIC | Age: 74
End: 2019-06-16

## 2019-06-16 ENCOUNTER — HOSPITAL ENCOUNTER (EMERGENCY)
Age: 74
Discharge: HOME | End: 2019-06-16
Attending: EMERGENCY MEDICINE
Payer: MEDICARE

## 2019-06-16 VITALS
HEART RATE: 91 BPM | WEIGHT: 140 LBS | SYSTOLIC BLOOD PRESSURE: 178 MMHG | RESPIRATION RATE: 18 BRPM | OXYGEN SATURATION: 98 % | HEIGHT: 63 IN | DIASTOLIC BLOOD PRESSURE: 85 MMHG | TEMPERATURE: 99.2 F | BODY MASS INDEX: 24.8 KG/M2

## 2019-06-16 DIAGNOSIS — I10 ESSENTIAL HYPERTENSION: Primary | ICD-10-CM

## 2019-06-16 DIAGNOSIS — F41.1 ANXIETY STATE: ICD-10-CM

## 2019-06-16 PROCEDURE — 99283 EMERGENCY DEPT VISIT LOW MDM: CPT

## 2019-06-16 RX ORDER — ALPRAZOLAM 0.25 MG/1
0.25 TABLET ORAL DAILY PRN
Qty: 10 TABLET | Refills: 0 | Status: SHIPPED | OUTPATIENT
Start: 2019-06-16 | End: 2019-07-16

## 2019-06-16 ASSESSMENT — ENCOUNTER SYMPTOMS
BACK PAIN: 0
NAUSEA: 0
SHORTNESS OF BREATH: 0
DIARRHEA: 0
COUGH: 0
EYE PAIN: 0
PHOTOPHOBIA: 0
COLOR CHANGE: 0
ABDOMINAL DISTENTION: 0
SINUS PRESSURE: 0
SORE THROAT: 0
VOMITING: 0
APNEA: 0
CONSTIPATION: 0
ABDOMINAL PAIN: 0
WHEEZING: 0
RHINORRHEA: 0

## 2019-06-16 NOTE — ED TRIAGE NOTES
Pt c/o HTN which has become worse since last night. Pt has been checking her BP at home, running high and concerned. Pt brought by boyfriend, Pt denies any c/o pain. Pt has been taking her meds as prescribed , but states her head feels heavy and not feeling right. Pt is on home O2 3L 24/7. Pt recently d/c from [facility] for breathing issues, had also been seen in the ER also and given Xanax, Pt not taking xanax any more. Pt to ED 11 via w/c, remains on O2 3L NC. Also c/o tingling to hands and feet x several months.

## 2019-06-16 NOTE — ED PROVIDER NOTES
Neurological: Negative for dizziness, tremors, syncope, weakness, light-headedness and headaches. Psychiatric/Behavioral: Negative for agitation, confusion and hallucinations. The patient is nervous/anxious. All other systems reviewed and are negative. Except as noted above the remainder of the review of systems was reviewed and negative.        PAST MEDICAL HISTORY     Past Medical History:   Diagnosis Date    Anxiety     Anxiety and depression     CAD (coronary artery disease)     Cancer ([facility] [address] 3/2014    Invasive ductal cancer left breast, spread to 5 lymph nodes    Carotid artery stenosis and occlusion     [facility]     Depression 1/15/2018    GERD (gastroesophageal reflux disease)     Headache(784.0)     Hyperlipidemia     Hypertension     Hypothyroidism     Insomnia     Osteoarthritis     RBBB 10/15/2014    Right carotid bruit 5/26/2015    S/P cardiac catheterization 7/13/2016         SURGICAL HISTORY       Past Surgical History:   Procedure Laterality Date    BREAST BIOPSY Right 11/8/2016    US biopsy    BREAST SURGERY Left 2/26/14    U/S Guided core bx of the left breast    BREAST SURGERY Right 3/20/14    U/S of the lateral right breast    BREAST SURGERY Left 3/27/14    U/S Guided Left breast lumpectomy and left snb    OTHER SURGICAL HISTORY  4/11/14    Placement drain, left axillary seroma    PA MASTECTOMY, SIMPLE, COMPLETE Right 9/6/2018    R modified radical mastectomy         CURRENT MEDICATIONS       Previous Medications    ALBUTEROL SULFATE HFA (VENTOLIN HFA) 108 (90 BASE) MCG/ACT INHALER    Inhale 2 puffs into the lungs every 6 hours as needed for Wheezing    CITALOPRAM (CELEXA) 10 MG TABLET    Take 1 tablet by mouth daily    GUAIFENESIN (MUCINEX) 600 MG EXTENDED RELEASE TABLET    Take 1,200 mg by mouth 2 times daily    IPRATROPIUM-ALBUTEROL (DUONEB) 0.5-2.5 (3) MG/3ML SOLN NEBULIZER SOLUTION    Inhale 3 mLs into the lungs every together: None     Attends [employer] service: None     Active member of club or organization: None     Attends meetings of clubs or organizations: None     Relationship status: None    Intimate partner violence:     Fear of current or ex partner: None     Emotionally abused: None     Physically abused: None     Forced sexual activity: None   Other Topics Concern    None   Social History Narrative    None       SCREENINGS             PHYSICAL EXAM    (up to 7 for level 4, 8 or more for level 5)     ED Triage Vitals [06/16/19 1100]   BP Temp Temp Source Pulse Resp SpO2 Height Weight   (!) 178/85 99.2 °F (37.3 °C) Oral 91 18 98 % 5' 3\" (1.6 m) 140 lb (63.5 kg)       Physical Exam   Constitutional: She is oriented to person, place, and time. She appears well-developed and well-nourished. No distress. HENT:   Head: Normocephalic and atraumatic. Nose: Nose normal.   Mouth/Throat: Oropharynx is clear and moist. No oropharyngeal exudate. Eyes: Pupils are equal, round, and reactive to light. Conjunctivae and EOM are normal. Right eye exhibits no discharge. Left eye exhibits no discharge. No scleral icterus. Neck: Normal range of motion. Neck supple. No JVD present. No tracheal deviation present. No thyromegaly present. Cardiovascular: Normal rate, regular rhythm, normal heart sounds and intact distal pulses. Exam reveals no gallop and no friction rub. No murmur heard. Pulmonary/Chest: Effort normal and breath sounds normal. No stridor. No respiratory distress. She has no wheezes. She has no rales. She exhibits no tenderness. Abdominal: Soft. Bowel sounds are normal. She exhibits no distension and no mass. There is no tenderness. There is no rebound and no guarding. Musculoskeletal: Normal range of motion. She exhibits no edema, tenderness or deformity. Lymphadenopathy:     She has no cervical adenopathy. Neurological: She is alert and oriented to person, place, and time. She has normal reflexes.  She 1. Essential hypertension    2. Anxiety state          DISPOSITION/PLAN   DISPOSITION Decision To Discharge 06/16/2019 11:27:50 AM      PATIENT REFERRED TO:  ROSALIND Braxton - CNP  [address] [facility]  [phone]            DISCHARGE MEDICATIONS:  New Prescriptions    ALPRAZOLAM (XANAX) 0.25 MG TABLET    Take 1 tablet by mouth daily as needed for Anxiety for up to 30 days.           (Please note that portions of this note were completed with a voice recognitionprogram.  Efforts were made to edit the dictations but occasionally words are mis-transcribed.)    Ky Rosario MD (electronically signed)  Attending Emergency Physician          Ky Rosario MD  06/16/19 0591

## 2019-06-18 ENCOUNTER — OFFICE VISIT (OUTPATIENT)
Dept: FAMILY MEDICINE CLINIC | Age: 74
End: 2019-06-18
Payer: MEDICARE

## 2019-06-18 ENCOUNTER — CARE COORDINATION (OUTPATIENT)
Dept: CARE COORDINATION | Age: 74
End: 2019-06-18

## 2019-06-18 VITALS
SYSTOLIC BLOOD PRESSURE: 118 MMHG | DIASTOLIC BLOOD PRESSURE: 70 MMHG | HEART RATE: 101 BPM | WEIGHT: 134 LBS | OXYGEN SATURATION: 98 % | HEIGHT: 63 IN | TEMPERATURE: 99.3 F | RESPIRATION RATE: 16 BRPM | BODY MASS INDEX: 23.74 KG/M2

## 2019-06-18 DIAGNOSIS — H90.2: Primary | Chronic | ICD-10-CM

## 2019-06-18 DIAGNOSIS — H65.91 MIDDLE EAR EFFUSION, RIGHT: ICD-10-CM

## 2019-06-18 PROCEDURE — 1036F TOBACCO NON-USER: CPT | Performed by: FAMILY MEDICINE

## 2019-06-18 PROCEDURE — 4040F PNEUMOC VAC/ADMIN/RCVD: CPT | Performed by: FAMILY MEDICINE

## 2019-06-18 PROCEDURE — 1090F PRES/ABSN URINE INCON ASSESS: CPT | Performed by: FAMILY MEDICINE

## 2019-06-18 PROCEDURE — G8420 CALC BMI NORM PARAMETERS: HCPCS | Performed by: FAMILY MEDICINE

## 2019-06-18 PROCEDURE — G8399 PT W/DXA RESULTS DOCUMENT: HCPCS | Performed by: FAMILY MEDICINE

## 2019-06-18 PROCEDURE — 99213 OFFICE O/P EST LOW 20 MIN: CPT | Performed by: FAMILY MEDICINE

## 2019-06-18 PROCEDURE — G8427 DOCREV CUR MEDS BY ELIG CLIN: HCPCS | Performed by: FAMILY MEDICINE

## 2019-06-18 PROCEDURE — 1123F ACP DISCUSS/DSCN MKR DOCD: CPT | Performed by: FAMILY MEDICINE

## 2019-06-18 PROCEDURE — 1111F DSCHRG MED/CURRENT MED MERGE: CPT | Performed by: FAMILY MEDICINE

## 2019-06-18 PROCEDURE — G8598 ASA/ANTIPLAT THER USED: HCPCS | Performed by: FAMILY MEDICINE

## 2019-06-18 RX ORDER — LORATADINE 10 MG/1
10 TABLET ORAL DAILY
Qty: 30 TABLET | Refills: 0 | Status: SHIPPED | OUTPATIENT
Start: 2019-06-18 | End: 2020-03-12

## 2019-06-18 NOTE — CARE COORDINATION
I met with Codymarina Butler as a face to face to discuss care coordination. She has had multiple admissions regarding COPD over the last 6 months. I discussed my role and the process of care coordination. She again tells me that she feels that she does not need this service at this time. I have provided her with the COPD zone sheet and I have asked her to refer to this whenever she has any changes in symptoms.   I have also asked her to call me with any changes or if she feels that she may benefit from this program.

## 2019-06-18 NOTE — PROGRESS NOTES
Subjective:      Patient ID: Peri Seay is a 66 y.o. female who presents today for:     Chief Complaint   Patient presents with    Follow-Up from Hospital     Patient presents with follow up [facility] 06/4, 06/06, 06/13 and 06/16 for COPD exacerbation. Patient states she is feeling fatigued and short of breath    Ear Fullness     Patient sttes she has had bilateral ear fullness which progressively gotten worse when she was in the ER. She states she has been trying to get in with an ENT for her symptoms. She states the soonest appointment available is 07/24/2019. HPI  Patient is a only 53-year-old female who presents today after multiple ER visits within the month of June secondary to breathing difficulties and fatigue. He states that symptoms are stable and has not required any more oxygen than her normal 3 L. She is concerned about right ear fullness that has been chronic for over 6 months. She has a follow-up with an ENT but not until next month.  She does not want any additional antibiotics  Past Medical History:   Diagnosis Date    Anxiety     Anxiety and depression     CAD (coronary artery disease)     Cancer ([address] 3/2014    Invasive ductal cancer left breast, spread to 5 lymph nodes    Carotid artery stenosis and occlusion     COPD (chronic obstructive pulmonary disease) ([address]     Depression 1/15/2018    GERD (gastroesophageal reflux disease)     Headache(784.0)     Hyperlipidemia     Hypertension     Hypothyroidism     Insomnia     Osteoarthritis     RBBB 10/15/2014    Right carotid bruit 5/26/2015    S/P cardiac catheterization 7/13/2016     Past Surgical History:   Procedure Laterality Date    BREAST BIOPSY Right 11/8/2016     biopsy    BREAST SURGERY Left 2/26/14    U/S Guided core bx of the left breast    BREAST SURGERY Right 3/20/14    U/S of the lateral right breast    BREAST SURGERY Left 3/27/14    U/S Guided Left breast lumpectomy and left snb    OTHER SURGICAL days. 10 tablet 0    citalopram (CELEXA) 10 MG tablet Take 1 tablet by mouth daily 30 tablet 3    OXYGEN Oxygen on 2L with exertion and Nocturnal 1 Units 0    SYMBICORT 160-4.5 MCG/ACT AERO USE 2 INHALATIONS TWICE A DAY 10.2 g 0    albuterol sulfate HFA (VENTOLIN HFA) 108 (90 Base) MCG/ACT inhaler Inhale 2 puffs into the lungs every 6 hours as needed for Wheezing 3 Inhaler 3    ipratropium-albuterol (DUONEB) 0.5-2.5 (3) MG/3ML SOLN nebulizer solution Inhale 3 mLs into the lungs every 4 hours 360 mL 1    metoprolol tartrate (LOPRESSOR) 50 MG tablet Take 50 mg by mouth 2 times daily      omeprazole (PRILOSEC) 20 MG delayed release capsule Take 20 mg by mouth daily as needed (heartburn)      guaiFENesin (MUCINEX) 600 MG extended release tablet Take 1,200 mg by mouth 2 times daily      traZODone (DESYREL) 150 MG tablet TAKE 1 TABLET NIGHTLY 90 tablet 0    lovastatin (MEVACOR) 20 MG tablet TAKE 1 TABLET NIGHTLY 90 tablet 1    vitamin D (ERGOCALCIFEROL) 83218 units CAPS capsule TAKE 1 CAPSULE ONCE A WEEK 12 capsule 3    SPIRIVA HANDIHALER 18 MCG inhalation capsule INHALE THE CONTENTS OF 1 CAPSULE DAILY 90 capsule 0    levothyroxine (SYNTHROID) 75 MCG tablet Take 1 tablet by mouth Daily 90 tablet 1    Respiratory Therapy Supplies (NEBULIZER/TUBING/MOUTHPIECE) KIT 1 kit by Does not apply route daily as needed (wheezing) 1 kit 5     No current facility-administered medications on file prior to visit. Allergies:  Patient has no known allergies. Review of Systems   Constitutional: Negative for activity change, appetite change and fatigue. HENT: Positive for congestion and ear pain. Respiratory: Negative for apnea, cough, chest tightness and shortness of breath. Cardiovascular: Negative for chest pain, palpitations and leg swelling. Gastrointestinal: Negative for abdominal pain, blood in stool, constipation, diarrhea, nausea and vomiting. Musculoskeletal: Negative for arthralgias. Neurological: Negative for seizures and headaches. Psychiatric/Behavioral: Negative for hallucinations and suicidal ideas. Objective:   /70 (Site: Left Upper Arm, Position: Sitting, Cuff Size: Small Adult)   Pulse 101   Temp 99.3 °F (37.4 °C) (Temporal)   Resp 16   Ht 5' 3\" (1.6 m)   Wt 134 lb (60.8 kg)   LMP  (LMP Unknown)   SpO2 98%   BMI 23.74 kg/m²     Physical Exam   Constitutional: She is oriented to person, place, and time. She appears well-developed and well-nourished. No distress. HENT:   Head: Normocephalic and atraumatic. Right Ear: Tympanic membrane is bulging. A middle ear effusion is present. Left Ear: Hearing, tympanic membrane and ear canal normal.   Eyes: Pupils are equal, round, and reactive to light. Conjunctivae and EOM are normal.   Neck: Normal range of motion. Cardiovascular: Normal rate, regular rhythm and normal heart sounds. Exam reveals no gallop and no friction rub. No murmur heard. Pulmonary/Chest: Effort normal and breath sounds normal. No respiratory distress. She has no wheezes. She has no rales. She exhibits no tenderness. Neurological: She is alert and oriented to person, place, and time. Skin: Skin is warm and dry. She is not diaphoretic. Psychiatric: She has a normal mood and affect. Her behavior is normal. Judgment and thought content normal.   Nursing note and vitals reviewed. Assessment & Plan:     1. Conductive hearing loss, tympanic membrane  Will refer to ENT  - Amb External Referral To ENT    2. Middle ear effusion, right  Will refer to ENT  Patient declines antibiotics and will try to use antihistamines to act as a drying agent and will advise continuance of Flonase  - Amb External Referral To ENT  - loratadine (CLARITIN) 10 MG tablet; Take 1 tablet by mouth daily  Dispense: 30 tablet; Refill: 0      No follow-ups on file.     Luís Harrell MD

## 2019-06-19 ENCOUNTER — OFFICE VISIT (OUTPATIENT)
Dept: PULMONOLOGY | Age: 74
End: 2019-06-19
Payer: MEDICARE

## 2019-06-19 VITALS
TEMPERATURE: 98.5 F | BODY MASS INDEX: 23.53 KG/M2 | OXYGEN SATURATION: 98 % | SYSTOLIC BLOOD PRESSURE: 126 MMHG | HEART RATE: 104 BPM | HEIGHT: 63 IN | DIASTOLIC BLOOD PRESSURE: 82 MMHG | WEIGHT: 132.8 LBS

## 2019-06-19 DIAGNOSIS — R09.82 POST-NASAL DRIP: ICD-10-CM

## 2019-06-19 DIAGNOSIS — H91.93 BILATERAL HEARING LOSS, UNSPECIFIED HEARING LOSS TYPE: ICD-10-CM

## 2019-06-19 DIAGNOSIS — K21.9 GASTROESOPHAGEAL REFLUX DISEASE WITHOUT ESOPHAGITIS: ICD-10-CM

## 2019-06-19 DIAGNOSIS — R59.0 MEDIASTINAL LYMPHADENOPATHY: ICD-10-CM

## 2019-06-19 DIAGNOSIS — J96.11 CHRONIC RESPIRATORY FAILURE WITH HYPOXIA (HCC): ICD-10-CM

## 2019-06-19 DIAGNOSIS — J44.9 CHRONIC OBSTRUCTIVE PULMONARY DISEASE, UNSPECIFIED COPD TYPE (HCC): Primary | ICD-10-CM

## 2019-06-19 PROCEDURE — G8399 PT W/DXA RESULTS DOCUMENT: HCPCS | Performed by: INTERNAL MEDICINE

## 2019-06-19 PROCEDURE — 3023F SPIROM DOC REV: CPT | Performed by: INTERNAL MEDICINE

## 2019-06-19 PROCEDURE — 1036F TOBACCO NON-USER: CPT | Performed by: INTERNAL MEDICINE

## 2019-06-19 PROCEDURE — G8598 ASA/ANTIPLAT THER USED: HCPCS | Performed by: INTERNAL MEDICINE

## 2019-06-19 PROCEDURE — 99214 OFFICE O/P EST MOD 30 MIN: CPT | Performed by: INTERNAL MEDICINE

## 2019-06-19 PROCEDURE — 1111F DSCHRG MED/CURRENT MED MERGE: CPT | Performed by: INTERNAL MEDICINE

## 2019-06-19 PROCEDURE — 1123F ACP DISCUSS/DSCN MKR DOCD: CPT | Performed by: INTERNAL MEDICINE

## 2019-06-19 PROCEDURE — G8926 SPIRO NO PERF OR DOC: HCPCS | Performed by: INTERNAL MEDICINE

## 2019-06-19 PROCEDURE — 4040F PNEUMOC VAC/ADMIN/RCVD: CPT | Performed by: INTERNAL MEDICINE

## 2019-06-19 PROCEDURE — G8420 CALC BMI NORM PARAMETERS: HCPCS | Performed by: INTERNAL MEDICINE

## 2019-06-19 PROCEDURE — G8427 DOCREV CUR MEDS BY ELIG CLIN: HCPCS | Performed by: INTERNAL MEDICINE

## 2019-06-19 PROCEDURE — 1090F PRES/ABSN URINE INCON ASSESS: CPT | Performed by: INTERNAL MEDICINE

## 2019-06-19 RX ORDER — FLUTICASONE PROPIONATE 50 MCG
1 SPRAY, SUSPENSION (ML) NASAL DAILY
Qty: 1 BOTTLE | Refills: 2 | Status: SHIPPED | OUTPATIENT
Start: 2019-06-19 | End: 2020-03-12

## 2019-06-19 NOTE — PROGRESS NOTES
Subjective:     Trena Russell is a 66 y.o. female whocomplains today of:     Chief Complaint   Patient presents with   [address] [facility]       HPI  Patient presents for COPD     She mild SOB but in general better, her main concern is hearing loss, walking to bathroom makes her SOB but in general better than before  No chest pain   No Cough   No fever   No lower ext swelling   No nasal congestion but has post nasal drip   + weight loss , appetite is low , food does not taste good   No GERD       Allergies:  Patient has no known allergies. Past Medical History:   Diagnosis Date    Anxiety     Anxiety and depression     CAD (coronary artery disease)     Cancer ([facility] [address] 3/2014    Invasive ductal cancer left breast, spread to 5 lymph nodes    Carotid artery stenosis and occlusion     COPD (chronic obstructive pulmonary disease) ([facility] [address]     Depression 1/15/2018    GERD (gastroesophageal reflux disease)     Headache(784.0)     Hyperlipidemia     Hypertension     Hypothyroidism     Insomnia     Osteoarthritis     RBBB 10/15/2014    Right carotid bruit 5/26/2015    S/P cardiac catheterization 7/13/2016     Past Surgical History:   Procedure Laterality Date    BREAST BIOPSY Right 11/8/2016    US biopsy    BREAST SURGERY Left 2/26/14    U/S Guided core bx of the left breast    BREAST SURGERY Right 3/20/14    U/S of the lateral right breast    BREAST SURGERY Left 3/27/14    U/S Guided Left breast lumpectomy and left snb    OTHER SURGICAL HISTORY  4/11/14    Placement drain, left axillary seroma    AR MASTECTOMY, SIMPLE, COMPLETE Right 9/6/2018    R modified radical mastectomy     Family History   Problem Relation Age of Onset    Cancer Sister         breast    Cancer Maternal Uncle         pancreatic     Social History     Socioeconomic History    Marital status:       Spouse name: Not on file    Number of children: Not on file    Years of education: Not on file    Highest education Nocturnal 1 Units 0    SYMBICORT 160-4.5 MCG/ACT AERO USE 2 INHALATIONS TWICE A DAY 10.2 g 0    albuterol sulfate HFA (VENTOLIN HFA) 108 (90 Base) MCG/ACT inhaler Inhale 2 puffs into the lungs every 6 hours as needed for Wheezing 3 Inhaler 3    ipratropium-albuterol (DUONEB) 0.5-2.5 (3) MG/3ML SOLN nebulizer solution Inhale 3 mLs into the lungs every 4 hours 360 mL 1    metoprolol tartrate (LOPRESSOR) 50 MG tablet Take 50 mg by mouth 2 times daily      omeprazole (PRILOSEC) 20 MG delayed release capsule Take 20 mg by mouth daily as needed (heartburn)      guaiFENesin (MUCINEX) 600 MG extended release tablet Take 1,200 mg by mouth 2 times daily      traZODone (DESYREL) 150 MG tablet TAKE 1 TABLET NIGHTLY 90 tablet 0    lovastatin (MEVACOR) 20 MG tablet TAKE 1 TABLET NIGHTLY 90 tablet 1    vitamin D (ERGOCALCIFEROL) 28469 units CAPS capsule TAKE 1 CAPSULE ONCE A WEEK 12 capsule 3    SPIRIVA HANDIHALER 18 MCG inhalation capsule INHALE THE CONTENTS OF 1 CAPSULE DAILY 90 capsule 0    levothyroxine (SYNTHROID) 75 MCG tablet Take 1 tablet by mouth Daily 90 tablet 1    Respiratory Therapy Supplies (NEBULIZER/TUBING/MOUTHPIECE) KIT 1 kit by Does not apply route daily as needed (wheezing) 1 kit 5     No current facility-administered medications for this visit. Objective:     Vitals:    06/19/19 1508   BP: 126/82   Site: Left Upper Arm   Pulse: 104   Temp: 98.5 °F (36.9 °C)   TempSrc: Tympanic   SpO2: 98%   Weight: 132 lb 12.8 oz (60.2 kg)   Height: 5' 3\" (1.6 m)         Physical Exam   Constitutional: She is oriented to person, place, and time. She appears well-developed and well-nourished. No distress. HENT:   Head: Normocephalic and atraumatic. Eyes: Pupils are equal, round, and reactive to light. Conjunctivae are normal.   Neck: Normal range of motion. Neck supple. Cardiovascular: Normal rate and regular rhythm. Exam reveals no gallop and no friction rub. No murmur heard.   Pulmonary/Chest: Effort normal and breath sounds normal. No respiratory distress. She has no wheezes. She has no rales. She exhibits no tenderness. Abdominal: Soft. She exhibits no distension. There is no tenderness. There is no rebound. Musculoskeletal: She exhibits no edema or tenderness. Lymphadenopathy:     She has no cervical adenopathy. Neurological: She is alert and oriented to person, place, and time. Skin: Skin is warm and dry. She is not diaphoretic. No erythema. Psychiatric: She has a normal mood and affect. Judgment normal.     Imaging studies reviewed by me CT chest shows mediastinal lymphadenopathy  Lab results reviewed in chart  PFT FEV1 26%  ECHO: Normal ejection fraction    Assessment and Plan       Diagnosis Orders   1. Chronic obstructive pulmonary disease, unspecified COPD type ([address]     2. Mediastinal lymphadenopathy     3. Chronic respiratory failure with hypoxia (HCC)     4. Bilateral hearing loss, unspecified hearing loss type  AGUST[place] - Judy Alanis MD, Otolaryngology, [facility]   5. Post-nasal drip     6.  Gastroesophageal reflux disease without esophagitis       · Very severe COPD, symptoms are relatively controlled, continue same current inhalers, continue oxygen to maintain sats 88 to 90%, yearly flu shot, and patient declined pulmonary rehab referral at this point  · Mediastinal lymphadenopathy, patient declined bronchoscopy or repeat CAT scan for monitoring, I did discuss at length with her that this could mean underlying malignant etiology however she does not want any follow-up or work-up at this point  · Continue oxygen titrate for saturation 88 to 90%  · Refer patient to ENT to be seen as soon as possible  · Start Flonase for postnasal drip  · Continue omeprazole      Orders Placed This Encounter   Procedures   Jose Pace MD, Otolaryngology, [facility]     Referral Priority:   Urgent     Referral Type:   Eval and Treat     Referral Reason:   Specialty Services Required

## 2019-06-20 ASSESSMENT — ENCOUNTER SYMPTOMS
BLOOD IN STOOL: 0
CONSTIPATION: 0
CHEST TIGHTNESS: 0
DIARRHEA: 0
ABDOMINAL PAIN: 0
SHORTNESS OF BREATH: 0
COUGH: 0
VOMITING: 0
APNEA: 0
NAUSEA: 0

## 2019-06-24 ENCOUNTER — OFFICE VISIT (OUTPATIENT)
Dept: FAMILY MEDICINE CLINIC | Age: 74
End: 2019-06-24
Payer: MEDICARE

## 2019-06-24 VITALS
TEMPERATURE: 98 F | BODY MASS INDEX: 24.03 KG/M2 | HEIGHT: 63 IN | HEART RATE: 97 BPM | DIASTOLIC BLOOD PRESSURE: 88 MMHG | RESPIRATION RATE: 15 BRPM | WEIGHT: 135.6 LBS | OXYGEN SATURATION: 98 % | SYSTOLIC BLOOD PRESSURE: 138 MMHG

## 2019-06-24 DIAGNOSIS — H90.0 CONDUCTIVE HEARING LOSS, BILATERAL: ICD-10-CM

## 2019-06-24 DIAGNOSIS — H65.191 ACUTE MEE (MIDDLE EAR EFFUSION), RIGHT: ICD-10-CM

## 2019-06-24 DIAGNOSIS — F41.9 ANXIETY: ICD-10-CM

## 2019-06-24 DIAGNOSIS — J44.9 CHRONIC OBSTRUCTIVE PULMONARY DISEASE, UNSPECIFIED COPD TYPE (HCC): Primary | ICD-10-CM

## 2019-06-24 DIAGNOSIS — F33.1 MODERATE EPISODE OF RECURRENT MAJOR DEPRESSIVE DISORDER (HCC): ICD-10-CM

## 2019-06-24 DIAGNOSIS — E78.5 DYSLIPIDEMIA: ICD-10-CM

## 2019-06-24 DIAGNOSIS — H53.9 CHANGE IN VISION: ICD-10-CM

## 2019-06-24 DIAGNOSIS — E03.1 CONGENITAL HYPOTHYROIDISM WITHOUT GOITER: ICD-10-CM

## 2019-06-24 DIAGNOSIS — C77.3 BREAST CANCER METASTASIZED TO AXILLARY LYMPH NODE, RIGHT (HCC): ICD-10-CM

## 2019-06-24 DIAGNOSIS — C50.911 BREAST CANCER METASTASIZED TO AXILLARY LYMPH NODE, RIGHT (HCC): ICD-10-CM

## 2019-06-24 PROCEDURE — 1123F ACP DISCUSS/DSCN MKR DOCD: CPT | Performed by: NURSE PRACTITIONER

## 2019-06-24 PROCEDURE — 1111F DSCHRG MED/CURRENT MED MERGE: CPT | Performed by: NURSE PRACTITIONER

## 2019-06-24 PROCEDURE — 1036F TOBACCO NON-USER: CPT | Performed by: NURSE PRACTITIONER

## 2019-06-24 PROCEDURE — 3023F SPIROM DOC REV: CPT | Performed by: NURSE PRACTITIONER

## 2019-06-24 PROCEDURE — G8598 ASA/ANTIPLAT THER USED: HCPCS | Performed by: NURSE PRACTITIONER

## 2019-06-24 PROCEDURE — 4040F PNEUMOC VAC/ADMIN/RCVD: CPT | Performed by: NURSE PRACTITIONER

## 2019-06-24 PROCEDURE — G8420 CALC BMI NORM PARAMETERS: HCPCS | Performed by: NURSE PRACTITIONER

## 2019-06-24 PROCEDURE — G8926 SPIRO NO PERF OR DOC: HCPCS | Performed by: NURSE PRACTITIONER

## 2019-06-24 PROCEDURE — 99214 OFFICE O/P EST MOD 30 MIN: CPT | Performed by: NURSE PRACTITIONER

## 2019-06-24 PROCEDURE — G8427 DOCREV CUR MEDS BY ELIG CLIN: HCPCS | Performed by: NURSE PRACTITIONER

## 2019-06-24 PROCEDURE — G8399 PT W/DXA RESULTS DOCUMENT: HCPCS | Performed by: NURSE PRACTITIONER

## 2019-06-24 PROCEDURE — 1090F PRES/ABSN URINE INCON ASSESS: CPT | Performed by: NURSE PRACTITIONER

## 2019-06-24 RX ORDER — BUSPIRONE HYDROCHLORIDE 10 MG/1
10 TABLET ORAL 2 TIMES DAILY
Qty: 60 TABLET | Refills: 3 | Status: SHIPPED | OUTPATIENT
Start: 2019-06-24 | End: 2019-06-24 | Stop reason: SDUPTHER

## 2019-06-24 RX ORDER — BUSPIRONE HYDROCHLORIDE 10 MG/1
10 TABLET ORAL 3 TIMES DAILY
Qty: 90 TABLET | Refills: 3 | Status: SHIPPED | OUTPATIENT
Start: 2019-06-24 | End: 2019-07-24

## 2019-06-24 NOTE — PROGRESS NOTES
Chief Complaint   Patient presents with    Follow-Up from Hospital     Patient presents today with a hospital follow up from [facility]) in [facility] [place]. Patient states that she was admittied 06/16/2019 and was dscharged on 06/20/2019. Pateint states that she has someone transport her to the ER. Pateint states that she was having a hard time breathing due to her COPD. HPI: Fran Gonzalez is a 66 y.o. female presenting for follow-up of medical concerns since recent hospitalization. She states that she was seen in the emergency room multiple times in the month of June and admitted one time for COPD exacerbation. Has since established with a new pulmonologist.  She initially saw Dr. Clayton Harvey for hospital follow-up and is here today to discuss chronic health concerns. She has had hearing loss. Seeing ENT specialist this Friday. She states that she has been on multiple antibiotics for various reasons over recent months and prefers to avoid future antibiotics. She needs a referral to see a specific ear nose and throat specialist that she was able to get an appointment with on Friday. She states that she is concerned about her hearing loss as things seem to have come on very quickly. Sounds are muffled and people have to speak louder for her to hear. She is currently taking antihistamine and nasal spray. Has been having visual changes: She states that she has an appointment scheduled malarious today with an eye doctor for recent visual changes. Has been having spots in her vision and redness of both of her eyes. She actually has an appointment scheduled in about 45 minutes from now. States that she would like to quickly discuss her concerns that she can make it to her appointment on time today. Copd: difficult to tolerate activity. Wears oxygen at 3LPM [place]. She states that as long as she is sitting still she does not have a lot of difficulty breathing as long as she has oxygen on.   Continues to get significant shortness of breath with any activity. Humid weather seems to make it worse. She continues to take medication as ordered and states that it has been helping. She was told that there were some areas of concern in her chest and CT scan was advised but she declined. She states that she is live this long and has had a pretty good quality of life and does not wish to pursue diagnosis of anything that she would not want treatment for. Anxiety: She states that her anxiety has been very bad lately. She was given a very short-term supply of Xanax at her recent ER visit and states that she still has several tablets at home. Is wondering if she could take something for anxiety on a daily basis or even a few times per day. She has been told that Xanax can lead to addiction and dependency. She continues to take Celexa daily but does not feel that it is covering her panic symptoms. She has been worried a lot more about her health lately. Difficult to stop the negative thoughts at times. She denies any thoughts of self-harm or harm to others. Breast cancer: She states that she is no longer following with oncology. She declined chemotherapy. She admits to significant weight loss over recent weeks. No open areas or sores to her skin. No fever or chills. Hypothyroidism: The patient reports no heat or cold intolerance, fair energy levels and no hair loss or excessive skin dryness. ROS: The patient reports no nausea or vomiting. There is no heartburn or indigestion. There is no diarrhea or constipation. No black, bloody, mucusy or tarry stool noticed. The patient reports no bloating and no change in appetite. I have reviewed the following diagnostic data: recent visit note with pulmonology, Dr. Briana Aldrich and [facility]. EXAM:  Constitutional Blood pressure 138/88, pulse 97, temperature 98 °F (36.7 °C), temperature source Temporal, resp.  rate 15, height 5' 3\" (1.6 m), weight 135 lb 9.6 oz (61.5 kg), SpO2 98 %, not currently breastfeeding. .  She has a normal affect, no acute distress, appears well developed and well nourished. Tympanic membrane on right with small amount of cerumen to surface and thick white fluid effusion. Neck:  neck- supple, no mass, non-tender and no bruits  Lungs:  Normal expansion. Clear to auscultation. No rales, rhonchi, or wheezing. Diminished bases, No chest wall tenderness. Heart:  Heart sounds are normal.  Regular rate and rhythm without murmur, gallop or rub. Abdomen:  Soft, non-tender, normal bowel sounds. No bruits, organomegaly or masses. Extremities: Extremities warm to touch, pink, with trace- 1+ pitting edema. DIAGNOSIS:    Diagnosis Orders   1. Chronic obstructive pulmonary disease, unspecified COPD type ([facility] Dr. Sara Tellez     2. Dyslipidemia  CBC Auto Differential    Comprehensive Metabolic Panel    Lipid Panel   3. 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 Moderate episode of recurrent major depressive disorder ([facility]     5. Anxiety  busPIRone (BUSPAR) 10 MG tablet    DISCONTINUED: busPIRone (BUSPAR) 10 MG tablet   6. Conductive hearing loss, bilateral  Amb External Referral To ENT   7. Acute SHAQ (middle ear effusion), right  Amb External Referral To ENT   8. Breast cancer metastasized to axillary lymph node, right ([facility]     9. Change in vision         PLAN: Include orders in the DX section. Follow up: 2 months and as needed. Blood work one week prior as ordered. 1.  Breathing has been at baseline lately with no evidence of exacerbation. She continues to follow routinely with pulmonology. Has been compliant with use of inhalers. Continues to wear oxygen at 3 L nasal cannula continuously. 2.  3.  Recommend getting current lab work done prior to next visit. She states that she has been compliant with medication therapy. 4.  5.  Recommend trying BuSpar to be taken up to 3 times a day as needed for anxiety. Discussed recommendation to limit use of benzodiazepine due to risk of dependency/rebound anxiety symptoms. Continue SSRI. Rx given. 6.  7.  Referral printed for ear nose and throat specialist of choice. She was able to make an appointment for this Friday with Dr. Tashia Pedro. 8.  She is not currently being treated for breast cancer has metastasized to her lymph nodes. She has been told about atypical findings in the lung region with recommendation for CT scan but she declines. States that she would not get chemotherapy even if additional cancer was found. 9.  She will be seeing eye specialist later this afternoon and declines further exam in office today. Reassurance given. She declines referral to additional specialist at this time. Wishes for no further changes in her current healthcare regimen. Please note this report has been partially produced using speech recognition software and may cause contain errors related to that system including grammar, punctuation and spelling as well as words and phrases that may seem inappropriate. If there are questions or concerns please feel free to contact me to clarify.           Electronically signed by Brielle Beach, 2:34 PM 6/24/19

## 2019-07-15 ENCOUNTER — APPOINTMENT (OUTPATIENT)
Dept: GENERAL RADIOLOGY | Age: 74
End: 2019-07-15
Payer: MEDICARE

## 2019-07-15 ENCOUNTER — HOSPITAL ENCOUNTER (EMERGENCY)
Age: 74
Discharge: HOME | End: 2019-07-15
Attending: EMERGENCY MEDICINE
Payer: MEDICARE

## 2019-07-15 VITALS
HEART RATE: 93 BPM | DIASTOLIC BLOOD PRESSURE: 75 MMHG | OXYGEN SATURATION: 97 % | TEMPERATURE: 98.5 F | WEIGHT: 130 LBS | BODY MASS INDEX: 23.04 KG/M2 | HEIGHT: 63 IN | RESPIRATION RATE: 22 BRPM | SYSTOLIC BLOOD PRESSURE: 167 MMHG

## 2019-07-15 DIAGNOSIS — J44.1 COPD EXACERBATION (HCC): Primary | ICD-10-CM

## 2019-07-15 LAB
ALBUMIN SERPL-MCNC: 4.4 G/DL (ref 3.5–4.6)
ALP BLD-CCNC: 56 U/L (ref 40–130)
ALT SERPL-CCNC: 9 U/L (ref 0–33)
ANION GAP SERPL CALCULATED.3IONS-SCNC: 13 MEQ/L (ref 9–15)
AST SERPL-CCNC: 17 U/L (ref 0–35)
BASOPHILS ABSOLUTE: 0.1 K/UL (ref 0–0.2)
BASOPHILS RELATIVE PERCENT: 1.1 %
BILIRUB SERPL-MCNC: 0.4 MG/DL (ref 0.2–0.7)
BILIRUBIN URINE: NEGATIVE
BLOOD, URINE: NEGATIVE
BUN BLDV-MCNC: 10 MG/DL (ref 8–23)
CALCIUM SERPL-MCNC: 9.7 MG/DL (ref 8.5–9.9)
CHLORIDE BLD-SCNC: 100 MEQ/L (ref 95–107)
CLARITY: CLEAR
CO2: 27 MEQ/L (ref 20–31)
COLOR: YELLOW
CREAT SERPL-MCNC: 0.66 MG/DL (ref 0.5–0.9)
EKG ATRIAL RATE: 90 BPM
EKG P AXIS: 71 DEGREES
EKG P-R INTERVAL: 170 MS
EKG Q-T INTERVAL: 366 MS
EKG QRS DURATION: 126 MS
EKG QTC CALCULATION (BAZETT): 447 MS
EKG R AXIS: 87 DEGREES
EKG T AXIS: 70 DEGREES
EKG VENTRICULAR RATE: 90 BPM
EOSINOPHILS ABSOLUTE: 0.1 K/UL (ref 0–0.7)
EOSINOPHILS RELATIVE PERCENT: 1 %
GFR AFRICAN AMERICAN: >60
GFR NON-AFRICAN AMERICAN: >60
GLOBULIN: 2.4 G/DL (ref 2.3–3.5)
GLUCOSE BLD-MCNC: 101 MG/DL (ref 70–99)
GLUCOSE URINE: NEGATIVE MG/DL
HCT VFR BLD CALC: 36.2 % (ref 37–47)
HEMOGLOBIN: 11.9 G/DL (ref 12–16)
INR BLD: 0.9
KETONES, URINE: NEGATIVE MG/DL
LACTIC ACID: 1.9 MMOL/L (ref 0.5–2.2)
LEUKOCYTE ESTERASE, URINE: NEGATIVE
LYMPHOCYTES ABSOLUTE: 1.5 K/UL (ref 1–4.8)
LYMPHOCYTES RELATIVE PERCENT: 24.1 %
MAGNESIUM: 1.9 MG/DL (ref 1.7–2.4)
MCH RBC QN AUTO: 30 PG (ref 27–31.3)
MCHC RBC AUTO-ENTMCNC: 32.7 % (ref 33–37)
MCV RBC AUTO: 91.6 FL (ref 82–100)
MONOCYTES ABSOLUTE: 0.8 K/UL (ref 0.2–0.8)
MONOCYTES RELATIVE PERCENT: 12.3 %
NEUTROPHILS ABSOLUTE: 3.9 K/UL (ref 1.4–6.5)
NEUTROPHILS RELATIVE PERCENT: 61.5 %
NITRITE, URINE: NEGATIVE
PDW BLD-RTO: 14.6 % (ref 11.5–14.5)
PH UA: 7 (ref 5–9)
PLATELET # BLD: 301 K/UL (ref 130–400)
POTASSIUM SERPL-SCNC: 4.2 MEQ/L (ref 3.4–4.9)
PRO-BNP: 189 PG/ML
PROTEIN UA: NEGATIVE MG/DL
PROTHROMBIN TIME: 12 SEC (ref 12.3–14.9)
RBC # BLD: 3.96 M/UL (ref 4.2–5.4)
SODIUM BLD-SCNC: 140 MEQ/L (ref 135–144)
SPECIFIC GRAVITY UA: 1.01 (ref 1–1.03)
TOTAL PROTEIN: 6.8 G/DL (ref 6.3–8)
URINE REFLEX TO CULTURE: NORMAL
UROBILINOGEN, URINE: 0.2 E.U./DL
WBC # BLD: 6.3 K/UL (ref 4.8–10.8)

## 2019-07-15 PROCEDURE — 81003 URINALYSIS AUTO W/O SCOPE: CPT

## 2019-07-15 PROCEDURE — 6360000002 HC RX W HCPCS: Performed by: EMERGENCY MEDICINE

## 2019-07-15 PROCEDURE — 85025 COMPLETE CBC W/AUTO DIFF WBC: CPT

## 2019-07-15 PROCEDURE — 83880 ASSAY OF NATRIURETIC PEPTIDE: CPT

## 2019-07-15 PROCEDURE — 83735 ASSAY OF MAGNESIUM: CPT

## 2019-07-15 PROCEDURE — 87040 BLOOD CULTURE FOR BACTERIA: CPT

## 2019-07-15 PROCEDURE — 2580000003 HC RX 258: Performed by: EMERGENCY MEDICINE

## 2019-07-15 PROCEDURE — 85610 PROTHROMBIN TIME: CPT

## 2019-07-15 PROCEDURE — 36415 COLL VENOUS BLD VENIPUNCTURE: CPT

## 2019-07-15 PROCEDURE — 93225 XTRNL ECG REC<48 HRS REC: CPT

## 2019-07-15 PROCEDURE — 93005 ELECTROCARDIOGRAM TRACING: CPT | Performed by: EMERGENCY MEDICINE

## 2019-07-15 PROCEDURE — 80053 COMPREHEN METABOLIC PANEL: CPT

## 2019-07-15 PROCEDURE — 6370000000 HC RX 637 (ALT 250 FOR IP): Performed by: EMERGENCY MEDICINE

## 2019-07-15 PROCEDURE — 99285 EMERGENCY DEPT VISIT HI MDM: CPT

## 2019-07-15 PROCEDURE — 94640 AIRWAY INHALATION TREATMENT: CPT

## 2019-07-15 PROCEDURE — 83605 ASSAY OF LACTIC ACID: CPT

## 2019-07-15 PROCEDURE — 71045 X-RAY EXAM CHEST 1 VIEW: CPT

## 2019-07-15 RX ORDER — AZITHROMYCIN 250 MG/1
TABLET, FILM COATED ORAL
Qty: 6 TABLET | Refills: 0 | Status: SHIPPED | OUTPATIENT
Start: 2019-07-15 | End: 2019-07-25

## 2019-07-15 RX ORDER — IPRATROPIUM BROMIDE AND ALBUTEROL SULFATE 2.5; .5 MG/3ML; MG/3ML
1 SOLUTION RESPIRATORY (INHALATION) ONCE
Status: COMPLETED | OUTPATIENT
Start: 2019-07-15 | End: 2019-07-15

## 2019-07-15 RX ORDER — SODIUM CHLORIDE 9 MG/ML
INJECTION, SOLUTION INTRAVENOUS CONTINUOUS
Status: DISCONTINUED | OUTPATIENT
Start: 2019-07-15 | End: 2019-07-15 | Stop reason: HOSPADM

## 2019-07-15 RX ORDER — ACETYLCYSTEINE 200 MG/ML
600 SOLUTION ORAL; RESPIRATORY (INHALATION) 2 TIMES DAILY
Status: DISCONTINUED | OUTPATIENT
Start: 2019-07-15 | End: 2019-07-15 | Stop reason: HOSPADM

## 2019-07-15 RX ORDER — SODIUM CHLORIDE 0.9 % (FLUSH) 0.9 %
3 SYRINGE (ML) INJECTION EVERY 8 HOURS
Status: DISCONTINUED | OUTPATIENT
Start: 2019-07-15 | End: 2019-07-15 | Stop reason: HOSPADM

## 2019-07-15 RX ORDER — PREDNISONE 20 MG/1
20 TABLET ORAL DAILY
Qty: 5 TABLET | Refills: 0 | Status: SHIPPED | OUTPATIENT
Start: 2019-07-15 | End: 2019-07-20

## 2019-07-15 RX ORDER — ACETYLCYSTEINE 100 MG/ML
4 SOLUTION ORAL; RESPIRATORY (INHALATION) 4 TIMES DAILY
Qty: 15 VIAL | Refills: 1 | Status: ON HOLD | OUTPATIENT
Start: 2019-07-15 | End: 2021-03-27

## 2019-07-15 RX ORDER — ACETYLCYSTEINE 200 MG/ML
600 SOLUTION ORAL; RESPIRATORY (INHALATION) 2 TIMES DAILY
Status: DISCONTINUED | OUTPATIENT
Start: 2019-07-15 | End: 2019-07-15

## 2019-07-15 RX ADMIN — ACETYLCYSTEINE 600 MG: 200 SOLUTION ORAL; RESPIRATORY (INHALATION) at 13:14

## 2019-07-15 RX ADMIN — Medication 3 ML: at 11:45

## 2019-07-15 RX ADMIN — IPRATROPIUM BROMIDE AND ALBUTEROL SULFATE 1 AMPULE: .5; 3 SOLUTION RESPIRATORY (INHALATION) at 13:14

## 2019-07-15 RX ADMIN — IPRATROPIUM BROMIDE AND ALBUTEROL SULFATE 1 AMPULE: .5; 3 SOLUTION RESPIRATORY (INHALATION) at 11:51

## 2019-07-15 RX ADMIN — SODIUM CHLORIDE: 9 INJECTION, SOLUTION INTRAVENOUS at 11:45

## 2019-07-15 ASSESSMENT — ENCOUNTER SYMPTOMS
VOMITING: 0
EYE PAIN: 0
CONSTIPATION: 0
DIARRHEA: 0
EYE DISCHARGE: 0
TROUBLE SWALLOWING: 0
ABDOMINAL PAIN: 0
STRIDOR: 0
SHORTNESS OF BREATH: 1
EYE REDNESS: 0
CHEST TIGHTNESS: 0
CHOKING: 0
BACK PAIN: 0
WHEEZING: 1
FACIAL SWELLING: 0
SINUS PRESSURE: 0
BLOOD IN STOOL: 0
COUGH: 1
VOICE CHANGE: 0
SORE THROAT: 0

## 2019-07-15 NOTE — ED TRIAGE NOTES
Patient presents to ED with c/o SOB that is chronic due to COPD but she feels it may be worse over the past few days

## 2019-07-15 NOTE — ED PROVIDER NOTES
[facility] ED  eMERGENCY dEPARTMENT eNCOUnter      Pt Name: Ashu Mcgregor  MRN: [id number]  [place] 1945  Date of evaluation: 7/15/2019  Provider: Jeanne Penaloza MD    [address] [place] [address]       Chief Complaint   Patient presents with    Shortness of Breath     History of COPD and Home 02       HISTORY OF PRESENT ILLNESS   (Location/Symptom, Timing/Onset,Context/Setting, Quality, Duration, Modifying Factors, Severity)  Note limiting factors. Ashu Mcgregor is a 66 y.o. female who presents to the emergency department history of COPD home oxygen 2 L history of breast cancer status post right mastectomy anxiety depression hypothyroidism history of conductive hearing loss patient has been having difficulty in breathing for the last few days time unable to bring phlegm out and the phlegm which is very take as per patient she needs something to phlegm to come out patient was seen by paramedics at the scene and noted to be wheezing bilaterally given Solu-Medrol and aerosol and brought it here patient refuses to get admitted to the hospital no fever no chest tightness or chest pain no swelling of the legs,     HPI    NursingNotes were reviewed. REVIEW OF SYSTEMS    (2-9 systems for level 4, 10 or more for level 5)     Review of Systems   Constitutional: Negative. Negative for activity change and fever. HENT: Negative for congestion, drooling, facial swelling, mouth sores, nosebleeds, sinus pressure, sore throat, trouble swallowing and voice change. Eyes: Negative for pain, discharge, redness and visual disturbance. Respiratory: Positive for cough, shortness of breath and wheezing. Negative for choking, chest tightness and stridor. Cardiovascular: Negative for chest pain, palpitations and leg swelling. Gastrointestinal: Negative for abdominal pain, blood in stool, constipation, diarrhea and vomiting. Endocrine: Negative for cold intolerance, polyphagia and polyuria.    Genitourinary:  Food insecurity:     Worry: None     Inability: None    Transportation needs:     Medical: None     Non-medical: None   Tobacco Use    Smoking status: Former Smoker     Packs/day: 1.50     Years: 40.00     Pack years: 60.00     Types: Cigarettes     Last attempt to quit: 1989     Years since quittin.4    Smokeless tobacco: Never Used   Substance and Sexual Activity    Alcohol use: No    Drug use: No    Sexual activity: Never   Lifestyle    Physical activity:     Days per week: None     Minutes per session: None    Stress: None   Relationships    Social connections:     Talks on phone: None     Gets together: None     Attends Sikhism service: None     Active member of club or organization: None     Attends meetings of clubs or organizations: None     Relationship status: None    Intimate partner violence:     Fear of current or ex partner: None     Emotionally abused: None     Physically abused: None     Forced sexual activity: None   Other Topics Concern    None   Social History Narrative    None       SCREENINGS      @FLOW([id number])@      PHYSICAL EXAM    (up to 7 for level 4, 8 or more for level 5)     ED Triage Vitals [07/15/19 1131]   BP Temp Temp Source Pulse Resp SpO2 Height Weight   (!) 167/75 98.5 °F (36.9 °C) Oral 93 22 97 % 5' 3\" (1.6 m) 130 lb (59 kg)       Physical Exam   Constitutional: She is oriented to person, place, and time. She appears well-developed. No distress. Active alert cooperative patient talks in full sentences no use of facility muscles   HENT:   Head: Normocephalic. Right Ear: External ear normal.   Left Ear: External ear normal.   Nose: Nose normal.   Mouth/Throat: Oropharynx is clear and moist.   Eyes: Pupils are equal, round, and reactive to light. EOM are normal.   Neck: Normal range of motion. Neck supple. Cardiovascular: Normal rate, regular rhythm and normal heart sounds. Exam reveals no gallop. No murmur heard.   Pulmonary/Chest: Effort normal

## 2019-07-16 ENCOUNTER — TELEPHONE (OUTPATIENT)
Dept: PULMONOLOGY | Age: 74
End: 2019-07-16

## 2019-07-19 ENCOUNTER — CARE COORDINATION (OUTPATIENT)
Dept: CARE COORDINATION | Age: 74
End: 2019-07-19

## 2019-07-20 LAB
BLOOD CULTURE, ROUTINE: NORMAL
CULTURE, BLOOD 2: NORMAL

## 2019-07-22 RX ORDER — THEOPHYLLINE 400 MG/1
TABLET, EXTENDED RELEASE ORAL
Qty: 90 TABLET | Refills: 1 | Status: SHIPPED | OUTPATIENT
Start: 2019-07-22 | End: 2020-01-20

## 2019-07-25 ENCOUNTER — CARE COORDINATION (OUTPATIENT)
Dept: CARE COORDINATION | Age: 74
End: 2019-07-25

## 2019-07-28 DIAGNOSIS — G47.09 OTHER INSOMNIA: ICD-10-CM

## 2019-07-29 RX ORDER — TRAZODONE HYDROCHLORIDE 150 MG/1
150 TABLET ORAL NIGHTLY
Qty: 90 TABLET | Refills: 0 | Status: SHIPPED | OUTPATIENT
Start: 2019-07-29 | End: 2019-10-27 | Stop reason: SDUPTHER

## 2019-08-02 ENCOUNTER — TELEPHONE (OUTPATIENT)
Dept: FAMILY MEDICINE CLINIC | Age: 74
End: 2019-08-02

## 2019-08-08 ENCOUNTER — CARE COORDINATION (OUTPATIENT)
Dept: CARE COORDINATION | Age: 74
End: 2019-08-08

## 2019-08-22 RX ORDER — IPRATROPIUM BROMIDE AND ALBUTEROL SULFATE 2.5; .5 MG/3ML; MG/3ML
1 SOLUTION RESPIRATORY (INHALATION) EVERY 4 HOURS
Qty: 360 ML | Refills: 1 | Status: SHIPPED | OUTPATIENT
Start: 2019-08-22 | End: 2019-09-04

## 2019-08-27 RX ORDER — LEVOTHYROXINE SODIUM 0.07 MG/1
75 TABLET ORAL DAILY
Qty: 90 TABLET | Refills: 4 | Status: SHIPPED | OUTPATIENT
Start: 2019-08-27 | End: 2020-11-19

## 2019-08-28 ENCOUNTER — HOSPITAL ENCOUNTER (OUTPATIENT)
Dept: LAB | Age: 74
Discharge: HOME | End: 2019-08-28
Payer: MEDICARE

## 2019-08-28 DIAGNOSIS — E03.1 CONGENITAL HYPOTHYROIDISM WITHOUT GOITER: ICD-10-CM

## 2019-08-28 DIAGNOSIS — E78.5 DYSLIPIDEMIA: ICD-10-CM

## 2019-08-28 LAB
ALBUMIN SERPL-MCNC: 4.1 G/DL (ref 3.5–4.6)
ALP BLD-CCNC: 60 U/L (ref 40–130)
ALT SERPL-CCNC: 8 U/L (ref 0–33)
ANION GAP SERPL CALCULATED.3IONS-SCNC: 13 MEQ/L (ref 9–15)
AST SERPL-CCNC: 22 U/L (ref 0–35)
BASOPHILS ABSOLUTE: 0.1 K/UL (ref 0–0.2)
BASOPHILS RELATIVE PERCENT: 1.2 %
BILIRUB SERPL-MCNC: 0.5 MG/DL (ref 0.2–0.7)
BUN BLDV-MCNC: 11 MG/DL (ref 8–23)
CALCIUM SERPL-MCNC: 9.7 MG/DL (ref 8.5–9.9)
CHLORIDE BLD-SCNC: 100 MEQ/L (ref 95–107)
CHOLESTEROL, TOTAL: 132 MG/DL (ref 0–199)
CO2: 28 MEQ/L (ref 20–31)
CREAT SERPL-MCNC: 0.84 MG/DL (ref 0.5–0.9)
EOSINOPHILS ABSOLUTE: 0.1 K/UL (ref 0–0.7)
EOSINOPHILS RELATIVE PERCENT: 2.3 %
GFR AFRICAN AMERICAN: >60
GFR NON-AFRICAN AMERICAN: >60
GLOBULIN: 2.8 G/DL (ref 2.3–3.5)
GLUCOSE BLD-MCNC: 99 MG/DL (ref 70–99)
HCT VFR BLD CALC: 38.7 % (ref 37–47)
HDLC SERPL-MCNC: 69 MG/DL (ref 40–59)
HEMOGLOBIN: 12.5 G/DL (ref 12–16)
LDL CHOLESTEROL CALCULATED: 47 MG/DL (ref 0–129)
LYMPHOCYTES ABSOLUTE: 1.9 K/UL (ref 1–4.8)
LYMPHOCYTES RELATIVE PERCENT: 29.6 %
MCH RBC QN AUTO: 28.9 PG (ref 27–31.3)
MCHC RBC AUTO-ENTMCNC: 32.2 % (ref 33–37)
MCV RBC AUTO: 89.7 FL (ref 82–100)
MONOCYTES ABSOLUTE: 0.8 K/UL (ref 0.2–0.8)
MONOCYTES RELATIVE PERCENT: 11.5 %
NEUTROPHILS ABSOLUTE: 3.6 K/UL (ref 1.4–6.5)
NEUTROPHILS RELATIVE PERCENT: 55.4 %
PDW BLD-RTO: 14.3 % (ref 11.5–14.5)
PLATELET # BLD: 285 K/UL (ref 130–400)
POTASSIUM SERPL-SCNC: 4.7 MEQ/L (ref 3.4–4.9)
RBC # BLD: 4.32 M/UL (ref 4.2–5.4)
SODIUM BLD-SCNC: 141 MEQ/L (ref 135–144)
T4 FREE: 1.63 NG/DL (ref 0.84–1.68)
TOTAL PROTEIN: 6.9 G/DL (ref 6.3–8)
TRIGL SERPL-MCNC: 79 MG/DL (ref 0–150)
TSH SERPL DL<=0.05 MIU/L-ACNC: 0.69 UIU/ML (ref 0.44–3.86)
WBC # BLD: 6.6 K/UL (ref 4.8–10.8)

## 2019-08-28 PROCEDURE — 80061 LIPID PANEL: CPT

## 2019-08-28 PROCEDURE — 84443 ASSAY THYROID STIM HORMONE: CPT

## 2019-08-28 PROCEDURE — 85025 COMPLETE CBC W/AUTO DIFF WBC: CPT

## 2019-08-28 PROCEDURE — 36415 COLL VENOUS BLD VENIPUNCTURE: CPT

## 2019-08-28 PROCEDURE — 84439 ASSAY OF FREE THYROXINE: CPT

## 2019-08-28 PROCEDURE — 80053 COMPREHEN METABOLIC PANEL: CPT

## 2019-08-29 ENCOUNTER — OFFICE VISIT (OUTPATIENT)
Dept: FAMILY MEDICINE CLINIC | Age: 74
End: 2019-08-29
Payer: MEDICARE

## 2019-08-29 VITALS
TEMPERATURE: 98.4 F | RESPIRATION RATE: 14 BRPM | DIASTOLIC BLOOD PRESSURE: 68 MMHG | OXYGEN SATURATION: 98 % | WEIGHT: 130 LBS | SYSTOLIC BLOOD PRESSURE: 114 MMHG | HEIGHT: 63 IN | HEART RATE: 88 BPM | BODY MASS INDEX: 23.04 KG/M2

## 2019-08-29 DIAGNOSIS — I10 ESSENTIAL HYPERTENSION: Primary | ICD-10-CM

## 2019-08-29 DIAGNOSIS — H90.2: ICD-10-CM

## 2019-08-29 DIAGNOSIS — C50.911 BREAST CANCER METASTASIZED TO AXILLARY LYMPH NODE, RIGHT (HCC): ICD-10-CM

## 2019-08-29 DIAGNOSIS — E55.9 VITAMIN D DEFICIENCY: ICD-10-CM

## 2019-08-29 DIAGNOSIS — I25.10 CORONARY ARTERY DISEASE INVOLVING NATIVE HEART WITHOUT ANGINA PECTORIS, UNSPECIFIED VESSEL OR LESION TYPE: ICD-10-CM

## 2019-08-29 DIAGNOSIS — C77.3 BREAST CANCER METASTASIZED TO AXILLARY LYMPH NODE, RIGHT (HCC): ICD-10-CM

## 2019-08-29 DIAGNOSIS — E78.5 DYSLIPIDEMIA: ICD-10-CM

## 2019-08-29 DIAGNOSIS — J44.9 CHRONIC OBSTRUCTIVE PULMONARY DISEASE, UNSPECIFIED COPD TYPE (HCC): ICD-10-CM

## 2019-08-29 DIAGNOSIS — R60.0 BILATERAL LOWER EXTREMITY EDEMA: ICD-10-CM

## 2019-08-29 DIAGNOSIS — I65.23 BILATERAL CAROTID ARTERY STENOSIS: ICD-10-CM

## 2019-08-29 DIAGNOSIS — F33.1 MODERATE EPISODE OF RECURRENT MAJOR DEPRESSIVE DISORDER (HCC): ICD-10-CM

## 2019-08-29 DIAGNOSIS — E03.1 CONGENITAL HYPOTHYROIDISM WITHOUT GOITER: ICD-10-CM

## 2019-08-29 DIAGNOSIS — Z99.81 DEPENDENCE ON CONTINUOUS SUPPLEMENTAL OXYGEN: ICD-10-CM

## 2019-08-29 PROCEDURE — 1036F TOBACCO NON-USER: CPT | Performed by: NURSE PRACTITIONER

## 2019-08-29 PROCEDURE — G8420 CALC BMI NORM PARAMETERS: HCPCS | Performed by: NURSE PRACTITIONER

## 2019-08-29 PROCEDURE — 3023F SPIROM DOC REV: CPT | Performed by: NURSE PRACTITIONER

## 2019-08-29 PROCEDURE — G8926 SPIRO NO PERF OR DOC: HCPCS | Performed by: NURSE PRACTITIONER

## 2019-08-29 PROCEDURE — G8427 DOCREV CUR MEDS BY ELIG CLIN: HCPCS | Performed by: NURSE PRACTITIONER

## 2019-08-29 PROCEDURE — 1090F PRES/ABSN URINE INCON ASSESS: CPT | Performed by: NURSE PRACTITIONER

## 2019-08-29 PROCEDURE — 3288F FALL RISK ASSESSMENT DOCD: CPT | Performed by: NURSE PRACTITIONER

## 2019-08-29 PROCEDURE — 1123F ACP DISCUSS/DSCN MKR DOCD: CPT | Performed by: NURSE PRACTITIONER

## 2019-08-29 PROCEDURE — G8598 ASA/ANTIPLAT THER USED: HCPCS | Performed by: NURSE PRACTITIONER

## 2019-08-29 PROCEDURE — 99214 OFFICE O/P EST MOD 30 MIN: CPT | Performed by: NURSE PRACTITIONER

## 2019-08-29 PROCEDURE — 4040F PNEUMOC VAC/ADMIN/RCVD: CPT | Performed by: NURSE PRACTITIONER

## 2019-08-29 PROCEDURE — G8399 PT W/DXA RESULTS DOCUMENT: HCPCS | Performed by: NURSE PRACTITIONER

## 2019-08-29 PROCEDURE — G8510 SCR DEP NEG, NO PLAN REQD: HCPCS | Performed by: NURSE PRACTITIONER

## 2019-08-29 RX ORDER — BUDESONIDE AND FORMOTEROL FUMARATE DIHYDRATE 160; 4.5 UG/1; UG/1
AEROSOL RESPIRATORY (INHALATION)
Qty: 10.2 G | Refills: 0 | Status: SHIPPED | OUTPATIENT
Start: 2019-08-29 | End: 2019-09-30 | Stop reason: SDUPTHER

## 2019-08-29 RX ORDER — POTASSIUM CHLORIDE 20 MEQ/1
20 TABLET, EXTENDED RELEASE ORAL DAILY
Qty: 3 TABLET | Refills: 0 | Status: SHIPPED | OUTPATIENT
Start: 2019-08-29 | End: 2019-09-30 | Stop reason: ALTCHOICE

## 2019-08-29 RX ORDER — FUROSEMIDE 40 MG/1
40 TABLET ORAL DAILY
Qty: 3 TABLET | Refills: 0 | Status: SHIPPED | OUTPATIENT
Start: 2019-08-29 | End: 2019-09-30 | Stop reason: ALTCHOICE

## 2019-08-29 ASSESSMENT — PATIENT HEALTH QUESTIONNAIRE - PHQ9
SUM OF ALL RESPONSES TO PHQ QUESTIONS 1-9: 0
1. LITTLE INTEREST OR PLEASURE IN DOING THINGS: 0
2. FEELING DOWN, DEPRESSED OR HOPELESS: 0
SUM OF ALL RESPONSES TO PHQ9 QUESTIONS 1 & 2: 0
SUM OF ALL RESPONSES TO PHQ QUESTIONS 1-9: 0

## 2019-08-29 NOTE — PROGRESS NOTES
same.    5.  11. She continues to follow routinely with pulmonology and is now wearing continuous oxygen. Is compliant with medication therapy. Has not had any significant shortness of breath since last hospitalization. 6.  8.  Recommend that she schedule follow-up with her cardiologist soon. I do also recommend that she have current carotid ultrasound and echocardiogram done. Orders given and she is encouraged to provide the name of her cardiologist through [facility] when she has testing done so that they can read her interpret her test results. 9.  She plans to continue to follow with you nose and throat specialist.  Tube intact to the right tympanic membrane. No evidence of infection. 10. She is no longer following with oncology. Has opted out of systemic treatment for right breast cancer after having had a right mastectomy. 12.  Recommend 3-day course of Lasix and potassium supplement for lower extremity edema. Also order echocardiogram and chest x-ray given her history. Please note this report has been partially produced using speech recognition software and may cause contain errors related to that system including grammar, punctuation and spelling as well as words and phrases that may seem inappropriate. If there are questions or concerns please feel free to contact me to clarify.       Electronically signed by Juve Beach, 3:00 PM 8/29/19

## 2019-09-04 DIAGNOSIS — J44.9 CHRONIC OBSTRUCTIVE PULMONARY DISEASE, UNSPECIFIED COPD TYPE (HCC): Primary | ICD-10-CM

## 2019-09-05 RX ORDER — IPRATROPIUM BROMIDE AND ALBUTEROL SULFATE 2.5; .5 MG/3ML; MG/3ML
1 SOLUTION RESPIRATORY (INHALATION) EVERY 4 HOURS
Qty: 540 VIAL | Refills: 1 | Status: SHIPPED | OUTPATIENT
Start: 2019-09-05 | End: 2019-10-15 | Stop reason: ALTCHOICE

## 2019-09-10 ENCOUNTER — HOSPITAL ENCOUNTER (OUTPATIENT)
Dept: ULTRASOUND IMAGING | Age: 74
Discharge: HOME | End: 2019-09-12
Payer: MEDICARE

## 2019-09-10 ENCOUNTER — HOSPITAL ENCOUNTER (OUTPATIENT)
Dept: GENERAL RADIOLOGY | Age: 74
Discharge: HOME | End: 2019-09-12
Payer: MEDICARE

## 2019-09-10 ENCOUNTER — HOSPITAL ENCOUNTER (OUTPATIENT)
Dept: NON INVASIVE DIAGNOSTICS | Age: 74
Discharge: HOME | End: 2019-09-10
Payer: MEDICARE

## 2019-09-10 ENCOUNTER — TELEPHONE (OUTPATIENT)
Dept: FAMILY MEDICINE CLINIC | Age: 74
End: 2019-09-10

## 2019-09-10 DIAGNOSIS — J44.9 CHRONIC OBSTRUCTIVE PULMONARY DISEASE, UNSPECIFIED COPD TYPE (HCC): ICD-10-CM

## 2019-09-10 DIAGNOSIS — I25.10 CORONARY ARTERY DISEASE INVOLVING NATIVE HEART WITHOUT ANGINA PECTORIS, UNSPECIFIED VESSEL OR LESION TYPE: ICD-10-CM

## 2019-09-10 DIAGNOSIS — R60.0 BILATERAL LOWER EXTREMITY EDEMA: ICD-10-CM

## 2019-09-10 DIAGNOSIS — I65.23 BILATERAL CAROTID ARTERY STENOSIS: ICD-10-CM

## 2019-09-10 LAB
LV EF: 60 %
LVEF MODALITY: NORMAL

## 2019-09-10 PROCEDURE — 93880 EXTRACRANIAL BILAT STUDY: CPT

## 2019-09-10 PROCEDURE — 71046 X-RAY EXAM CHEST 2 VIEWS: CPT

## 2019-09-10 PROCEDURE — 93306 TTE W/DOPPLER COMPLETE: CPT

## 2019-09-10 NOTE — RESULT ENCOUNTER NOTE
Please notify [facility] [place] that echocardiogram results are stable with no significant findings from last test.  Follow up as scheduled and as needed.

## 2019-09-10 NOTE — TELEPHONE ENCOUNTER
Patient made aware of results. She states that she has known about the blockage on the left for years now and feels fine. She wants Sandy to know that she appreciates her concern for.   Lilly Whittaker

## 2019-09-22 ENCOUNTER — APPOINTMENT (OUTPATIENT)
Dept: GENERAL RADIOLOGY | Age: 74
End: 2019-09-22
Payer: MEDICARE

## 2019-09-22 ENCOUNTER — HOSPITAL ENCOUNTER (EMERGENCY)
Age: 74
Discharge: HOME | End: 2019-09-23
Payer: MEDICARE

## 2019-09-22 DIAGNOSIS — J06.9 VIRAL URI: Primary | ICD-10-CM

## 2019-09-22 DIAGNOSIS — J44.1 COPD EXACERBATION (HCC): ICD-10-CM

## 2019-09-22 LAB
EKG ATRIAL RATE: 97 BPM
EKG P AXIS: 58 DEGREES
EKG P-R INTERVAL: 178 MS
EKG Q-T INTERVAL: 352 MS
EKG QRS DURATION: 126 MS
EKG QTC CALCULATION (BAZETT): 447 MS
EKG R AXIS: 78 DEGREES
EKG T AXIS: 54 DEGREES
EKG VENTRICULAR RATE: 97 BPM

## 2019-09-22 PROCEDURE — 82550 ASSAY OF CK (CPK): CPT

## 2019-09-22 PROCEDURE — 36415 COLL VENOUS BLD VENIPUNCTURE: CPT

## 2019-09-22 PROCEDURE — 94640 AIRWAY INHALATION TREATMENT: CPT

## 2019-09-22 PROCEDURE — 2700000000 HC OXYGEN THERAPY PER DAY

## 2019-09-22 PROCEDURE — 84484 ASSAY OF TROPONIN QUANT: CPT

## 2019-09-22 PROCEDURE — 6370000000 HC RX 637 (ALT 250 FOR IP): Performed by: PHYSICIAN ASSISTANT

## 2019-09-22 PROCEDURE — 83605 ASSAY OF LACTIC ACID: CPT

## 2019-09-22 PROCEDURE — 99285 EMERGENCY DEPT VISIT HI MDM: CPT

## 2019-09-22 PROCEDURE — 80053 COMPREHEN METABOLIC PANEL: CPT

## 2019-09-22 PROCEDURE — 93005 ELECTROCARDIOGRAM TRACING: CPT | Performed by: PHYSICIAN ASSISTANT

## 2019-09-22 PROCEDURE — 71045 X-RAY EXAM CHEST 1 VIEW: CPT

## 2019-09-22 PROCEDURE — 83880 ASSAY OF NATRIURETIC PEPTIDE: CPT

## 2019-09-22 PROCEDURE — 85025 COMPLETE CBC W/AUTO DIFF WBC: CPT

## 2019-09-22 RX ORDER — METHYLPREDNISOLONE SODIUM SUCCINATE 125 MG/2ML
125 INJECTION, POWDER, LYOPHILIZED, FOR SOLUTION INTRAMUSCULAR; INTRAVENOUS ONCE
Status: COMPLETED | OUTPATIENT
Start: 2019-09-22 | End: 2019-09-23

## 2019-09-22 RX ORDER — ALBUTEROL SULFATE 2.5 MG/3ML
2.5 SOLUTION RESPIRATORY (INHALATION)
Status: DISCONTINUED | OUTPATIENT
Start: 2019-09-22 | End: 2019-09-23 | Stop reason: HOSPADM

## 2019-09-22 RX ORDER — IPRATROPIUM BROMIDE AND ALBUTEROL SULFATE 2.5; .5 MG/3ML; MG/3ML
1 SOLUTION RESPIRATORY (INHALATION) ONCE
Status: COMPLETED | OUTPATIENT
Start: 2019-09-22 | End: 2019-09-22

## 2019-09-22 RX ADMIN — IPRATROPIUM BROMIDE AND ALBUTEROL SULFATE 1 AMPULE: .5; 3 SOLUTION RESPIRATORY (INHALATION) at 23:36

## 2019-09-22 ASSESSMENT — ENCOUNTER SYMPTOMS
TROUBLE SWALLOWING: 0
WHEEZING: 1
ALLERGIC/IMMUNOLOGIC NEGATIVE: 1
APNEA: 0
COLOR CHANGE: 0
EYE PAIN: 0
SHORTNESS OF BREATH: 1
ABDOMINAL PAIN: 0
DIARRHEA: 0
CHEST TIGHTNESS: 1
VOMITING: 0

## 2019-09-22 ASSESSMENT — PAIN DESCRIPTION - PAIN TYPE: TYPE: ACUTE PAIN

## 2019-09-22 ASSESSMENT — PAIN SCALES - GENERAL: PAINLEVEL_OUTOF10: 3

## 2019-09-22 ASSESSMENT — PAIN DESCRIPTION - LOCATION: LOCATION: CHEST;NECK;SHOULDER

## 2019-09-23 VITALS
RESPIRATION RATE: 27 BRPM | DIASTOLIC BLOOD PRESSURE: 58 MMHG | SYSTOLIC BLOOD PRESSURE: 118 MMHG | OXYGEN SATURATION: 94 % | TEMPERATURE: 99.1 F | WEIGHT: 125 LBS | BODY MASS INDEX: 22.15 KG/M2 | HEIGHT: 63 IN | HEART RATE: 98 BPM

## 2019-09-23 LAB
ALBUMIN SERPL-MCNC: 3.9 G/DL (ref 3.5–4.6)
ALP BLD-CCNC: 63 U/L (ref 40–130)
ALT SERPL-CCNC: 12 U/L (ref 0–33)
ANION GAP SERPL CALCULATED.3IONS-SCNC: 11 MEQ/L (ref 9–15)
AST SERPL-CCNC: 28 U/L (ref 0–35)
BASOPHILS ABSOLUTE: 0.1 K/UL (ref 0–0.2)
BASOPHILS RELATIVE PERCENT: 0.7 %
BILIRUB SERPL-MCNC: <0.2 MG/DL (ref 0.2–0.7)
BUN BLDV-MCNC: 9 MG/DL (ref 8–23)
CALCIUM SERPL-MCNC: 9.3 MG/DL (ref 8.5–9.9)
CHLORIDE BLD-SCNC: 98 MEQ/L (ref 95–107)
CO2: 30 MEQ/L (ref 20–31)
CREAT SERPL-MCNC: 0.65 MG/DL (ref 0.5–0.9)
EOSINOPHILS ABSOLUTE: 0.2 K/UL (ref 0–0.7)
EOSINOPHILS RELATIVE PERCENT: 1.6 %
GFR AFRICAN AMERICAN: >60
GFR NON-AFRICAN AMERICAN: >60
GLOBULIN: 2.7 G/DL (ref 2.3–3.5)
GLUCOSE BLD-MCNC: 121 MG/DL (ref 70–99)
HCT VFR BLD CALC: 36.1 % (ref 37–47)
HEMOGLOBIN: 11.8 G/DL (ref 12–16)
INFLUENZA A BY PCR: NEGATIVE
INFLUENZA B BY PCR: NEGATIVE
LACTIC ACID: 1.6 MMOL/L (ref 0.5–2.2)
LYMPHOCYTES ABSOLUTE: 1.3 K/UL (ref 1–4.8)
LYMPHOCYTES RELATIVE PERCENT: 12.2 %
MCH RBC QN AUTO: 28.8 PG (ref 27–31.3)
MCHC RBC AUTO-ENTMCNC: 32.8 % (ref 33–37)
MCV RBC AUTO: 87.8 FL (ref 82–100)
MONOCYTES ABSOLUTE: 1 K/UL (ref 0.2–0.8)
MONOCYTES RELATIVE PERCENT: 9.8 %
NEUTROPHILS ABSOLUTE: 7.8 K/UL (ref 1.4–6.5)
NEUTROPHILS RELATIVE PERCENT: 75.7 %
PDW BLD-RTO: 14.8 % (ref 11.5–14.5)
PLATELET # BLD: 223 K/UL (ref 130–400)
POTASSIUM SERPL-SCNC: 4.9 MEQ/L (ref 3.4–4.9)
PRO-BNP: 139 PG/ML
RBC # BLD: 4.11 M/UL (ref 4.2–5.4)
SODIUM BLD-SCNC: 139 MEQ/L (ref 135–144)
TOTAL CK: 71 U/L (ref 0–170)
TOTAL PROTEIN: 6.6 G/DL (ref 6.3–8)
TROPONIN: <0.01 NG/ML (ref 0–0.01)
WBC # BLD: 10.3 K/UL (ref 4.8–10.8)

## 2019-09-23 PROCEDURE — 6360000002 HC RX W HCPCS: Performed by: PHYSICIAN ASSISTANT

## 2019-09-23 PROCEDURE — 93010 ELECTROCARDIOGRAM REPORT: CPT | Performed by: INTERNAL MEDICINE

## 2019-09-23 PROCEDURE — 36415 COLL VENOUS BLD VENIPUNCTURE: CPT

## 2019-09-23 PROCEDURE — 87040 BLOOD CULTURE FOR BACTERIA: CPT

## 2019-09-23 PROCEDURE — 96374 THER/PROPH/DIAG INJ IV PUSH: CPT

## 2019-09-23 PROCEDURE — 87502 INFLUENZA DNA AMP PROBE: CPT

## 2019-09-23 PROCEDURE — 6370000000 HC RX 637 (ALT 250 FOR IP): Performed by: PHYSICIAN ASSISTANT

## 2019-09-23 RX ORDER — PREDNISONE 10 MG/1
40 TABLET ORAL DAILY
Qty: 16 TABLET | Refills: 0 | Status: SHIPPED | OUTPATIENT
Start: 2019-09-23 | End: 2019-09-27

## 2019-09-23 RX ORDER — ACETAMINOPHEN 325 MG/1
650 TABLET ORAL ONCE
Status: COMPLETED | OUTPATIENT
Start: 2019-09-23 | End: 2019-09-23

## 2019-09-23 RX ORDER — ACETAMINOPHEN 325 MG/1
650 TABLET ORAL EVERY 8 HOURS PRN
Qty: 40 TABLET | Refills: 0 | Status: SHIPPED | OUTPATIENT
Start: 2019-09-23 | End: 2020-03-12

## 2019-09-23 RX ADMIN — METHYLPREDNISOLONE SODIUM SUCCINATE 125 MG: 125 INJECTION, POWDER, FOR SOLUTION INTRAMUSCULAR; INTRAVENOUS at 00:09

## 2019-09-23 RX ADMIN — ACETAMINOPHEN 650 MG: 325 TABLET ORAL at 01:21

## 2019-09-23 ASSESSMENT — PAIN SCALES - GENERAL: PAINLEVEL_OUTOF10: 6

## 2019-09-23 NOTE — ED PROVIDER NOTES
thought content normal.   Nursing note and vitals reviewed. DIAGNOSTIC RESULTS     EKG: All EKG's are interpreted by the Emergency Department Physician who either signs or Co-signsthis chart in the absence of a cardiologist.    NSR @ 97, RBBB    RADIOLOGY:   Non-plain filmimages such as CT, Ultrasound and MRI are read by the radiologist. Plain radiographic images are visualized and preliminarily interpreted by the emergency physician with the below findings:    NAD    Interpretation per the Radiologist below, if available at the time ofthis note:    XR CHEST PORTABLE    (Results Pending)         ED BEDSIDE ULTRASOUND:   Performed by ED Physician - none    LABS:  Labs Reviewed   COMPREHENSIVE METABOLIC PANEL - Abnormal; Notable for the following components:       Result Value    Glucose 121 (*)     All other components within normal limits   CBC WITH AUTO DIFFERENTIAL - Abnormal; Notable for the following components:    RBC 4.11 (*)     Hemoglobin 11.8 (*)     Hematocrit 36.1 (*)     MCHC 32.8 (*)     RDW 14.8 (*)     Neutrophils Absolute 7.8 (*)     Monocytes Absolute 1.0 (*)     All other components within normal limits   RAPID INFLUENZA A/B ANTIGENS   CULTURE BLOOD #1   CULTURE BLOOD #2   TROPONIN   LACTIC ACID, PLASMA   CK   BRAIN NATRIURETIC PEPTIDE       All other labs were within normal range or not returned as of this dictation. EMERGENCY DEPARTMENT COURSE and DIFFERENTIAL DIAGNOSIS/MDM:   Vitals:    Vitals:    09/22/19 2303 09/22/19 2306 09/22/19 2327   BP: (!) 140/66     Pulse: 89     Resp: 27     Temp: 99.1 °F (37.3 °C)     SpO2: 97%     Weight:  125 lb (56.7 kg) 125 lb (56.7 kg)   Height:  5' 3\" (1.6 m) 5' 3\" (1.6 m)         MDM      REASSESSMENT      Patient improved symptoms after receiving SVN and Solu-Medrol. X-ray and laboratory testing are grossly unremarkable. Patient was offered admission and declined stating that she would like to go home.   Patient will be tried on home and return for

## 2019-09-23 NOTE — ED NOTES
Resp at bedside for trt. LS scattered wheezed otherwise diminished.      Abbey Gonzalez RN  59/90/60 [id number]

## 2019-09-28 LAB
BLOOD CULTURE, ROUTINE: NORMAL
CULTURE, BLOOD 2: NORMAL

## 2019-09-30 ENCOUNTER — HOSPITAL ENCOUNTER (OUTPATIENT)
Age: 74
Discharge: HOME | End: 2019-10-02
Payer: MEDICARE

## 2019-09-30 ENCOUNTER — OFFICE VISIT (OUTPATIENT)
Dept: FAMILY MEDICINE CLINIC | Age: 74
End: 2019-09-30
Payer: MEDICARE

## 2019-09-30 ENCOUNTER — HOSPITAL ENCOUNTER (OUTPATIENT)
Dept: GENERAL RADIOLOGY | Age: 74
Discharge: HOME | End: 2019-10-02
Payer: MEDICARE

## 2019-09-30 ENCOUNTER — TELEPHONE (OUTPATIENT)
Dept: INTERNAL MEDICINE | Age: 74
End: 2019-09-30

## 2019-09-30 VITALS
WEIGHT: 129 LBS | OXYGEN SATURATION: 97 % | RESPIRATION RATE: 14 BRPM | TEMPERATURE: 99.4 F | HEIGHT: 63 IN | DIASTOLIC BLOOD PRESSURE: 78 MMHG | SYSTOLIC BLOOD PRESSURE: 122 MMHG | HEART RATE: 76 BPM | BODY MASS INDEX: 22.86 KG/M2

## 2019-09-30 DIAGNOSIS — R07.89 DISCOMFORT OF CHEST WALL: ICD-10-CM

## 2019-09-30 DIAGNOSIS — C50.911 BREAST CANCER METASTASIZED TO AXILLARY LYMPH NODE, RIGHT (HCC): Chronic | ICD-10-CM

## 2019-09-30 DIAGNOSIS — C77.3 BREAST CANCER METASTASIZED TO AXILLARY LYMPH NODE, RIGHT (HCC): Chronic | ICD-10-CM

## 2019-09-30 DIAGNOSIS — R50.9 LOW GRADE FEVER: ICD-10-CM

## 2019-09-30 DIAGNOSIS — R60.0 BILATERAL LEG EDEMA: Primary | ICD-10-CM

## 2019-09-30 DIAGNOSIS — J44.9 CHRONIC OBSTRUCTIVE PULMONARY DISEASE, UNSPECIFIED COPD TYPE (HCC): ICD-10-CM

## 2019-09-30 DIAGNOSIS — R93.89 ABNORMAL CXR: ICD-10-CM

## 2019-09-30 PROCEDURE — 4040F PNEUMOC VAC/ADMIN/RCVD: CPT | Performed by: NURSE PRACTITIONER

## 2019-09-30 PROCEDURE — G8926 SPIRO NO PERF OR DOC: HCPCS | Performed by: NURSE PRACTITIONER

## 2019-09-30 PROCEDURE — 1090F PRES/ABSN URINE INCON ASSESS: CPT | Performed by: NURSE PRACTITIONER

## 2019-09-30 PROCEDURE — 1123F ACP DISCUSS/DSCN MKR DOCD: CPT | Performed by: NURSE PRACTITIONER

## 2019-09-30 PROCEDURE — 71046 X-RAY EXAM CHEST 2 VIEWS: CPT

## 2019-09-30 PROCEDURE — G8598 ASA/ANTIPLAT THER USED: HCPCS | Performed by: NURSE PRACTITIONER

## 2019-09-30 PROCEDURE — G8399 PT W/DXA RESULTS DOCUMENT: HCPCS | Performed by: NURSE PRACTITIONER

## 2019-09-30 PROCEDURE — G8510 SCR DEP NEG, NO PLAN REQD: HCPCS | Performed by: NURSE PRACTITIONER

## 2019-09-30 PROCEDURE — G8427 DOCREV CUR MEDS BY ELIG CLIN: HCPCS | Performed by: NURSE PRACTITIONER

## 2019-09-30 PROCEDURE — 3288F FALL RISK ASSESSMENT DOCD: CPT | Performed by: NURSE PRACTITIONER

## 2019-09-30 PROCEDURE — G8420 CALC BMI NORM PARAMETERS: HCPCS | Performed by: NURSE PRACTITIONER

## 2019-09-30 PROCEDURE — 99214 OFFICE O/P EST MOD 30 MIN: CPT | Performed by: NURSE PRACTITIONER

## 2019-09-30 PROCEDURE — 3023F SPIROM DOC REV: CPT | Performed by: NURSE PRACTITIONER

## 2019-09-30 PROCEDURE — 1036F TOBACCO NON-USER: CPT | Performed by: NURSE PRACTITIONER

## 2019-09-30 RX ORDER — BUDESONIDE AND FORMOTEROL FUMARATE DIHYDRATE 160; 4.5 UG/1; UG/1
AEROSOL RESPIRATORY (INHALATION)
Qty: 3 INHALER | Refills: 2 | Status: SHIPPED | OUTPATIENT
Start: 2019-09-30 | End: 2019-10-01 | Stop reason: ALTCHOICE

## 2019-09-30 ASSESSMENT — PATIENT HEALTH QUESTIONNAIRE - PHQ9
SUM OF ALL RESPONSES TO PHQ QUESTIONS 1-9: 0
SUM OF ALL RESPONSES TO PHQ QUESTIONS 1-9: 0
SUM OF ALL RESPONSES TO PHQ9 QUESTIONS 1 & 2: 0
2. FEELING DOWN, DEPRESSED OR HOPELESS: 0
1. LITTLE INTEREST OR PLEASURE IN DOING THINGS: 0

## 2019-09-30 NOTE — PROGRESS NOTES
Chief Complaint   Patient presents with    Leg Swelling     patient is having some swelling on her legs since last week. HPI: Daniel Mckoy is a 66 y.o. female presenting for follow-up of for follow up ER visit for respiratory symptoms     Viral URI: she went to the ER a couple of weeks ago for chest tightness and fever. She is not sure if the chest tightness was from anxiety or not. She had normal x-ray and blood work done. She was given prednisone for 4 days. She prefers to avoid taking antibiotic. She developed swelling to the legs on Friday and that is when she made the appointment. She increased water intake and elevated the legs and the swelling is gone now. Low grade fever: She states that she has had a low-grade fever off and on for what seems like months now. She has been evaluated for it no source can be identified. She does not really want treatment with an antibiotic though even if something is identified. She feels that being on so many antibiotics has made her more sick over time. She denies any worsening shortness of breath or cough. No sputum production. No abdominal pain or change in bowel patterns. No flank pain, dysuria, change in any urine odor or color. She has not felt more tired than normal.    Chest wall pain: She states that she has been having pain to the right side of her chest near the area of previous right mastectomy for breast cancer. She feels that her skin is very tight and this makes it hard for her to breathe on the right side. This has been ongoing for several weeks if not months. She was upset to learn that her previous surgeon has retired. She would like to know who she can see in the future to ask questions regarding the symptoms. COPD: She states that breathing has been at baseline for her. She has not felt that she is any more short of breath than normal.  Continues to wear oxygen routinely. No sputum production.   She has not felt any section. Follow up: 3 months and as needed. Blood work one week prior as ordered. 1.  Bilateral lower extremity edema has resolved with the patient elevating her extremities over the weekend. She does not want to take any more diuretics because she heard that it can cause hearing issues and she has had some hearing issues. Education given but she does not want to take any more water pills. Echocardiogram from earlier this year was stable. No orthopnea reported. 2.  5.  6.  She has been compliant with maintenance medication for COPD and will see her lung specialist tomorrow. After reviewing the chest x-ray from ER, there is possible infiltrate to the right lower lobe where the patient has been describing some discomfort. She is also had low-grade fever for several weeks off and on. She will get x-ray done today in office to call with results when available. States that her symptoms are no worse than when she was examined in the emergency room. She is encouraged to return to emergency room if symptoms worsen. I do read and her ER report that admission was offered but she declined. 3.  4.  Referral given to breast specialist.  She had follow with Dr. Orin Munoz in the past.  Patient has had mastectomy but declined systemic treatment for breast cancer. Please note this report has been partially produced using speech recognition software and may cause contain errors related to that system including grammar, punctuation and spelling as well as words and phrases that may seem inappropriate. If there are questions or concerns please feel free to contact me to clarify.         Electronically signed by Licha Beach, 11:12 AM 9/30/19

## 2019-09-30 NOTE — TELEPHONE ENCOUNTER
While on call :  Patient called stating that she was seen today in the office in [place]. The had a fever of 100.1 . chest x-ray was done today and she is awaiting the test results. She is very anxious about the results. Reviewed the chest x-ray results and reassured the patient that it was negative for pneumonia. Advised rest and increasing fluids. Motrin as needed for fever.   She can follow-up with her PCP if no improvement    Electronically signed by YAMILEX Cowart on 9/30/2019 at 5:47 PM

## 2019-10-01 ENCOUNTER — OFFICE VISIT (OUTPATIENT)
Dept: PULMONOLOGY | Age: 74
End: 2019-10-01
Payer: MEDICARE

## 2019-10-01 VITALS
BODY MASS INDEX: 22.61 KG/M2 | WEIGHT: 127.6 LBS | OXYGEN SATURATION: 96 % | HEIGHT: 63 IN | TEMPERATURE: 98.2 F | DIASTOLIC BLOOD PRESSURE: 74 MMHG | SYSTOLIC BLOOD PRESSURE: 120 MMHG | RESPIRATION RATE: 15 BRPM | HEART RATE: 69 BPM

## 2019-10-01 DIAGNOSIS — J96.11 CHRONIC RESPIRATORY FAILURE WITH HYPOXIA (HCC): ICD-10-CM

## 2019-10-01 DIAGNOSIS — J44.9 CHRONIC OBSTRUCTIVE PULMONARY DISEASE, UNSPECIFIED COPD TYPE (HCC): Primary | ICD-10-CM

## 2019-10-01 DIAGNOSIS — K21.9 GASTROESOPHAGEAL REFLUX DISEASE WITHOUT ESOPHAGITIS: ICD-10-CM

## 2019-10-01 DIAGNOSIS — R59.0 MEDIASTINAL LYMPHADENOPATHY: ICD-10-CM

## 2019-10-01 DIAGNOSIS — R09.82 POST-NASAL DRIP: ICD-10-CM

## 2019-10-01 PROCEDURE — G8484 FLU IMMUNIZE NO ADMIN: HCPCS | Performed by: INTERNAL MEDICINE

## 2019-10-01 PROCEDURE — 1036F TOBACCO NON-USER: CPT | Performed by: INTERNAL MEDICINE

## 2019-10-01 PROCEDURE — G8420 CALC BMI NORM PARAMETERS: HCPCS | Performed by: INTERNAL MEDICINE

## 2019-10-01 PROCEDURE — 1090F PRES/ABSN URINE INCON ASSESS: CPT | Performed by: INTERNAL MEDICINE

## 2019-10-01 PROCEDURE — G8399 PT W/DXA RESULTS DOCUMENT: HCPCS | Performed by: INTERNAL MEDICINE

## 2019-10-01 PROCEDURE — G8926 SPIRO NO PERF OR DOC: HCPCS | Performed by: INTERNAL MEDICINE

## 2019-10-01 PROCEDURE — G8427 DOCREV CUR MEDS BY ELIG CLIN: HCPCS | Performed by: INTERNAL MEDICINE

## 2019-10-01 PROCEDURE — G8598 ASA/ANTIPLAT THER USED: HCPCS | Performed by: INTERNAL MEDICINE

## 2019-10-01 PROCEDURE — 4040F PNEUMOC VAC/ADMIN/RCVD: CPT | Performed by: INTERNAL MEDICINE

## 2019-10-01 PROCEDURE — 3023F SPIROM DOC REV: CPT | Performed by: INTERNAL MEDICINE

## 2019-10-01 PROCEDURE — 99214 OFFICE O/P EST MOD 30 MIN: CPT | Performed by: INTERNAL MEDICINE

## 2019-10-01 PROCEDURE — 1123F ACP DISCUSS/DSCN MKR DOCD: CPT | Performed by: INTERNAL MEDICINE

## 2019-10-14 RX ORDER — LOVASTATIN 20 MG/1
TABLET ORAL
Qty: 90 TABLET | Refills: 4 | Status: ON HOLD | OUTPATIENT
Start: 2019-10-14 | End: 2021-03-27

## 2019-10-15 ENCOUNTER — TELEPHONE (OUTPATIENT)
Dept: PULMONOLOGY | Age: 74
End: 2019-10-15

## 2019-10-15 DIAGNOSIS — J44.9 CHRONIC OBSTRUCTIVE PULMONARY DISEASE, UNSPECIFIED COPD TYPE (HCC): Primary | ICD-10-CM

## 2019-10-15 RX ORDER — ALBUTEROL SULFATE 2.5 MG/3ML
2.5 SOLUTION RESPIRATORY (INHALATION) 4 TIMES DAILY
Qty: 120 EACH | Refills: 3 | Status: SHIPPED | OUTPATIENT
Start: 2019-10-15

## 2019-10-21 DIAGNOSIS — J40 BRONCHITIS: Primary | ICD-10-CM

## 2019-10-21 RX ORDER — CEFUROXIME AXETIL 500 MG/1
500 TABLET ORAL 2 TIMES DAILY
Qty: 10 TABLET | Refills: 0 | Status: SHIPPED | OUTPATIENT
Start: 2019-10-21 | End: 2019-10-26

## 2019-10-21 RX ORDER — PREDNISONE 10 MG/1
40 TABLET ORAL DAILY
Qty: 20 TABLET | Refills: 0 | Status: SHIPPED | OUTPATIENT
Start: 2019-10-21 | End: 2019-10-26

## 2019-11-05 RX ORDER — ERGOCALCIFEROL 1.25 MG/1
50000 CAPSULE ORAL WEEKLY
Qty: 12 CAPSULE | Refills: 4 | Status: SHIPPED | OUTPATIENT
Start: 2019-11-05 | End: 2020-12-29

## 2019-11-07 ENCOUNTER — TELEPHONE (OUTPATIENT)
Dept: PULMONOLOGY | Age: 74
End: 2019-11-07

## 2019-12-05 ENCOUNTER — OFFICE VISIT (OUTPATIENT)
Dept: FAMILY MEDICINE CLINIC | Age: 74
End: 2019-12-05
Payer: MEDICARE

## 2019-12-05 VITALS
BODY MASS INDEX: 21.97 KG/M2 | HEART RATE: 83 BPM | WEIGHT: 124 LBS | SYSTOLIC BLOOD PRESSURE: 120 MMHG | OXYGEN SATURATION: 97 % | RESPIRATION RATE: 18 BRPM | DIASTOLIC BLOOD PRESSURE: 70 MMHG | HEIGHT: 63 IN | TEMPERATURE: 98.4 F

## 2019-12-05 DIAGNOSIS — C50.911 BREAST CANCER METASTASIZED TO AXILLARY LYMPH NODE, RIGHT (HCC): ICD-10-CM

## 2019-12-05 DIAGNOSIS — F33.1 MODERATE EPISODE OF RECURRENT MAJOR DEPRESSIVE DISORDER (HCC): Primary | ICD-10-CM

## 2019-12-05 DIAGNOSIS — C77.3 BREAST CANCER METASTASIZED TO AXILLARY LYMPH NODE, RIGHT (HCC): ICD-10-CM

## 2019-12-05 DIAGNOSIS — Z23 NEED FOR INFLUENZA VACCINATION: ICD-10-CM

## 2019-12-05 DIAGNOSIS — J44.9 CHRONIC OBSTRUCTIVE PULMONARY DISEASE, UNSPECIFIED COPD TYPE (HCC): ICD-10-CM

## 2019-12-05 DIAGNOSIS — L85.3 DRY SKIN: ICD-10-CM

## 2019-12-05 DIAGNOSIS — Z23 NEED FOR VACCINATION FOR STREP PNEUMONIAE: ICD-10-CM

## 2019-12-05 DIAGNOSIS — M89.8X9 BONE PAIN: ICD-10-CM

## 2019-12-05 DIAGNOSIS — I25.10 CORONARY ARTERY DISEASE INVOLVING NATIVE HEART WITHOUT ANGINA PECTORIS, UNSPECIFIED VESSEL OR LESION TYPE: ICD-10-CM

## 2019-12-05 DIAGNOSIS — G47.09 OTHER INSOMNIA: ICD-10-CM

## 2019-12-05 DIAGNOSIS — H61.21 EXCESSIVE CERUMEN IN EAR CANAL, RIGHT: ICD-10-CM

## 2019-12-05 PROCEDURE — G0008 ADMIN INFLUENZA VIRUS VAC: HCPCS | Performed by: NURSE PRACTITIONER

## 2019-12-05 PROCEDURE — 90732 PPSV23 VACC 2 YRS+ SUBQ/IM: CPT | Performed by: NURSE PRACTITIONER

## 2019-12-05 PROCEDURE — 4040F PNEUMOC VAC/ADMIN/RCVD: CPT | Performed by: NURSE PRACTITIONER

## 2019-12-05 PROCEDURE — G8926 SPIRO NO PERF OR DOC: HCPCS | Performed by: NURSE PRACTITIONER

## 2019-12-05 PROCEDURE — 1090F PRES/ABSN URINE INCON ASSESS: CPT | Performed by: NURSE PRACTITIONER

## 2019-12-05 PROCEDURE — 99214 OFFICE O/P EST MOD 30 MIN: CPT | Performed by: NURSE PRACTITIONER

## 2019-12-05 PROCEDURE — G8399 PT W/DXA RESULTS DOCUMENT: HCPCS | Performed by: NURSE PRACTITIONER

## 2019-12-05 PROCEDURE — 1036F TOBACCO NON-USER: CPT | Performed by: NURSE PRACTITIONER

## 2019-12-05 PROCEDURE — G0009 ADMIN PNEUMOCOCCAL VACCINE: HCPCS | Performed by: NURSE PRACTITIONER

## 2019-12-05 PROCEDURE — G8482 FLU IMMUNIZE ORDER/ADMIN: HCPCS | Performed by: NURSE PRACTITIONER

## 2019-12-05 PROCEDURE — G8598 ASA/ANTIPLAT THER USED: HCPCS | Performed by: NURSE PRACTITIONER

## 2019-12-05 PROCEDURE — 69210 REMOVE IMPACTED EAR WAX UNI: CPT | Performed by: NURSE PRACTITIONER

## 2019-12-05 PROCEDURE — 1123F ACP DISCUSS/DSCN MKR DOCD: CPT | Performed by: NURSE PRACTITIONER

## 2019-12-05 PROCEDURE — G8427 DOCREV CUR MEDS BY ELIG CLIN: HCPCS | Performed by: NURSE PRACTITIONER

## 2019-12-05 PROCEDURE — G8510 SCR DEP NEG, NO PLAN REQD: HCPCS | Performed by: NURSE PRACTITIONER

## 2019-12-05 PROCEDURE — 3023F SPIROM DOC REV: CPT | Performed by: NURSE PRACTITIONER

## 2019-12-05 PROCEDURE — 90653 IIV ADJUVANT VACCINE IM: CPT | Performed by: NURSE PRACTITIONER

## 2019-12-05 PROCEDURE — G8420 CALC BMI NORM PARAMETERS: HCPCS | Performed by: NURSE PRACTITIONER

## 2019-12-05 RX ORDER — AMMONIUM LACTATE 12 G/100G
LOTION TOPICAL
Qty: 1 BOTTLE | Refills: 2 | Status: SHIPPED | OUTPATIENT
Start: 2019-12-05 | End: 2020-03-12

## 2019-12-05 ASSESSMENT — PATIENT HEALTH QUESTIONNAIRE - PHQ9
1. LITTLE INTEREST OR PLEASURE IN DOING THINGS: 0
SUM OF ALL RESPONSES TO PHQ QUESTIONS 1-9: 0
2. FEELING DOWN, DEPRESSED OR HOPELESS: 0
SUM OF ALL RESPONSES TO PHQ9 QUESTIONS 1 & 2: 0
SUM OF ALL RESPONSES TO PHQ QUESTIONS 1-9: 0

## 2019-12-16 RX ORDER — OMEPRAZOLE 20 MG/1
20 CAPSULE, DELAYED RELEASE ORAL DAILY
Qty: 90 CAPSULE | Refills: 4 | Status: ON HOLD | OUTPATIENT
Start: 2019-12-16 | End: 2021-03-27

## 2019-12-19 ENCOUNTER — HOSPITAL ENCOUNTER (OUTPATIENT)
Dept: NUCLEAR MEDICINE | Age: 74
Discharge: HOME | End: 2019-12-21
Payer: MEDICARE

## 2019-12-19 DIAGNOSIS — C77.3 BREAST CANCER METASTASIZED TO AXILLARY LYMPH NODE, RIGHT (HCC): ICD-10-CM

## 2019-12-19 DIAGNOSIS — C50.911 BREAST CANCER METASTASIZED TO AXILLARY LYMPH NODE, RIGHT (HCC): ICD-10-CM

## 2019-12-19 DIAGNOSIS — M89.8X9 BONE PAIN: ICD-10-CM

## 2019-12-19 PROCEDURE — 3430000000 HC RX DIAGNOSTIC RADIOPHARMACEUTICAL: Performed by: NURSE PRACTITIONER

## 2019-12-19 PROCEDURE — 78306 BONE IMAGING WHOLE BODY: CPT

## 2019-12-19 PROCEDURE — A9503 TC99M MEDRONATE: HCPCS | Performed by: NURSE PRACTITIONER

## 2019-12-19 RX ORDER — TC 99M MEDRONATE 20 MG/10ML
25 INJECTION, POWDER, LYOPHILIZED, FOR SOLUTION INTRAVENOUS
Status: COMPLETED | OUTPATIENT
Start: 2019-12-19 | End: 2019-12-19

## 2019-12-19 RX ADMIN — TC 99M MEDRONATE 26.5 MILLICURIE: 20 INJECTION, POWDER, LYOPHILIZED, FOR SOLUTION INTRAVENOUS at 13:30

## 2020-01-02 ENCOUNTER — OFFICE VISIT (OUTPATIENT)
Dept: FAMILY MEDICINE CLINIC | Age: 75
End: 2020-01-02
Payer: MEDICARE

## 2020-01-02 VITALS
RESPIRATION RATE: 14 BRPM | TEMPERATURE: 98.9 F | SYSTOLIC BLOOD PRESSURE: 138 MMHG | WEIGHT: 124 LBS | HEART RATE: 78 BPM | BODY MASS INDEX: 21.97 KG/M2 | OXYGEN SATURATION: 99 % | DIASTOLIC BLOOD PRESSURE: 80 MMHG | HEIGHT: 63 IN

## 2020-01-02 PROCEDURE — 1123F ACP DISCUSS/DSCN MKR DOCD: CPT | Performed by: NURSE PRACTITIONER

## 2020-01-02 PROCEDURE — 99214 OFFICE O/P EST MOD 30 MIN: CPT | Performed by: NURSE PRACTITIONER

## 2020-01-02 PROCEDURE — G8420 CALC BMI NORM PARAMETERS: HCPCS | Performed by: NURSE PRACTITIONER

## 2020-01-02 PROCEDURE — 3288F FALL RISK ASSESSMENT DOCD: CPT | Performed by: NURSE PRACTITIONER

## 2020-01-02 PROCEDURE — G8482 FLU IMMUNIZE ORDER/ADMIN: HCPCS | Performed by: NURSE PRACTITIONER

## 2020-01-02 PROCEDURE — 3023F SPIROM DOC REV: CPT | Performed by: NURSE PRACTITIONER

## 2020-01-02 PROCEDURE — 4040F PNEUMOC VAC/ADMIN/RCVD: CPT | Performed by: NURSE PRACTITIONER

## 2020-01-02 PROCEDURE — G8926 SPIRO NO PERF OR DOC: HCPCS | Performed by: NURSE PRACTITIONER

## 2020-01-02 PROCEDURE — 1036F TOBACCO NON-USER: CPT | Performed by: NURSE PRACTITIONER

## 2020-01-02 PROCEDURE — G8427 DOCREV CUR MEDS BY ELIG CLIN: HCPCS | Performed by: NURSE PRACTITIONER

## 2020-01-02 PROCEDURE — G8510 SCR DEP NEG, NO PLAN REQD: HCPCS | Performed by: NURSE PRACTITIONER

## 2020-01-02 PROCEDURE — 1090F PRES/ABSN URINE INCON ASSESS: CPT | Performed by: NURSE PRACTITIONER

## 2020-01-02 PROCEDURE — G8399 PT W/DXA RESULTS DOCUMENT: HCPCS | Performed by: NURSE PRACTITIONER

## 2020-01-02 RX ORDER — TRAMADOL HYDROCHLORIDE 50 MG/1
50 TABLET ORAL EVERY 4 HOURS PRN
Qty: 42 TABLET | Refills: 0 | Status: SHIPPED | OUTPATIENT
Start: 2020-01-02 | End: 2020-02-13 | Stop reason: SDUPTHER

## 2020-01-02 ASSESSMENT — PATIENT HEALTH QUESTIONNAIRE - PHQ9
1. LITTLE INTEREST OR PLEASURE IN DOING THINGS: 0
SUM OF ALL RESPONSES TO PHQ QUESTIONS 1-9: 0
SUM OF ALL RESPONSES TO PHQ QUESTIONS 1-9: 0
SUM OF ALL RESPONSES TO PHQ9 QUESTIONS 1 & 2: 0
2. FEELING DOWN, DEPRESSED OR HOPELESS: 0

## 2020-01-02 NOTE — PROGRESS NOTES
pulmonology routinely. Next appointment is in February. She has not had any worsening of symptoms over time. Still able to do the things that she has been doing lately. Has not felt that she has been sick recently. Depression: Louis Lobo reports being in a fair mood that is stable. The patient is not reporting insomnia, difficulty concentrating and usual interest in activities. This patient is not homicidal or suicidal.  She does continue to take trazodone to help with sleep. She states that at this point in her life she is happy to be as functional as she currently is. She continues to worry some about her daughter who has mental illness. Otherwise she states that she is excepted her life as it is. Does miss her  who passed away years ago. States that she is not interested in talking anyone about her mood or feelings. ROS: This patient reports no chest pain or pressure. There is no shortness of breath or cough. The patient reports no nausea or vomiting. There is no heartburn or indigestion. There is no diarrhea or constipation. No black, bloody, mucusy or tarry stool noticed. The patient reports no bloating and no change in appetite. There is no numbness, tingling or swelling in the extremities. I have reviewed the following diagnostic data: Recent nuclear bone scan with evidence of metastatic disease to the thoracic spine and left tibia. EXAM:  Constitutional Blood pressure 138/80, pulse 78, temperature 98.9 °F (37.2 °C), temperature source Temporal, resp. rate 14, height 5' 3\" (1.6 m), weight 124 lb (56.2 kg), SpO2 99 %, not currently breastfeeding. She has a normal affect, no acute distress, appears well developed and well nourished.   Neck:  neck- supple, no mass, non-tender and no bruits  Lungs:  Breathing Pattern: regular, no distress, Breath sounds: diminished breath sounds- right base and left base  Heart:  Heart sounds are normal.  Regular rate and rhythm without murmur, gallop or rub. Extremities: Extremities warm to touch, pink, with no edema. DIAGNOSIS:    Diagnosis Orders   1. Bone pain  traMADol (ULTRAM) 50 MG tablet   2. Mid back pain  traMADol (ULTRAM) 50 MG tablet   3. Abnormal bone scan of thoracic spine  traMADol (ULTRAM) 50 MG tablet   4. Breast cancer metastasized to axillary lymph node, right (HCC)  traMADol (ULTRAM) 50 MG tablet   5. Left leg pain     6. Other specified hearing loss of both ears     7. Chronic obstructive pulmonary disease, unspecified COPD type ([facility] [address]     8. Moderate episode of recurrent major depressive disorder (HCC)           PLAN: Include orders in the DX section. Follow up: 2 months and as needed. Blood work one week prior as ordered. 1. 2. 3. 4. 5.  I did review the nuclear bone scan test results with the patient and discussed likelihood of metastatic findings in her mid back and her left lower leg. Discussed option of referral to oncology for official diagnosis of metastasis to the bone but she declines at this time. She states that she would not want to do any systemic treatment anyway. She is requesting medication for pain. Discussed that I do have limitations of the medications that I can prescribe. She has taken Ultram in the past and it has worked for her without side effect. Prescription given initially for 7 days and if the medication works well for her she will let me know and I will order more long-term medication. Can also consider referral to palliative care in the future if pain becomes more severe or widespread. We spent several minutes discussing findings and her decision to avoid seeking specialist care at this point. Will plan for random urine testing and medication contract at next visit. 6.  Discussed option of hearing assessment and she may go to [facility] to try hearing aids. She has seen ENT in the past.    7.  Breathing has been at baseline on current medication and continuous oxygen.   Continue the same.    8.  She has been sleeping okay at night with trazodone. Other than being tearful during today's appointment she states that her mood has been very stable lately. She is aware to notify me if this changes. Controlled Substance Monitoring:    Acute and Chronic Pain Monitoring:   RX Monitoring 1/2/2020   Attestation -   Acute Pain Prescriptions Severe pain not adequately treated with lower dose. Periodic Controlled Substance Monitoring Possible medication side effects, risk of tolerance/dependence & alternative treatments discussed. ;No signs of potential drug abuse or diversion identified. ;Assessed functional status. Please note this report has been partially produced using speech recognition software and may cause contain errors related to that system including grammar, punctuation and spelling as well as words and phrases that may seem inappropriate. If there are questions or concerns please feel free to contact me to clarify.         Electronically signed by Estee Kwon, 1:29 PM 1/2/20

## 2020-01-08 ENCOUNTER — TELEPHONE (OUTPATIENT)
Dept: FAMILY MEDICINE CLINIC | Age: 75
End: 2020-01-08

## 2020-01-18 NOTE — TELEPHONE ENCOUNTER
Pharmacy requesting medication refill.  Please approve or deny this request.    Rx requested:  Requested Prescriptions     Pending Prescriptions Disp Refills    theophylline (UNIPHYL) 400 MG extended release tablet [Pharmacy Med Name: THEOPHYLLINE ER TABS 400MG] 90 tablet 4     Sig: TAKE 1 TABLET DAILY         Last Office Visit:   1/2/2020      Next Visit Date:  Future Appointments   Date Time Provider Darlene Walsh   2/3/2020  2:00 PM Celso Steiner MD [address]   3/12/2020  2:00 PM Cinda Omer, [address]

## 2020-01-20 RX ORDER — THEOPHYLLINE 400 MG/1
TABLET, EXTENDED RELEASE ORAL
Qty: 90 TABLET | Refills: 4 | Status: ON HOLD | OUTPATIENT
Start: 2020-01-20 | End: 2020-10-12

## 2020-01-26 NOTE — TELEPHONE ENCOUNTER
Pharmacy is requesting medication refill.  Please approve or deny this request.    Rx requested:  Requested Prescriptions     Pending Prescriptions Disp Refills    traZODone (DESYREL) 150 MG tablet [Pharmacy Med Name: TRAZODONE HCL TABS 150MG] 90 tablet 4     Sig: Take 1 tablet by mouth nightly         Last Office Visit:   6/18/2019      Next Visit Date:  Future Appointments   Date Time Provider [facility] [place]   2/3/2020  2:00 PM Debbie Soni MD [address]   3/12/2020  2:00 PM Farhad Joyce, [address]

## 2020-01-27 RX ORDER — TRAZODONE HYDROCHLORIDE 150 MG/1
150 TABLET ORAL NIGHTLY
Qty: 90 TABLET | Refills: 4 | Status: SHIPPED | OUTPATIENT
Start: 2020-01-27 | End: 2021-02-25 | Stop reason: DRUGHIGH

## 2020-02-03 ENCOUNTER — OFFICE VISIT (OUTPATIENT)
Dept: PULMONOLOGY | Age: 75
End: 2020-02-03
Payer: MEDICARE

## 2020-02-03 VITALS
DIASTOLIC BLOOD PRESSURE: 82 MMHG | BODY MASS INDEX: 21.3 KG/M2 | RESPIRATION RATE: 15 BRPM | OXYGEN SATURATION: 98 % | WEIGHT: 120.2 LBS | SYSTOLIC BLOOD PRESSURE: 136 MMHG | HEART RATE: 69 BPM | HEIGHT: 63 IN

## 2020-02-03 PROCEDURE — G8420 CALC BMI NORM PARAMETERS: HCPCS | Performed by: INTERNAL MEDICINE

## 2020-02-03 PROCEDURE — G8926 SPIRO NO PERF OR DOC: HCPCS | Performed by: INTERNAL MEDICINE

## 2020-02-03 PROCEDURE — 1090F PRES/ABSN URINE INCON ASSESS: CPT | Performed by: INTERNAL MEDICINE

## 2020-02-03 PROCEDURE — 99214 OFFICE O/P EST MOD 30 MIN: CPT | Performed by: INTERNAL MEDICINE

## 2020-02-03 PROCEDURE — G8482 FLU IMMUNIZE ORDER/ADMIN: HCPCS | Performed by: INTERNAL MEDICINE

## 2020-02-03 PROCEDURE — G8399 PT W/DXA RESULTS DOCUMENT: HCPCS | Performed by: INTERNAL MEDICINE

## 2020-02-03 PROCEDURE — 1036F TOBACCO NON-USER: CPT | Performed by: INTERNAL MEDICINE

## 2020-02-03 PROCEDURE — 3023F SPIROM DOC REV: CPT | Performed by: INTERNAL MEDICINE

## 2020-02-03 PROCEDURE — 4040F PNEUMOC VAC/ADMIN/RCVD: CPT | Performed by: INTERNAL MEDICINE

## 2020-02-03 PROCEDURE — G8427 DOCREV CUR MEDS BY ELIG CLIN: HCPCS | Performed by: INTERNAL MEDICINE

## 2020-02-03 PROCEDURE — 1123F ACP DISCUSS/DSCN MKR DOCD: CPT | Performed by: INTERNAL MEDICINE

## 2020-02-03 RX ORDER — BUDESONIDE AND FORMOTEROL FUMARATE DIHYDRATE 160; 4.5 UG/1; UG/1
2 AEROSOL RESPIRATORY (INHALATION) 2 TIMES DAILY
COMMUNITY
End: 2020-08-11

## 2020-02-03 RX ORDER — BUDESONIDE 0.5 MG/2ML
1 INHALANT ORAL 2 TIMES DAILY
Qty: 60 AMPULE | Refills: 3 | Status: SHIPPED | OUTPATIENT
Start: 2020-02-03 | End: 2020-02-11 | Stop reason: SDUPTHER

## 2020-02-03 NOTE — PROGRESS NOTES
Subjective:     Juanito Norwood is a 66 y.o. female who complains today of:     Chief Complaint   Patient presents with    Follow-up     COPD       HPI  Patient presents for COPD follow-up    Essential symptoms not controlled, she has significant dyspnea on exertion, no coughing, no chest pain, no fever or chills, no heartburn or dysphagia, no lower extremity edema, she has pain in her left leg she did bone scan suspicious for metastases in her leg and back she did not want to see oncology however after lengthy discussion she is agreeable for referral likely sources metastatic breast cancer. No nausea or vomiting, appetite is okay no significant weight loss. She is agreeable to pulmonary rehab however I am concerned with a lesion in her left lower extremity which is probably metastatic disease and I would wait until she gets treatment hopefully radiation            Allergies:  Patient has no known allergies.   Past Medical History:   Diagnosis Date    Anxiety     Anxiety and depression     CAD (coronary artery disease)     Cancer ([facility] [address] 3/2014    Invasive ductal cancer left breast, spread to 5 lymph nodes    Carotid artery stenosis and occlusion     COPD (chronic obstructive pulmonary disease) ([facility] [address]     Depression 1/15/2018    GERD (gastroesophageal reflux disease)     Headache(784.0)     Hyperlipidemia     Hypertension     Hypothyroidism     Insomnia     Osteoarthritis     RBBB 10/15/2014    Right carotid bruit 5/26/2015    S/P cardiac catheterization 7/13/2016     Past Surgical History:   Procedure Laterality Date    BREAST BIOPSY Right 11/8/2016     biopsy    BREAST SURGERY Left 2/26/14    U/S Guided core bx of the left breast    BREAST SURGERY Right 3/20/14    U/S of the lateral right breast    BREAST SURGERY Left 3/27/14    U/S Guided Left breast lumpectomy and left snb    OTHER SURGICAL HISTORY  4/11/14    Placement drain, left axillary seroma    [place] MASTECTOMY, SIMPLE, COMPLETE Right 2018    R modified radical mastectomy     Family History   Problem Relation Age of Onset    Cancer Sister         breast    Cancer Maternal Uncle         pancreatic     Social History     Socioeconomic History    Marital status:      Spouse name: Not on file    Number of children: Not on file    Years of education: Not on file    Highest education level: Not on file   Occupational History    Not on file   Social Needs    Financial resource strain: Not on file    Food insecurity:     Worry: Not on file     Inability: Not on file    Transportation needs:     Medical: Not on file     Non-medical: Not on file   Tobacco Use    Smoking status: Former Smoker     Packs/day: 1.50     Years: 40.00     Pack years: 60.00     Types: Cigarettes     Last attempt to quit: 1989     Years since quittin.0    Smokeless tobacco: Never Used   Substance and Sexual Activity    Alcohol use: No    Drug use: No    Sexual activity: Never   Lifestyle    Physical activity:     Days per week: Not on file     Minutes per session: Not on file    Stress: Not on file   Relationships    Social connections:     Talks on phone: Not on file     Gets together: Not on file     Attends Anabaptism service: Not on file     Active member of club or organization: Not on file     Attends meetings of clubs or organizations: Not on file     Relationship status: Not on file    Intimate partner violence:     Fear of current or ex partner: Not on file     Emotionally abused: Not on file     Physically abused: Not on file     Forced sexual activity: Not on file   Other Topics Concern    Not on file   Social History Narrative    Not on file         Review of Systems      ROS: 10 organs review of system is done including general, psychological, ENT, hematological, endocrine, respiratory, cardiovascular, gastrointestinal,musculoskeletal, neurological,  allergy and Immunology is done and is otherwise negative.     Current Outpatient EF    Assessment and Plan       Diagnosis Orders   1. Chronic obstructive pulmonary disease, unspecified COPD type ([facility] [address]     2. Mediastinal lymphadenopathy  CT CHEST WO CONTRAST   3. Metastatic cancer ([facility] [address]  Amb External Referral To Oncology   4. Gastroesophageal reflux disease without esophagitis     5. Chronic respiratory failure with hypoxia (HCC)       · COPD very severe, symptoms not controlled, she quit taking Trelegy back on Symbicort and Spiriva, I will add budesonide nebs, continue duo nebs as needed, and yearly flu shot. I will send to pulmonary rehab after left leg probable metastatic lesion is addressed hopefully by radiation treatment if patient agrees.   · Mediastinal lymphadenopathy patient finally agrees to follow-up CAT scan which I ordered today we will see how it looks and evaluate  · Probable metastatic lesions in the back and left leg patient agreeable to see Dr. Feliberto Banda, referral placed she would likely need radiation treatment  · Continue PPI  · Continue O2 with activity and while asleep target sat 88 to 90%      Orders Placed This Encounter   Procedures    CT CHEST WO CONTRAST     Standing Status:   Future     Standing Expiration Date:   2/3/2021    Amb External Referral To Oncology     Referral Priority:   Routine     Referral Type:   Consult for Advice and Opinion     Referral Reason:   Specialty Services Required     Referral Location:   /[facility] [place] [address]     Referred to Provider:   Diamantina Hodgkins, MD     Requested Specialty:   Hematology and Oncology     Number of Visits Requested:   1     Orders Placed This Encounter   Medications    budesonide (PULMICORT) 0.5 MG/2ML nebulizer suspension     Sig: Take 2 mLs by nebulization 2 times daily     Dispense:  60 ampule     Refill:  3     Discussed with patient the importance of exercise and weight control and  overall health and well-being.     Reviewed with the patient: current clinical status, medications, activities and

## 2020-02-04 NOTE — PROGRESS NOTES
[facility] Respiratory Therapy Evaluation   Current Order: QID    Home Regimen:  QID per pt     Ordering Physician:Ryan  Re-evaluation Date:  6/12  Diagnosis: COPD  Patient Status: Stable / Unstable + Physician notified    The following MDI Criteria must be met in order to convert aerosol to MDI with spacer. If unable to meet, MDI will be converted to aerosol:  []  Patient able to demonstrate the ability to use MDI effectively  []  Patient alert and cooperative  []  Patient able to take deep breath with 5-10 second hold  []  Medication(s) available in this delivery method   []  Peak flow greater than or equal to 200 ml/min            Current Order Substituted To  (same drug, same frequency)   Aerosol to MDI [] Albuterol Sulfate 0.083% unit dose by aerosol Albuterol Sulfate MDI 2 puffs by inhalation with spacer    [] Levalbuterol 1.25 mg unit dose by aerosol Levalbuterol MDI 2 puffs by inhalation with spacer    [] Levalbuterol 0.63 mg unit dose by aerosol Levalbuterol MDI 2 puffs by inhalation with spacer    [] Ipratropium Bromide 0.02% unit dose by aerosol Ipratropium Bromide MDI 2 puffs by inhalation with spacer    [] Duoneb (Ipratropium + Albuterol) unit dose by aerosol Ipratropium MDI + Albuterol MDI 2 puffs by inhalation w/spacer   MDI to Aerosol [] Albuterol Sulfate MDI Albuterol Sulfate 0.083% unit dose by aerosol    [] Levalbuterol MDI 2 puffs by inhalation Levalbuterol 1.25 mg unit dose by aerosol    [] Ipratropium Bromide MDI by inhalation Ipratropium Bromide 0.02% unit dose by aerosol    [] Combivent (Ipratropium + Albuterol) MDI by inhalation Duoneb (Ipratropium + Albuterol) unit dose by aerosol   Treatment Assessment [Frequency/Schedule]:  Change frequency to: _________QID per home med_________________________________________per Protocol, P&T, MEC      Points 0 1 2 3 4   Pulmonary Status  Non-Smoker  []   Smoking history   < 20 pack years  []   Smoking history  ?  20 pack years  []   Pulmonary Patient/Spouse/Significant Other Disorder  (acute or chronic)  [x]   Severe or Chronic w/ Exacerbation  []     Surgical Status No [x]   Surgeries     General []   Surgery Lower []   Abdominal Thoracic or []   Upper Abdominal Thoracic with  PulmonaryDisorder  []     Chest X-ray Clear/Not  Ordered     [x]  Chronic Changes  Results Pending  []  Infiltrates, atelectasis, pleural effusion, or edema  []  Infiltrates in more than one lobe []  Infiltrate + Atelectasis, &/or pleural effusion  []    Respiratory Pattern Regular,  RR = 12-20 [x]  Increased,  RR = 21-25 []  SOL, irregular,  or RR = 26-30 []  Decreased FEV1  or RR = 31-35 []  Severe SOB, use  of accessory muscles, or RR ? 35  []    Mental Status Alert, oriented,  Cooperative [x]  Confused but Follows commands []  Lethargic or unable to follow commands []  Obtunded  []  Comatose  []    Breath Sounds Clear to  auscultation  []  Decreased unilaterally or  in bases only []  Decreased  bilaterally  [x]  Crackles or intermittent wheezes []  Wheezes []    Cough Strong, Spontan., & nonproductive [x]  Strong,  spontaneous, &  productive []  Weak,  Nonproductive []  Weak, productive or  with wheezes []  No spontaneous  cough or may require suctioning []    Level of Activity Ambulatory []  Ambulatory w/ Assist  [x]  Non-ambulatory []  Paraplegic []  Quadriplegic []    Total    Score:___6____     Triage Score:__4______      Tri       Triage:     1. (>20) Freq: Q3    2. (16-20) Freq: Q4   3. (11-15) Freq: QID & Albuterol Q2 PRN    4. (6-10) Freq: TID & Albuterol Q2 PRN    5. (0-5) Freq Q4prn Spouse/Significant Other

## 2020-02-10 ENCOUNTER — TELEPHONE (OUTPATIENT)
Dept: PULMONOLOGY | Age: 75
End: 2020-02-10

## 2020-02-10 ENCOUNTER — HOSPITAL ENCOUNTER (OUTPATIENT)
Dept: LAB | Age: 75
Discharge: HOME | End: 2020-02-10
Payer: MEDICARE

## 2020-02-10 ENCOUNTER — HOSPITAL ENCOUNTER (OUTPATIENT)
Dept: CT IMAGING | Age: 75
Discharge: HOME | End: 2020-02-12
Payer: MEDICARE

## 2020-02-10 LAB
ALBUMIN SERPL-MCNC: 4.3 G/DL (ref 3.5–4.6)
ALP BLD-CCNC: 56 U/L (ref 40–130)
ALT SERPL-CCNC: 9 U/L (ref 0–33)
ANION GAP SERPL CALCULATED.3IONS-SCNC: 11 MEQ/L (ref 9–15)
AST SERPL-CCNC: 20 U/L (ref 0–35)
BILIRUB SERPL-MCNC: 0.4 MG/DL (ref 0.2–0.7)
BUN BLDV-MCNC: 13 MG/DL (ref 8–23)
CALCIUM SERPL-MCNC: 10.4 MG/DL (ref 8.5–9.9)
CHLORIDE BLD-SCNC: 98 MEQ/L (ref 95–107)
CO2: 30 MEQ/L (ref 20–31)
CREAT SERPL-MCNC: 0.75 MG/DL (ref 0.5–0.9)
GFR AFRICAN AMERICAN: >60
GFR NON-AFRICAN AMERICAN: >60
GLOBULIN: 2.3 G/DL (ref 2.3–3.5)
GLUCOSE BLD-MCNC: 112 MG/DL (ref 70–99)
POTASSIUM SERPL-SCNC: 4.6 MEQ/L (ref 3.4–4.9)
SODIUM BLD-SCNC: 139 MEQ/L (ref 135–144)
TOTAL PROTEIN: 6.6 G/DL (ref 6.3–8)

## 2020-02-10 PROCEDURE — 80053 COMPREHEN METABOLIC PANEL: CPT

## 2020-02-10 PROCEDURE — 36415 COLL VENOUS BLD VENIPUNCTURE: CPT

## 2020-02-10 PROCEDURE — 71260 CT THORAX DX C+: CPT

## 2020-02-10 PROCEDURE — 6360000004 HC RX CONTRAST MEDICATION: Performed by: INTERNAL MEDICINE

## 2020-02-10 RX ADMIN — IOPAMIDOL 100 ML: 755 INJECTION, SOLUTION INTRAVENOUS at 16:06

## 2020-02-10 NOTE — TELEPHONE ENCOUNTER
Spoke with the patient over the phone discussed finding on CAT scan increased size of the lymph node, recommended proceeding with endobronchial ultrasound and biopsy however she does not want to do it at this point she would like to think about it and would like to see oncology. She did not make the appointment yet with oncology but she is going to soon. Discussed with the patient to strongly consider further work-up she will call me and let me know if she decides to proceed with the procedure.

## 2020-02-11 RX ORDER — BUDESONIDE 0.5 MG/2ML
1 INHALANT ORAL 2 TIMES DAILY
Qty: 60 AMPULE | Refills: 3 | Status: SHIPPED | OUTPATIENT
Start: 2020-02-11 | End: 2020-02-27 | Stop reason: SDUPTHER

## 2020-02-13 ENCOUNTER — OFFICE VISIT (OUTPATIENT)
Dept: FAMILY MEDICINE CLINIC | Age: 75
End: 2020-02-13
Payer: MEDICARE

## 2020-02-13 VITALS
WEIGHT: 115 LBS | HEIGHT: 63 IN | OXYGEN SATURATION: 98 % | BODY MASS INDEX: 20.38 KG/M2 | TEMPERATURE: 98.7 F | RESPIRATION RATE: 16 BRPM | SYSTOLIC BLOOD PRESSURE: 104 MMHG | DIASTOLIC BLOOD PRESSURE: 60 MMHG | HEART RATE: 74 BPM

## 2020-02-13 PROCEDURE — G8482 FLU IMMUNIZE ORDER/ADMIN: HCPCS | Performed by: NURSE PRACTITIONER

## 2020-02-13 PROCEDURE — G8399 PT W/DXA RESULTS DOCUMENT: HCPCS | Performed by: NURSE PRACTITIONER

## 2020-02-13 PROCEDURE — 1123F ACP DISCUSS/DSCN MKR DOCD: CPT | Performed by: NURSE PRACTITIONER

## 2020-02-13 PROCEDURE — 4040F PNEUMOC VAC/ADMIN/RCVD: CPT | Performed by: NURSE PRACTITIONER

## 2020-02-13 PROCEDURE — 99214 OFFICE O/P EST MOD 30 MIN: CPT | Performed by: NURSE PRACTITIONER

## 2020-02-13 PROCEDURE — G8427 DOCREV CUR MEDS BY ELIG CLIN: HCPCS | Performed by: NURSE PRACTITIONER

## 2020-02-13 PROCEDURE — 1090F PRES/ABSN URINE INCON ASSESS: CPT | Performed by: NURSE PRACTITIONER

## 2020-02-13 PROCEDURE — G8420 CALC BMI NORM PARAMETERS: HCPCS | Performed by: NURSE PRACTITIONER

## 2020-02-13 PROCEDURE — 1036F TOBACCO NON-USER: CPT | Performed by: NURSE PRACTITIONER

## 2020-02-13 RX ORDER — MIRTAZAPINE 7.5 MG/1
7.5 TABLET, FILM COATED ORAL NIGHTLY
Qty: 30 TABLET | Refills: 1 | Status: SHIPPED | OUTPATIENT
Start: 2020-02-13 | End: 2020-03-12

## 2020-02-13 RX ORDER — TRAMADOL HYDROCHLORIDE 50 MG/1
50 TABLET ORAL EVERY 4 HOURS PRN
Qty: 120 TABLET | Refills: 2 | Status: SHIPPED | OUTPATIENT
Start: 2020-02-13 | End: 2020-09-29 | Stop reason: SDUPTHER

## 2020-02-13 ASSESSMENT — PATIENT HEALTH QUESTIONNAIRE - PHQ9
1. LITTLE INTEREST OR PLEASURE IN DOING THINGS: 0
SUM OF ALL RESPONSES TO PHQ QUESTIONS 1-9: 0
SUM OF ALL RESPONSES TO PHQ9 QUESTIONS 1 & 2: 0
SUM OF ALL RESPONSES TO PHQ QUESTIONS 1-9: 0
2. FEELING DOWN, DEPRESSED OR HOPELESS: 0

## 2020-02-13 NOTE — PROGRESS NOTES
to feeling down some days but nothing consistently. Continues to help look after her daughter who has significant mental illness. She states that she has not had any thoughts of self-harm or harm to others. Does have problems sleeping at night because of worrying about things. She states that she is also had a decreased appetite lately. She denies any stomach upset or pain but just does not feel hungry. Has been trying to force herself to eat more often. He has noticed some weight loss and her cardiologist mentions some concern over how much weight she is losing. ROS: This patient reports no chest pain or pressure. There is no shortness of breath or cough. The patient reports no nausea or vomiting. There is no heartburn or indigestion. There is no diarrhea or constipation. No black, bloody, mucusy or tarry stool noticed. The patient reports no bloating and no change in appetite. There is no numbness, tingling or swelling in the extremities. I have reviewed the following diagnostic data: CT of the chest with abnormal findings. EXAM:  Constitutional Blood pressure 104/60, pulse 74, temperature 98.7 °F (37.1 °C), temperature source Temporal, resp. rate 16, height 5' 3\" (1.6 m), weight 115 lb (52.2 kg), SpO2 98 %, not currently breastfeeding. .  She has a normal affect, no acute distress, appears well developed and well nourished. Neck:  neck- supple, no mass, non-tender and no bruits  Lungs:  Normal expansion. Clear to auscultation. No rales, rhonchi, or wheezing., No chest wall tenderness. Heart:  Heart sounds are normal.  Regular rate and rhythm without murmur, gallop or rub. Abdomen:  Soft, non-tender, normal bowel sounds. No bruits, organomegaly or masses. Extremities: Extremities warm to touch, pink, with no edema. DIAGNOSIS:    Diagnosis Orders   1. Weight loss  traMADol (ULTRAM) 50 MG tablet   2. Bone pain  traMADol (ULTRAM) 50 MG tablet   3.  Mid back pain  traMADol (ULTRAM) 50 assistance with current medication. Reviewed that condition impacts psychological and/or physical function in daily life. Reviewed that medication does indeed improve function/pain  Reviewed patient/provider roles in compliance. Reviewed acceptable alternatives (CBT/medication/exercise/therapy)  Reviewed medication agreement. Reviewed use, abuse and diversion of medications. Discussed need for random urine testing at least yearly. No significant interactions/side effects reported. Controlled Substance Monitoring:    Acute and Chronic Pain Monitoring:   RX Monitoring 2/13/2020   Attestation -   Acute Pain Prescriptions Severe pain not adequately treated with lower dose. Periodic Controlled Substance Monitoring Possible medication side effects, risk of tolerance/dependence & alternative treatments discussed. ;No signs of potential drug abuse or diversion identified. ;Assessed functional status. Please note this report has been partially produced using speech recognition software and may cause contain errors related to that system including grammar, punctuation and spelling as well as words and phrases that may seem inappropriate. If there are questions or concerns please feel free to contact me to clarify.         Electronically signed by Robert Beach, 2:16 PM 2/13/20

## 2020-02-25 ENCOUNTER — HOSPITAL ENCOUNTER (OUTPATIENT)
Dept: GENERAL RADIOLOGY | Age: 75
Discharge: HOME | End: 2020-02-27
Payer: MEDICARE

## 2020-02-25 PROCEDURE — 73590 X-RAY EXAM OF LOWER LEG: CPT

## 2020-02-27 RX ORDER — BUDESONIDE 0.5 MG/2ML
1 INHALANT ORAL 2 TIMES DAILY
Qty: 60 AMPULE | Refills: 3 | Status: CANCELLED | OUTPATIENT
Start: 2020-02-27

## 2020-02-27 RX ORDER — BUDESONIDE 0.5 MG/2ML
1 INHALANT ORAL 2 TIMES DAILY
Qty: 60 AMPULE | Refills: 3 | Status: ON HOLD | OUTPATIENT
Start: 2020-02-27 | End: 2020-10-16 | Stop reason: HOSPADM

## 2020-03-05 ENCOUNTER — HOSPITAL ENCOUNTER (OUTPATIENT)
Dept: CT IMAGING | Age: 75
Discharge: HOME | End: 2020-03-07
Payer: MEDICARE

## 2020-03-05 VITALS — HEART RATE: 62 BPM | SYSTOLIC BLOOD PRESSURE: 163 MMHG | DIASTOLIC BLOOD PRESSURE: 81 MMHG | RESPIRATION RATE: 16 BRPM

## 2020-03-05 PROCEDURE — 2500000003 HC RX 250 WO HCPCS: Performed by: INTERNAL MEDICINE

## 2020-03-05 PROCEDURE — 6360000004 HC RX CONTRAST MEDICATION: Performed by: INTERNAL MEDICINE

## 2020-03-05 PROCEDURE — 74177 CT ABD & PELVIS W/CONTRAST: CPT

## 2020-03-05 RX ORDER — SODIUM CHLORIDE 0.9 % (FLUSH) 0.9 %
10 SYRINGE (ML) INJECTION
Status: DISPENSED | OUTPATIENT
Start: 2020-03-05 | End: 2020-03-05

## 2020-03-05 RX ADMIN — BARIUM SULFATE 450 ML: 20 SUSPENSION ORAL at 13:33

## 2020-03-05 RX ADMIN — IOPAMIDOL 100 ML: 755 INJECTION, SOLUTION INTRAVENOUS at 13:33

## 2020-03-12 ENCOUNTER — OFFICE VISIT (OUTPATIENT)
Dept: FAMILY MEDICINE CLINIC | Age: 75
End: 2020-03-12
Payer: MEDICARE

## 2020-03-12 VITALS
WEIGHT: 118.4 LBS | SYSTOLIC BLOOD PRESSURE: 120 MMHG | DIASTOLIC BLOOD PRESSURE: 60 MMHG | OXYGEN SATURATION: 94 % | HEIGHT: 63 IN | RESPIRATION RATE: 18 BRPM | BODY MASS INDEX: 20.98 KG/M2 | TEMPERATURE: 98.5 F | HEART RATE: 71 BPM

## 2020-03-12 PROCEDURE — G8420 CALC BMI NORM PARAMETERS: HCPCS | Performed by: NURSE PRACTITIONER

## 2020-03-12 PROCEDURE — G8482 FLU IMMUNIZE ORDER/ADMIN: HCPCS | Performed by: NURSE PRACTITIONER

## 2020-03-12 PROCEDURE — 4040F PNEUMOC VAC/ADMIN/RCVD: CPT | Performed by: NURSE PRACTITIONER

## 2020-03-12 PROCEDURE — 99214 OFFICE O/P EST MOD 30 MIN: CPT | Performed by: NURSE PRACTITIONER

## 2020-03-12 PROCEDURE — G8399 PT W/DXA RESULTS DOCUMENT: HCPCS | Performed by: NURSE PRACTITIONER

## 2020-03-12 PROCEDURE — 3023F SPIROM DOC REV: CPT | Performed by: NURSE PRACTITIONER

## 2020-03-12 PROCEDURE — 1090F PRES/ABSN URINE INCON ASSESS: CPT | Performed by: NURSE PRACTITIONER

## 2020-03-12 PROCEDURE — 1036F TOBACCO NON-USER: CPT | Performed by: NURSE PRACTITIONER

## 2020-03-12 PROCEDURE — 1123F ACP DISCUSS/DSCN MKR DOCD: CPT | Performed by: NURSE PRACTITIONER

## 2020-03-12 PROCEDURE — G8926 SPIRO NO PERF OR DOC: HCPCS | Performed by: NURSE PRACTITIONER

## 2020-03-12 PROCEDURE — G8427 DOCREV CUR MEDS BY ELIG CLIN: HCPCS | Performed by: NURSE PRACTITIONER

## 2020-03-12 NOTE — PROGRESS NOTES
Subjective:   Eder Shea is here for follow-up of elevated blood pressure. She is exercising and is adherent to a low-salt diet. Blood pressure is well controlled at home. Cardiac symptoms: none. Patient denies: chest pain, chest pressure/discomfort, claudication, fatigue, irregular heart beat, lower extremity edema, near-syncope and palpitations. This 66 y.o. patient is  hypertensive, is not diabetic, is  hyperlipidemic. She has a history of smoking and has a  family history of heart disease. She is not obese. History of target organ damage: none. Breast cancer: She states that she recently saw Dr. Salvador Nair regarding history of breast cancer and bone pain of her back and her left leg. She states that she had a CT scan of her abdomen and pelvis ordered as well as an x-ray of her leg but has not heard results. She was supposed to see him today but had to reschedule for about 2 weeks from now. States that weight loss has stopped and she has been had about the same weight with even a couple of pounds gain over recent weeks. Left leg pain: She states that leg pain is still present but tends to be worse some days than others. She feels that the pain gets better when she compresses the area with an Ace wrap or just holds the skin over the area where it is painful. No change to the skin. No redness or warmth. No wounds. She has not had difficulty walking on the leg. She recently had an x-ray done that was ordered by her oncologist.    COPD: she is wearing 2 L of oxygen in [place] daily. She states that overall she has not been short of breath unless she tries to do too much too fast.  She has a habit of running everywhere she goes and doing as many things that she can and she has had to make it a point to slow down lately. She has not had any recent chest congestion or sputum production. No recent fever or chills. Mid-back pain: He states that mid back pain tends to come and go as well.   Feels better when months and as needed. Blood work one week prior as ordered. 1.  Pressure is well controlled on current medication. Continue the same. 2.  3.  4.  She is encouraged to keep scheduled appointment with her oncologist in 2 weeks. We did briefly review x-ray results of the leg and the CT of the abdomen and pelvis and discussed need for additional follow-up/testing. She will continue to take Ultram for pain and notify me if additional medication is needed. 5.  Breathing has been at baseline with continuous oxygen. No signs or symptoms of acute exacerbation. She plans to follow with pulmonology later this month as well. Please note this report has been partially produced using speech recognition software and may cause contain errors related to that system including grammar, punctuation and spelling as well as words and phrases that may seem inappropriate. If there are questions or concerns please feel free to contact me to clarify.         Electronically signed by Matteo Corral, 2:50 PM 3/12/20

## 2020-03-25 PROBLEM — E78.5 HYPERLIPIDEMIA: Status: RESOLVED | Noted: 2020-03-25 | Resolved: 2020-03-24

## 2020-04-30 ENCOUNTER — TELEPHONE (OUTPATIENT)
Dept: FAMILY MEDICINE CLINIC | Age: 75
End: 2020-04-30

## 2020-07-13 ENCOUNTER — TELEPHONE (OUTPATIENT)
Dept: FAMILY MEDICINE CLINIC | Age: 75
End: 2020-07-13

## 2020-07-13 NOTE — TELEPHONE ENCOUNTER
Patient would like to have a referral for Ortho he left lower leg is bothering her she ahs never seen one before. cp

## 2020-07-14 NOTE — TELEPHONE ENCOUNTER
1st attempt to reach patient.  Called patient @ [phone]   and left message on machine for patient to return call during normal business hours of 8:30 AM and 5 PM @ [phone] option 3.mj

## 2020-08-10 ENCOUNTER — HOSPITAL ENCOUNTER (OUTPATIENT)
Dept: LAB | Age: 75
Discharge: HOME | End: 2020-08-10
Payer: MEDICARE

## 2020-08-10 LAB
ALBUMIN SERPL-MCNC: 3.9 G/DL (ref 3.5–4.6)
ALP BLD-CCNC: 87 U/L (ref 40–130)
ALT SERPL-CCNC: 49 U/L (ref 0–33)
ANION GAP SERPL CALCULATED.3IONS-SCNC: 8 MEQ/L (ref 9–15)
AST SERPL-CCNC: 52 U/L (ref 0–35)
BILIRUB SERPL-MCNC: 0.5 MG/DL (ref 0.2–0.7)
BUN BLDV-MCNC: 13 MG/DL (ref 8–23)
CALCIUM SERPL-MCNC: 9.8 MG/DL (ref 8.5–9.9)
CHLORIDE BLD-SCNC: 97 MEQ/L (ref 95–107)
CO2: 31 MEQ/L (ref 20–31)
CREAT SERPL-MCNC: 0.81 MG/DL (ref 0.5–0.9)
GFR AFRICAN AMERICAN: >60
GFR NON-AFRICAN AMERICAN: >60
GLOBULIN: 2.5 G/DL (ref 2.3–3.5)
GLUCOSE BLD-MCNC: 99 MG/DL (ref 70–99)
POTASSIUM SERPL-SCNC: 4.9 MEQ/L (ref 3.4–4.9)
SODIUM BLD-SCNC: 136 MEQ/L (ref 135–144)
TOTAL PROTEIN: 6.4 G/DL (ref 6.3–8)

## 2020-08-10 PROCEDURE — 36415 COLL VENOUS BLD VENIPUNCTURE: CPT

## 2020-08-10 PROCEDURE — 80053 COMPREHEN METABOLIC PANEL: CPT

## 2020-08-11 RX ORDER — BUDESONIDE AND FORMOTEROL FUMARATE DIHYDRATE 160; 4.5 UG/1; UG/1
AEROSOL RESPIRATORY (INHALATION)
Qty: 30.6 G | Refills: 3 | Status: SHIPPED | OUTPATIENT
Start: 2020-08-11 | End: 2020-11-03 | Stop reason: ALTCHOICE

## 2020-08-11 NOTE — TELEPHONE ENCOUNTER
Patient is requesting medication refill.  Please approve or deny this request.    Rx requested:  Requested Prescriptions     Pending Prescriptions Disp Refills    SYMBICORT 160-4.5 MCG/ACT AERO [Pharmacy Med Name: Rudi Brower 160/4.5MCG] 30.6 g 3     Sig: USE 2 INHALATIONS TWICE A DAY         Last Office Visit:   3/12/2020      Next Visit Date:  Future Appointments   Date Time Provider Darlene Walsh   8/11/2020  4:20 PM Sasha Frazier, APRN - CNP [address]

## 2020-08-14 ENCOUNTER — HOSPITAL ENCOUNTER (OUTPATIENT)
Dept: ULTRASOUND IMAGING | Age: 75
Discharge: HOME | End: 2020-08-16
Payer: MEDICARE

## 2020-08-14 PROCEDURE — 93971 EXTREMITY STUDY: CPT

## 2020-08-24 ENCOUNTER — HOSPITAL ENCOUNTER (OUTPATIENT)
Dept: NUCLEAR MEDICINE | Age: 75
Discharge: HOME | End: 2020-08-26
Payer: MEDICARE

## 2020-08-24 ENCOUNTER — HOSPITAL ENCOUNTER (OUTPATIENT)
Dept: CT IMAGING | Age: 75
Discharge: HOME | End: 2020-08-26
Payer: MEDICARE

## 2020-08-24 PROCEDURE — 2580000003 HC RX 258: Performed by: INTERNAL MEDICINE

## 2020-08-24 PROCEDURE — A9503 TC99M MEDRONATE: HCPCS | Performed by: INTERNAL MEDICINE

## 2020-08-24 PROCEDURE — 78306 BONE IMAGING WHOLE BODY: CPT

## 2020-08-24 PROCEDURE — 6360000004 HC RX CONTRAST MEDICATION: Performed by: INTERNAL MEDICINE

## 2020-08-24 PROCEDURE — 73701 CT LOWER EXTREMITY W/DYE: CPT

## 2020-08-24 PROCEDURE — 3430000000 HC RX DIAGNOSTIC RADIOPHARMACEUTICAL: Performed by: INTERNAL MEDICINE

## 2020-08-24 RX ORDER — TC 99M MEDRONATE 20 MG/10ML
25 INJECTION, POWDER, LYOPHILIZED, FOR SOLUTION INTRAVENOUS
Status: COMPLETED | OUTPATIENT
Start: 2020-08-24 | End: 2020-08-24

## 2020-08-24 RX ORDER — SODIUM CHLORIDE 0.9 % (FLUSH) 0.9 %
10 SYRINGE (ML) INJECTION PRN
Status: DISCONTINUED | OUTPATIENT
Start: 2020-08-24 | End: 2020-08-27 | Stop reason: HOSPADM

## 2020-08-24 RX ORDER — SODIUM CHLORIDE 0.9 % (FLUSH) 0.9 %
10 SYRINGE (ML) INJECTION ONCE
Status: COMPLETED | OUTPATIENT
Start: 2020-08-24 | End: 2020-08-24

## 2020-08-24 RX ADMIN — IOPAMIDOL 100 ML: 612 INJECTION, SOLUTION INTRAVENOUS at 08:17

## 2020-08-24 RX ADMIN — Medication 10 ML: at 08:44

## 2020-08-24 RX ADMIN — Medication 10 ML: at 08:18

## 2020-08-24 RX ADMIN — TC 99M MEDRONATE 28.8 MILLICURIE: 20 INJECTION, POWDER, LYOPHILIZED, FOR SOLUTION INTRAVENOUS at 08:44

## 2020-09-08 ENCOUNTER — HOSPITAL ENCOUNTER (OUTPATIENT)
Dept: CT IMAGING | Age: 75
Discharge: HOME | End: 2020-09-10
Payer: MEDICARE

## 2020-09-08 PROCEDURE — 2500000003 HC RX 250 WO HCPCS: Performed by: INTERNAL MEDICINE

## 2020-09-08 PROCEDURE — 71260 CT THORAX DX C+: CPT

## 2020-09-08 PROCEDURE — 74177 CT ABD & PELVIS W/CONTRAST: CPT

## 2020-09-08 PROCEDURE — 6360000004 HC RX CONTRAST MEDICATION: Performed by: INTERNAL MEDICINE

## 2020-09-08 RX ADMIN — BARIUM SULFATE 450 ML: 20 SUSPENSION ORAL at 12:47

## 2020-09-08 RX ADMIN — IOPAMIDOL 100 ML: 755 INJECTION, SOLUTION INTRAVENOUS at 13:58

## 2020-09-29 ENCOUNTER — VIRTUAL VISIT (OUTPATIENT)
Dept: FAMILY MEDICINE CLINIC | Age: 75
End: 2020-09-29
Payer: MEDICARE

## 2020-09-29 PROCEDURE — 99443 PR PHYS/QHP TELEPHONE EVALUATION 21-30 MIN: CPT | Performed by: NURSE PRACTITIONER

## 2020-09-29 RX ORDER — GABAPENTIN 100 MG/1
CAPSULE ORAL
Status: ON HOLD | COMMUNITY
Start: 2020-09-25 | End: 2020-10-12

## 2020-09-29 RX ORDER — GUARN/MA-HUANG/P.GIN/S.GINSENG
TABLET ORAL 2 TIMES DAILY
COMMUNITY

## 2020-09-29 RX ORDER — ALBUTEROL SULFATE 90 UG/1
2 AEROSOL, METERED RESPIRATORY (INHALATION) EVERY 6 HOURS PRN
Qty: 3 INHALER | Refills: 3 | Status: SHIPPED | OUTPATIENT
Start: 2020-09-29

## 2020-09-29 RX ORDER — TRAMADOL HYDROCHLORIDE 50 MG/1
50 TABLET ORAL EVERY 4 HOURS PRN
Qty: 120 TABLET | Refills: 2 | Status: SHIPPED | OUTPATIENT
Start: 2020-09-29 | End: 2020-12-28

## 2020-09-29 ASSESSMENT — PATIENT HEALTH QUESTIONNAIRE - PHQ9
SUM OF ALL RESPONSES TO PHQ QUESTIONS 1-9: 0
2. FEELING DOWN, DEPRESSED OR HOPELESS: 0
SUM OF ALL RESPONSES TO PHQ QUESTIONS 1-9: 0
1. LITTLE INTEREST OR PLEASURE IN DOING THINGS: 0
SUM OF ALL RESPONSES TO PHQ9 QUESTIONS 1 & 2: 0

## 2020-09-29 NOTE — PROGRESS NOTES
2020    TELEHEALTH EVALUATION -- Audio/Visual (During EVPNT-00 public health emergency)    Due to [place] 19 outbreak, patient's office visit was converted to a virtual visit. Patient was contacted and agreed to proceed with a virtual visit via Telephone Visit  The risks and benefits of converting to a virtual visit were discussed in light of the current infectious disease epidemic. Patient also understood that insurance coverage and co-pays are up to their individual insurance plans. Tayler Hernandez is a 66 y.o. female being evaluated by a Virtual Visit (video visit) encounter to address concerns as mentioned above. A caregiver was present when appropriate. Due to this being a TeleHealth encounter (During KPHIG-16 public health emergency), evaluation of the following organ systems was limited: Vitals/Constitutional/EENT/Resp/CV/GI//MS/Neuro/Skin/Heme-Lymph-Imm. Pursuant to the emergency declaration under the [address] [place] [address] and the [employer] Act, this Virtual Visit was conducted with patient's (and/or legal guardian's) consent, to reduce the patient's risk of exposure to COVID-19 and provide necessary medical care. The patient (and/or legal guardian) has also been advised to contact this office for worsening conditions or problems, and seek emergency medical treatment and/or call 911 if deemed necessary. Patient identification was verified at the start of the visit: Yes    Total time spent for this encounter: 35 minutes    Services were provided through a video synchronous discussion virtually to substitute for in-person clinic visit. Patient and provider were located at their individual homes. The patient is talking with me virtually from her home and I am located at my office in [facility].        HPI:    Tayler Hernandez (:  1945) has requested an audio/video evaluation for the following concern(s):     COPD: she has had shortness of breath that has been progressive. Rib cage feels tight. Occasional wheezing. She knows that she is overdue with appointment with her pulmonologist and will plan to schedule something soon. She is taking medication as ordered and wearing oxygen continuously. No excessive sputum production. No fever or chills. No hemoptysis. Metastatic breast cancer: following with Dr. Yrn Palomino. Needs biopsy of bone of leg. Suspected cancer of the bone. Once monthly shot for osteoporosis. (xgeva). Next appointment is in the beginning of October. She has seen orthopedic specialist and told that a surgery to place a ning to help support the bone could be done. The patient is not interested in this at this time due to risk. She states that this time she just wants to live her life and not assume any additional risk. She has not noticed any further weight loss. Mid-back pain and leg pain: She states that mid back pain still has been very bothersome to her. She continues to take medication sparingly and states that oncology gave her a short-term prescription of Percocet which she has taken very sparingly and still has some left. She tries not to take medication every day even. Pain can be severe in the leg at times as well. Depression: Wang Winchester reports being in a fair mood that is stable. The patient is not reporting insomnia, difficulty concentrating and usual interest in activities. This patient is not homicidal or suicidal.    CAD/HTN: continues with low salt diet. She states that she has not had any chest pain. When her blood pressures checked at other appointments it is well managed. She is not able to get much physical activity however. Review of Systems   Denies any heart palpitations. No lightheadedness or dizziness. No change in bowel patterns. No difficulty urinating or pain with urination. No flank pain. No hematuria or dysuria.   No lower Steven Melchor MD   metoprolol tartrate (LOPRESSOR) 50 MG tablet Take 50 mg by mouth 2 times daily Yes Historical Provider, MD   guaiFENesin (MUCINEX) 600 MG extended release tablet Take 1,200 mg by mouth 2 times daily Yes Historical Provider, MD   Respiratory Therapy Supplies (NEBULIZER/TUBING/MOUTHPIECE) KIT 1 kit by Does not apply route daily as needed (wheezing) Yes Jose Vanessa Avendano, APRN - CNP       Social History     Tobacco Use    Smoking status: Former Smoker     Packs/day: 1.50     Years: 40.00     Pack years: 60.00     Types: Cigarettes     Last attempt to quit: 1989     Years since quittin.6    Smokeless tobacco: Never Used   Substance Use Topics    Alcohol use: No    Drug use: No        PHYSICAL EXAMINATION:  [ INSTRUCTIONS:  \"[x]\" Indicates a positive item  \"[]\" Indicates a negative item  -- DELETE ALL ITEMS NOT EXAMINED]  Telephone visit. Due to this being a TeleHealth encounter, evaluation of the following organ systems is limited: Vitals/Constitutional/EENT/Resp/CV/GI//MS/Neuro/Skin/Heme-Lymph-Imm. ASSESSMENT/PLAN:   Diagnosis Orders   1. Bone pain  traMADol (ULTRAM) 50 MG tablet   2. Mid back pain  traMADol (ULTRAM) 50 MG tablet   3. Abnormal bone scan of thoracic spine  traMADol (ULTRAM) 50 MG tablet   4. Breast cancer metastasized to axillary lymph node, right (HCC)  traMADol (ULTRAM) 50 MG tablet   5. Left leg pain     6. Weight loss  traMADol (ULTRAM) 50 MG tablet   7. Chronic obstructive pulmonary disease, unspecified COPD type (HCC)  albuterol sulfate HFA (VENTOLIN HFA) 108 (90 Base) MCG/ACT inhaler   8. Moderate episode of recurrent major depressive disorder ([facility] [address]     9. Coronary artery disease involving native heart without angina pectoris, unspecified vessel or lesion type     10. Essential hypertension           Return in about 3 months (around 2020) for 1 month- AWV phone visit. .  1.  2.  3.  4.  5.  6. She plans to follow with oncology early next month.   Has decided against orthopedic surgery to help stabilize the bone of the left leg. She states that a biopsy to the bone is pending. She would like to remain hopeful regarding her prognosis. She states that currently, her pain level has been controlled with Percocet taken very sparingly and the use of Ultram with last prescription given in July. She only takes when absolutely needed. We again discussed option of palliative care but she declines at this time and will notify me if she changes her mind. 7.  Recommend current follow-up with pulmonology. Recent CT of the chest shows questionable nodules. I reviewed test results but did not discussed with the patient. She will be seeing oncology next week. 8.  Mood is been stable overall. Reassurance given and she is encouraged to notify me should an appointment be needed prior to next scheduled visit. 9.  10.  Blood pressure has been stable per her report and she has not had any signs or symptoms of unstable angina. Controlled Substance Monitoring:    Acute and Chronic Pain Monitoring:   RX Monitoring 9/29/2020   Attestation -   Acute Pain Prescriptions -   Periodic Controlled Substance Monitoring Possible medication side effects, risk of tolerance/dependence & alternative treatments discussed. ;No signs of potential drug abuse or diversion identified. ;Assessed functional status. Chronic Pain > 50 MEDD Obtained or confirmed \"Consent for Opioid Use\" on file. ;Re-evaluated the status of the patient's underlying condition causing pain. Please note this report has been partially produced using speech recognition software and may cause contain errors related to that system including grammar, punctuation and spelling as well as words and phrases that may seem inappropriate. If there are questions or concerns please feel free to contact me to clarify. An  electronic signature was used to authenticate this note.     --ROSALIND Mendiola - CNP on 9/29/2020 at 4:33 PM        Pursuant to the emergency declaration under the [facility], [facility] waiver authority and the [employer] and [employer] Act, this Virtual  Visit was conducted, with patient's consent, to reduce the patient's risk of exposure to COVID-19 and provide continuity of care for an established patient. Services were provided through a video synchronous discussion virtually to substitute for in-person clinic visit.

## 2020-10-02 ENCOUNTER — HOSPITAL ENCOUNTER (OUTPATIENT)
Dept: RADIATION ONCOLOGY | Age: 75
Discharge: HOME | End: 2020-10-02
Attending: RADIOLOGY
Payer: MEDICARE

## 2020-10-02 ENCOUNTER — HOSPITAL ENCOUNTER (OUTPATIENT)
Dept: RADIATION ONCOLOGY | Age: 75
Discharge: HOME | End: 2020-10-02
Payer: MEDICARE

## 2020-10-02 ENCOUNTER — APPOINTMENT (OUTPATIENT)
Dept: RADIATION ONCOLOGY | Age: 75
End: 2020-10-02
Attending: RADIOLOGY
Payer: MEDICARE

## 2020-10-02 VITALS
DIASTOLIC BLOOD PRESSURE: 70 MMHG | RESPIRATION RATE: 16 BRPM | BODY MASS INDEX: 22.18 KG/M2 | SYSTOLIC BLOOD PRESSURE: 126 MMHG | TEMPERATURE: 97.8 F | OXYGEN SATURATION: 98 % | WEIGHT: 125.2 LBS | HEART RATE: 80 BPM

## 2020-10-02 PROCEDURE — 99212 OFFICE O/P EST SF 10 MIN: CPT | Performed by: RADIOLOGY

## 2020-10-02 NOTE — H&P
Depression 1/15/2018    GERD (gastroesophageal reflux disease)     Headache(784.0)     Hyperlipidemia     Hypertension     Hypothyroidism     Insomnia     Osteoarthritis     RBBB 10/15/2014    Right carotid bruit 5/26/2015    S/P cardiac catheterization 7/13/2016       PAST SURGICAL HISTORY:  Past Surgical History:   Procedure Laterality Date    BREAST BIOPSY Right 11/8/2016    US biopsy    BREAST SURGERY Left 2/26/14    U/S Guided core bx of the left breast    BREAST SURGERY Right 3/20/14    U/S of the lateral right breast    BREAST SURGERY Left 3/27/14    U/S Guided Left breast lumpectomy and left snb    OTHER SURGICAL HISTORY  4/11/14    Placement drain, left axillary seroma    IA MASTECTOMY, SIMPLE, COMPLETE Right 9/6/2018    R modified radical mastectomy        ALLERGIES:   No Known Allergies    CURRENT MEDICATIONS:   Prior to Admission medications    Medication Sig Start Date End Date Taking? Authorizing Provider   gabapentin (NEURONTIN) 100 MG capsule take 1 (ONE) capsule 3 (THREE) times a day 9/25/20   Historical Provider, MD   albuterol sulfate HFA (VENTOLIN HFA) 108 (90 Base) MCG/ACT inhaler Inhale 2 puffs into the lungs every 6 hours as needed for Wheezing 9/29/20   ROSALIND Roca CNP   traMADol (ULTRAM) 50 MG tablet Take 1 tablet by mouth every 4 hours as needed for Pain for up to 90 days. Intended supply: 7 days.  Take lowest dose possible to manage pain 9/29/20 12/28/20  ROSALIND Roca CNP   Calcium 600-200 MG-UNIT TABS Take by mouth 2 times daily    Historical Provider, MD   SYMBICORT 160-4.5 MCG/ACT AERO USE 2 INHALATIONS TWICE A DAY 8/11/20   ROSALIND Roca CNP   budesonide (PULMICORT) 0.5 MG/2ML nebulizer suspension Take 2 mLs by nebulization 2 times daily 2/27/20   Mauricio Barboza MD   Tiotropium Bromide Monohydrate ([address]) Inhale into the lungs    Historical Provider, MD   traZODone (DESYREL) 150 MG tablet Take 1 tablet by mouth nightly 20   Lino Bar ROSALIND Avendano CNP   theophylline (UNIPHYL) 400 MG extended release tablet TAKE 1 TABLET DAILY 20   Lino Bar Riy, APRN - CNP   omeprazole (PRILOSEC) 20 MG delayed release capsule Take 1 capsule by mouth Daily 19   Lino Bar ROSALIND Avendano CNP   vitamin D (ERGOCALCIFEROL) 1.25 MG (14661 UT) CAPS capsule Take 1 capsule by mouth once a week 19   Lino Bar RiROSALIND wadsworth CNP   albuterol (PROVENTIL) (2.5 MG/3ML) 0.083% nebulizer solution Take 3 mLs by nebulization 4 times daily 10/15/19   Edilma Quinones MD   lovastatin (MEVACOR) 20 MG tablet TAKE 1 TABLET NIGHTLY 10/14/19   Lino Bar ROSALIND Avendano CNP   levothyroxine (SYNTHROID) 75 MCG tablet Take 1 tablet by mouth Daily 19   Lino Bar ROSALIND Avendano CNP   acetylcysteine (MUCOMYST) 10 % nebulizer solution Inhale 4 mLs into the lungs 4 times daily 7/15/19   Jimy Anders MD   OXYGEN Oxygen on 2L with exertion and Nocturnal 6/10/19   Edilma Quinones MD   metoprolol tartrate (LOPRESSOR) 50 MG tablet Take 50 mg by mouth 2 times daily    Historical Provider, MD   guaiFENesin (MUCINEX) 600 MG extended release tablet Take 1,200 mg by mouth 2 times daily    Historical Provider, MD   Respiratory Therapy Supplies (NEBULIZER/TUBING/MOUTHPIECE) KIT 1 kit by Does not apply route daily as needed (wheezing) 18   Lino Bar ROSALIND Avendano CNP       I have personally reviewed and reconciled the medications listed.     FAMILY HISTORY:   Family History   Problem Relation Age of Onset    Cancer Sister         breast    Cancer Maternal Uncle         pancreatic       SOCIAL HISTORY:   Social History     Tobacco Use   Smoking Status Former Smoker    Packs/day: 1.50    Years: 40.00    Pack years: 60.00    Types: Cigarettes    Last attempt to quit: 1989    Years since quittin.7   Smokeless Tobacco Never Used     Social History     Substance and Sexual Activity   Alcohol Use No       Review Of Systems:     Pain Score:   Pain Score (1-10): left leg 5  Pain Location: left lef   Pain Duration: intermittent  Pain Management/Control: tramadol- has a few percocet's left       Is pain affecting your ability to take care of yourself or move throughout your home? [x]? Yes   []? No    General:   Patient has gained weight []? Yes   [x]? No  Patient has lost weight []? Yes   [x]? No  How much weight in pounds and over what length of time:      Eyes (Ophthalmic):                 Skin (Dermatological):                 ENT:                 Respiratory: COPD, on continuous 2lnc. Multiple inhalers. Cardiovascular:                             Device   []? Yes   [x]? No              Copy of Card Obtained []? Yes   [x]? No     Gastrointestinal: no  Genito-Urinary: no                Breast: rt mastectomy- hx left breast radiation                Musculoskeletal: denies-undiagnosed  arthritis     Neurological: denies                            Hematological and Lymphatic: denies                Endocrine: synthroid     Gyn History:   /Para: 2/2  Age of Menarche:11  Age of [id number]  Vaginal Bleedin  First Pregnancy:18  Breast Feeding:    Last Menstrual period:   Oral Contraceptives: yes     Number of years: 6  Hormone Replacement therapy more than 10 years     Number of Years:   Last Pap Smear: cant recall  Last Mammogram cannot recall    A 10-point review of systems has been conducted and pertinent positives have been   recorded. All other review of systems are negative. ECOG PERFORMANCE STATUS: 2    VITAL SIGNS:   126/70 T 97.8  P 80  RR 16  98% 2L NC  125 lbs    PHYSICAL EXAMINATION:  Constitutional: No acute distress.  awake, alert, cooperative    HEENT:  Normocephalic, without obvious abnormality, atraumatic, EOMI, external ears without lesions, oral pharynx with moist mucus membranes, orophayrnx clear, mucous membranes moist    NECK:  Supple, without supraclavicular or cervical adenopathy, thyroid symmetric, not enlarged    CARDIOVASCULAR:  Normal apical impulse, regular rate and rhythm, normal S1 and S2, no S3 or S4, and no murmur noted    LUNGS:  No increased work of breathing, good air exchange, clear to auscultation bilaterally, no crackles or wheezing    EXTREMITIES: No cyanosis clubbing   2+ bipedal edema edema    MUSCULOSKELETAL: No bony tenderness along the hips ribs or spine. + tenderness L LE in wheelchair      STUDIES: I have personally reviewed the imaging, laboratory, and pathology studies as previously described. IMPRESSION/PLAN:    Metastatic breast cancer to bone. Saw Orthopedic surgeon regarding IM nailing of L tibia but declined. Pallaitive radiation 400 cGy x 5 fx offered. Patient wants to return next week with her decision. Needs COVID testing and avoidance of weight bearing. Intent of treatment, potential risks benefits and side effects reviewed today. Thank you for this consult and allowing us to participate in the care of this patient.   Sincerely,    Electronically signed by Maranda Arciniega MD on 10/2/20 at 3:44 PM EDT      cc:   No att. providers found  Aileen Scott, APRN - [address], [place]  [address]

## 2020-10-02 NOTE — PROGRESS NOTES
NURSING ASSESSMENT     Date: 10/2/2020        Patient Name: Alondra Good     YOB: 1945      Age:  66 y.o. MRN: [id number]     Chaperone [] Yes   [x] No      Advance Directives:   Do you currently have completed advance directives (living will)? [] Yes   [x] No         *If yes, please bring us a copy for your records. *If no, would you like info or assistance in completing advance directives (living will)? [] Yes   [x] No    Pain Score:   Pain Score (1-10): left leg 5  Pain Location: left lef   Pain Duration: intermittent  Pain Management/Control: tramadol- has a few percocet's left       Is pain affecting your ability to take care of yourself or move throughout your home? [x] Yes   [] No    General:   Patient has gained weight [] Yes   [x] No  Patient has lost weight [] Yes   [x] No  How much weight in pounds and over what length of time:     Eyes (Ophthalmic):      Skin (Dermatological):      ENT:      Respiratory: COPD, on continuous 2lnc. Multiple inhalers.        Cardiovascular:       Device   [] Yes   [x] No   Copy of Card Obtained [] Yes   [x] No    Gastrointestinal: no  Genito-Urinary: no     Breast: rt mastectomy- hx left breast radiation     Musculoskeletal: denies-undiagnosed  arthritis    Neurological: denies      Hematological and Lymphatic: denies     Endocrine: synthroid    Gyn History:   /Para: 2/2  Age of Menarche:11  Age of [id number]  Vaginal Bleedin  First Pregnancy:18  Breast Feeding:    Last Menstrual period:   Oral Contraceptives: yes     Number of years: 6  Hormone Replacement therapy more than 10 years     Number of Years:   Last Pap Smear: cant recall  Last Mammogram cannot recall    Additional Comments: for consultation- not sure she wants any reatmnet

## 2020-10-06 ENCOUNTER — CLINICAL DOCUMENTATION (OUTPATIENT)
Dept: MRI IMAGING | Age: 75
End: 2020-10-06

## 2020-10-08 ENCOUNTER — HOSPITAL ENCOUNTER (OUTPATIENT)
Dept: RADIATION ONCOLOGY | Age: 75
End: 2020-10-08
Payer: MEDICARE

## 2020-10-11 ENCOUNTER — HOSPITAL ENCOUNTER (INPATIENT)
Age: 75
LOS: 4 days | Discharge: HOME | DRG: 191 | End: 2020-10-16
Attending: INTERNAL MEDICINE | Admitting: INTERNAL MEDICINE
Payer: MEDICARE

## 2020-10-11 ENCOUNTER — APPOINTMENT (OUTPATIENT)
Dept: GENERAL RADIOLOGY | Age: 75
DRG: 191 | End: 2020-10-11
Payer: MEDICARE

## 2020-10-11 LAB
EKG ATRIAL RATE: 88 BPM
EKG P AXIS: 67 DEGREES
EKG P-R INTERVAL: 158 MS
EKG Q-T INTERVAL: 388 MS
EKG QRS DURATION: 130 MS
EKG QTC CALCULATION (BAZETT): 469 MS
EKG R AXIS: 92 DEGREES
EKG T AXIS: 63 DEGREES
EKG VENTRICULAR RATE: 88 BPM

## 2020-10-11 PROCEDURE — 85025 COMPLETE CBC W/AUTO DIFF WBC: CPT

## 2020-10-11 PROCEDURE — 99284 EMERGENCY DEPT VISIT MOD MDM: CPT

## 2020-10-11 PROCEDURE — 99285 EMERGENCY DEPT VISIT HI MDM: CPT

## 2020-10-11 PROCEDURE — 96365 THER/PROPH/DIAG IV INF INIT: CPT

## 2020-10-11 PROCEDURE — 6360000002 HC RX W HCPCS: Performed by: PHYSICIAN ASSISTANT

## 2020-10-11 PROCEDURE — 96375 TX/PRO/DX INJ NEW DRUG ADDON: CPT

## 2020-10-11 PROCEDURE — 87040 BLOOD CULTURE FOR BACTERIA: CPT

## 2020-10-11 PROCEDURE — 84145 PROCALCITONIN (PCT): CPT

## 2020-10-11 PROCEDURE — 96366 THER/PROPH/DIAG IV INF ADDON: CPT

## 2020-10-11 PROCEDURE — 71045 X-RAY EXAM CHEST 1 VIEW: CPT

## 2020-10-11 PROCEDURE — 80053 COMPREHEN METABOLIC PANEL: CPT

## 2020-10-11 PROCEDURE — 36415 COLL VENOUS BLD VENIPUNCTURE: CPT

## 2020-10-11 PROCEDURE — 83880 ASSAY OF NATRIURETIC PEPTIDE: CPT

## 2020-10-11 PROCEDURE — 2580000003 HC RX 258: Performed by: PHYSICIAN ASSISTANT

## 2020-10-11 PROCEDURE — 93005 ELECTROCARDIOGRAM TRACING: CPT | Performed by: PHYSICIAN ASSISTANT

## 2020-10-11 PROCEDURE — 84484 ASSAY OF TROPONIN QUANT: CPT

## 2020-10-11 RX ORDER — METHYLPREDNISOLONE SODIUM SUCCINATE 125 MG/2ML
125 INJECTION, POWDER, LYOPHILIZED, FOR SOLUTION INTRAMUSCULAR; INTRAVENOUS ONCE
Status: COMPLETED | OUTPATIENT
Start: 2020-10-11 | End: 2020-10-11

## 2020-10-11 RX ORDER — MAGNESIUM SULFATE IN WATER 40 MG/ML
2 INJECTION, SOLUTION INTRAVENOUS ONCE
Status: COMPLETED | OUTPATIENT
Start: 2020-10-11 | End: 2020-10-12

## 2020-10-11 RX ORDER — 0.9 % SODIUM CHLORIDE 0.9 %
1000 INTRAVENOUS SOLUTION INTRAVENOUS ONCE
Status: COMPLETED | OUTPATIENT
Start: 2020-10-11 | End: 2020-10-12

## 2020-10-11 RX ADMIN — METHYLPREDNISOLONE SODIUM SUCCINATE 125 MG: 125 INJECTION, POWDER, FOR SOLUTION INTRAMUSCULAR; INTRAVENOUS at 23:44

## 2020-10-11 RX ADMIN — SODIUM CHLORIDE 1000 ML: 9 INJECTION, SOLUTION INTRAVENOUS at 23:44

## 2020-10-11 RX ADMIN — MAGNESIUM SULFATE IN WATER 2 G: 40 INJECTION, SOLUTION INTRAVENOUS at 23:45

## 2020-10-11 ASSESSMENT — PAIN SCALES - GENERAL: PAINLEVEL_OUTOF10: 0

## 2020-10-12 ENCOUNTER — APPOINTMENT (OUTPATIENT)
Dept: CT IMAGING | Age: 75
DRG: 191 | End: 2020-10-12
Payer: MEDICARE

## 2020-10-12 LAB
ALBUMIN SERPL-MCNC: 4 G/DL (ref 3.5–4.6)
ALP BLD-CCNC: 64 U/L (ref 40–130)
ALT SERPL-CCNC: 15 U/L (ref 0–33)
ANION GAP SERPL CALCULATED.3IONS-SCNC: 10 MEQ/L (ref 9–15)
AST SERPL-CCNC: 24 U/L (ref 0–35)
BASOPHILS ABSOLUTE: 0.1 K/UL (ref 0–0.2)
BASOPHILS RELATIVE PERCENT: 0.7 %
BILIRUB SERPL-MCNC: 0.5 MG/DL (ref 0.2–0.7)
BUN BLDV-MCNC: 9 MG/DL (ref 8–23)
CALCIUM SERPL-MCNC: 9 MG/DL (ref 8.5–9.9)
CHLORIDE BLD-SCNC: 99 MEQ/L (ref 95–107)
CO2: 28 MEQ/L (ref 20–31)
CREAT SERPL-MCNC: 0.65 MG/DL (ref 0.5–0.9)
EOSINOPHILS ABSOLUTE: 0.1 K/UL (ref 0–0.7)
EOSINOPHILS RELATIVE PERCENT: 1.2 %
GFR AFRICAN AMERICAN: >60
GFR NON-AFRICAN AMERICAN: >60
GLOBULIN: 2.3 G/DL (ref 2.3–3.5)
GLUCOSE BLD-MCNC: 104 MG/DL (ref 70–99)
HCT VFR BLD CALC: 39.2 % (ref 37–47)
HEMOGLOBIN: 13 G/DL (ref 12–16)
LYMPHOCYTES ABSOLUTE: 1.5 K/UL (ref 1–4.8)
LYMPHOCYTES RELATIVE PERCENT: 16.1 %
MCH RBC QN AUTO: 30.1 PG (ref 27–31.3)
MCHC RBC AUTO-ENTMCNC: 33.2 % (ref 33–37)
MCV RBC AUTO: 90.8 FL (ref 82–100)
MONOCYTES ABSOLUTE: 0.9 K/UL (ref 0.2–0.8)
MONOCYTES RELATIVE PERCENT: 9.1 %
NEUTROPHILS ABSOLUTE: 6.8 K/UL (ref 1.4–6.5)
NEUTROPHILS RELATIVE PERCENT: 72.9 %
PDW BLD-RTO: 13.6 % (ref 11.5–14.5)
PLATELET # BLD: 291 K/UL (ref 130–400)
POTASSIUM SERPL-SCNC: 4.4 MEQ/L (ref 3.4–4.9)
PRO-BNP: 289 PG/ML
PROCALCITONIN: 0.04 NG/ML (ref 0–0.15)
RBC # BLD: 4.32 M/UL (ref 4.2–5.4)
SODIUM BLD-SCNC: 137 MEQ/L (ref 135–144)
TOTAL PROTEIN: 6.3 G/DL (ref 6.3–8)
TROPONIN: <0.01 NG/ML (ref 0–0.01)
WBC # BLD: 9.4 K/UL (ref 4.8–10.8)

## 2020-10-12 PROCEDURE — 6360000002 HC RX W HCPCS: Performed by: NURSE PRACTITIONER

## 2020-10-12 PROCEDURE — 6360000002 HC RX W HCPCS: Performed by: INTERNAL MEDICINE

## 2020-10-12 PROCEDURE — 6360000004 HC RX CONTRAST MEDICATION: Performed by: PERSONAL EMERGENCY RESPONSE ATTENDANT

## 2020-10-12 PROCEDURE — 6370000000 HC RX 637 (ALT 250 FOR IP): Performed by: NURSE PRACTITIONER

## 2020-10-12 PROCEDURE — 6360000002 HC RX W HCPCS: Performed by: PHYSICIAN ASSISTANT

## 2020-10-12 PROCEDURE — 6370000000 HC RX 637 (ALT 250 FOR IP)

## 2020-10-12 PROCEDURE — 99223 1ST HOSP IP/OBS HIGH 75: CPT | Performed by: INTERNAL MEDICINE

## 2020-10-12 PROCEDURE — 71275 CT ANGIOGRAPHY CHEST: CPT

## 2020-10-12 PROCEDURE — 94761 N-INVAS EAR/PLS OXIMETRY MLT: CPT

## 2020-10-12 PROCEDURE — 1210000000 HC MED SURG R&B

## 2020-10-12 PROCEDURE — 94640 AIRWAY INHALATION TREATMENT: CPT

## 2020-10-12 PROCEDURE — 6370000000 HC RX 637 (ALT 250 FOR IP): Performed by: INTERNAL MEDICINE

## 2020-10-12 PROCEDURE — 2580000003 HC RX 258: Performed by: NURSE PRACTITIONER

## 2020-10-12 PROCEDURE — 94664 DEMO&/EVAL PT USE INHALER: CPT

## 2020-10-12 PROCEDURE — 6360000002 HC RX W HCPCS

## 2020-10-12 RX ORDER — ALBUTEROL SULFATE 90 UG/1
2 AEROSOL, METERED RESPIRATORY (INHALATION) EVERY 6 HOURS PRN
Status: DISCONTINUED | OUTPATIENT
Start: 2020-10-12 | End: 2020-10-16 | Stop reason: HOSPADM

## 2020-10-12 RX ORDER — IPRATROPIUM BROMIDE AND ALBUTEROL SULFATE 2.5; .5 MG/3ML; MG/3ML
SOLUTION RESPIRATORY (INHALATION)
Status: COMPLETED
Start: 2020-10-12 | End: 2020-10-12

## 2020-10-12 RX ORDER — LEVOTHYROXINE SODIUM 0.07 MG/1
75 TABLET ORAL DAILY
Status: DISCONTINUED | OUTPATIENT
Start: 2020-10-12 | End: 2020-10-16 | Stop reason: HOSPADM

## 2020-10-12 RX ORDER — IPRATROPIUM BROMIDE AND ALBUTEROL SULFATE 2.5; .5 MG/3ML; MG/3ML
1 SOLUTION RESPIRATORY (INHALATION)
Status: DISCONTINUED | OUTPATIENT
Start: 2020-10-12 | End: 2020-10-12

## 2020-10-12 RX ORDER — PREDNISONE 20 MG/1
40 TABLET ORAL DAILY
Status: DISCONTINUED | OUTPATIENT
Start: 2020-10-12 | End: 2020-10-15

## 2020-10-12 RX ORDER — BUDESONIDE 0.5 MG/2ML
INHALANT ORAL
Status: COMPLETED
Start: 2020-10-12 | End: 2020-10-12

## 2020-10-12 RX ORDER — CODEINE PHOSPHATE AND GUAIFENESIN 10; 100 MG/5ML; MG/5ML
5 SOLUTION ORAL EVERY 6 HOURS PRN
Status: DISCONTINUED | OUTPATIENT
Start: 2020-10-12 | End: 2020-10-12

## 2020-10-12 RX ORDER — ONDANSETRON 2 MG/ML
4 INJECTION INTRAMUSCULAR; INTRAVENOUS EVERY 6 HOURS PRN
Status: DISCONTINUED | OUTPATIENT
Start: 2020-10-12 | End: 2020-10-16 | Stop reason: HOSPADM

## 2020-10-12 RX ORDER — ACETAMINOPHEN 650 MG/1
650 SUPPOSITORY RECTAL EVERY 6 HOURS PRN
Status: DISCONTINUED | OUTPATIENT
Start: 2020-10-12 | End: 2020-10-16 | Stop reason: HOSPADM

## 2020-10-12 RX ORDER — ACETAMINOPHEN 325 MG/1
650 TABLET ORAL EVERY 6 HOURS PRN
Status: DISCONTINUED | OUTPATIENT
Start: 2020-10-12 | End: 2020-10-16 | Stop reason: HOSPADM

## 2020-10-12 RX ORDER — PROMETHAZINE HYDROCHLORIDE 12.5 MG/1
12.5 TABLET ORAL EVERY 6 HOURS PRN
Status: DISCONTINUED | OUTPATIENT
Start: 2020-10-12 | End: 2020-10-16 | Stop reason: HOSPADM

## 2020-10-12 RX ORDER — BUDESONIDE 0.5 MG/2ML
0.5 INHALANT ORAL 2 TIMES DAILY
Status: DISCONTINUED | OUTPATIENT
Start: 2020-10-12 | End: 2020-10-16 | Stop reason: HOSPADM

## 2020-10-12 RX ORDER — SODIUM CHLORIDE 0.9 % (FLUSH) 0.9 %
10 SYRINGE (ML) INJECTION EVERY 12 HOURS SCHEDULED
Status: DISCONTINUED | OUTPATIENT
Start: 2020-10-12 | End: 2020-10-16 | Stop reason: HOSPADM

## 2020-10-12 RX ORDER — IPRATROPIUM BROMIDE AND ALBUTEROL SULFATE 2.5; .5 MG/3ML; MG/3ML
1 SOLUTION RESPIRATORY (INHALATION) 4 TIMES DAILY
Status: DISCONTINUED | OUTPATIENT
Start: 2020-10-12 | End: 2020-10-16 | Stop reason: HOSPADM

## 2020-10-12 RX ORDER — ATORVASTATIN CALCIUM 10 MG/1
10 TABLET, FILM COATED ORAL DAILY
Status: DISCONTINUED | OUTPATIENT
Start: 2020-10-12 | End: 2020-10-16 | Stop reason: HOSPADM

## 2020-10-12 RX ORDER — GUAIFENESIN 600 MG/1
1200 TABLET, EXTENDED RELEASE ORAL 2 TIMES DAILY
Status: DISCONTINUED | OUTPATIENT
Start: 2020-10-12 | End: 2020-10-16 | Stop reason: HOSPADM

## 2020-10-12 RX ORDER — SODIUM CHLORIDE 0.9 % (FLUSH) 0.9 %
10 SYRINGE (ML) INJECTION PRN
Status: DISCONTINUED | OUTPATIENT
Start: 2020-10-12 | End: 2020-10-16 | Stop reason: HOSPADM

## 2020-10-12 RX ORDER — METOPROLOL TARTRATE 50 MG/1
50 TABLET, FILM COATED ORAL 2 TIMES DAILY
Status: DISCONTINUED | OUTPATIENT
Start: 2020-10-12 | End: 2020-10-16 | Stop reason: HOSPADM

## 2020-10-12 RX ORDER — POLYETHYLENE GLYCOL 3350 17 G/17G
17 POWDER, FOR SOLUTION ORAL DAILY PRN
Status: DISCONTINUED | OUTPATIENT
Start: 2020-10-12 | End: 2020-10-16 | Stop reason: HOSPADM

## 2020-10-12 RX ORDER — ALBUTEROL SULFATE 2.5 MG/3ML
2.5 SOLUTION RESPIRATORY (INHALATION)
Status: DISCONTINUED | OUTPATIENT
Start: 2020-10-12 | End: 2020-10-16 | Stop reason: HOSPADM

## 2020-10-12 RX ADMIN — LEVOTHYROXINE SODIUM 75 MCG: 0.07 TABLET ORAL at 06:13

## 2020-10-12 RX ADMIN — ALBUTEROL SULFATE 5 MG: 2.5 SOLUTION RESPIRATORY (INHALATION) at 03:22

## 2020-10-12 RX ADMIN — ALBUTEROL SULFATE 5 MG: 2.5 SOLUTION RESPIRATORY (INHALATION) at 03:10

## 2020-10-12 RX ADMIN — PREDNISONE 40 MG: 20 TABLET ORAL at 09:00

## 2020-10-12 RX ADMIN — IOPAMIDOL 100 ML: 612 INJECTION, SOLUTION INTRAVENOUS at 02:47

## 2020-10-12 RX ADMIN — BUDESONIDE 500 MCG: 0.5 INHALANT RESPIRATORY (INHALATION) at 20:06

## 2020-10-12 RX ADMIN — GUAIFENESIN 1200 MG: 600 TABLET ORAL at 09:00

## 2020-10-12 RX ADMIN — GUAIFENESIN 1200 MG: 600 TABLET ORAL at 21:10

## 2020-10-12 RX ADMIN — BUDESONIDE INHALATION 500 MCG: 0.5 SUSPENSION RESPIRATORY (INHALATION) at 20:07

## 2020-10-12 RX ADMIN — IPRATROPIUM BROMIDE 0.5 MG: 0.5 SOLUTION RESPIRATORY (INHALATION) at 03:22

## 2020-10-12 RX ADMIN — ALBUTEROL SULFATE 5 MG: 2.5 SOLUTION RESPIRATORY (INHALATION) at 02:59

## 2020-10-12 RX ADMIN — ACETAMINOPHEN 650 MG: 325 TABLET, FILM COATED ORAL at 12:22

## 2020-10-12 RX ADMIN — IPRATROPIUM BROMIDE 0.5 MG: 0.5 SOLUTION RESPIRATORY (INHALATION) at 03:10

## 2020-10-12 RX ADMIN — IPRATROPIUM BROMIDE AND ALBUTEROL SULFATE 3 ML: .5; 3 SOLUTION RESPIRATORY (INHALATION) at 05:39

## 2020-10-12 RX ADMIN — ATORVASTATIN CALCIUM 10 MG: 10 TABLET, FILM COATED ORAL at 09:00

## 2020-10-12 RX ADMIN — IPRATROPIUM BROMIDE AND ALBUTEROL SULFATE 3 ML: 2.5; .5 SOLUTION RESPIRATORY (INHALATION) at 05:39

## 2020-10-12 RX ADMIN — ALBUTEROL SULFATE 2.5 MG: 2.5 SOLUTION RESPIRATORY (INHALATION) at 09:03

## 2020-10-12 RX ADMIN — Medication 10 ML: at 09:02

## 2020-10-12 RX ADMIN — METOPROLOL TARTRATE 50 MG: 50 TABLET, FILM COATED ORAL at 21:10

## 2020-10-12 RX ADMIN — IPRATROPIUM BROMIDE AND ALBUTEROL SULFATE 1 AMPULE: .5; 3 SOLUTION RESPIRATORY (INHALATION) at 11:50

## 2020-10-12 RX ADMIN — IPRATROPIUM BROMIDE AND ALBUTEROL SULFATE 1 AMPULE: .5; 3 SOLUTION RESPIRATORY (INHALATION) at 20:06

## 2020-10-12 RX ADMIN — METOPROLOL TARTRATE 50 MG: 50 TABLET, FILM COATED ORAL at 09:00

## 2020-10-12 RX ADMIN — BUDESONIDE INHALATION 500 MCG: 0.5 SUSPENSION RESPIRATORY (INHALATION) at 12:20

## 2020-10-12 RX ADMIN — Medication 10 ML: at 21:11

## 2020-10-12 RX ADMIN — ENOXAPARIN SODIUM 40 MG: 40 INJECTION SUBCUTANEOUS at 09:00

## 2020-10-12 RX ADMIN — IPRATROPIUM BROMIDE AND ALBUTEROL SULFATE 1 AMPULE: .5; 3 SOLUTION RESPIRATORY (INHALATION) at 17:00

## 2020-10-12 RX ADMIN — IPRATROPIUM BROMIDE 0.5 MG: 0.5 SOLUTION RESPIRATORY (INHALATION) at 02:59

## 2020-10-12 ASSESSMENT — ENCOUNTER SYMPTOMS
EYE DISCHARGE: 0
SINUS PAIN: 0
BACK PAIN: 0
ALLERGIC/IMMUNOLOGIC NEGATIVE: 1
DIARRHEA: 0
NAUSEA: 0
CHEST TIGHTNESS: 0
VOMITING: 0
COUGH: 1
RHINORRHEA: 0
EYES NEGATIVE: 1
ABDOMINAL PAIN: 0
SHORTNESS OF BREATH: 1
COLOR CHANGE: 0
WHEEZING: 1
EYE REDNESS: 0
SORE THROAT: 0

## 2020-10-12 ASSESSMENT — PAIN SCALES - GENERAL
PAINLEVEL_OUTOF10: 4
PAINLEVEL_OUTOF10: 0
PAINLEVEL_OUTOF10: 5

## 2020-10-12 ASSESSMENT — PAIN - FUNCTIONAL ASSESSMENT: PAIN_FUNCTIONAL_ASSESSMENT: 0-10

## 2020-10-12 NOTE — PROGRESS NOTES
Physical Therapy Missed Treatment   Facility/Department: [facility] MED SURG O894/[id number]    NAME: Elizabeth Rasmussen    : 1945 (66 y.o.)  MRN: [id number]    Account: [de-identified]  Gender: female      Pt declined therapy at this time. She reports she is fatigued. Pt was newly admitted from home thru ER over night. Nursing staff notified. Will follow and attempt PT evaluation again at earliest availability.        Shaun Monae, PT, 10/12/20 at 9:47 AM

## 2020-10-12 NOTE — ED TRIAGE NOTES
Pt arrives via EMS from home for COPD exacerbation. Pt has hx of COPD and relates increasingly worse SOB x 3days. Pt took 4 breathing tx at home and medics gave 1 duo neb en route.  Pt able to talk in clear and complete sentences

## 2020-10-12 NOTE — H&P
[facility]  MEDICINE    HISTORY AND PHYSICAL EXAM    PATIENT NAME:  Shayan Luther    MRN:  [id number]  SERVICE DATE:  10/12/2020   SERVICE TIME:  4:52 AM    Primary Care Physician: ROSALIND Nunez CNP         SUBJECTIVE  CHIEF COMPLAINT: Shortness of breath    HPI: Patient presents to the ED after 3 days of increasing shortness of breath. Patient is alert and oriented x3. Patient has a history of COPD and is oxygen dependent on 2 L at home. She reports that her shortness of breath increases with exertion. Patient denies any recent illness including fever, increasing productive cough, abdominal pain, nausea or vomiting. Patient denies any chest pain. During examination patient had mild accessory muscle use. Patient speaking in complete sentences. Respirations are even and mildly labored. Patient's past medical history includes hypertension, hyperlipidemia, hypothyroidism, anxiety and depression.     PAST MEDICAL HISTORY:    Past Medical History:   Diagnosis Date    Anxiety     Anxiety and depression     CAD (coronary artery disease)     Cancer ([address] 3/2014    Invasive ductal cancer left breast, spread to 5 lymph nodes    Carotid artery stenosis and occlusion     COPD (chronic obstructive pulmonary disease) ([facility] [address]     Depression 1/15/2018    GERD (gastroesophageal reflux disease)     Headache(784.0)     Hyperlipidemia     Hypertension     Hypothyroidism     Insomnia     Osteoarthritis     RBBB 10/15/2014    Right carotid bruit 5/26/2015    S/P cardiac catheterization 7/13/2016     PAST SURGICAL HISTORY:    Past Surgical History:   Procedure Laterality Date    BREAST BIOPSY Right 11/8/2016     biopsy    BREAST SURGERY Left 2/26/14    U/S Guided core bx of the left breast    BREAST SURGERY Right 3/20/14    U/S of the lateral right breast    BREAST SURGERY Left 3/27/14    U/S Guided Left breast lumpectomy and left snb    OTHER SURGICAL HISTORY  4/11/14    Placement drain, left axillary seroma    [place] MASTECTOMY, SIMPLE, COMPLETE Right 2018    R modified radical mastectomy     FAMILY HISTORY:    Family History   Problem Relation Age of Onset    Cancer Sister         breast    Cancer Maternal Uncle         pancreatic     SOCIAL HISTORY:    Social History     Socioeconomic History    Marital status:      Spouse name: Not on file    Number of children: Not on file    Years of education: Not on file    Highest education level: Not on file   Occupational History    Not on file   Social Needs    Financial resource strain: Not on file    Food insecurity     Worry: Not on file     Inability: Not on file    Transportation needs     Medical: Not on file     Non-medical: Not on file   Tobacco Use    Smoking status: Former Smoker     Packs/day: 1.50     Years: 40.00     Pack years: 60.00     Types: Cigarettes     Last attempt to quit: 1989     Years since quittin.7    Smokeless tobacco: Never Used   Substance and Sexual Activity    Alcohol use: No    Drug use: No    Sexual activity: Never   Lifestyle    Physical activity     Days per week: Not on file     Minutes per session: Not on file    Stress: Not on file   Relationships    Social connections     Talks on phone: Not on file     Gets together: Not on file     Attends Confucianist service: Not on file     Active member of club or organization: Not on file     Attends meetings of clubs or organizations: Not on file     Relationship status: Not on file    Intimate partner violence     Fear of current or ex partner: Not on file     Emotionally abused: Not on file     Physically abused: Not on file     Forced sexual activity: Not on file   Other Topics Concern    Not on file   Social History Narrative    Not on file     MEDICATIONS:   Prior to Admission medications    Medication Sig Start Date End Date Taking?  Authorizing Provider   gabapentin (NEURONTIN) 100 MG capsule take 1 (ONE) capsule 3 (THREE) times a day 9/25/20   Historical Provider, MD   albuterol sulfate HFA (VENTOLIN HFA) 108 (90 Base) MCG/ACT inhaler Inhale 2 puffs into the lungs every 6 hours as needed for Wheezing 9/29/20   ROSALIND Trejo CNP   traMADol (ULTRAM) 50 MG tablet Take 1 tablet by mouth every 4 hours as needed for Pain for up to 90 days. Intended supply: 7 days.  Take lowest dose possible to manage pain 9/29/20 12/28/20  ROSALIND Trejo CNP   Calcium 600-200 MG-UNIT TABS Take by mouth 2 times daily    Historical Provider, MD   SYMBICORT 160-4.5 MCG/ACT AERO USE 2 INHALATIONS TWICE A DAY 8/11/20   ROSALIND Trejo CNP   budesonide (PULMICORT) 0.5 MG/2ML nebulizer suspension Take 2 mLs by nebulization 2 times daily 2/27/20   Reji Jim MD   Tiotropium Bromide Monohydrate (SPIRIVA RESPIMAT IN) Inhale into the lungs    Historical Provider, MD   traZODone (DESYREL) 150 MG tablet Take 1 tablet by mouth nightly 1/27/20   ROSALIND Trejo CNP   theophylline (UNIPHYL) 400 MG extended release tablet TAKE 1 TABLET DAILY 1/20/20   ROSALIND Trejo CNP   omeprazole (PRILOSEC) 20 MG delayed release capsule Take 1 capsule by mouth Daily 12/16/19   ROSALIND Trejo CNP   vitamin D (ERGOCALCIFEROL) 1.25 MG (68035 UT) CAPS capsule Take 1 capsule by mouth once a week 11/5/19   ROSALIND Trejo CNP   albuterol (PROVENTIL) (2.5 MG/3ML) 0.083% nebulizer solution Take 3 mLs by nebulization 4 times daily 10/15/19   Reji Jim MD   lovastatin (MEVACOR) 20 MG tablet TAKE 1 TABLET NIGHTLY 10/14/19   ROSALIND Trejo CNP   levothyroxine (SYNTHROID) 75 MCG tablet Take 1 tablet by mouth Daily 8/27/19   ROSALIND Trejo CNP   acetylcysteine (MUCOMYST) 10 % nebulizer solution Inhale 4 mLs into the lungs 4 times daily 7/15/19   Dylon Carbajal MD   OXYGEN Oxygen on 2L with exertion and Nocturnal 6/10/19   Reji Jim MD   metoprolol tartrate (LOPRESSOR) 50 MG tablet Take 50 mg by mouth 2 times daily    Historical Provider, MD   guaiFENesin (MUCINEX) 600 MG extended release tablet Take 1,200 mg by mouth 2 times daily    Historical Provider, MD   Respiratory Therapy Supplies (NEBULIZER/TUBING/MOUTHPIECE) KIT 1 kit by Does not apply route daily as needed (wheezing) 11/5/18   Benito Janieliliana Avendano, APRN - CNP       ALLERGIES: Patient has no known allergies. REVIEW OF SYSTEM:   Review of Systems   Constitutional: Negative for appetite change, fatigue, fever and unexpected weight change. HENT: Negative for congestion, rhinorrhea and sore throat. Eyes: Negative. Respiratory: Positive for shortness of breath and wheezing. Cardiovascular: Negative for chest pain. Gastrointestinal: Negative for abdominal pain, nausea and vomiting. Endocrine: Negative. Genitourinary: Negative for difficulty urinating, dysuria, menstrual problem, pelvic pain, vaginal bleeding, vaginal discharge and vaginal pain. Musculoskeletal: Negative for back pain. Skin: Negative. Allergic/Immunologic: Negative. Neurological: Negative. Hematological: Negative. Psychiatric/Behavioral: Negative. OBJECTIVE  PHYSICAL EXAM: BP (!) 151/84   Pulse 102   Temp 98.4 °F (36.9 °C) (Oral)   Resp 26   Ht 5' 3\" (1.6 m)   Wt 125 lb (56.7 kg)   LMP  (LMP Unknown)   SpO2 99%   BMI 22.14 kg/m²     Physical Exam  Vitals signs and nursing note reviewed. Constitutional:       General: She is not in acute distress. Appearance: She is well-developed. HENT:      Right Ear: External ear normal.      Left Ear: External ear normal.      Nose: Nose normal.   Eyes:      Pupils: Pupils are equal, round, and reactive to light. Neck:      Musculoskeletal: Normal range of motion. Cardiovascular:      Rate and Rhythm: Normal rate and regular rhythm. Pulmonary:      Effort: Accessory muscle usage (mild) present. No respiratory distress. Breath sounds: Wheezing present. No rales.    Abdominal:      General: 0.1 0.0 - 0.2 K/uL   PROCALCITONIN    Collection Time: 10/11/20 11:30 PM   Result Value Ref Range    Procalcitonin 0.04 0.00 - 0.15 ng/mL   Brain Natriuretic Peptide    Collection Time: 10/11/20 11:30 PM   Result Value Ref Range    Pro- pg/mL   Troponin    Collection Time: 10/11/20 11:30 PM   Result Value Ref Range    Troponin <0.010 0.000 - 0.010 ng/mL       IMAGING:  No results found. VTE Prophylaxis: low molecular weight heparin -  start    ASSESSMENT AND PLAN    Principal Problem:     1) COPD exacerbation ([facility] [address]): Shortness of breath. Patient given Solu-Medrol and DuoNeb's in ED. patient is on 2 L nasal cannula at home. Patient became hypoxic 2 L and is currently 94% on 4 L. .  Patient reports she feels better after treatments in the ED however she still short of breath. Patient speaking in complete sentences. We will consult pulmonology. We will give prednisone daily. We will administer DuoNeb treatments regularly. Active Problems:    2) HTN (hypertension): Patient takes oral medications to control. We will resume home meds and monitor blood pressure regularly. 3)  Dyslipidemia: Patient takes statin at home. We will resume home meds  4)  Hypothyroidism: Patient takes Synthroid. We will resume home meds        Plan of care discussed with: patient    SIGNATURE: Lala Koyanagi, RN, NP  DATE: October 12, 2020  TIME: 4:52 AM     KEENA Ferreira MD - supervising physician

## 2020-10-12 NOTE — ED NOTES
Bed: 20  Expected date: 10/11/20  Expected time: 11:09 PM  Means of arrival: [facility]  Comments:  Lavelle Reynaga, RN  10/11/20 0165

## 2020-10-12 NOTE — ACP (ADVANCE CARE PLANNING)
Advance Care Planning     Advance Care Planning Activator (Inpatient)  Conversation Note      Date of ACP Conversation: 10/11/2020    Conversation Conducted with: Patient with Decision Making Capacity    ACP Activator: [address] makes decisions on behalf of the incapacitated patient: Decision Maker is asked to consider and make decisions based on patient values, known preferences, or best interests. Health Care Decision Maker:     Current Designated Health Care Decision Maker:   (If there is a valid [address] named in the [address] [place] [address] Makers\" box in the ACP activity, but it is not visible above, be sure to open that field and then select the health care decision maker relationship (ie \"primary\") in the blank space to the right of the name.) Validate  this information as still accurate & up-to-date; edit [address] field as needed.)    Note: Assess and validate information in current ACP documents, as indicated. If no Decision Maker listed above or available through scanned documents, then:    If no Authorized Decision Maker has previously been identified, then patient chooses [address]:  \"Who would you like to name as your primary health care decision-maker? \"               Name: Beulah Barakat        Relationship: Daughter          Phone number: [phone]  Alondra Prost this person be reached easily? \" Yes  \"Who would you like to name as your back-up decision maker? \"   Name:         Relationship:           Phone number:   \"Can this person be reached easily? \"     Note: If the relationship of these Decision-Makers to the patient does NOT follow your state's Next of Kin hierarchy, recommend that patient complete ACP document that meets state-specific requirements to allow them to act on the patient's behalf when appropriate. Care Preferences    Ventilation:   \"If you were in your present state of health and suddenly became very ill and were unable to breathe on your own, what would your preference be about the use of a ventilator (breathing machine) if it were available to you? \"      Would the patient desire the use of ventilator (breathing machine)?: no    \"If your health worsens and it becomes clear that your chance of recovery is unlikely, what would your preference be about the use of a ventilator (breathing machine) if it were available to you? \"     Would the patient desire the use of ventilator (breathing machine)?: No      Resuscitation  \"CPR works best to restart the heart when there is a sudden event, like a heart attack, in someone who is otherwise healthy. Unfortunately, CPR does not typically restart the heart for people who have serious health conditions or who are very sick. \"    \"In the event your heart stopped as a result of an underlying serious health condition, would you want attempts to be made to restart your heart (answer \"yes\" for attempt to resuscitate) or would you prefer a natural death (answer \"no\" for do not attempt to resuscitate)? \" Unsure      NOTE: If the patient has a valid advance directive AND now provides care preference(s) that are inconsistent with that prior directive, advise the patient to consider either: creating a new advance directive that complies with state-specific requirements; or, if that is not possible, orally revoking that prior directive in accordance with state-specific requirements, which must be documented in the EHR. [] Yes   [] No   Educated Patient / Shelli Ego regarding differences between Advance Directives and portable DNR orders.     Length of ACP Conversation in minutes:      Conversation Outcomes:  [x] ACP discussion completed  [] Existing advance directive reviewed with patient; no changes to patient's previously recorded wishes  [] New Advance Directive completed  [] Portable Do Not Rescitate prepared for Provider review and signature  [] POLST/POST/MOLST/MOST prepared for Provider review and signature      Follow-up plan:    [] Schedule follow-up conversation to continue planning  [] Referred individual to Provider for additional questions/concerns   [] Advised patient/agent/surrogate to review completed ACP document and update if needed with changes in condition, patient preferences or care setting    [] This note routed to one or more involved healthcare providers

## 2020-10-12 NOTE — CONSULTS
Pulmonary and Critical Care Medicine  Consult Note  Encounter Date: 10/12/2020 12:08 PM    Ms. Joanna Villalta is a 66 y.o. female  : 1945  Requesting Provider: Vazquez Martinez DO    Reason for request: COPD exacerbation            HISTORY OF PRESENT ILLNESS:    Patient is 66 y.o. presents with worsening shortness of breath, has been going on progressively over the last 4 to 5 days no chest pain, minimal coughing, nonproductive, no fever or chills, no sick contact, no nausea no vomiting, just 2 L O2 at home with no increased requirement of oxygen, no diarrhea. Past Medical History:        Diagnosis Date    Anxiety     Anxiety and depression     CAD (coronary artery disease)     Cancer ([facility] [address] 3/2014    Invasive ductal cancer left breast, spread to 5 lymph nodes    Carotid artery stenosis and occlusion     COPD (chronic obstructive pulmonary disease) ([facility] [address]     Depression 1/15/2018    GERD (gastroesophageal reflux disease)     Headache(784.0)     Hyperlipidemia     Hypertension     Hypothyroidism     Insomnia     Osteoarthritis     RBBB 10/15/2014    Right carotid bruit 2015    S/P cardiac catheterization 2016       Past Surgical History:        Procedure Laterality Date    BREAST BIOPSY Right 2016    US biopsy    BREAST SURGERY Left 14    U/S Guided core bx of the left breast    BREAST SURGERY Right 3/20/14    U/S of the lateral right breast    BREAST SURGERY Left 3/27/14    U/S Guided Left breast lumpectomy and left snb    OTHER SURGICAL HISTORY  14    Placement drain, left axillary seroma    [place] MASTECTOMY, SIMPLE, COMPLETE Right 2018    R modified radical mastectomy       Social History:     reports that she quit smoking about 31 years ago. Her smoking use included cigarettes. She has a 60.00 pack-year smoking history. She has never used smokeless tobacco. She reports that she does not drink alcohol or use drugs.     Family History:       Problem Relation Age of Onset    Cancer Sister         breast    Cancer Maternal Uncle         pancreatic       Allergies:  Patient has no known allergies. MEDICATIONS during current hospitalization:    Continuous Infusions:    Scheduled Meds:   sodium chloride flush  10 mL Intravenous 2 times per day    enoxaparin  40 mg Subcutaneous Daily    predniSONE  40 mg Oral Daily    guaiFENesin  1,200 mg Oral BID    levothyroxine  75 mcg Oral Daily    atorvastatin  10 mg Oral Daily    metoprolol tartrate  50 mg Oral BID    ipratropium-albuterol  1 ampule Inhalation 4x daily       PRN Meds:sodium chloride flush, acetaminophen **OR** acetaminophen, polyethylene glycol, promethazine **OR** ondansetron, albuterol        REVIEW OF SYSTEMS:  ROS: 10 organs review of system is done including general, psychological, ENT, hematological, endocrine, respiratory, cardiovascular, gastrointestinal, musculoskeletal, neurological,  allergy and Immunology is done and is otherwise negative. PHYSICAL EXAM:    Vitals:  BP (!) 163/89   Pulse 90   Temp 97.5 °F (36.4 °C) (Oral)   Resp 20   Ht 5' 3\" (1.6 m)   Wt 125 lb (56.7 kg)   LMP  (LMP Unknown)   SpO2 96%   BMI 22.14 kg/m²     General: alert, cooperative, no distress  Head: normocephalic, atraumatic  Eyes:No gross abnormalities. ENT:  MMM no lesions  Neck:  supple and no masses  Chest : Minimal end expiratory wheezing, diminished air sounds, minimal distress, nontender, tympanic  Heart[de-identified] Heart sounds are normal.  Regular rate and rhythm without murmur, gallop or rub. ABD:  symmetric, soft, non-tender  Musculoskeletal : no cyanosis, no clubbing and no edema  Neuro:  Grossly normal  Skin: No rashes or nodules noted.   Lymph node:  no cervical nodes  Urology: No Georges   Psychiatric: appropriate        Data Review  Recent Labs     10/11/20  2330   WBC 9.4   HGB 13.0   HCT 39.2         Recent Labs     10/11/20  2330      K 4.4   CL 99   CO2 28   BUN 9   CREATININE 0. 65   GLUCOSE 104*       MV Settings: ABGs: No results for input(s): PHART, RFS8BOH, PO2ART, TVO6LAQ, BEART, I7CQUXDQ, PGG3KJR in the last 72 hours.   O2 Device: Nasal cannula  O2 Flow Rate (L/min): 4 L/min  Lab Results   Component Value Date    LACTA 1.6 09/22/2019    LACTA 1.9 07/15/2019    LACTA 1.9 06/06/2019       Radiology  I personally reviewed imaging studies and EKG shows no infiltrate, has emphysema, large hiatal hernia, mediastinal lymphadenopathy seems to have resolved        Assessment, plan:   Patient is at risk due to    · COPD with acute exacerbation  · Chronic hypoxic respiratory failure on 2 L O2 at home  · History of large mediastinal lymph node resolved on recent CT  · Pain in left leg secondary to possible metastatic lesion  · Nodule like lesion left anterior chest recurrence versus scar from prior surgery  Recommendation  · Prednisone 40 mg p.o. daily  · Budesonide nebs twice daily  · Continue DuoNeb  · Watch volume status avoid overload  · Work-up for possible bone mets and left anterior chest abnormality as per primary team and oncology  · O2 to keep sat 88 to 90%       Thank you for consultation    Electronically signed by Kandis Mary MD, [facility],  on 10/12/2020 at 12:08 PM

## 2020-10-12 NOTE — PROGRESS NOTES
[facility] Occupational Therapy      Date: 10/12/2020  Patient Name: Joanna Villalta        MRN: [id number]  Account: [de-identified]   : 1945  (66 y.o.)  Room: [place] [address]    Chart reviewed, attempted OT at [phone] for evaluation. Patient not seen 2° to:    Pt. declined, stating: \"I'm just tired and sick. \"    Spoke to ESA Taylor aware. Will attempt again when able.     Electronically signed by REKHA Werner on  at 3:34 PM

## 2020-10-12 NOTE — PROGRESS NOTES
pack years  []   Smoking history  ?  20 pack years  []   Pulmonary Disorder  (acute or chronic)  []   Severe or Chronic w/ Exacerbation  [x]     Surgical Status No [x]   Surgeries     General []   Surgery Lower []   Abdominal Thoracic or []   Upper Abdominal Thoracic with  PulmonaryDisorder  []     Chest X-ray Clear/Not  Ordered     []  Chronic Changes  Results Pending  []  Infiltrates, atelectasis, pleural effusion, or edema  [x]  Infiltrates in more than one lobe []  Infiltrate + Atelectasis, &/or pleural effusion  []    Respiratory Pattern Regular,  RR = 12-20 [x]  Increased,  RR = 21-25 []  SOL, irregular,  or RR = 26-30 []  Decreased FEV1  or RR = 31-35 []  Severe SOB, use  of accessory muscles, or RR ? 35  []    Mental Status Alert, oriented,  Cooperative [x]  Confused but Follows commands []  Lethargic or unable to follow commands []  Obtunded  []  Comatose  []    Breath Sounds Clear to  auscultation  []  Decreased unilaterally or  in bases only []  Decreased  bilaterally  [x]  Crackles or intermittent wheezes []  Wheezes []    Cough Strong, Spontan., & nonproductive []  Strong,  spontaneous, &  productive [x]  Weak,  Nonproductive []  Weak, productive or  with wheezes []  No spontaneous  cough or may require suctioning []    Level of Activity Ambulatory []  Ambulatory w/ Assist  [x]  Non-ambulatory []  Paraplegic []  Quadriplegic []    Total    Score:___10____     Triage Score:____4____      Tri       Triage:     1. (>20) Freq: Q3    2. (16-20) Freq: Q4   3. (11-15) Freq: QID & Albuterol Q2 PRN    4. (6-10) Freq: TID & Albuterol Q2 PRN    5. (0-5) Freq Q4prn

## 2020-10-12 NOTE — PROGRESS NOTES
[facility] Occupational Therapy      Date: 10/12/2020  Patient Name: Abel Alford        MRN: [id number]  Account: [de-identified]   : 1945  (66 y.o.)  Room: [place] [address]    Chart reviewed, attempted OT at [phone] for evaluation. Patient not seen 2° to:    Pt. declined, stating: \"I'm just too tired. \"    Spoke to ESA Taylor aware. Will attempt again when able.     Electronically signed by REKHA Navarro on  at 9:48 AM

## 2020-10-12 NOTE — CARE COORDINATION
[facility] Case Management Initial Discharge Assessment    Met with Patient to discuss discharge plan. PCP: ROSALIND Solares - [facility]                                Date of Last Visit: 9/29    If no PCP, list provided? N/A    Discharge Planning    Living Arrangements: independently at home    Who do you live with? dtr    Who helps you with your care:  self    If lives at home:     Do you have any barriers navigating in your home? no    Patient can perform ADL? Yes    Current Services (outpatient and in home) :  None    Dialysis: No    Is transportation available to get to your appointments? Yes    DME Equipment:  yes - WHEEL CHAIR    Respiratory equipment:2LNC OXYGEN AND NEBULIZER    Respiratory provider:  yes - MEDICAL SERVICES     Pharmacy:  yes - [facility] [place] [facility] with Medication Assistance Program?  No      Patient agreeable to [facility]? Declined    Patient agreeable to SNF/Rehab? Declined    Other discharge needs identified? Palliative Care PATIENT DECLINED    Freedom of choice list provided with basic dialogue that supports the patient's individualized plan of care/goals and shares the quality data associated with the providers. Yes    Does Patient Have a High-Risk for Readmission Diagnosis (CHF, PN, MI, COPD)? Yes    If Yes,     Consult with pulmonologist? Yes   Consult with cardiologist? No   Cardiac Rehab referral if EF <35%? N/A   Consult with Pharmacy for medication assessment prior to discharge? N/A   Consult with Behavioral health to aid in depression, anxiety, or coping issues? N/A   Palliative Care Consult? Declined   Pulmonary Rehab order for COPD, PN, and CHF (if EF > 35%)? No    Does patient have a reliable scale and know how to read it (for CHF)? N/A   Nutrition consult for CHF? N/A   Respiratory therapy consult that includes bedside instruction on administration of nebulizers and/or inhalers, and assessment of oxygen and equipment needs in the home? Yes    The plan for Transition of Care is related to the following treatment goals:NEB, STEROIDS, IMAGING    Initial Discharge Plan? (Note: please see concurrent daily documentation for any updates after initial note).     HOME WITH DTR    The Patient and/or patient representative: James Holland was provided with choice of any post-acute providers for care and equipment and agrees with discharge plan  Yes    Electronically signed by Tutu King RN on 10/12/2020 at 1:49 PM

## 2020-10-12 NOTE — ED PROVIDER NOTES
Psychiatric/Behavioral: Negative for agitation and behavioral problems. The patient is not nervous/anxious. Except as noted above the remainder of the review of systems was reviewed and negative. PAST MEDICAL HISTORY     Past Medical History:   Diagnosis Date    Anxiety     Anxiety and depression     CAD (coronary artery disease)     Cancer ([facility] [address] 3/2014    Invasive ductal cancer left breast, spread to 5 lymph nodes    Carotid artery stenosis and occlusion     COPD (chronic obstructive pulmonary disease) ([facility] [address]     Depression 1/15/2018    GERD (gastroesophageal reflux disease)     Headache(784.0)     Hyperlipidemia     Hypertension     Hypothyroidism     Insomnia     Osteoarthritis     RBBB 10/15/2014    Right carotid bruit 5/26/2015    S/P cardiac catheterization 7/13/2016         SURGICAL HISTORY       Past Surgical History:   Procedure Laterality Date    BREAST BIOPSY Right 11/8/2016    US biopsy    BREAST SURGERY Left 2/26/14    U/S Guided core bx of the left breast    BREAST SURGERY Right 3/20/14    U/S of the lateral right breast    BREAST SURGERY Left 3/27/14    U/S Guided Left breast lumpectomy and left snb    OTHER SURGICAL HISTORY  4/11/14    Placement drain, left axillary seroma    MN MASTECTOMY, SIMPLE, COMPLETE Right 9/6/2018    R modified radical mastectomy         CURRENT MEDICATIONS       Discharge Medication List as of 10/16/2020  5:35 PM      CONTINUE these medications which have NOT CHANGED    Details   budesonide (PULMICORT) 0.5 MG/2ML nebulizer suspension Take 1 ampule by nebulization 2 times dailyHistorical Med      albuterol sulfate HFA (VENTOLIN HFA) 108 (90 Base) MCG/ACT inhaler Inhale 2 puffs into the lungs every 6 hours as needed for Wheezing, Disp-3 Inhaler,R-3Normal      traMADol (ULTRAM) 50 MG tablet Take 1 tablet by mouth every 4 hours as needed for Pain for up to 90 days. Intended supply: 7 days.  Take lowest dose possible to manage pain, Disp-120 tablet,R-2Normal      Calcium 600-200 MG-UNIT TABS Take by mouth 2 times dailyHistorical Med      SYMBICORT 160-4.5 MCG/ACT AERO USE 2 INHALATIONS TWICE A DAY, Disp-30.6 g,R-3Normal      Tiotropium Bromide Monohydrate (SPIRIVA RESPIMAT IN) Inhale into the lungsHistorical Med      traZODone (DESYREL) 150 MG tablet Take 1 tablet by mouth nightly, Disp-90 tablet, R-4Normal      vitamin D (ERGOCALCIFEROL) 1.25 MG (89377 UT) CAPS capsule Take 1 capsule by mouth once a week, Disp-12 capsule, R-4Normal      albuterol (PROVENTIL) (2.5 MG/3ML) 0.083% nebulizer solution Take 3 mLs by nebulization 4 times daily, Disp-120 each, R-3Print      levothyroxine (SYNTHROID) 75 MCG tablet Take 1 tablet by mouth Daily, Disp-90 tablet, R-4Normal      acetylcysteine (MUCOMYST) 10 % nebulizer solution Inhale 4 mLs into the lungs 4 times daily, Disp-15 vial, R-1Print      OXYGEN Oxygen on 2L with exertion and Nocturnal, Disp-1 Units, R-0Print      metoprolol tartrate (LOPRESSOR) 50 MG tablet Take 50 mg by mouth 2 times dailyHistorical Med      guaiFENesin (MUCINEX) 600 MG extended release tablet Take 1,200 mg by mouth 2 times dailyHistorical Med      Respiratory Therapy Supplies (NEBULIZER/TUBING/MOUTHPIECE) KIT DAILY PRN Starting Mon 11/5/2018, Disp-1 kit, R-5, Normal      omeprazole (PRILOSEC) 20 MG delayed release capsule Take 1 capsule by mouth Daily, Disp-90 capsule, R-4Normal      lovastatin (MEVACOR) 20 MG tablet TAKE 1 TABLET NIGHTLY, Disp-90 tablet, R-4Normal             ALLERGIES     Patient has no known allergies. FAMILY HISTORY       Family History   Problem Relation Age of Onset    Cancer Sister         breast    Cancer Maternal Uncle         pancreatic          SOCIAL HISTORY       Social History     Socioeconomic History    Marital status:       Spouse name: None    Number of children: None    Years of education: None    Highest education level: None   Occupational History    None   Social Needs    Financial resource strain: None    Food insecurity     Worry: None     Inability: None    Transportation needs     Medical: None     Non-medical: None   Tobacco Use    Smoking status: Former Smoker     Packs/day: 1.50     Years: 40.00     Pack years: 60.00     Types: Cigarettes     Last attempt to quit: 1989     Years since quittin.7    Smokeless tobacco: Never Used   Substance and Sexual Activity    Alcohol use: No    Drug use: No    Sexual activity: Never   Lifestyle    Physical activity     Days per week: None     Minutes per session: None    Stress: None   Relationships    Social connections     Talks on phone: None     Gets together: None     Attends [employer] service: None     Active member of club or organization: None     Attends meetings of clubs or organizations: None     Relationship status: None    Intimate partner violence     Fear of current or ex partner: None     Emotionally abused: None     Physically abused: None     Forced sexual activity: None   Other Topics Concern    None   Social History Narrative    None       SCREENINGS        [place] Coma Scale  Eye Opening: Spontaneous  Best Verbal Response: Oriented  Best Motor Response: Obeys commands  [place] Coma Scale Score: 15               PHYSICAL EXAM    (up to 7 for level 4, 8 or more for level 5)     ED Triage Vitals [10/11/20 2315]   BP Temp Temp Source Pulse Resp SpO2 Height Weight   (!) 162/88 98.4 °F (36.9 °C) Oral 102 22 99 % 5' 3\" (1.6 m) 125 lb (56.7 kg)       Physical Exam  Vitals signs and nursing note reviewed. Constitutional:       General: She is not in acute distress. Appearance: Normal appearance. She is normal weight. HENT:      Head: Normocephalic. Right Ear: Tympanic membrane, ear canal and external ear normal.      Left Ear: Tympanic membrane, ear canal and external ear normal.      Nose: Nose normal.      Mouth/Throat:      Mouth: Mucous membranes are moist.      Pharynx: Oropharynx is clear.  No oropharyngeal exudate or posterior oropharyngeal erythema. Eyes:      Conjunctiva/sclera: Conjunctivae normal.      Pupils: Pupils are equal, round, and reactive to light. Neck:      Musculoskeletal: Normal range of motion. No neck rigidity. Cardiovascular:      Rate and Rhythm: Normal rate and regular rhythm. Pulses: Normal pulses. Heart sounds: Normal heart sounds. No murmur. No friction rub. Pulmonary:      Effort: Pulmonary effort is normal. No respiratory distress. Breath sounds: No stridor. Examination of the right-upper field reveals wheezing. Examination of the left-upper field reveals wheezing. Examination of the right-middle field reveals decreased breath sounds. Examination of the left-middle field reveals decreased breath sounds. Examination of the right-lower field reveals decreased breath sounds. Examination of the left-lower field reveals decreased breath sounds. Decreased breath sounds and wheezing present. Abdominal:      General: Abdomen is flat. Bowel sounds are normal.      Palpations: Abdomen is soft. Genitourinary:     Vagina: No vaginal discharge. Musculoskeletal: Normal range of motion. General: No swelling or tenderness. Neurological:      General: No focal deficit present. Mental Status: She is alert. Psychiatric:         Mood and Affect: Mood normal.         Behavior: Behavior normal.         DIAGNOSTIC RESULTS     EKG: All EKG's are interpreted by the Emergency Department Physician who either signs or Co-signs this chart in the absence of a cardiologist.        RADIOLOGY:   Non-plain film images such as CT, Ultrasound and MRI are read by the radiologist. Plain radiographic images are visualized and preliminarily interpreted by the emergency physician with the below findings:    Possible left lower lobe pneumonia.     Interpretation per the Radiologist below, if available at the time of this note:    RADIOLOGY REPORT   Final Result      CTA Chest W WO  (PE study)   Final Result   1. No evidence of thoracic aortic dissection or aneurysm. Negative for pulmonary embolus   2. Stable appearance of the lungs since the prior study demonstrating chronic emphysematous changes as well as pulmonary nodules measuring up to 13 mm. May represent metastatic disease, PET CT evaluation may be considered. 3. There is a 14 mm subcutaneous soft tissue nodule on the anterior right chest wall in the region of the prior mastectomy which may represent residual or recurrent tumor. Status post right axillary node dissection. XR CHEST PORTABLE   Final Result   NO ACUTE CARDIOPULMONARY ABNORMALITY. NO CHANGE.      COPD.                ED BEDSIDE ULTRASOUND:   Performed by ED Physician - none    LABS:  Labs Reviewed   COMPREHENSIVE METABOLIC PANEL - Abnormal; Notable for the following components:       Result Value    Glucose 104 (*)     All other components within normal limits   CBC WITH AUTO DIFFERENTIAL - Abnormal; Notable for the following components:    Neutrophils Absolute 6.8 (*)     Monocytes Absolute 0.9 (*)     All other components within normal limits   CBC WITH AUTO DIFFERENTIAL - Abnormal; Notable for the following components:    MCHC 32.6 (*)     RDW 14.7 (*)     Neutrophils Absolute 7.9 (*)     Lymphocytes Absolute 0.9 (*)     All other components within normal limits   BASIC METABOLIC PANEL - Abnormal; Notable for the following components:    Sodium 133 (*)     Glucose 104 (*)     All other components within normal limits   CBC WITH AUTO DIFFERENTIAL - Abnormal; Notable for the following components:    MCHC 32.4 (*)     Monocytes Absolute 0.9 (*)     All other components within normal limits   BASIC METABOLIC PANEL - Abnormal; Notable for the following components:    Glucose 103 (*)     All other components within normal limits   CBC WITH AUTO DIFFERENTIAL - Abnormal; Notable for the following components:    MCHC 32.9 (*)     All other components within normal limits   BASIC METABOLIC PANEL - Abnormal; Notable for the following components:    Anion Gap 8 (*)     Calcium 8.2 (*)     All other components within normal limits   CULTURE, BLOOD 1    Narrative:     ORDER#: [id number]                          ORDERED BY: JUSTINA GRAHAM  SOURCE: Blood                              COLLECTED:  10/11/20 23:56  ANTIBIOTICS AT MAXWELL.:                      RECEIVED :  10/11/20 23:56   CULTURE, BLOOD 2    Narrative:     ORDER#: [id number]                          ORDERED BY: JUSTINA GRAHAM  SOURCE: Blood                              COLLECTED:  10/11/20 23:56  ANTIBIOTICS AT MAXWELL.:                      RECEIVED :  10/11/20 23:56   PROCALCITONIN   BRAIN NATRIURETIC PEPTIDE   TROPONIN   BASIC METABOLIC PANEL   CBC WITH AUTO DIFFERENTIAL       All other labs were within normal range or not returned as of this dictation. EMERGENCY DEPARTMENT COURSE and DIFFERENTIAL DIAGNOSIS/MDM:   Vitals:    Vitals:    10/16/20 0727 10/16/20 0807 10/16/20 1116 10/16/20 1527   BP: (!) 150/69      Pulse: 84      Resp:  16 16 16   Temp: 97.9 °F (36.6 °C)      TempSrc: Oral      SpO2: 100% 100% 100% 96%   Weight:       Height:           68-year of age female who presents with shortness of breath. Patient has a history of COPD. On arrival patient talking short 4-5 word sentences. Patient denies fever. Patient does endorse decreased ability to ambulate. CBC, CMP, BNP, prolactin, troponin, EKG, chest x-ray, blood cultures x2 will be obtained. Patient will be given IV Solu-Medrol, IV mag, patient will be reassessed. MDM        REASSESSMENT      Reassessment patient remains mildly tachypneic able to speak in longer sentences. Patient has remained on 2 L which is her baseline at home. Chest x-ray showed a possible left lower lobe pneumonia. Patient will be given to be reassessed. With patient's given symptoms and I believe most action would be to admit patient.   Hospitalist consulted who agreed to admit the patient. Patient's questions were answered and agreed to stay. CONSULTS:  IP CONSULT TO PULMONOLOGY  IP CONSULT TO SPIRITUAL SERVICES  IP CONSULT TO ONCOLOGY  IP CONSULT TO PALLIATIVE CARE    PROCEDURES:  Unless otherwise noted below, none     Procedures        FINAL IMPRESSION      1. Anxiety and depression    2. COPD exacerbation ([address]    3. Pulmonary nodule          DISPOSITION/PLAN   DISPOSITION Admitted 10/12/2020 04:14:10 AM      PATIENT REFERRED TO:  Malika Ortiz MD  [facility]  [address] [facility]  [phone]    On 10/23/2020  at 2:40pm - telephone visit    Aurelia Benz MD  [address]  [phone]    On 11/3/2020  at 2:15pm      DISCHARGE MEDICATIONS:  Discharge Medication List as of 10/16/2020  5:35 PM      START taking these medications    Details   ALPRAZolam (XANAX) 0.5 MG tablet Take 1 tablet by mouth 2 times daily as needed for Anxiety for up to 7 days. , Disp-14 tablet,R-0Normal      predniSONE (DELTASONE) 10 MG tablet Take 3 tabs daily for 3 days then take 2 tabs daily for 3 days then take 1 tab daily for 3 days. , Disp-18 tablet,R-0Normal           Controlled Substances Monitoring:     RX Monitoring 9/29/2020   Attestation -   Acute Pain Prescriptions -   Periodic Controlled Substance Monitoring Possible medication side effects, risk of tolerance/dependence & alternative treatments discussed. ;No signs of potential drug abuse or diversion identified. ;Assessed functional status. Chronic Pain > 50 MEDD Obtained or confirmed \"Consent for Opioid Use\" on file. ;Re-evaluated the status of the patient's underlying condition causing pain.        (Please note that portions of this note were completed with a voice recognition program.  Efforts were made to edit the dictations but occasionally words are mis-transcribed.)    Grant Benedict PA-C (electronically signed)             Grant Benedict PA-C  10/19/20 [id number]

## 2020-10-13 LAB
ANION GAP SERPL CALCULATED.3IONS-SCNC: 10 MEQ/L (ref 9–15)
BASOPHILS ABSOLUTE: 0.1 K/UL (ref 0–0.2)
BASOPHILS RELATIVE PERCENT: 0.7 %
BUN BLDV-MCNC: 12 MG/DL (ref 8–23)
CALCIUM SERPL-MCNC: 8.5 MG/DL (ref 8.5–9.9)
CHLORIDE BLD-SCNC: 98 MEQ/L (ref 95–107)
CO2: 25 MEQ/L (ref 20–31)
CREAT SERPL-MCNC: 0.65 MG/DL (ref 0.5–0.9)
EOSINOPHILS ABSOLUTE: 0.1 K/UL (ref 0–0.7)
EOSINOPHILS RELATIVE PERCENT: 0.5 %
GFR AFRICAN AMERICAN: >60
GFR NON-AFRICAN AMERICAN: >60
GLUCOSE BLD-MCNC: 104 MG/DL (ref 70–99)
HCT VFR BLD CALC: 40.2 % (ref 37–47)
HEMOGLOBIN: 13.1 G/DL (ref 12–16)
LYMPHOCYTES ABSOLUTE: 0.9 K/UL (ref 1–4.8)
LYMPHOCYTES RELATIVE PERCENT: 9.6 %
MCH RBC QN AUTO: 30.1 PG (ref 27–31.3)
MCHC RBC AUTO-ENTMCNC: 32.6 % (ref 33–37)
MCV RBC AUTO: 92.3 FL (ref 82–100)
MONOCYTES ABSOLUTE: 0.5 K/UL (ref 0.2–0.8)
MONOCYTES RELATIVE PERCENT: 5.8 %
NEUTROPHILS ABSOLUTE: 7.9 K/UL (ref 1.4–6.5)
NEUTROPHILS RELATIVE PERCENT: 83.4 %
PDW BLD-RTO: 14.7 % (ref 11.5–14.5)
PLATELET # BLD: 316 K/UL (ref 130–400)
POTASSIUM SERPL-SCNC: 4 MEQ/L (ref 3.4–4.9)
RBC # BLD: 4.35 M/UL (ref 4.2–5.4)
SODIUM BLD-SCNC: 133 MEQ/L (ref 135–144)
WBC # BLD: 9.5 K/UL (ref 4.8–10.8)

## 2020-10-13 PROCEDURE — 36415 COLL VENOUS BLD VENIPUNCTURE: CPT

## 2020-10-13 PROCEDURE — 2700000000 HC OXYGEN THERAPY PER DAY

## 2020-10-13 PROCEDURE — 6370000000 HC RX 637 (ALT 250 FOR IP): Performed by: NURSE PRACTITIONER

## 2020-10-13 PROCEDURE — 1210000000 HC MED SURG R&B

## 2020-10-13 PROCEDURE — 2580000003 HC RX 258: Performed by: NURSE PRACTITIONER

## 2020-10-13 PROCEDURE — 94761 N-INVAS EAR/PLS OXIMETRY MLT: CPT

## 2020-10-13 PROCEDURE — 94640 AIRWAY INHALATION TREATMENT: CPT

## 2020-10-13 PROCEDURE — 85025 COMPLETE CBC W/AUTO DIFF WBC: CPT

## 2020-10-13 PROCEDURE — 93010 ELECTROCARDIOGRAM REPORT: CPT | Performed by: INTERNAL MEDICINE

## 2020-10-13 PROCEDURE — 6360000002 HC RX W HCPCS: Performed by: INTERNAL MEDICINE

## 2020-10-13 PROCEDURE — 99232 SBSQ HOSP IP/OBS MODERATE 35: CPT | Performed by: INTERNAL MEDICINE

## 2020-10-13 PROCEDURE — 6370000000 HC RX 637 (ALT 250 FOR IP): Performed by: INTERNAL MEDICINE

## 2020-10-13 PROCEDURE — 80053 COMPREHEN METABOLIC PANEL: CPT

## 2020-10-13 PROCEDURE — 6360000002 HC RX W HCPCS: Performed by: NURSE PRACTITIONER

## 2020-10-13 RX ADMIN — IPRATROPIUM BROMIDE AND ALBUTEROL SULFATE 1 AMPULE: .5; 3 SOLUTION RESPIRATORY (INHALATION) at 20:04

## 2020-10-13 RX ADMIN — Medication 10 ML: at 20:01

## 2020-10-13 RX ADMIN — ENOXAPARIN SODIUM 40 MG: 40 INJECTION SUBCUTANEOUS at 08:23

## 2020-10-13 RX ADMIN — BUDESONIDE INHALATION 500 MCG: 0.5 SUSPENSION RESPIRATORY (INHALATION) at 20:05

## 2020-10-13 RX ADMIN — IPRATROPIUM BROMIDE AND ALBUTEROL SULFATE 1 AMPULE: .5; 3 SOLUTION RESPIRATORY (INHALATION) at 12:50

## 2020-10-13 RX ADMIN — PREDNISONE 40 MG: 20 TABLET ORAL at 08:23

## 2020-10-13 RX ADMIN — BUDESONIDE INHALATION 500 MCG: 0.5 SUSPENSION RESPIRATORY (INHALATION) at 07:25

## 2020-10-13 RX ADMIN — LEVOTHYROXINE SODIUM 75 MCG: 0.07 TABLET ORAL at 05:48

## 2020-10-13 RX ADMIN — GUAIFENESIN 1200 MG: 600 TABLET ORAL at 20:00

## 2020-10-13 RX ADMIN — GUAIFENESIN 1200 MG: 600 TABLET ORAL at 08:23

## 2020-10-13 RX ADMIN — ATORVASTATIN CALCIUM 10 MG: 10 TABLET, FILM COATED ORAL at 08:23

## 2020-10-13 RX ADMIN — METOPROLOL TARTRATE 50 MG: 50 TABLET, FILM COATED ORAL at 20:00

## 2020-10-13 RX ADMIN — METOPROLOL TARTRATE 50 MG: 50 TABLET, FILM COATED ORAL at 08:23

## 2020-10-13 RX ADMIN — IPRATROPIUM BROMIDE AND ALBUTEROL SULFATE 1 AMPULE: .5; 3 SOLUTION RESPIRATORY (INHALATION) at 07:25

## 2020-10-13 ASSESSMENT — PAIN SCALES - GENERAL: PAINLEVEL_OUTOF10: 0

## 2020-10-13 NOTE — PROGRESS NOTES
Assumed care of patient this evening. Pt resting in bed and becomes very SOB with any movement or talking. Pt denies pain this shift.   Electronically signed by Anita Smith RN on 10/13/2020 at 1:33 AM

## 2020-10-13 NOTE — PROGRESS NOTES
Physical Therapy Missed Treatment   Facility/Department: [facility] MED SURG A596/[id number]    NAME: Kilo Guerra    : 1945 (66 y.o.)  MRN: [id number]    Account: [de-identified]  Gender: female      PT evaluation and treatment orders received. Chart reviewed. PT evaluation attempted. Pt declining this PM stating that \"today isn't a good day. \" Educated pt on role of PT in house and importance of OOB activity. Nursing staff notified. Will follow and attempt PT evaluation again at earliest availability.        Everett Mcintyre, PT, 10/13/20 at 1:37 PM

## 2020-10-13 NOTE — PROGRESS NOTES
Hospitalist Daily Progress Note  Name: Ray Grajeda  Age: 66 y.o. Gender: female  CodeStatus: Full Code  Allergies: No Known Allergies    Chief Complaint:Shortness of Breath    Primary Care Provider: ROSALIND Borden CNP  InpatientTreatment Team: Treatment Team: Attending Provider: Melissa Stock DO; Consulting Physician: Mary Beth Pinedo MD; Registered Nurse: Nick Kuo RN  Admission Date: 10/11/2020      Subjective: patient seen and evaluated at bedside. 2L o2 at home. Persistent dyspnea, requiring bedside commode. No cough. Afebrile 98.4 RR 22  /88 99% on 3L     Physical Exam  Constitutional:       Appearance: Normal appearance. Comments: Thin and cachectic   HENT:      Head: Normocephalic and atraumatic. Mouth/Throat:      Mouth: Mucous membranes are moist.      Pharynx: Oropharynx is clear. Eyes:      Extraocular Movements: Extraocular movements intact. Pupils: Pupils are equal, round, and reactive to light. Cardiovascular:      Rate and Rhythm: Normal rate and regular rhythm. Heart sounds: No murmur. No friction rub. No gallop. Pulmonary:      Breath sounds: Wheezing present. No rhonchi or rales. Comments:  Decreased air movement throughout. Abdominal:      General: There is no distension. Tenderness: There is no abdominal tenderness. There is no guarding. Musculoskeletal: Normal range of motion. Comments: LLE painful anterior shin. RLE normal   Neurological:      General: No focal deficit present. Mental Status: She is alert and oriented to person, place, and time. Psychiatric:         Mood and Affect: Mood normal.         Thought Content: Thought content normal.         Judgment: Judgment normal.         Review of Systems  14 point ros is reviewed and negative except for as above.    Medications:  Reviewed    Infusion Medications:   Scheduled Medications:    sodium chloride flush  10 mL Intravenous 2 times per day    enoxaparin  40 mg Subcutaneous Daily    predniSONE  40 mg Oral Daily    guaiFENesin  1,200 mg Oral BID    levothyroxine  75 mcg Oral Daily    atorvastatin  10 mg Oral Daily    metoprolol tartrate  50 mg Oral BID    ipratropium-albuterol  1 ampule Inhalation 4x daily    budesonide  0.5 mg Nebulization BID     PRN Meds: sodium chloride flush, acetaminophen **OR** acetaminophen, polyethylene glycol, promethazine **OR** ondansetron, albuterol, albuterol sulfate HFA    Labs:   Recent Labs     10/11/20  2330   WBC 9.4   HGB 13.0   HCT 39.2        Recent Labs     10/11/20  2330      K 4.4   CL 99   CO2 28   BUN 9   CREATININE 0.65   CALCIUM 9.0     Recent Labs     10/11/20  2330   AST 24   ALT 15   BILITOT 0.5   ALKPHOS 64     No results for input(s): INR in the last 72 hours. Recent Labs     10/11/20  2330   TROPONINI <0.010       Urinalysis:   Lab Results   Component Value Date    NITRU Negative 07/15/2019    BLOODU Negative 07/15/2019    SPECGRAV 1.015 07/15/2019    GLUCOSEU Negative 07/15/2019       Radiology:   Most recent    Chest CT      WITH CONTRAST:  Results for orders placed during the hospital encounter of 09/08/20   CT CHEST W CONTRAST    Narrative CT CHEST W CONTRAST    COMPARISON: November 5, 2018; February 10, 2020. HISTORY: Malignant neoplasm of the central portion of the right female breast    TECHNIQUE: CT CHEST W CONTRASTAxial CT images of the thorax were obtained following the administration of 100 mL of Isovue 370 intravenous contrast. 2-D and 3-D reconstructions were obtained. FINDINGS:     Diffuse emphysematous disease is seen. Bull[place] are seen bilaterally in the upper lobes, right greater than left. Paraseptal emphysematous changes are also seen on the left. In the lingula there are reticulations and pleural thickening in the chest wall   this could also represent post radiation postsurgical changes. A 3 mm nodule is seen in the right upper lobe (series 5, image 16). Anteriorly in the right upper lobe there is a 2.7 mm nodule (series 5, image 17). A 2.5 mm nodule is seen in the right   lateral upper lobe (series 5, image 16). This is also seen laterally where there is  pleural thickening visualized. This also may represent post radiation postsurgical changes. Also in the right middle lobe there is a nodular opacity that measures 3.3 mm (series 5, image 32). On the same image there is a 3 mm nodule in the right lower lobe. In the left lower lobe a pleural-based nodule is seen that measures 3 mm (series 5 image   37. Also in the left lower lobe and irregular nodule is seen that measures 1.1 x 1.1 cm (series 5, image 44. ). A precarinal lymph node is seen that measures 1.4 x 1.7 cm. It appears to be slightly larger than what was seen on previous examination. There are surgical clips seen in the right axillary region and also chest wall where there is evidence of mastectomy. On the right an 8mm hypodensity  is seen along the right chest wall that appears to contain fluid. This area was slightly smaller on previous examination. It measured 6 mm. Surgical clips are seen along the chest wall on the left as well. .      No aneurysm is seen. There are diffuse vascular calcifications seen in the aortic arch. There are also coronary calcifications and/or stents. A large hiatal hernia is again seen. Impression 1. There are multiple small nodules seen on this examination concerning for metastatic disease. The largest nodule visualized is seen in the left lower lobe. It is irregular. 2. Precarinal lymph node is again visualized. It appears slightly larger than what was seen on previous examination. All CT scans at this facility use dose modulation, iterative reconstruction, and/or weight based dosing when appropriate to reduce radiation dose to as low as reasonably achievable.       CT ABDOMEN PELVIS W IV CONTRAST     Comparison: October 3, 2018       History: Malignant neoplasm central portion of the right breast    Technique: Helically Acquired CT of the  abdomen and pelvis was performed following the administration of oral contrast and during the intravenous infusion of 100 mL of Isovue-370. Axial delayed images were also performed. Reformatted sagittal and   coronal images were also obtained. Findings:       A large hiatal hernia is again seen. A calcified mass is again seen in the area of the gallbladder fossa thought to represent porcelain gallbladder. The liver, spleen, pancreas and left adrenal gland are unremarkable. A nodule is again seen in the right adrenal gland and is stable. Bilaterally both kidneys show uptake and excretion of contrast with no evidence for hydronephrosis or renal calculi. No   aneurysm or dissection is present. There are diffuse atherosclerotic calcifications. No enlarged retroperitoneal, mesenteric or pelvic lymph nodes seen. No abnormally dilated loops of bowel, free air or free fluid seen. Within the pelvis the uterus is unremarkable. The contrast is seen layering in the bladder. No bladder wall thickening is seen. The osseous structures contain no destructive lesions. A levoscoliosis is seen in the lumbar spine. Impression:    1. 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 There is no evidence for obstruction. No evidence of appendicitis or diverticulitis seen. 3. No hydronephrosis is seen  4. No pathologically enlarged lymph nodes seen. All CT scans at this facility use dose modulation, iterative reconstruction, and/or weight based dosing             WITHOUT CONTRAST: No results found for this or any previous visit. CXR      2-view:   Results for orders placed during the hospital encounter of 09/30/19   XR CHEST STANDARD (2 VW)    Narrative DIAGNOSIS:R07.89 Discomfort of chest wall ICD10    COMPARISON: September 10, 2010; September 22, 2019    TECHNIQUE: PA and lateral chest    FINDINGS: The lungs are hyperinflated. There is coarsening of the interstitium,  increased AP diameter and flattening of the diaphragm are also seen.: The lungs contain no focal infiltrate, pleural effusion, consolidation or pneumothorax. The cardiac   silhouette is not enlarged. Calcifications are seen in the thoracic aorta. The bony structures are osteopenic with degenerative changes visualized. A retrocardiac air-fluid level is seen that may represent a hiatal hernia. Impression No acute cardiopulmonary process, findings are suggestive of COPD. Portable:   Results for orders placed during the hospital encounter of 10/11/20   XR CHEST PORTABLE    Narrative EXAMINATION: Portable AP ERECT view of the chest.    CLINICAL HISTORY: Increasing  sob , COPD. DATE: 10/11/2020 11:45 PM    COMPARISONS: 9/30/2019    FINDINGS: Lungs are hyperexpanded. Normal cardiac silhouette. There are atherosclerotic calcifications in the aortic arch. Lungs are clear without consolidation. No pleural effusion or pneumothorax. The bony thorax is unremarkable. Status post right   mastectomy. There are clips in the right axilla. There are old healed right rib fractures. Impression NO ACUTE CARDIOPULMONARY ABNORMALITY. NO CHANGE.    COPD. Echo No results found for this or any previous visit. Assessment/Plan:    Active Hospital Problems    Diagnosis Date Noted    COPD exacerbation ([facility] [address] [J44.1] 05/12/2019    Dyslipidemia [E78.5] 09/08/2015    HTN (hypertension) [I10] 09/16/2014    Hypothyroidism [E03.9]      Advanced COPD with exacerbation: prednisone 40 daily. Budesonide 500 BID, mucinex 1200 BID. duoneb QID. Hypothyroid 75 mcg daily. HTN: metoprolol 50 BID. Hyperlipidemia lipitor 10 daily. Breast CA: NM bone scan from august shows diffuse metastatic disease. following with oncology. Encouraged follow up with XRT Dr Nathaniel Resendez    Lovenox 40 QD.      Additional work up or/and treatment plan may be added today or then after based on clinical progression. I am managing a portion of pt care. Some medical issues are handled byother specialists. Additional work up and treatment should be done in out pt setting by pt PCP and other out pt providers. In addition to examining and evaluating pt, I spent additional time explaining care, normaland abnormal findings, and treatment plan. All of pt questions were answered. Counseling, diet and education were provided. Case will be discussed with nursing staff when appropriate. Family will be updated if and whenappropriate.       Electronically signed by Kojo Leblanc DO on 10/12/2020 at 10:32 PM

## 2020-10-13 NOTE — PROGRESS NOTES
INPATIENT PROGRESS NOTES    PATIENT NAME: Mil Joyce  MRN: [id number]  SERVICE DATE:  2020   SERVICE TIME:  12:54 PM      PRIMARY SERVICE: Pulmonary Disease    CHIEF COMPLAINTS: COPD exacerbation    INTERVAL HPI: Patient seen and examined at bedside, Interval Notes, orders reviewed. Nursing notes noted    Patient report patient feels better, still short of breath but slightly improved, no coughing, slept okay, no chest pain, no lower extremity edema, no nausea no vomiting    Review of system:     GI Abdominal pain No  Skin Rash No    Social History     Tobacco Use    Smoking status: Former Smoker     Packs/day: 1.50     Years: 40.00     Pack years: 60.00     Types: Cigarettes     Last attempt to quit: 1989     Years since quittin.7    Smokeless tobacco: Never Used   Substance Use Topics    Alcohol use: No         Problem Relation Age of Onset    Cancer Sister         breast    Cancer Maternal Uncle         pancreatic         OBJECTIVE    Body mass index is 21.93 kg/m². PHYSICAL EXAM:  Vitals:  BP (!) 150/73   Pulse 110   Temp 98.1 °F (36.7 °C) (Oral)   Resp 18   Ht 5' 3\" (1.6 m)   Wt 123 lb 12.8 oz (56.2 kg)   LMP  (LMP Unknown)   SpO2 100%   BMI 21.93 kg/m²     General: alert, cooperative, no distress  Head: normocephalic, atraumatic  Eyes:No gross abnormalities. ENT:  MMM no lesions  Neck:  supple and no masses  Chest : Improved air movement, still tight, mild expiratory wheezing, minimal basal rales, nontender, tympanic  Heart[de-identified] Heart sounds are normal.  Regular rate and rhythm without murmur, gallop or rub. ABD:  symmetric, soft, non-tender  Musculoskeletal : no cyanosis, no clubbing and no edema  Neuro:  Grossly normal  Skin: No rashes or nodules noted.   Lymph node:  no cervical nodes  Urology: No Georges   Psychiatric: appropriate    DATA:   Recent Labs     10/11/20  2330 10/13/20  0957   WBC 9.4 9.5   HGB 13.0 13.1   HCT 39.2 40.2   MCV 90.8 92.3    316 Recent Labs     10/11/20  2330 10/13/20  0957    133*   K 4.4 4.0   CL 99 98   CO2 28 25   BUN 9 12   CREATININE 0.65 0.65   GLUCOSE 104* 104*   CALCIUM 9.0 8.5   PROT 6.3  --    LABALBU 4.0  --    BILITOT 0.5  --    ALKPHOS 64  --    AST 24  --    ALT 15  --    LABGLOM >60.0 >60.0   GFRAA >60.0 >60.0   GLOB 2.3  --        MV Settings:          No results for input(s): PHART, LAU2NND, PO2ART, YZA9UGU, BEART, E4VHWKLS in the last 72 hours. O2 Device: Nasal cannula  O2 Flow Rate (L/min): 4 L/min    DIET CARB CONTROL;     MEDICATIONS during current hospitalization:    Continuous Infusions:    Scheduled Meds:   sodium chloride flush  10 mL Intravenous 2 times per day    enoxaparin  40 mg Subcutaneous Daily    predniSONE  40 mg Oral Daily    guaiFENesin  1,200 mg Oral BID    levothyroxine  75 mcg Oral Daily    atorvastatin  10 mg Oral Daily    metoprolol tartrate  50 mg Oral BID    ipratropium-albuterol  1 ampule Inhalation 4x daily    budesonide  0.5 mg Nebulization BID       PRN Meds:sodium chloride flush, acetaminophen **OR** acetaminophen, polyethylene glycol, promethazine **OR** ondansetron, albuterol, albuterol sulfate HFA    Radiology  Cta Chest W Wo  (pe Study)    Result Date: 10/12/2020  EXAM:CTA CHEST W WO CONTRAST DATE10/12/2020 2:46 AM: REASON FOR EXAM:Acute severe anterior chest wall pain. pe COMPARISON: Chest CT from September 8, 0931 Technique: Helical CT was performed through the chest following the IV administration of100  cc of nonionic contrast. 3-D MIP reconstructions were performed on an independent workstation in the coronal plane with thick slab technique utilized in the interpretation. 3-D maximum intensity projection performed. All CT scans at this facility use dose modulation, iterative reconstruction, and/or weight based dosing when appropriate to reduce radiation dose to as low as reasonably achievable. FINDINGS Vascular structures: There is no evidence for thoracic aortic aneurysm or dissection. No evidence of traumatic aortic injury. There are vascular calcifications and evidence of arteriosclerosis. There is no evidence for pulmonary emboli. Lungs: The lungs are so due to previous examination demonstrating hyperlucency, hyperinflation and centrilobular emphysematous changes are seen in the apices. There are small nodules in both lungs measuring up to 13 mm in the left lower lobe similar to the prior study. Pleura: No pleural effusion or thickening. Mediastinum: Mediastinum and simón are normal. There are no pathologically enlarged lymph nodes. The chest wall and lower neck are normal there is a persistent large hiatal hernia with a diameter of approximately 7centimeters containing the proximal stomach. Upper abdomen: There is a partially calcified lesion in the liver which is not adequately imaged on the current study but has been present since the prior examination. Bones/axillae/soft tissues: There has been an right axillary node dissection and right mastectomy. There is a 14 mm subcutaneous nodule in the region of the right chest wall. 1. No evidence of thoracic aortic dissection or aneurysm. Negative for pulmonary embolus 2. Stable appearance of the lungs since the prior study demonstrating chronic emphysematous changes as well as pulmonary nodules measuring up to 13 mm. May represent metastatic disease, PET CT evaluation may be considered. 3. There is a 14 mm subcutaneous soft tissue nodule on the anterior right chest wall in the region of the prior mastectomy which may represent residual or recurrent tumor. Status post right axillary node dissection. Xr Chest Portable    Result Date: 10/12/2020  EXAMINATION: Portable AP ERECT view of the chest. CLINICAL HISTORY: Increasing  sob , COPD. DATE: 10/11/2020 11:45 PM COMPARISONS: 9/30/2019 FINDINGS: Lungs are hyperexpanded. Normal cardiac silhouette. There are atherosclerotic calcifications in the aortic arch.  Lungs are clear without consolidation. No pleural effusion or pneumothorax. The bony thorax is unremarkable. Status post right mastectomy. There are clips in the right axilla. There are old healed right rib fractures. NO ACUTE CARDIOPULMONARY ABNORMALITY. NO CHANGE. COPD. IMPRESSION AND SUGGESTION:  Patient is at risk due to   · COPD with acute exacerbation  · Chronic hypoxic respiratory failure on 2 L O2 at home  · History of breast cancer, questionable recurrence with mets follows with oncology    Recommendation  · Continue prednisone  · Cont O2 to keep sat 88 to 90%  · Continue current nebs  · Encourage activity  · Breast cancer and mets issues can be managed through oncology as outpatient.            Electronically signed by Shannon House MD,  FCCP   on 10/13/2020 at 12:54 PM

## 2020-10-13 NOTE — CONSULTS
Hematology/Oncology Consult  Encounter Date: 10/13/2020 11:16 AM    Ms. Zeferino Mondragon is a 66 y.o. female  : 1945  MRN: [id number]  Acct Number: [de-identified]  Requesting Provider: Autumn Ordonez DO    Reason for request: Further management of metastatic breast cancer       CONSULTANT: Avery Romeo    HPI: Mrs. Judi Guardado is a 66years old  female who is being seen in consultation at the request of Dr. Layla Bhatti regarding further management of metastatic breast cancer . Mrs. Judi Guardado was diagnosed to have infiltrating ductal carcinoma of left breast in  at which time she underwent lumpectomy left breast followed by RT. It was ER+ve, Her2/Negative and she was started on Arimidex. But patient stopped Arimidex after about 6 months . She was diagnosed to have Right sided breast cancer in  and underwent Right Radical mastectomy. There were 5 axillary lymph nodes +ve for metastases. Tumor was again ER and [place]+ve. She was not treated with adjuvant therapy per patient's wishes. She developed left leg pain in 2019. Bone scan showed possible mets to Left Tibia and thoracic spine. Recent bone scan in 2020 showed widespread bone mets. Patient was seen by Orthopedic surgery and was advised pinning of lytic lesion left tibia. Patient is undecided regarding further management . She has seen Radiation Oncologist and is open to consider palliative RT to left tibia after seeing Dr. Kenn Ch again . She is not enthusiastic about surgery. She has progressively worsening shortness of breath for last 4-5 days . She was admitted for further evaluation . She is  Unable to bear weight on her left leg because of pain . CT scan of chest on 10/3/2020 revealed  Multiple small lung nodules and enlarged precarinal lymph nodes .     Patient Active Problem List   Diagnosis    Hypothyroidism    COPD (chronic obstructive pulmonary disease) ([facility]- Dr. Starr Valentin Anxiety and depression    Insomnia    pulmonary disease) [facility]     Depression 1/15/2018    GERD (gastroesophageal reflux disease)     Headache(784.0)     Hyperlipidemia     Hypertension     Hypothyroidism     Insomnia     Osteoarthritis     RBBB 10/15/2014    Right carotid bruit 2015    S/P cardiac catheterization 2016     @PSH@  Family History   Problem Relation Age of Onset    Cancer Sister         breast    Cancer Maternal Uncle         pancreatic     Social History     Socioeconomic History    Marital status:       Spouse name: Not on file    Number of children: Not on file    Years of education: Not on file    Highest education level: Not on file   Occupational History    Not on file   Social Needs    Financial resource strain: Not on file    Food insecurity     Worry: Not on file     Inability: Not on file    Transportation needs     Medical: Not on file     Non-medical: Not on file   Tobacco Use    Smoking status: Former Smoker     Packs/day: 1.50     Years: 40.00     Pack years: 60.00     Types: Cigarettes     Last attempt to quit: 1989     Years since quittin.7    Smokeless tobacco: Never Used   Substance and Sexual Activity    Alcohol use: No    Drug use: No    Sexual activity: Never   Lifestyle    Physical activity     Days per week: Not on file     Minutes per session: Not on file    Stress: Not on file   Relationships    Social connections     Talks on phone: Not on file     Gets together: Not on file     Attends Hoahaoism service: Not on file     Active member of club or organization: Not on file     Attends meetings of clubs or organizations: Not on file     Relationship status: Not on file    Intimate partner violence     Fear of current or ex partner: Not on file     Emotionally abused: Not on file     Physically abused: Not on file     Forced sexual activity: Not on file   Other Topics Concern    Not on file   Social History Narrative    Not on file            Current Nebulization BID Reji Jim MD   500 mcg at 10/13/20 0725    albuterol sulfate  (90 Base) MCG/ACT inhaler 2 puff  2 puff Inhalation Q6H PRN Reji Jim MD         [unfilled]  No Known Allergies     Review of Systems is negative except for symptoms mentioned in HPI. PHYSICAL EXAMINATION:   VITAL SIGNS: BP (!) 150/73   Pulse 110   Temp 98.1 °F (36.7 °C)   Resp 18   Ht 5' 3\" (1.6 m)   Wt 123 lb 12.8 oz (56.2 kg)   LMP  (LMP Unknown)   SpO2 100%   BMI 21.93 kg/m²     (2) Ambulatory and capable of self care, unable to carry out work activity, up and about > 50% or waking hours    GENERAL: In no acute distress, under  nourished, well- developed,alert and oriented to person place and time. SKIN: Warm and dry, withoutjaundice, ecchymoses, or petechiae. HEENT: Normocephalic, Conjunctivae pinkish, sclera anicteric, oral mucosa moist without lesion or exudate in the visible oral cavity or oropharynx, tongue mid-line with good mobility and no deviation with extension. NODES: No palpable adenopathy in the neck Levels I-V, bilateral   Supraclavicular fossae, axillary chains, or inguinal regions. LUNGS: Good inspiratory effort, no accessory muscle use, Decreased breath sounds  bilaterally, no focal wheeze, rales or rhonchi. CARDIAC: Regular rate and rhythm, without murmurs, rubs or gallops. ABDOMINAL: Normal bowel soundspresent, soft, non-tender, no mass or  organomegaly. MUSKL: Muscle wasting noted   GENITALS: Normal appearing external genitalia. Patient is continent of urine. RECTAL: deferred  EXTREMITIES: Marked tenderness noted over left leg. ROM of left leg limited due to pain. NEUROLOGIC: Gait normal. No grossly apparent focal deficits. BREASTS : Right mastectomy scar noted. Scar over left breast noted .      LAB RESULTS:  Recent Results (from the past 24 hour(s))   CBC Auto Differential    Collection Time: 10/13/20  9:57 AM   Result Value Ref Range    WBC 9.5 4.8 - 10.8 K/uL    RBC 31 mEq/L  Final  10/11/2020 11:30 PM  [address] [facility]    Anion Gap  10  9 - 15 mEq/L  Final  10/11/2020 11:30 PM  [address] [facility]    Glucose  104High    70 - 99 mg/dL  Final  10/11/2020 11:30 PM  [address] [facility]    BUN  9  8 - 23 mg/dL  Final  10/11/2020 11:30 PM  [address] [facility]    CREATININE  0.65  0.50 - 0.90 mg/dL  Final  10/11/2020 11:30 PM  [address] [facility]    GFR Non-  >60.0  >60  Final  10/11/2020 11:30 PM  [address] [facility]    >60 mL/min/1.73m2 EGFR, calc. for ages 25 and older using the   MDRD formula (not corrected for weight), is valid for stable   renal function. GFR   >60.0  >60  Final  10/11/2020 11:30 PM  [address] [facility]    >60 mL/min/1.73m2 EGFR, calc. for ages 25 and older using the   MDRD formula (not corrected for weight), is valid for stable   renal function. Calcium  9.0  8.5 - 9.9 mg/dL  Final  10/11/2020 11:30 PM  [address] [facility]    Total Protein  6.3  6.3 - 8.0 g/dL  Final  10/11/2020 11:30 PM  [address] [facility]    Alb  4.0  3.5 - 4.6 g/dL  Final  10/11/2020 11:30 PM  [address] [facility]    Total Bilirubin  0.5  0.2 - 0.7 mg/dL  Final  10/11/2020 11:30 PM  [address] [facility]    Alkaline Phosphatase  64  40 - 130 U/L  Final  10/11/2020 11:30 PM  [address] [facility]    ALT  15  0 - 33 U/L  Final  10/11/2020 11:30 PM  [address] [facility]    AST  24  0 - 35 U/L  Final  10/11/2020 11:30 PM  [address] [facility]    Globulin  2.3  2.3 - 3.5 g/dL  Final  10/11/2020 11:30 PM  [address] [facility]        Pathology:     RADIOLOGY RESULTS:  Cta Chest W Wo  (pe Study)    Result Date: 10/12/2020  EXAM:CTA CHEST W WO CONTRAST DATE10/12/2020 2:46 AM: REASON FOR EXAM:Acute severe anterior chest wall pain.   pe COMPARISON: Chest CT from September 8, 1310 Technique: Helical CT was performed through the chest following the IV administration of100  cc of nonionic contrast. of the prior mastectomy which may represent residual or recurrent tumor. Status post right axillary node dissection. Xr Chest Portable    Result Date: 10/12/2020  EXAMINATION: Portable AP ERECT view of the chest. CLINICAL HISTORY: Increasing  sob , COPD. DATE: 10/11/2020 11:45 PM COMPARISONS: 9/30/2019 FINDINGS: Lungs are hyperexpanded. Normal cardiac silhouette. There are atherosclerotic calcifications in the aortic arch. Lungs are clear without consolidation. No pleural effusion or pneumothorax. The bony thorax is unremarkable. Status post right mastectomy. There are clips in the right axilla. There are old healed right rib fractures. NO ACUTE CARDIOPULMONARY ABNORMALITY. NO CHANGE. COPD. .     ASSESSMENT AND PLAN  1. Left leg pain secondary to bone metastases involving left Tibia more likely  2. Hx of ER+ve Bilateral breast cancers. 3. Lung and Bone metastases . 4. Acute exacerbation of COPD  5. Cachexia      Recommendations : I discussed at length all the findings with the patient . She is hesitant to consider orthopedic intervention at this time. She would like to meet with Dr. Evan Benites to discuss palliative RT to left tibia. She does not want me to initiate Radiation therapy consult at this time. MRI of left tibia will be helpful to assess the extent of bone mets involving left Tibia. Patient needs systemic therapy for breast cancer and again she would like to discuss this with Dr. Evan Benites after she is discharged . Please consider palliative care referral . Left leg brace will be helpful . Thanks for this consultation .      Electronically signed by Yue Awan MD on 10/13/2020 at 11:16 AM

## 2020-10-13 NOTE — CARE COORDINATION
This Care Transition Nurse provided COPD booklet and zones sheet and reviewed. Discussed the use of zones sheet. Stressed the importance of phoning physician promptly for symptoms in the yellow zone and ems for symptoms in the red zone. Discussed cause of COPD, how lungs work, avoiding respiratory infection, good handwashing, avoid sick contacts, keeping nebulizer set up clean, avoiding inhaled irritants, use of masks to protect airway, breathing exercises, continue to use incentive spirometer and acapella at home (if ordered) , positions to reduce shortness of breath, energy conservation, copd medications, take antibiotics and prednisone (if ordered) until gone, vaccines, drinking plenty of fluids, nutrition,limit sodium, pulmonary rehab. Stressed importance of physician follow up within one week of discharge and follow up with pulmonologist as directed. Patient voiced  understanding. Patient states she quit smoking 25 years ago but was a heavy smoker prior to quitting. She keeps her nebulizer set up clean with vinegar and water. Advised to clean daily with mild soap and warm water, rinse well, allow to air dry. She takes all of her medications as ordered. She is up to date with vaccines. She was ill for approximately 6 days prior to seeking medical attention. She states she kept thinking she would get better. Advised that she should phone her PCP or Dr. Jacky Teresa as soon as she notices symptoms on the yellow zone. She has had a decreased appetite. Advised to eat small frequent meals and add Ensure Enlive to supplement her diet when appetite is decreased. Offered Ensure [employer]. Patient requests only one case at this time. She only wants to use it if absolutely needed. She states she gets a lot of her information from google.com. Advised to search for reputable information from sites . org, .edu, or .Sandra Macdonald. Suggested [employer] Association web site. Patient voiced understanding.

## 2020-10-14 LAB
ANION GAP SERPL CALCULATED.3IONS-SCNC: 9 MEQ/L (ref 9–15)
BASOPHILS ABSOLUTE: 0 K/UL (ref 0–0.2)
BASOPHILS RELATIVE PERCENT: 0.5 %
BUN BLDV-MCNC: 14 MG/DL (ref 8–23)
CALCIUM SERPL-MCNC: 8.7 MG/DL (ref 8.5–9.9)
CHLORIDE BLD-SCNC: 102 MEQ/L (ref 95–107)
CO2: 24 MEQ/L (ref 20–31)
CREAT SERPL-MCNC: 0.65 MG/DL (ref 0.5–0.9)
EOSINOPHILS ABSOLUTE: 0.1 K/UL (ref 0–0.7)
EOSINOPHILS RELATIVE PERCENT: 1 %
GFR AFRICAN AMERICAN: >60
GFR NON-AFRICAN AMERICAN: >60
GLUCOSE BLD-MCNC: 84 MG/DL (ref 70–99)
HCT VFR BLD CALC: 40.3 % (ref 37–47)
HEMOGLOBIN: 13.1 G/DL (ref 12–16)
LYMPHOCYTES ABSOLUTE: 2.2 K/UL (ref 1–4.8)
LYMPHOCYTES RELATIVE PERCENT: 26.7 %
MCH RBC QN AUTO: 30.2 PG (ref 27–31.3)
MCHC RBC AUTO-ENTMCNC: 32.4 % (ref 33–37)
MCV RBC AUTO: 93.2 FL (ref 82–100)
MONOCYTES ABSOLUTE: 0.9 K/UL (ref 0.2–0.8)
MONOCYTES RELATIVE PERCENT: 11.3 %
NEUTROPHILS ABSOLUTE: 5 K/UL (ref 1.4–6.5)
NEUTROPHILS RELATIVE PERCENT: 60.5 %
PDW BLD-RTO: 14.3 % (ref 11.5–14.5)
PLATELET # BLD: 267 K/UL (ref 130–400)
POTASSIUM SERPL-SCNC: 4.3 MEQ/L (ref 3.4–4.9)
RBC # BLD: 4.32 M/UL (ref 4.2–5.4)
SODIUM BLD-SCNC: 135 MEQ/L (ref 135–144)
WBC # BLD: 8.2 K/UL (ref 4.8–10.8)

## 2020-10-14 PROCEDURE — 6370000000 HC RX 637 (ALT 250 FOR IP): Performed by: NURSE PRACTITIONER

## 2020-10-14 PROCEDURE — 94640 AIRWAY INHALATION TREATMENT: CPT

## 2020-10-14 PROCEDURE — 6360000002 HC RX W HCPCS: Performed by: NURSE PRACTITIONER

## 2020-10-14 PROCEDURE — 1210000000 HC MED SURG R&B

## 2020-10-14 PROCEDURE — 94761 N-INVAS EAR/PLS OXIMETRY MLT: CPT

## 2020-10-14 PROCEDURE — 85025 COMPLETE CBC W/AUTO DIFF WBC: CPT

## 2020-10-14 PROCEDURE — 6360000002 HC RX W HCPCS: Performed by: INTERNAL MEDICINE

## 2020-10-14 PROCEDURE — 6370000000 HC RX 637 (ALT 250 FOR IP): Performed by: INTERNAL MEDICINE

## 2020-10-14 PROCEDURE — 36415 COLL VENOUS BLD VENIPUNCTURE: CPT

## 2020-10-14 PROCEDURE — 97165 OT EVAL LOW COMPLEX 30 MIN: CPT

## 2020-10-14 PROCEDURE — 99232 SBSQ HOSP IP/OBS MODERATE 35: CPT | Performed by: INTERNAL MEDICINE

## 2020-10-14 PROCEDURE — 97161 PT EVAL LOW COMPLEX 20 MIN: CPT

## 2020-10-14 PROCEDURE — 80048 BASIC METABOLIC PNL TOTAL CA: CPT

## 2020-10-14 PROCEDURE — 2580000003 HC RX 258: Performed by: NURSE PRACTITIONER

## 2020-10-14 RX ORDER — ALPRAZOLAM 0.5 MG/1
0.5 TABLET ORAL 2 TIMES DAILY PRN
Status: DISCONTINUED | OUTPATIENT
Start: 2020-10-14 | End: 2020-10-16 | Stop reason: HOSPADM

## 2020-10-14 RX ADMIN — BUDESONIDE INHALATION 500 MCG: 0.5 SUSPENSION RESPIRATORY (INHALATION) at 07:56

## 2020-10-14 RX ADMIN — METOPROLOL TARTRATE 50 MG: 50 TABLET, FILM COATED ORAL at 09:04

## 2020-10-14 RX ADMIN — ALPRAZOLAM 0.5 MG: 0.5 TABLET ORAL at 18:45

## 2020-10-14 RX ADMIN — Medication 10 ML: at 22:18

## 2020-10-14 RX ADMIN — IPRATROPIUM BROMIDE AND ALBUTEROL SULFATE 1 AMPULE: .5; 3 SOLUTION RESPIRATORY (INHALATION) at 12:14

## 2020-10-14 RX ADMIN — LEVOTHYROXINE SODIUM 75 MCG: 0.07 TABLET ORAL at 05:32

## 2020-10-14 RX ADMIN — ENOXAPARIN SODIUM 40 MG: 40 INJECTION SUBCUTANEOUS at 09:04

## 2020-10-14 RX ADMIN — IPRATROPIUM BROMIDE AND ALBUTEROL SULFATE 1 AMPULE: .5; 3 SOLUTION RESPIRATORY (INHALATION) at 15:51

## 2020-10-14 RX ADMIN — IPRATROPIUM BROMIDE AND ALBUTEROL SULFATE 1 AMPULE: .5; 3 SOLUTION RESPIRATORY (INHALATION) at 20:00

## 2020-10-14 RX ADMIN — METOPROLOL TARTRATE 50 MG: 50 TABLET, FILM COATED ORAL at 22:18

## 2020-10-14 RX ADMIN — Medication 10 ML: at 09:06

## 2020-10-14 RX ADMIN — GUAIFENESIN 1200 MG: 600 TABLET ORAL at 09:04

## 2020-10-14 RX ADMIN — PREDNISONE 40 MG: 20 TABLET ORAL at 09:04

## 2020-10-14 RX ADMIN — BUDESONIDE INHALATION 500 MCG: 0.5 SUSPENSION RESPIRATORY (INHALATION) at 20:00

## 2020-10-14 RX ADMIN — ATORVASTATIN CALCIUM 10 MG: 10 TABLET, FILM COATED ORAL at 09:04

## 2020-10-14 RX ADMIN — GUAIFENESIN 1200 MG: 600 TABLET ORAL at 22:18

## 2020-10-14 RX ADMIN — IPRATROPIUM BROMIDE AND ALBUTEROL SULFATE 1 AMPULE: .5; 3 SOLUTION RESPIRATORY (INHALATION) at 07:56

## 2020-10-14 ASSESSMENT — PAIN SCALES - GENERAL
PAINLEVEL_OUTOF10: 4
PAINLEVEL_OUTOF10: 4
PAINLEVEL_OUTOF10: 0

## 2020-10-14 ASSESSMENT — PAIN DESCRIPTION - LOCATION
LOCATION: LEG
LOCATION: LEG

## 2020-10-14 ASSESSMENT — PAIN DESCRIPTION - DESCRIPTORS: DESCRIPTORS: ACHING

## 2020-10-14 ASSESSMENT — PAIN DESCRIPTION - ORIENTATION
ORIENTATION: LEFT
ORIENTATION: LEFT

## 2020-10-14 NOTE — PLAN OF CARE
Therapy evaluation completed. Please see daily notes and/or progress notes for details related to planned treatment interventions, goals and functional performance.

## 2020-10-14 NOTE — PROGRESS NOTES
INPATIENT PROGRESS NOTES    PATIENT NAME: Steffani Meigs  MRN: [id number]  SERVICE DATE:  2020   SERVICE TIME:  5:46 PM      PRIMARY SERVICE: Pulmonary Disease    CHIEF COMPLAINTS: COPD exacerbation    INTERVAL HPI: Patient seen and examined at bedside, Interval Notes, orders reviewed. Nursing notes noted    Shortness of breath improved, minimal to no coughing, she still feels some tightness in her ribs, no fever, no nausea no vomiting, she feels anxious    Review of system:     GI Abdominal pain No  Skin Rash No    Social History     Tobacco Use    Smoking status: Former Smoker     Packs/day: 1.50     Years: 40.00     Pack years: 60.00     Types: Cigarettes     Last attempt to quit: 1989     Years since quittin.7    Smokeless tobacco: Never Used   Substance Use Topics    Alcohol use: No         Problem Relation Age of Onset    Cancer Sister         breast    Cancer Maternal Uncle         pancreatic         OBJECTIVE    Body mass index is 21.93 kg/m². PHYSICAL EXAM:  Vitals:  BP (!) 147/80   Pulse 88   Temp 98.1 °F (36.7 °C) (Oral)   Resp 16   Ht 5' 3\" (1.6 m)   Wt 123 lb 12.8 oz (56.2 kg)   LMP  (LMP Unknown)   SpO2 99%   BMI 21.93 kg/m²     General: alert, cooperative, no distress  Head: normocephalic, atraumatic  Eyes:No gross abnormalities. ENT:  MMM no lesions  Neck:  supple and no masses  Chest : Good air movement, mild end-expiratory wheezing, no rales, nontender, tympanic  Heart[de-identified] Heart sounds are normal.  Regular rate and rhythm without murmur, gallop or rub. ABD:  symmetric, soft, non-tender  Musculoskeletal : no cyanosis, no clubbing and no edema  Neuro:  Grossly normal  Skin: No rashes or nodules noted.   Lymph node:  no cervical nodes  Urology: No Georges   Psychiatric: appropriate    DATA:   Recent Labs     10/13/20  0957 10/14/20  0656   WBC 9.5 8.2   HGB 13.1 13.1   HCT 40.2 40.3   MCV 92.3 93.2    267     Recent Labs     10/11/20  2330 10/13/20  0957 10/14/20  0656    133* 135   K 4.4 4.0 4.3   CL 99 98 102   CO2 28 25 24   BUN 9 12 14   CREATININE 0.65 0.65 0.65   GLUCOSE 104* 104* 84   CALCIUM 9.0 8.5 8.7   PROT 6.3  --   --    LABALBU 4.0  --   --    BILITOT 0.5  --   --    ALKPHOS 64  --   --    AST 24  --   --    ALT 15  --   --    LABGLOM >60.0 >60.0 >60.0   GFRAA >60.0 >60.0 >60.0   GLOB 2.3  --   --        MV Settings:          No results for input(s): PHART, FNJ6OKZ, PO2ART, QBE1TVM, BEART, K7KZHBMJ in the last 72 hours. O2 Device: Nasal cannula  O2 Flow Rate (L/min): 3 L/min    DIET CARB CONTROL;     MEDICATIONS during current hospitalization:    Continuous Infusions:    Scheduled Meds:   sodium chloride flush  10 mL Intravenous 2 times per day    enoxaparin  40 mg Subcutaneous Daily    predniSONE  40 mg Oral Daily    guaiFENesin  1,200 mg Oral BID    levothyroxine  75 mcg Oral Daily    atorvastatin  10 mg Oral Daily    metoprolol tartrate  50 mg Oral BID    ipratropium-albuterol  1 ampule Inhalation 4x daily    budesonide  0.5 mg Nebulization BID       PRN Meds:sodium chloride flush, acetaminophen **OR** acetaminophen, polyethylene glycol, promethazine **OR** ondansetron, albuterol, albuterol sulfate HFA    Radiology  Cta Chest W Wo  (pe Study)    Result Date: 10/12/2020  EXAM:CTA CHEST W WO CONTRAST DATE10/12/2020 2:46 AM: REASON FOR EXAM:Acute severe anterior chest wall pain. pe COMPARISON: Chest CT from September 8, 0496 Technique: Helical CT was performed through the chest following the IV administration of100  cc of nonionic contrast. 3-D MIP reconstructions were performed on an independent workstation in the coronal plane with thick slab technique utilized in the interpretation. 3-D maximum intensity projection performed. All CT scans at this facility use dose modulation, iterative reconstruction, and/or weight based dosing when appropriate to reduce radiation dose to as low as reasonably achievable.  FINDINGS Vascular atherosclerotic calcifications in the aortic arch. Lungs are clear without consolidation. No pleural effusion or pneumothorax. The bony thorax is unremarkable. Status post right mastectomy. There are clips in the right axilla. There are old healed right rib fractures. NO ACUTE CARDIOPULMONARY ABNORMALITY. NO CHANGE. COPD. IMPRESSION AND SUGGESTION:  Patient is at risk due to   · COPD with acute exacerbation  · Chronic hypoxic respiratory failure on 2 L O2 at home  · History of breast cancer, questionable recurrence with mets follows with oncology  · Anxiety    Recommendation  · Continue prednisone likely start taper tomorrow  · Cont O2 to keep sat 88 to 90%  · Continue current nebs  · Encourage activity  · Xanax as needed  · Breast cancer and mets issues can be managed through oncology as outpatient.            Electronically signed by Clarisse Read MD,  FCCP   on 10/14/2020 at 5:46 PM

## 2020-10-14 NOTE — PROGRESS NOTES
[facility] OCCUPATIONAL THERAPY EVALUATION - ACUTE     NAME: Mervat Jessica  : 1945 (66 y.o.)  MRN: [id number]  CODE STATUS: Full Code  Room: B768/Z568-74    Date of Service: 10/14/2020    Patient Diagnosis(es): COPD exacerbation [facility]) [J44.1]   Chief Complaint   Patient presents with    Shortness of Breath     Patient Active Problem List    Diagnosis Date Noted    Dependence on continuous supplemental oxygen 2019    Conductive hearing loss, tympanic membrane 2019    Cerumen debris on tympanic membrane of right ear 2019    Tympanosclerosis involving tympanic membrane only 2019    Rhinorrhea     Pseudomonas aeruginosa colonization     Acute on chronic respiratory failure with hypoxia ([address] 2019    COPD exacerbation ([address] 2019    Former smoker 2019    Dermatochalasis of both upper eyelids 2019    Pseudophakia of both eyes 2019    Squamous blepharitis of upper and lower eyelids of both eyes 2019    Breast cancer metastasized to axillary lymph node, right ([address] 2018    Chronic respiratory failure with hypoxia ([address] 2018    Community acquired pneumonia of right lower lobe of lung 08/15/2018    Breast mass, right- Dr. Fanta Littlejohn Acute exacerbation of chronic obstructive pulmonary disease (COPD) ([address] 2018    Pulmonary nodule     Moderate episode of recurrent major depressive disorder ([address] 2018    History of breast cancer- left 2018    Depression 5659    Diastolic dysfunction     Decreased GFR 2017    Bilateral carotid artery stenosis- Dr. Ning Mirza- 100% occlusion left and 70% occlusion right- scan 2016    Abnormal ultrasound of breast 2016    Abnormal cardiovascular stress test 2016    Myocardial perfusion defect, homogeneous 2016    S/P cardiac catheterization 2016    Hiatal hernia 09/10/2015    Dyslipidemia 2015    Congenital Subjective  Pre Treatment Pain Screening  Pain at present: 4  Scale Used: Numeric Score  Intervention List: Patient able to continue with treatment    Pain Reassessment:   Pain Assessment  Pain Assessment: 0-10  Pain Level: 4  Pain Location: Leg  Pain Orientation: Left       Prior Level of Function:  Social/Functional History  Lives With: Daughter  Type of Home: House  Home Layout: One level, Laundry in basement  Home Access: Stairs to enter without rails  Entrance Stairs - Number of Steps: 1  Bathroom Shower/Tub: Tub/Shower unit  Bathroom Equipment: Shower chair, Grab bars in shower, Hand-held shower  Home Equipment: Cane, Rolling walker, [employer], Oxygen(has at home however does not currently use)  ADL Assistance: Independent  Homemaking Assistance: Independent  Homemaking Responsibilities: Yes(shared)  Ambulation Assistance: Independent  Transfer Assistance: Independent  Active : Yes  Mode of Transportation: Car  Occupation: Retired  Leisure & Hobbies: guitar piano  IADL Comments: shared responsibilities with daughter    OBJECTIVE:     Orientation Status:  Orientation  Overall Orientation Status: Within Functional Limits    Observation:       Cognition Status:  Cognition  Overall Cognitive Status: WFL    Perception Status:       Sensation Status:  Sensation  Overall Sensation Status: Impaired(numbness in B toes)    Vision and Hearing Status:  Vision  Vision: Impaired  Vision Exceptions: Wears glasses for reading  Hearing  Hearing: Exceptions to [facility]  Hearing Exceptions: Hard of hearing/hearing concerns     ROM:   LUE AROM (degrees)  LUE AROM : WFL  Left Hand AROM (degrees)  Left Hand AROM: WFL  RUE AROM (degrees)  RUE AROM : WFL  Right Hand AROM (degrees)  Right Hand AROM: WFL    Strength:  LUE Strength  Gross LUE Strength: WFL  L Hand General: 3+/5  LUE Strength Comment: gross assessment  RUE Strength  Gross RUE Strength: WFL  R Hand General: 3+/5  RUE Strength Comment: gross assessment    Coordination, Tone, Quality of Movement: Tone RUE  RUE Tone: Normotonic  Tone LUE  LUE Tone: Normotonic  Coordination  Movements Are Fluid And Coordinated: Yes    Hand Dominance:  Hand Dominance  Hand Dominance: Right    ADL Status:  ADL  Feeding: Modified independent   Grooming: Modified independent   UE Bathing: Modified independent   LE Bathing: Modified independent   UE Dressing: Modified independent   LE Dressing: Modified independent   Toileting: Modified independent   Additional Comments: simulated ADL seated EOB. Levels as anticipated. O2 in place.   Toilet Transfers  Toilet Transfer: Modified independent  Toilet Transfers Comments: anticipated level based on mobility and t/f completed in room       Therapy key for assistance levels -   Independent = Pt. is able to perform task with no assistance but may require a device   Stand by assistance = Pt. does not perform task at an independent level but does not need physical assistance, requires verbal cues  Minimal, Moderate, Maximal Assistance = Pt. requires physical assistance (25%, 50%, 75% assist from helper) for task but is able to actively participate in task   Dependent = Pt. requires total assistance with task and is not able to actively participate with task completion     Functional Mobility:  Functional Mobility  Functional - Mobility Device: No device  Activity: Other(aprox 5 ft)  Assist Level: Modified independent   Transfers  Sit to stand: Modified independent  Stand to sit: Modified independent    Bed Mobility  Bed mobility  Supine to Sit: Modified independent  Sit to Supine: Modified independent  Comment: HOB elevated    Seated and Standing Balance:  Balance  Sitting Balance: Independent  Standing Balance: Modified independent     Functional Endurance:  Activity Tolerance  Activity Tolerance: Patient Tolerated treatment well  Activity Tolerance: edu on breathing technique; SPO2 98% post activity and rest break with O2 in place    D/C Recommendations:  OT D/C RECOMMENDATIONS  REQUIRES OT FOLLOW UP: No    Equipment Recommendations:       OT Education:   OT Education  OT Education: OT Role, Plan of Care    OT Follow Up:  OT D/C RECOMMENDATIONS  REQUIRES OT FOLLOW UP: No       Assessment/Discharge Disposition:  Assessment: Pt is a 66 yr old female presenting to [facility] with the above functional deficits. Pt declines need for OT at current level. Simulated ADL at [facility] level. Performance deficits / Impairments: Decreased endurance  Prognosis: Good  Discharge Recommendations: Continue to assess pending progress  No Skilled OT: Patient refusal  Decision Making: Low Complexity  History: multiple  Exam: 1 perf deficit  Assistance / Modification: SABINA    Six Click Score   How much help for putting on and taking off regular lower body clothing?: None  How much help for Bathing?: None  How much help for Toileting?: None  How much help for putting on and taking off regular upper body clothing?: None  How much help for taking care of personal grooming?: None  How much help for eating meals?: None  [facility] Inpatient Daily Activity Raw Score: 24  AM-PAC Inpatient ADL T-Scale Score : 57.54  ADL Inpatient CMS 0-100% Score: 0    Plan:       Goals:   Patient will:        Patient Goal: Patient goals : to return home as soon as able     Discussed and agreed upon: Yes Comments: Pt declined further OT at this level.      Therapy Time:   OT Individual Minutes  Time In: 1003  Time Out: 1020  Minutes: 17    Eval: 17 minutes     Electronically signed by:    REKHA Banuelos  10/14/2020, 11:14 AM

## 2020-10-14 NOTE — PROGRESS NOTES
Hospitalist Daily Progress Note  Name: Lexie Swan  Age: 66 y.o. Gender: female  CodeStatus: Full Code  Allergies: No Known Allergies    Chief Complaint:Shortness of Breath    Primary Care Provider: ROSALIND Cedeno CNP  InpatientTreatment Team: Treatment Team: Attending Provider: Salty Last DO; Consulting Physician: Rufus Smith MD; Consulting Physician: Claude Spearing, MD; Registered Nurse: Chelsey Angel RN  Admission Date: 10/11/2020      Subjective: patient seen and evaluated at bedside. 2L o2 at home. Breathing not quite back to baseline, but improved. Afebrile. Vitals stable. Physical Exam  Constitutional:       Appearance: Normal appearance. Comments: Thin and cachectic   HENT:      Head: Normocephalic and atraumatic. Mouth/Throat:      Mouth: Mucous membranes are moist.      Pharynx: Oropharynx is clear. Eyes:      Extraocular Movements: Extraocular movements intact. Pupils: Pupils are equal, round, and reactive to light. Cardiovascular:      Rate and Rhythm: Normal rate and regular rhythm. Heart sounds: No murmur. No friction rub. No gallop. Pulmonary:      Breath sounds: Wheezing present. No rhonchi or rales. Comments:  Decreased air movement throughout. Abdominal:      General: There is no distension. Tenderness: There is no abdominal tenderness. There is no guarding. Musculoskeletal: Normal range of motion. Comments: LLE painful anterior shin. RLE normal   Neurological:      General: No focal deficit present. Mental Status: She is alert and oriented to person, place, and time. Psychiatric:         Mood and Affect: Mood normal.         Thought Content: Thought content normal.         Judgment: Judgment normal.         Review of Systems  14 point ros is reviewed and negative except for as above.    Medications:  Reviewed    Infusion Medications:   Scheduled Medications:    sodium chloride flush  10 mL Intravenous 2 times per day    enoxaparin  40 mg Subcutaneous Daily    predniSONE  40 mg Oral Daily    guaiFENesin  1,200 mg Oral BID    levothyroxine  75 mcg Oral Daily    atorvastatin  10 mg Oral Daily    metoprolol tartrate  50 mg Oral BID    ipratropium-albuterol  1 ampule Inhalation 4x daily    budesonide  0.5 mg Nebulization BID     PRN Meds: sodium chloride flush, acetaminophen **OR** acetaminophen, polyethylene glycol, promethazine **OR** ondansetron, albuterol, albuterol sulfate HFA    Labs:   Recent Labs     10/11/20  2330 10/13/20  0957   WBC 9.4 9.5   HGB 13.0 13.1   HCT 39.2 40.2    316     Recent Labs     10/11/20  2330 10/13/20  0957    133*   K 4.4 4.0   CL 99 98   CO2 28 25   BUN 9 12   CREATININE 0.65 0.65   CALCIUM 9.0 8.5     Recent Labs     10/11/20  2330   AST 24   ALT 15   BILITOT 0.5   ALKPHOS 64     No results for input(s): INR in the last 72 hours. Recent Labs     10/11/20  2330   TROPONINI <0.010       Urinalysis:   Lab Results   Component Value Date    NITRU Negative 07/15/2019    BLOODU Negative 07/15/2019    SPECGRAV 1.015 07/15/2019    GLUCOSEU Negative 07/15/2019       Radiology:   Most recent    Chest CT      WITH CONTRAST:  Results for orders placed during the hospital encounter of 09/08/20   CT CHEST W CONTRAST    Narrative CT CHEST W CONTRAST    COMPARISON: November 5, 2018; February 10, 2020. HISTORY: Malignant neoplasm of the central portion of the right female breast    TECHNIQUE: CT CHEST W CONTRASTAxial CT images of the thorax were obtained following the administration of 100 mL of Isovue 370 intravenous contrast. 2-D and 3-D reconstructions were obtained. FINDINGS:     Diffuse emphysematous disease is seen. Bulla are seen bilaterally in the upper lobes, right greater than left. Paraseptal emphysematous changes are also seen on the left.  In the lingula there are reticulations and pleural thickening in the chest wall   this could also represent post radiation postsurgical changes. A 3 mm nodule is seen in the right upper lobe (series 5, image 16). Anteriorly in the right upper lobe there is a 2.7 mm nodule (series 5, image 17). A 2.5 mm nodule is seen in the right   lateral upper lobe (series 5, image 16). This is also seen laterally where there is  pleural thickening visualized. This also may represent post radiation postsurgical changes. Also in the right middle lobe there is a nodular opacity that measures 3.3 mm (series 5, image 32). On the same image there is a 3 mm nodule in the right lower lobe. In the left lower lobe a pleural-based nodule is seen that measures 3 mm (series 5 image   37. Also in the left lower lobe and irregular nodule is seen that measures 1.1 x 1.1 cm (series 5, image 44. ). A precarinal lymph node is seen that measures 1.4 x 1.7 cm. It appears to be slightly larger than what was seen on previous examination. There are surgical clips seen in the right axillary region and also chest wall where there is evidence of mastectomy. On the right an 8mm hypodensity  is seen along the right chest wall that appears to contain fluid. This area was slightly smaller on previous examination. It measured 6 mm. Surgical clips are seen along the chest wall on the left as well. .      No aneurysm is seen. There are diffuse vascular calcifications seen in the aortic arch. There are also coronary calcifications and/or stents. A large hiatal hernia is again seen. Impression 1. There are multiple small nodules seen on this examination concerning for metastatic disease. The largest nodule visualized is seen in the left lower lobe. It is irregular. 2. Precarinal lymph node is again visualized. It appears slightly larger than what was seen on previous examination. All CT scans at this facility use dose modulation, iterative reconstruction, and/or weight based dosing when appropriate to reduce radiation dose to as low as reasonably achievable.       CT ABDOMEN PELVIS W IV CONTRAST     Comparison: October 3, 2018       History: Malignant neoplasm central portion of the right breast    Technique: Helically Acquired CT of the  abdomen and pelvis was performed following the administration of oral contrast and during the intravenous infusion of 100 mL of Isovue-370. Axial delayed images were also performed. Reformatted sagittal and   coronal images were also obtained. Findings:       A large hiatal hernia is again seen. A calcified mass is again seen in the area of the gallbladder fossa thought to represent porcelain gallbladder. The liver, spleen, pancreas and left adrenal gland are unremarkable. A nodule is again seen in the right adrenal gland and is stable. Bilaterally both kidneys show uptake and excretion of contrast with no evidence for hydronephrosis or renal calculi. No   aneurysm or dissection is present. There are diffuse atherosclerotic calcifications. No enlarged retroperitoneal, mesenteric or pelvic lymph nodes seen. No abnormally dilated loops of bowel, free air or free fluid seen. Within the pelvis the uterus is unremarkable. The contrast is seen layering in the bladder. No bladder wall thickening is seen. The osseous structures contain no destructive lesions. A levoscoliosis is seen in the lumbar spine. Impression:    1. 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 There is no evidence for obstruction. No evidence of appendicitis or diverticulitis seen. 3. No hydronephrosis is seen  4. No pathologically enlarged lymph nodes seen. All CT scans at this facility use dose modulation, iterative reconstruction, and/or weight based dosing             WITHOUT CONTRAST: No results found for this or any previous visit.     CXR      2-view:   Results for orders placed during the hospital encounter of 09/30/19   XR CHEST STANDARD (2 VW)    Narrative DIAGNOSIS:R07.89 Discomfort of chest wall ICD10    COMPARISON: September 10, 2010; September 22, 2019    TECHNIQUE: PA and lateral chest    FINDINGS: The lungs are hyperinflated. There is coarsening of the interstitium,  increased AP diameter and flattening of the diaphragm are also seen.: The lungs contain no focal infiltrate, pleural effusion, consolidation or pneumothorax. The cardiac   silhouette is not enlarged. Calcifications are seen in the thoracic aorta. The bony structures are osteopenic with degenerative changes visualized. A retrocardiac air-fluid level is seen that may represent a hiatal hernia. Impression No acute cardiopulmonary process, findings are suggestive of COPD. Portable:   Results for orders placed during the hospital encounter of 10/11/20   XR CHEST PORTABLE    Narrative EXAMINATION: Portable AP ERECT view of the chest.    CLINICAL HISTORY: Increasing  sob , COPD. DATE: 10/11/2020 11:45 PM    COMPARISONS: 9/30/2019    FINDINGS: Lungs are hyperexpanded. Normal cardiac silhouette. There are atherosclerotic calcifications in the aortic arch. Lungs are clear without consolidation. No pleural effusion or pneumothorax. The bony thorax is unremarkable. Status post right   mastectomy. There are clips in the right axilla. There are old healed right rib fractures. Impression NO ACUTE CARDIOPULMONARY ABNORMALITY. NO CHANGE.    COPD. Echo No results found for this or any previous visit. Assessment/Plan:    Active Hospital Problems    Diagnosis Date Noted    COPD exacerbation ([facility] [address] [J44.1] 05/12/2019    Dyslipidemia [E78.5] 09/08/2015    HTN (hypertension) [I10] 09/16/2014    Hypothyroidism [E03.9]      Advanced COPD with exacerbation: prednisone 40 daily. Budesonide 500 BID, mucinex 1200 BID. duoneb QID. Hypothyroid 75 mcg daily. HTN: metoprolol 50 BID. Hyperlipidemia lipitor 10 daily. Breast CA: NM bone scan from august shows diffuse metastatic disease. following with oncology. Encouraged follow up with XRT Dr Ricardo Pedro    Lovenox 40 QD. Additional work up or/and treatment plan may be added today or then after based on clinical progression. I am managing a portion of pt care. Some medical issues are handled byother specialists. Additional work up and treatment should be done in out pt setting by pt PCP and other out pt providers. In addition to examining and evaluating pt, I spent additional time explaining care, normaland abnormal findings, and treatment plan. All of pt questions were answered. Counseling, diet and education were provided. Case will be discussed with nursing staff when appropriate. Family will be updated if and whenappropriate.       Electronically signed by Zach Gaines DO on 10/13/2020 at 8:38 PM

## 2020-10-14 NOTE — PROGRESS NOTES
Pt assessment complete. Pt alert and oriented. Pt denies pain, states she just doesn't feel good. States breathing is better than yesterday. Pt on 2.5L nc sp02 97%. Lungs sound diminished. Up to bedside commode. Short of breath with exertion. Vs stable. Will continue to monitor.

## 2020-10-14 NOTE — PROGRESS NOTES
Hospitalist Daily Progress Note  Name: Uriel Torrez  Age: 66 y.o. Gender: female  CodeStatus: Full Code  Allergies: No Known Allergies    Chief Complaint:Shortness of Breath    Primary Care Provider: ROSALIND Jesus - CNP  InpatientTreatment Team: Treatment Team: Attending Provider: Yoli Bird DO; Consulting Physician: Allegra Vallejo MD; Consulting Physician: Mary Carmen Carrizales MD; Registered Nurse: Houston Crabtree RN; Nursing Student: Last Hannon; Respiratory Therapist (Day): Vivian Steiner RCP; : AJAY Syed; Utilization Reviewer: Zenobia Jack RN  Admission Date: 10/11/2020      Subjective: patient seen and evaluated at bedside. 2L o2 at home. Breathing improved, not quite back to baseline, patient is transitioning from bedside commode to the bed without stopping to catch her breath but not able to ambulate to the bathroom yet. Physical Exam  Constitutional:       Appearance: Normal appearance. Comments: Thin and cachectic   HENT:      Head: Normocephalic and atraumatic. Mouth/Throat:      Mouth: Mucous membranes are moist.      Pharynx: Oropharynx is clear. Eyes:      Extraocular Movements: Extraocular movements intact. Pupils: Pupils are equal, round, and reactive to light. Cardiovascular:      Rate and Rhythm: Normal rate and regular rhythm. Heart sounds: No murmur. No friction rub. No gallop. Pulmonary:      Breath sounds: Wheezing present. No rhonchi or rales. Comments: Improved air movement, no wheezes, rales, rhonchi  Abdominal:      General: There is no distension. Tenderness: There is no abdominal tenderness. There is no guarding. Musculoskeletal: Normal range of motion. Comments: LLE painful anterior shin. RLE normal   Neurological:      General: No focal deficit present. Mental Status: She is alert and oriented to person, place, and time.    Psychiatric:         Mood and Affect: Mood normal. Thought Content: Thought content normal.         Judgment: Judgment normal.         Review of Systems  14 point ros is reviewed and negative except for as above. Medications:  Reviewed    Infusion Medications:   Scheduled Medications:    sodium chloride flush  10 mL Intravenous 2 times per day    enoxaparin  40 mg Subcutaneous Daily    predniSONE  40 mg Oral Daily    guaiFENesin  1,200 mg Oral BID    levothyroxine  75 mcg Oral Daily    atorvastatin  10 mg Oral Daily    metoprolol tartrate  50 mg Oral BID    ipratropium-albuterol  1 ampule Inhalation 4x daily    budesonide  0.5 mg Nebulization BID     PRN Meds: sodium chloride flush, acetaminophen **OR** acetaminophen, polyethylene glycol, promethazine **OR** ondansetron, albuterol, albuterol sulfate HFA    Labs:   Recent Labs     10/11/20  2330 10/13/20  0957 10/14/20  0656   WBC 9.4 9.5 8.2   HGB 13.0 13.1 13.1   HCT 39.2 40.2 40.3    316 267     Recent Labs     10/11/20  2330 10/13/20  0957 10/14/20  0656    133* 135   K 4.4 4.0 4.3   CL 99 98 102   CO2 28 25 24   BUN 9 12 14   CREATININE 0.65 0.65 0.65   CALCIUM 9.0 8.5 8.7     Recent Labs     10/11/20  2330   AST 24   ALT 15   BILITOT 0.5   ALKPHOS 64     No results for input(s): INR in the last 72 hours. Recent Labs     10/11/20  2330   TROPONINI <0.010       Urinalysis:   Lab Results   Component Value Date    NITRU Negative 07/15/2019    BLOODU Negative 07/15/2019    SPECGRAV 1.015 07/15/2019    GLUCOSEU Negative 07/15/2019       Radiology:   Most recent    Chest CT      WITH CONTRAST:  Results for orders placed during the hospital encounter of 09/08/20   CT CHEST W CONTRAST    Narrative CT CHEST W CONTRAST    COMPARISON: November 5, 2018; February 10, 2020.     HISTORY: Malignant neoplasm of the central portion of the right female breast    TECHNIQUE: CT CHEST W CONTRASTAxial CT images of the thorax were obtained following the administration of 100 mL of Isovue 370 intravenous contrast. 2-D and 3-D reconstructions were obtained. FINDINGS:     Diffuse emphysematous disease is seen. Bulla are seen bilaterally in the upper lobes, right greater than left. Paraseptal emphysematous changes are also seen on the left. In the lingula there are reticulations and pleural thickening in the chest wall   this could also represent post radiation postsurgical changes. A 3 mm nodule is seen in the right upper lobe (series 5, image 16). Anteriorly in the right upper lobe there is a 2.7 mm nodule (series 5, image 17). A 2.5 mm nodule is seen in the right   lateral upper lobe (series 5, image 16). This is also seen laterally where there is  pleural thickening visualized. This also may represent post radiation postsurgical changes. Also in the right middle lobe there is a nodular opacity that measures 3.3 mm (series 5, image 32). On the same image there is a 3 mm nodule in the right lower lobe. In the left lower lobe a pleural-based nodule is seen that measures 3 mm (series 5 image   37. Also in the left lower lobe and irregular nodule is seen that measures 1.1 x 1.1 cm (series 5, image 44. ). A precarinal lymph node is seen that measures 1.4 x 1.7 cm. It appears to be slightly larger than what was seen on previous examination. There are surgical clips seen in the right axillary region and also chest wall where there is evidence of mastectomy. On the right an 8mm hypodensity  is seen along the right chest wall that appears to contain fluid. This area was slightly smaller on previous examination. It measured 6 mm. Surgical clips are seen along the chest wall on the left as well. .      No aneurysm is seen. There are diffuse vascular calcifications seen in the aortic arch. There are also coronary calcifications and/or stents. A large hiatal hernia is again seen. Impression 1. There are multiple small nodules seen on this examination concerning for metastatic disease.  The largest nodule visualized is seen in the left lower lobe. It is irregular. 2. Precarinal lymph node is again visualized. It appears slightly larger than what was seen on previous examination. All CT scans at this facility use dose modulation, iterative reconstruction, and/or weight based dosing when appropriate to reduce radiation dose to as low as reasonably achievable. CT ABDOMEN PELVIS W IV CONTRAST     Comparison: October 3, 2018       History: Malignant neoplasm central portion of the right breast    Technique: Helically Acquired CT of the  abdomen and pelvis was performed following the administration of oral contrast and during the intravenous infusion of 100 mL of Isovue-370. Axial delayed images were also performed. Reformatted sagittal and   coronal images were also obtained. Findings:       A large hiatal hernia is again seen. A calcified mass is again seen in the area of the gallbladder fossa thought to represent porcelain gallbladder. The liver, spleen, pancreas and left adrenal gland are unremarkable. A nodule is again seen in the right adrenal gland and is stable. Bilaterally both kidneys show uptake and excretion of contrast with no evidence for hydronephrosis or renal calculi. No   aneurysm or dissection is present. There are diffuse atherosclerotic calcifications. No enlarged retroperitoneal, mesenteric or pelvic lymph nodes seen. No abnormally dilated loops of bowel, free air or free fluid seen. Within the pelvis the uterus is unremarkable. The contrast is seen layering in the bladder. No bladder wall thickening is seen. The osseous structures contain no destructive lesions. A levoscoliosis is seen in the lumbar spine. Impression:    1. 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 There is no evidence for obstruction. No evidence of appendicitis or diverticulitis seen. 3. No hydronephrosis is seen  4. No pathologically enlarged lymph nodes seen.      All CT scans at this facility use dose modulation, iterative reconstruction, and/or weight based dosing             WITHOUT CONTRAST: No results found for this or any previous visit. CXR      2-view:   Results for orders placed during the hospital encounter of 09/30/19   XR CHEST STANDARD (2 VW)    Narrative DIAGNOSIS:R07.89 Discomfort of chest wall ICD10    COMPARISON: September 10, 2010; September 22, 2019    TECHNIQUE: PA and lateral chest    FINDINGS: The lungs are hyperinflated. There is coarsening of the interstitium,  increased AP diameter and flattening of the diaphragm are also seen.: The lungs contain no focal infiltrate, pleural effusion, consolidation or pneumothorax. The cardiac   silhouette is not enlarged. Calcifications are seen in the thoracic aorta. The bony structures are osteopenic with degenerative changes visualized. A retrocardiac air-fluid level is seen that may represent a hiatal hernia. Impression No acute cardiopulmonary process, findings are suggestive of COPD. Portable:   Results for orders placed during the hospital encounter of 10/11/20   XR CHEST PORTABLE    Narrative EXAMINATION: Portable AP ERECT view of the chest.    CLINICAL HISTORY: Increasing  sob , COPD. DATE: 10/11/2020 11:45 PM    COMPARISONS: 9/30/2019    FINDINGS: Lungs are hyperexpanded. Normal cardiac silhouette. There are atherosclerotic calcifications in the aortic arch. Lungs are clear without consolidation. No pleural effusion or pneumothorax. The bony thorax is unremarkable. Status post right   mastectomy. There are clips in the right axilla. There are old healed right rib fractures. Impression NO ACUTE CARDIOPULMONARY ABNORMALITY. NO CHANGE.    COPD. Echo No results found for this or any previous visit.           Assessment/Plan:    Active Hospital Problems    Diagnosis Date Noted    [facility]2019    Dyslipidemia [E78.5] 09/08/2015    HTN (hypertension) [I10] 09/16/2014    Hypothyroidism [E03.9]      Advanced COPD with exacerbation: prednisone 40 daily. Budesonide 500 BID, mucinex 1200 BID. duoneb QID. Hypothyroid 75 mcg daily. HTN: metoprolol 50 BID. Hyperlipidemia lipitor 10 daily. Breast CA: NM bone scan from august shows diffuse metastatic disease. following with oncology. Encouraged follow up with XRT Dr Anastasia Patrick    Lovenox 40 QD. Additional work up or/and treatment plan may be added today or then after based on clinical progression. I am managing a portion of pt care. Some medical issues are handled byother specialists. Additional work up and treatment should be done in out pt setting by pt PCP and other out pt providers. In addition to examining and evaluating pt, I spent additional time explaining care, normaland abnormal findings, and treatment plan. All of pt questions were answered. Counseling, diet and education were provided. Case will be discussed with nursing staff when appropriate. Family will be updated if and whenappropriate.       Electronically signed by Selena Norwood DO on 10/14/2020 at 9:48 AM

## 2020-10-14 NOTE — PROGRESS NOTES
Physical Therapy Med Surg Initial Assessment  Facility/Department: [facility]  [id number]       NAME: Nasima Carroll  : 1945 (30 y.o.)  MRN: [id number]  CODE STATUS: Full Code    Date of Service: 10/14/2020    Patient Diagnosis(es): COPD exacerbation [facility]) [J44.1]   Chief Complaint   Patient presents with    Shortness of Breath     Patient Active Problem List    Diagnosis Date Noted    Dependence on continuous supplemental oxygen 2019    Conductive hearing loss, tympanic membrane 2019    Cerumen debris on tympanic membrane of right ear 2019    Tympanosclerosis involving tympanic membrane only 2019    Rhinorrhea     Pseudomonas aeruginosa colonization     Acute on chronic respiratory failure with hypoxia ([address] 2019    COPD exacerbation ([address] 2019    Former smoker 2019    Dermatochalasis of both upper eyelids 2019    Pseudophakia of both eyes 2019    Squamous blepharitis of upper and lower eyelids of both eyes 2019    Breast cancer metastasized to axillary lymph node, right ([address] 2018    Chronic respiratory failure with hypoxia ([address] 2018    Community acquired pneumonia of right lower lobe of lung 08/15/2018    Breast mass, right- Dr. Zulma Ruth Acute exacerbation of chronic obstructive pulmonary disease (COPD) ([address] 2018    Pulmonary nodule     Moderate episode of recurrent major depressive disorder ([address] 2018    History of breast cancer- left 2018    Depression     Diastolic dysfunction     Decreased GFR 2017    Bilateral carotid artery stenosis- Dr. Poonam Thomas- 100% occlusion left and 70% occlusion right- scan 2016    Abnormal ultrasound of breast 2016    Abnormal cardiovascular stress test 2016    Myocardial perfusion defect, homogeneous 2016    S/P cardiac catheterization 2016    Hiatal hernia 09/10/2015    No    Restrictions:  Restrictions/Precautions: Fall Risk     SUBJECTIVE: Subjective: Pt reluctant however willing to participate in PT evaluation. Pt reports not needing therapy at this time but willing to be assessed and review ww use. Pain       Post Treatment Pain Screening:   Pain Screening  Patient Currently in Pain: Yes  Pain Assessment  Pain Assessment: 0-10  Pain Level: 4  Pain Location: Leg  Pain Orientation: Left  Pain Descriptors: Aching    Prior Level of Function:  Social/Functional History  Lives With: Daughter(helps daughter who has RA)  Type of Home: House  Home Layout: One level, Laundry in basement  Home Access: Stairs to enter without rails  Entrance Stairs - Number of Steps: 1  Bathroom Shower/Tub: Tub/Shower unit  Bathroom Equipment: Shower chair, Grab bars in shower, Hand-held shower  Home Equipment: Cane, Rolling walker, [employer], Oxygen(has at home however does not currently use)  ADL Assistance: Independent  Homemaking Assistance: Independent  Homemaking Responsibilities: Yes(shared)  Ambulation Assistance: Independent  Transfer Assistance: Independent  Active : Yes  Mode of Transportation: Car  Occupation: Retired  Leisure & Hobbies: guitar, piano  IADL Comments: shared responsibilities with daughter    OBJECTIVE:   Vision: Impaired  Vision Exceptions: Wears glasses for reading  Hearing: Exceptions to [facility]  Hearing Exceptions: Hard of hearing/hearing concerns    Cognition:  Overall Orientation Status: Within Normal Limits    Observation/Palpation  Posture: Fair  Observation: Fwd head and trunk posture.  Decrease wt bearing on L LE    ROM:  RLE AROM: WFL  LLE AROM : WFL  RUE AROM : WFL  LUE AROM : WFL    Strength:  Strength RLE  Comment: 3+/5 throughout hip, knee, ankle  Strength LLE  Comment: 3/5 throughout hip and ankle, Knee NT due to sensitivity    Neuro:        Sensation  Overall Sensation Status: Impaired(numbness in B toes)  Light Touch: Partial deficits in the LLE    Bed mobility  Supine to Sit: Independent  Sit to Supine: Independent    Transfers  Sit to Stand: Supervision  Stand to sit: Supervision    Ambulation  Ambulation?: Yes  Ambulation 1  Surface: level tile  Device: No Device;Rolling Walker  Assistance: Modified Independent;Supervision  Quality of Gait: Pt very guarded during bed mobility and transfers. Gait Deviations: Slow Diane;Decreased step length  Distance: 10ft limited by SOB and fear. PT Equipment Recommendations  Equipment Needed: No  Other: offered cont training on [address] for follow up and pt declining      PT Education  Patient Education: Maebelle Cramp use and if need further services to request from MD    ASSESSMENT:   Body structures, Functions, Activity limitations: Decreased functional mobility   Decision Making: Low Complexity  History: med  Exam: low  Clinical Presentation: high    Patient Education: Maebelle Cramp use and if need further services to request from MD    DISCHARGE RECOMMENDATIONS:       Assessment: Pt with limited mobility at evaluation due to SOB and fear of instability in L LE, pt repeatedly stating not wanting to push any ambulation and cause more issues. Pt states she is capable of getting around at her current home situation and declines an further PT. Other: offered cont training on [address] for follow up and pt declining  REQUIRES PT FOLLOW UP: No      PLAN OF CARE:  Plan  Plan Comment: no further PT needed    Goals:none at this time       [facility] (6 CLICK) [address] without Stair Climbing Raw Score : 18     Therapy Time:   Individual   Time In 1440   Time Out 1511   Minutes [id number] [place], [place], 10/14/20 at 3:17 PM         Definitions for assistance levels  Independent = pt does not require any physical supervision or assistance from another person for activity completion. Device may be needed.   Stand by assistance = pt requires verbal cues or instructions from another person, close to but not touching, to perform the activity  Minimal assistance= pt performs 75% or more of the activity; assistance is required to complete the activity  Moderate assistance= pt performs 50% of the activity; assistance is required to complete the activity  Maximal assistance = pt performs 25% of the activity; assistance is required to complete the activity  Dependent = pt requires total physical assistance to accomplish the task

## 2020-10-15 LAB
ANION GAP SERPL CALCULATED.3IONS-SCNC: 11 MEQ/L (ref 9–15)
BASOPHILS ABSOLUTE: 0 K/UL (ref 0–0.2)
BASOPHILS RELATIVE PERCENT: 0.4 %
BUN BLDV-MCNC: 17 MG/DL (ref 8–23)
CALCIUM SERPL-MCNC: 9 MG/DL (ref 8.5–9.9)
CHLORIDE BLD-SCNC: 105 MEQ/L (ref 95–107)
CO2: 24 MEQ/L (ref 20–31)
CREAT SERPL-MCNC: 0.69 MG/DL (ref 0.5–0.9)
EOSINOPHILS ABSOLUTE: 0.1 K/UL (ref 0–0.7)
EOSINOPHILS RELATIVE PERCENT: 0.7 %
GFR AFRICAN AMERICAN: >60
GFR NON-AFRICAN AMERICAN: >60
GLUCOSE BLD-MCNC: 103 MG/DL (ref 70–99)
HCT VFR BLD CALC: 40.1 % (ref 37–47)
HEMOGLOBIN: 13.2 G/DL (ref 12–16)
LYMPHOCYTES ABSOLUTE: 2.1 K/UL (ref 1–4.8)
LYMPHOCYTES RELATIVE PERCENT: 26.3 %
MCH RBC QN AUTO: 30.2 PG (ref 27–31.3)
MCHC RBC AUTO-ENTMCNC: 32.9 % (ref 33–37)
MCV RBC AUTO: 91.6 FL (ref 82–100)
MONOCYTES ABSOLUTE: 0.8 K/UL (ref 0.2–0.8)
MONOCYTES RELATIVE PERCENT: 10.2 %
NEUTROPHILS ABSOLUTE: 4.9 K/UL (ref 1.4–6.5)
NEUTROPHILS RELATIVE PERCENT: 62.4 %
PDW BLD-RTO: 13.7 % (ref 11.5–14.5)
PLATELET # BLD: 260 K/UL (ref 130–400)
POTASSIUM SERPL-SCNC: 4 MEQ/L (ref 3.4–4.9)
RBC # BLD: 4.37 M/UL (ref 4.2–5.4)
SODIUM BLD-SCNC: 140 MEQ/L (ref 135–144)
WBC # BLD: 7.8 K/UL (ref 4.8–10.8)

## 2020-10-15 PROCEDURE — 6370000000 HC RX 637 (ALT 250 FOR IP): Performed by: NURSE PRACTITIONER

## 2020-10-15 PROCEDURE — 94664 DEMO&/EVAL PT USE INHALER: CPT

## 2020-10-15 PROCEDURE — 6360000002 HC RX W HCPCS: Performed by: NURSE PRACTITIONER

## 2020-10-15 PROCEDURE — 36415 COLL VENOUS BLD VENIPUNCTURE: CPT

## 2020-10-15 PROCEDURE — 6370000000 HC RX 637 (ALT 250 FOR IP): Performed by: INTERNAL MEDICINE

## 2020-10-15 PROCEDURE — 2700000000 HC OXYGEN THERAPY PER DAY

## 2020-10-15 PROCEDURE — 2580000003 HC RX 258: Performed by: NURSE PRACTITIONER

## 2020-10-15 PROCEDURE — 80048 BASIC METABOLIC PNL TOTAL CA: CPT

## 2020-10-15 PROCEDURE — 85025 COMPLETE CBC W/AUTO DIFF WBC: CPT

## 2020-10-15 PROCEDURE — 99232 SBSQ HOSP IP/OBS MODERATE 35: CPT | Performed by: INTERNAL MEDICINE

## 2020-10-15 PROCEDURE — 94761 N-INVAS EAR/PLS OXIMETRY MLT: CPT

## 2020-10-15 PROCEDURE — 94640 AIRWAY INHALATION TREATMENT: CPT

## 2020-10-15 PROCEDURE — 1210000000 HC MED SURG R&B

## 2020-10-15 PROCEDURE — 6360000002 HC RX W HCPCS: Performed by: INTERNAL MEDICINE

## 2020-10-15 RX ORDER — PREDNISONE 20 MG/1
20 TABLET ORAL DAILY
Status: DISCONTINUED | OUTPATIENT
Start: 2020-10-19 | End: 2020-10-16 | Stop reason: HOSPADM

## 2020-10-15 RX ORDER — TRAMADOL HYDROCHLORIDE 50 MG/1
50 TABLET ORAL EVERY 6 HOURS PRN
Status: DISCONTINUED | OUTPATIENT
Start: 2020-10-15 | End: 2020-10-16 | Stop reason: HOSPADM

## 2020-10-15 RX ORDER — PREDNISONE 10 MG/1
10 TABLET ORAL DAILY
Status: DISCONTINUED | OUTPATIENT
Start: 2020-10-22 | End: 2020-10-16 | Stop reason: HOSPADM

## 2020-10-15 RX ADMIN — IPRATROPIUM BROMIDE AND ALBUTEROL SULFATE 1 AMPULE: .5; 3 SOLUTION RESPIRATORY (INHALATION) at 08:32

## 2020-10-15 RX ADMIN — GUAIFENESIN 1200 MG: 600 TABLET ORAL at 21:33

## 2020-10-15 RX ADMIN — BUDESONIDE INHALATION 500 MCG: 0.5 SUSPENSION RESPIRATORY (INHALATION) at 19:50

## 2020-10-15 RX ADMIN — METOPROLOL TARTRATE 50 MG: 50 TABLET, FILM COATED ORAL at 09:33

## 2020-10-15 RX ADMIN — IPRATROPIUM BROMIDE AND ALBUTEROL SULFATE 1 AMPULE: .5; 3 SOLUTION RESPIRATORY (INHALATION) at 19:50

## 2020-10-15 RX ADMIN — BUDESONIDE INHALATION 500 MCG: 0.5 SUSPENSION RESPIRATORY (INHALATION) at 08:32

## 2020-10-15 RX ADMIN — LEVOTHYROXINE SODIUM 75 MCG: 0.07 TABLET ORAL at 05:19

## 2020-10-15 RX ADMIN — PREDNISONE 40 MG: 20 TABLET ORAL at 09:32

## 2020-10-15 RX ADMIN — GUAIFENESIN 1200 MG: 600 TABLET ORAL at 09:33

## 2020-10-15 RX ADMIN — ALPRAZOLAM 0.5 MG: 0.5 TABLET ORAL at 13:03

## 2020-10-15 RX ADMIN — IPRATROPIUM BROMIDE AND ALBUTEROL SULFATE 1 AMPULE: .5; 3 SOLUTION RESPIRATORY (INHALATION) at 16:37

## 2020-10-15 RX ADMIN — METOPROLOL TARTRATE 50 MG: 50 TABLET, FILM COATED ORAL at 21:33

## 2020-10-15 RX ADMIN — IPRATROPIUM BROMIDE AND ALBUTEROL SULFATE 1 AMPULE: .5; 3 SOLUTION RESPIRATORY (INHALATION) at 11:03

## 2020-10-15 RX ADMIN — Medication 10 ML: at 21:35

## 2020-10-15 RX ADMIN — ENOXAPARIN SODIUM 40 MG: 40 INJECTION SUBCUTANEOUS at 09:33

## 2020-10-15 RX ADMIN — ALPRAZOLAM 0.5 MG: 0.5 TABLET ORAL at 21:33

## 2020-10-15 RX ADMIN — ATORVASTATIN CALCIUM 10 MG: 10 TABLET, FILM COATED ORAL at 09:32

## 2020-10-15 NOTE — PROGRESS NOTES
[employer] Respiratory Therapy Evaluation   Current Order:  Duoneb QID      Home Regimen: QID      Ordering Physician: Blanca Dowling  Re-evaluation Date:  10/18     Diagnosis: COPD      Patient Status: Stable / Unstable + Physician notified    The following MDI Criteria must be met in order to convert aerosol to MDI with spacer. If unable to meet, MDI will be converted to aerosol:  []  Patient able to demonstrate the ability to use MDI effectively  []  Patient alert and cooperative  []  Patient able to take deep breath with 5-10 second hold  []  Medication(s) available in this delivery method   []  Peak flow greater than or equal to 200 ml/min            Current Order Substituted To  (same drug, same frequency)   Aerosol to MDI [] Albuterol Sulfate 0.083% unit dose by aerosol Albuterol Sulfate MDI 2 puffs by inhalation with spacer    [] Levalbuterol 1.25 mg unit dose by aerosol Levalbuterol MDI 2 puffs by inhalation with spacer    [] Levalbuterol 0.63 mg unit dose by aerosol Levalbuterol MDI 2 puffs by inhalation with spacer    [] Ipratropium Bromide 0.02% unit dose by aerosol Ipratropium Bromide MDI 2 puffs by inhalation with spacer    [] Duoneb (Ipratropium + Albuterol) unit dose by aerosol Ipratropium MDI + Albuterol MDI 2 puffs by inhalation w/spacer   MDI to Aerosol [] Albuterol Sulfate MDI Albuterol Sulfate 0.083% unit dose by aerosol    [] Levalbuterol MDI 2 puffs by inhalation Levalbuterol 1.25 mg unit dose by aerosol    [] Ipratropium Bromide MDI by inhalation Ipratropium Bromide 0.02% unit dose by aerosol    [] Combivent (Ipratropium + Albuterol) MDI by inhalation Duoneb (Ipratropium + Albuterol) unit dose by aerosol   Treatment Assessment [Frequency/Schedule]:  Change frequency to: ______No change____________________________________________per Protocol, P&T, MEC      Points 0 1 2 3 4   Pulmonary Status  Non-Smoker  []   Smoking history   < 20 pack years  []   Smoking history  ?  20 pack years  []   Pulmonary Disorder  (acute or chronic)  []   Severe or Chronic w/ Exacerbation  [x]     Surgical Status No [x]   Surgeries     General []   Surgery Lower []   Abdominal Thoracic or []   Upper Abdominal Thoracic with  PulmonaryDisorder  []     Chest X-ray Clear/Not  Ordered     [x]  Chronic Changes  Results Pending  []  Infiltrates, atelectasis, pleural effusion, or edema  []  Infiltrates in more than one lobe []  Infiltrate + Atelectasis, &/or pleural effusion  []    Respiratory Pattern Regular,  RR = 12-20 [x]  Increased,  RR = 21-25 []  SOL, irregular,  or RR = 26-30 []  Decreased FEV1  or RR = 31-35 []  Severe SOB, use  of accessory muscles, or RR ? 35  []    Mental Status Alert, oriented,  Cooperative [x]  Confused but Follows commands []  Lethargic or unable to follow commands []  Obtunded  []  Comatose  []    Breath Sounds Clear to  auscultation  []  Decreased unilaterally or  in bases only []  Decreased  bilaterally  [x]  Crackles or intermittent wheezes []  Wheezes []    Cough Strong, Spontan., & nonproductive [x]  Strong,  spontaneous, &  productive []  Weak,  Nonproductive []  Weak, productive or  with wheezes []  No spontaneous  cough or may require suctioning []    Level of Activity Ambulatory []  Ambulatory w/ Assist  [x]  Non-ambulatory []  Paraplegic []  Quadriplegic []    Total    Score:___7____     Triage Score:___4_____      Tri       Triage:     1. (>20) Freq: Q3    2. (16-20) Freq: Q4   3. (11-15) Freq: QID & Albuterol Q2 PRN    4. (6-10) Freq: TID & Albuterol Q2 PRN    5. (0-5) Freq Q4prn

## 2020-10-15 NOTE — PROGRESS NOTES
INPATIENT PROGRESS NOTES    PATIENT NAME: Nasima Carroll  MRN: [id number]  SERVICE DATE:  October 15, 2020   SERVICE TIME:  3:20 PM      PRIMARY SERVICE: Pulmonary Disease    CHIEF COMPLAINTS: COPD exacerbation    INTERVAL HPI: Patient seen and examined at bedside, Interval Notes, orders reviewed. Nursing notes noted    Feels much better, Xanax helped significantly, she slept better, anxiety is less, no chest pain, no coughing, no nausea no vomiting no fever or chills. Review of system:     GI Abdominal pain No  Skin Rash No    Social History     Tobacco Use    Smoking status: Former Smoker     Packs/day: 1.50     Years: 40.00     Pack years: 60.00     Types: Cigarettes     Last attempt to quit: 1989     Years since quittin.7    Smokeless tobacco: Never Used   Substance Use Topics    Alcohol use: No         Problem Relation Age of Onset    Cancer Sister         breast    Cancer Maternal Uncle         pancreatic         OBJECTIVE    Body mass index is 21.93 kg/m². PHYSICAL EXAM:  Vitals:  BP (!) 113/47   Pulse 108   Temp 97.9 °F (36.6 °C)   Resp 16   Ht 5' 3\" (1.6 m)   Wt 123 lb 12.8 oz (56.2 kg)   LMP  (LMP Unknown)   SpO2 99%   BMI 21.93 kg/m²     General: alert, cooperative, no distress  Head: normocephalic, atraumatic  Eyes:No gross abnormalities. ENT:  MMM no lesions  Neck:  supple and no masses  Chest : Good air movement, minimal rales at the base, nontender, tympanic, no wheezing. Heart[de-identified] Heart sounds are normal.  Regular rate and rhythm without murmur, gallop or rub. ABD:  symmetric, soft, non-tender  Musculoskeletal : no cyanosis, no clubbing and no edema  Neuro:  Grossly normal  Skin: No rashes or nodules noted.   Lymph node:  no cervical nodes  Urology: No Georges   Psychiatric: appropriate    DATA:   Recent Labs     10/14/20  0656 10/15/20  06   WBC 8.2 7.8   HGB 13.1 13.2   HCT 40.3 40.1   MCV 93.2 91.6    260     Recent Labs     10/14/20  0656 10/15/20  06   NA evidence for pulmonary emboli. Lungs: The lungs are so due to previous examination demonstrating hyperlucency, hyperinflation and centrilobular emphysematous changes are seen in the apices. There are small nodules in both lungs measuring up to 13 mm in the left lower lobe similar to the prior study. Pleura: No pleural effusion or thickening. Mediastinum: Mediastinum and simón are normal. There are no pathologically enlarged lymph nodes. The chest wall and lower neck are normal there is a persistent large hiatal hernia with a diameter of approximately 7centimeters containing the proximal stomach. Upper abdomen: There is a partially calcified lesion in the liver which is not adequately imaged on the current study but has been present since the prior examination. Bones/axillae/soft tissues: There has been an right axillary node dissection and right mastectomy. There is a 14 mm subcutaneous nodule in the region of the right chest wall. 1. No evidence of thoracic aortic dissection or aneurysm. Negative for pulmonary embolus 2. Stable appearance of the lungs since the prior study demonstrating chronic emphysematous changes as well as pulmonary nodules measuring up to 13 mm. May represent metastatic disease, PET CT evaluation may be considered. 3. There is a 14 mm subcutaneous soft tissue nodule on the anterior right chest wall in the region of the prior mastectomy which may represent residual or recurrent tumor. Status post right axillary node dissection. Xr Chest Portable    Result Date: 10/12/2020  EXAMINATION: Portable AP ERECT view of the chest. CLINICAL HISTORY: Increasing  sob , COPD. DATE: 10/11/2020 11:45 PM COMPARISONS: 9/30/2019 FINDINGS: Lungs are hyperexpanded. Normal cardiac silhouette. There are atherosclerotic calcifications in the aortic arch. Lungs are clear without consolidation. No pleural effusion or pneumothorax. The bony thorax is unremarkable. Status post right mastectomy.  There are clips in the right axilla. There are old healed right rib fractures. NO ACUTE CARDIOPULMONARY ABNORMALITY. NO CHANGE. COPD. IMPRESSION AND SUGGESTION:  Patient is at risk due to   · COPD with acute exacerbation  · Chronic hypoxic respiratory failure on 2 L O2 at home  · History of breast cancer, questionable recurrence with mets follows with oncology  · Anxiety    Recommendation  · Start taper prednisone  · Cont O2 to keep sat 88 to 90%  · Continue current nebs  · Encourage activity  · Xanax as needed  · Breast cancer and mets issues can be managed through oncology as outpatient.    · Probably ready for home tomorrow           Electronically signed by Declan Ramesh MD,  FCCP   on 10/15/2020 at 3:20 PM

## 2020-10-15 NOTE — PROGRESS NOTES
Hospitalist Daily Progress Note  Name: Lizeth Ruth  Age: 66 y.o. Gender: female  CodeStatus: Full Code  Allergies: No Known Allergies    Chief Complaint:Shortness of Breath    Primary Care Provider: ROSALIND Ventura CNP  InpatientTreatment Team: Treatment Team: Attending Provider: Ranjeet Hopson DO; Consulting Physician: Mikayla Correia MD; Consulting Physician: Kait Infante MD; Utilization Reviewer: Donny Colón, RN; : Edil Duran, RN; Registered Nurse: Asuncion Ferris RN; : AJAY Dupree  Admission Date: 10/11/2020      Subjective: patient seen and evaluated at bedside. 2L o2 at home. Pt does not feel back to baseline, not able to amble to toilet, still using bedside commode. No new nursing concerns. Afebrile vitals stable. Physical Exam  Constitutional:       Appearance: Normal appearance. Comments: Thin and cachectic   HENT:      Head: Normocephalic and atraumatic. Mouth/Throat:      Mouth: Mucous membranes are moist.      Pharynx: Oropharynx is clear. Eyes:      Extraocular Movements: Extraocular movements intact. Pupils: Pupils are equal, round, and reactive to light. Cardiovascular:      Rate and Rhythm: Normal rate and regular rhythm. Heart sounds: No murmur. No friction rub. No gallop. Pulmonary:      Breath sounds: Wheezing present. No rhonchi or rales. Comments: Improved air movement, no wheezes, rales, rhonchi  Abdominal:      General: There is no distension. Tenderness: There is no abdominal tenderness. There is no guarding. Musculoskeletal: Normal range of motion. Comments: LLE painful anterior shin. RLE normal   Neurological:      General: No focal deficit present. Mental Status: She is alert and oriented to person, place, and time. Psychiatric:         Mood and Affect: Mood normal.         Thought Content:  Thought content normal.         Judgment: Judgment normal.         Review of Systems  14 point ros is reviewed and negative except for as above. Medications:  Reviewed    Infusion Medications:   Scheduled Medications:    sodium chloride flush  10 mL Intravenous 2 times per day    enoxaparin  40 mg Subcutaneous Daily    predniSONE  40 mg Oral Daily    guaiFENesin  1,200 mg Oral BID    levothyroxine  75 mcg Oral Daily    atorvastatin  10 mg Oral Daily    metoprolol tartrate  50 mg Oral BID    ipratropium-albuterol  1 ampule Inhalation 4x daily    budesonide  0.5 mg Nebulization BID     PRN Meds: traMADol, ALPRAZolam, sodium chloride flush, acetaminophen **OR** acetaminophen, polyethylene glycol, promethazine **OR** ondansetron, albuterol, albuterol sulfate HFA    Labs:   Recent Labs     10/13/20  0957 10/14/20  0656 10/15/20  0632   WBC 9.5 8.2 7.8   HGB 13.1 13.1 13.2   HCT 40.2 40.3 40.1    267 260     Recent Labs     10/13/20  0957 10/14/20  0656 10/15/20  0632   * 135 140   K 4.0 4.3 4.0   CL 98 102 105   CO2 25 24 24   BUN 12 14 17   CREATININE 0.65 0.65 0.69   CALCIUM 8.5 8.7 9.0     No results for input(s): AST, ALT, BILIDIR, BILITOT, ALKPHOS in the last 72 hours. No results for input(s): INR in the last 72 hours. No results for input(s): Emma Comment in the last 72 hours. Urinalysis:   Lab Results   Component Value Date    NITRU Negative 07/15/2019    BLOODU Negative 07/15/2019    SPECGRAV 1.015 07/15/2019    GLUCOSEU Negative 07/15/2019       Radiology:   Most recent    Chest CT      WITH CONTRAST:  Results for orders placed during the hospital encounter of 09/08/20   CT CHEST W CONTRAST    Narrative CT CHEST W CONTRAST    COMPARISON: November 5, 2018; February 10, 2020. HISTORY: Malignant neoplasm of the central portion of the right female breast    TECHNIQUE: CT CHEST W CONTRASTAxial CT images of the thorax were obtained following the administration of 100 mL of Isovue 370 intravenous contrast. 2-D and 3-D reconstructions were obtained. FINDINGS:     Diffuse emphysematous disease is seen. [place] are seen bilaterally in the upper lobes, right greater than left. Paraseptal emphysematous changes are also seen on the left. In the lingula there are reticulations and pleural thickening in the chest wall   this could also represent post radiation postsurgical changes. A 3 mm nodule is seen in the right upper lobe (series 5, image 16). Anteriorly in the right upper lobe there is a 2.7 mm nodule (series 5, image 17). A 2.5 mm nodule is seen in the right   lateral upper lobe (series 5, image 16). This is also seen laterally where there is  pleural thickening visualized. This also may represent post radiation postsurgical changes. Also in the right middle lobe there is a nodular opacity that measures 3.3 mm (series 5, image 32). On the same image there is a 3 mm nodule in the right lower lobe. In the left lower lobe a pleural-based nodule is seen that measures 3 mm (series 5 image   37. Also in the left lower lobe and irregular nodule is seen that measures 1.1 x 1.1 cm (series 5, image 44. ). A precarinal lymph node is seen that measures 1.4 x 1.7 cm. It appears to be slightly larger than what was seen on previous examination. There are surgical clips seen in the right axillary region and also chest wall where there is evidence of mastectomy. On the right an 8mm hypodensity  is seen along the right chest wall that appears to contain fluid. This area was slightly smaller on previous examination. It measured 6 mm. Surgical clips are seen along the chest wall on the left as well. .      No aneurysm is seen. There are diffuse vascular calcifications seen in the aortic arch. There are also coronary calcifications and/or stents. A large hiatal hernia is again seen. Impression 1. There are multiple small nodules seen on this examination concerning for metastatic disease. The largest nodule visualized is seen in the left lower lobe.  It is irregular. 2. Precarinal lymph node is again visualized. It appears slightly larger than what was seen on previous examination. All CT scans at this facility use dose modulation, iterative reconstruction, and/or weight based dosing when appropriate to reduce radiation dose to as low as reasonably achievable. CT ABDOMEN PELVIS W IV CONTRAST     Comparison: October 3, 2018       History: Malignant neoplasm central portion of the right breast    Technique: Helically Acquired CT of the  abdomen and pelvis was performed following the administration of oral contrast and during the intravenous infusion of 100 mL of Isovue-370. Axial delayed images were also performed. Reformatted sagittal and   coronal images were also obtained. Findings:       A large hiatal hernia is again seen. A calcified mass is again seen in the area of the gallbladder fossa thought to represent porcelain gallbladder. The liver, spleen, pancreas and left adrenal gland are unremarkable. A nodule is again seen in the right adrenal gland and is stable. Bilaterally both kidneys show uptake and excretion of contrast with no evidence for hydronephrosis or renal calculi. No   aneurysm or dissection is present. There are diffuse atherosclerotic calcifications. No enlarged retroperitoneal, mesenteric or pelvic lymph nodes seen. No abnormally dilated loops of bowel, free air or free fluid seen. Within the pelvis the uterus is unremarkable. The contrast is seen layering in the bladder. No bladder wall thickening is seen. The osseous structures contain no destructive lesions. A levoscoliosis is seen in the lumbar spine. Impression:    1. 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 There is no evidence for obstruction. No evidence of appendicitis or diverticulitis seen. 3. No hydronephrosis is seen  4. No pathologically enlarged lymph nodes seen.      All CT scans at this facility use dose modulation, iterative reconstruction, and/or weight based dosing             WITHOUT CONTRAST: No results found for this or any previous visit. CXR      2-view:   Results for orders placed during the hospital encounter of 09/30/19   XR CHEST STANDARD (2 VW)    Narrative DIAGNOSIS:R07.89 Discomfort of chest wall ICD10    COMPARISON: September 10, 2010; September 22, 2019    TECHNIQUE: PA and lateral chest    FINDINGS: The lungs are hyperinflated. There is coarsening of the interstitium,  increased AP diameter and flattening of the diaphragm are also seen.: The lungs contain no focal infiltrate, pleural effusion, consolidation or pneumothorax. The cardiac   silhouette is not enlarged. Calcifications are seen in the thoracic aorta. The bony structures are osteopenic with degenerative changes visualized. A retrocardiac air-fluid level is seen that may represent a hiatal hernia. Impression No acute cardiopulmonary process, findings are suggestive of COPD. Portable:   Results for orders placed during the hospital encounter of 10/11/20   XR CHEST PORTABLE    Narrative EXAMINATION: Portable AP ERECT view of the chest.    CLINICAL HISTORY: Increasing  sob , COPD. DATE: 10/11/2020 11:45 PM    COMPARISONS: 9/30/2019    FINDINGS: Lungs are hyperexpanded. Normal cardiac silhouette. There are atherosclerotic calcifications in the aortic arch. Lungs are clear without consolidation. No pleural effusion or pneumothorax. The bony thorax is unremarkable. Status post right   mastectomy. There are clips in the right axilla. There are old healed right rib fractures. Impression NO ACUTE CARDIOPULMONARY ABNORMALITY. NO CHANGE.    COPD. Echo No results found for this or any previous visit.           Assessment/Plan:    Active Hospital Problems    Diagnosis Date Noted    [facility] [J44.1] 05/12/2019    Dyslipidemia [E78.5] 09/08/2015    HTN (hypertension) [I10] 09/16/2014    Hypothyroidism [E03.9]      Advanced COPD with exacerbation: prednisone 40 daily. Budesonide 500 BID, mucinex 1200 BID. duoneb QID. Hypothyroid 75 mcg daily. HTN: metoprolol 50 BID. Hyperlipidemia lipitor 10 daily. Breast CA: NM bone scan from august shows diffuse metastatic disease. following with oncology. Encouraged follow up with XRT Dr Valarie Bautista    Lovenox 40 QD. Additional work up or/and treatment plan may be added today or then after based on clinical progression. I am managing a portion of pt care. Some medical issues are handled byother specialists. Additional work up and treatment should be done in out pt setting by pt PCP and other out pt providers. In addition to examining and evaluating pt, I spent additional time explaining care, normaland abnormal findings, and treatment plan. All of pt questions were answered. Counseling, diet and education were provided. Case will be discussed with nursing staff when appropriate. Family will be updated if and whenappropriate.       Electronically signed by Sonal Dubois DO on 10/15/2020 at 1:16 PM

## 2020-10-15 NOTE — FLOWSHEET NOTE
Patient awake and resting quietly in bed. Alert and oriented x4. Refused PT/OT therapy due to being very tired and very SOB with exertion. Had respiratory treatment this morning, for dyspnea. Lungs diminished posterior, with some wheezes noted. Tele. NSR-90. Assisted up to [facility] for large BM today. Appetite poor for breakfast. No edema.
Pt in bed, eyes closed.  called concerned he had not talked to the pt. Writer in room, took Movaya, states breathing is better. No labored breathing at rest. Lungs clear, diminished. Writer explained  called, pt states she would call him back.
Pt in bed, states breathing is better than earlier. Pt dyspneic in bed and with exertion. On 4L NC, wears 2L NC at home. Lungs diminished, states small amount of sputum produced with cough, writer did not see. Took Pm med's, denies pain, snack given.
AM EDT     To reach a  for emotional and spiritual support, place an [facility] consult request.   If a  is needed immediately, dial 0 and ask to page the on-call .

## 2020-10-16 VITALS
HEART RATE: 84 BPM | OXYGEN SATURATION: 96 % | DIASTOLIC BLOOD PRESSURE: 69 MMHG | WEIGHT: 123.8 LBS | BODY MASS INDEX: 21.93 KG/M2 | TEMPERATURE: 97.9 F | SYSTOLIC BLOOD PRESSURE: 150 MMHG | HEIGHT: 63 IN | RESPIRATION RATE: 16 BRPM

## 2020-10-16 LAB
ANION GAP SERPL CALCULATED.3IONS-SCNC: 8 MEQ/L (ref 9–15)
BUN BLDV-MCNC: 16 MG/DL (ref 8–23)
CALCIUM SERPL-MCNC: 8.2 MG/DL (ref 8.5–9.9)
CHLORIDE BLD-SCNC: 104 MEQ/L (ref 95–107)
CO2: 24 MEQ/L (ref 20–31)
CREAT SERPL-MCNC: 0.59 MG/DL (ref 0.5–0.9)
GFR AFRICAN AMERICAN: >60
GFR NON-AFRICAN AMERICAN: >60
GLUCOSE BLD-MCNC: 97 MG/DL (ref 70–99)
POTASSIUM SERPL-SCNC: 4.5 MEQ/L (ref 3.4–4.9)
SODIUM BLD-SCNC: 136 MEQ/L (ref 135–144)

## 2020-10-16 PROCEDURE — 6370000000 HC RX 637 (ALT 250 FOR IP): Performed by: INTERNAL MEDICINE

## 2020-10-16 PROCEDURE — 6360000002 HC RX W HCPCS: Performed by: NURSE PRACTITIONER

## 2020-10-16 PROCEDURE — 6360000002 HC RX W HCPCS: Performed by: INTERNAL MEDICINE

## 2020-10-16 PROCEDURE — 99232 SBSQ HOSP IP/OBS MODERATE 35: CPT | Performed by: INTERNAL MEDICINE

## 2020-10-16 PROCEDURE — 36415 COLL VENOUS BLD VENIPUNCTURE: CPT

## 2020-10-16 PROCEDURE — 80048 BASIC METABOLIC PNL TOTAL CA: CPT

## 2020-10-16 PROCEDURE — 6370000000 HC RX 637 (ALT 250 FOR IP): Performed by: NURSE PRACTITIONER

## 2020-10-16 PROCEDURE — 94761 N-INVAS EAR/PLS OXIMETRY MLT: CPT

## 2020-10-16 PROCEDURE — 94640 AIRWAY INHALATION TREATMENT: CPT

## 2020-10-16 PROCEDURE — 2700000000 HC OXYGEN THERAPY PER DAY

## 2020-10-16 RX ORDER — BUDESONIDE 0.5 MG/2ML
1 INHALANT ORAL 2 TIMES DAILY
COMMUNITY

## 2020-10-16 RX ORDER — PREDNISONE 10 MG/1
TABLET ORAL
Qty: 18 TABLET | Refills: 0 | Status: SHIPPED | OUTPATIENT
Start: 2020-10-16 | End: 2020-11-03 | Stop reason: ALTCHOICE

## 2020-10-16 RX ORDER — ALPRAZOLAM 0.5 MG/1
0.5 TABLET ORAL 2 TIMES DAILY PRN
Qty: 14 TABLET | Refills: 0 | Status: SHIPPED | OUTPATIENT
Start: 2020-10-16 | End: 2020-10-23

## 2020-10-16 RX ADMIN — ENOXAPARIN SODIUM 40 MG: 40 INJECTION SUBCUTANEOUS at 09:44

## 2020-10-16 RX ADMIN — GUAIFENESIN 1200 MG: 600 TABLET ORAL at 09:44

## 2020-10-16 RX ADMIN — METOPROLOL TARTRATE 50 MG: 50 TABLET, FILM COATED ORAL at 09:44

## 2020-10-16 RX ADMIN — LEVOTHYROXINE SODIUM 75 MCG: 0.07 TABLET ORAL at 05:11

## 2020-10-16 RX ADMIN — IPRATROPIUM BROMIDE AND ALBUTEROL SULFATE 1 AMPULE: .5; 3 SOLUTION RESPIRATORY (INHALATION) at 11:16

## 2020-10-16 RX ADMIN — BUDESONIDE INHALATION 500 MCG: 0.5 SUSPENSION RESPIRATORY (INHALATION) at 08:07

## 2020-10-16 RX ADMIN — ALPRAZOLAM 0.5 MG: 0.5 TABLET ORAL at 13:47

## 2020-10-16 RX ADMIN — PREDNISONE 30 MG: 20 TABLET ORAL at 09:43

## 2020-10-16 RX ADMIN — IPRATROPIUM BROMIDE AND ALBUTEROL SULFATE 1 AMPULE: .5; 3 SOLUTION RESPIRATORY (INHALATION) at 15:27

## 2020-10-16 RX ADMIN — IPRATROPIUM BROMIDE AND ALBUTEROL SULFATE 1 AMPULE: .5; 3 SOLUTION RESPIRATORY (INHALATION) at 08:07

## 2020-10-16 RX ADMIN — ATORVASTATIN CALCIUM 10 MG: 10 TABLET, FILM COATED ORAL at 09:43

## 2020-10-16 ASSESSMENT — PAIN SCALES - GENERAL: PAINLEVEL_OUTOF10: 0

## 2020-10-16 NOTE — PROGRESS NOTES
Pt given d/c instructions and iv hep lock removed. Pt also given script for xanax.  Electronically signed by Priscila Womack RN on 10/16/2020 at 5:53 PM

## 2020-10-16 NOTE — PROGRESS NOTES
105 104   CO2 24 24   BUN 17 16   CREATININE 0.69 0.59   GLUCOSE 103* 97   CALCIUM 9.0 8.2*   LABGLOM >60.0 >60.0   GFRAA >60.0 >60.0       MV Settings:          No results for input(s): PHART, EIC1LFL, PO2ART, OIN8UEF, BEART, G1AGHXZI in the last 72 hours. O2 Device: Nasal cannula  O2 Flow Rate (L/min): 2 L/min    DIET CARB CONTROL;     MEDICATIONS during current hospitalization:    Continuous Infusions:    Scheduled Meds:   predniSONE  30 mg Oral Daily    Followed by   Sheryn Form ON 10/19/2020] predniSONE  20 mg Oral Daily    Followed by   Sheryn Form ON 10/22/2020] predniSONE  10 mg Oral Daily    sodium chloride flush  10 mL Intravenous 2 times per day    enoxaparin  40 mg Subcutaneous Daily    guaiFENesin  1,200 mg Oral BID    levothyroxine  75 mcg Oral Daily    atorvastatin  10 mg Oral Daily    metoprolol tartrate  50 mg Oral BID    ipratropium-albuterol  1 ampule Inhalation 4x daily    budesonide  0.5 mg Nebulization BID       PRN Meds:traMADol, ALPRAZolam, sodium chloride flush, acetaminophen **OR** acetaminophen, polyethylene glycol, promethazine **OR** ondansetron, albuterol, albuterol sulfate [facility]    Radiology  Cta Chest W Wo  (pe Study)    Result Date: 10/12/2020  EXAM:CTA CHEST W WO CONTRAST DATE10/12/2020 2:46 AM: REASON FOR EXAM:Acute severe anterior chest wall pain. pe COMPARISON: Chest CT from September 8, 2757 Technique: Helical CT was performed through the chest following the IV administration of100  cc of nonionic contrast. 3-D MIP reconstructions were performed on an independent workstation in the coronal plane with thick slab technique utilized in the interpretation. 3-D maximum intensity projection performed. All CT scans at this facility use dose modulation, iterative reconstruction, and/or weight based dosing when appropriate to reduce radiation dose to as low as reasonably achievable. FINDINGS Vascular structures: There is no evidence for thoracic aortic aneurysm or dissection.  No pleural effusion or pneumothorax. The bony thorax is unremarkable. Status post right mastectomy. There are clips in the right axilla. There are old healed right rib fractures. NO ACUTE CARDIOPULMONARY ABNORMALITY. NO CHANGE. COPD. IMPRESSION AND SUGGESTION:  Patient is at risk due to   · COPD with acute exacerbation  · Chronic hypoxic respiratory failure on 2 L O2 at home  · History of breast cancer, questionable recurrence with mets follows with oncology  · Anxiety    Recommendation  · Taper of prednisone  · Cont O2 to keep sat 88 to 90%  · Continue current nebs  · Encourage activity  · Xanax as needed  · Palliative medicine consult patient need to establish as outpatient for anxiety/pain management  · Breast cancer and mets issues can be managed through oncology as outpatient.    · Okay to DC home from pulmonary point of view  · Follow-up with me in 2-4 weeks           Electronically signed by Oly Tovar MD,  FCCP   on 10/16/2020 at 11:30 AM

## 2020-10-17 ENCOUNTER — CARE COORDINATION (OUTPATIENT)
Dept: CASE MANAGEMENT | Age: 75
End: 2020-10-17

## 2020-10-17 ENCOUNTER — HOSPITAL ENCOUNTER (EMERGENCY)
Age: 75
Discharge: HOME | End: 2020-10-17
Attending: EMERGENCY MEDICINE
Payer: MEDICARE

## 2020-10-17 ENCOUNTER — APPOINTMENT (OUTPATIENT)
Dept: CT IMAGING | Age: 75
End: 2020-10-17
Payer: MEDICARE

## 2020-10-17 ENCOUNTER — TELEPHONE (OUTPATIENT)
Dept: OTHER | Facility: CLINIC | Age: 75
End: 2020-10-17

## 2020-10-17 VITALS
BODY MASS INDEX: 21.62 KG/M2 | HEIGHT: 63 IN | OXYGEN SATURATION: 100 % | DIASTOLIC BLOOD PRESSURE: 72 MMHG | RESPIRATION RATE: 18 BRPM | HEART RATE: 87 BPM | SYSTOLIC BLOOD PRESSURE: 126 MMHG | WEIGHT: 122 LBS | TEMPERATURE: 98.3 F

## 2020-10-17 LAB
ALBUMIN SERPL-MCNC: 4.1 G/DL (ref 3.5–4.6)
ALP BLD-CCNC: 79 U/L (ref 40–130)
ALT SERPL-CCNC: 83 U/L (ref 0–33)
ANION GAP SERPL CALCULATED.3IONS-SCNC: 12 MEQ/L (ref 9–15)
AST SERPL-CCNC: 222 U/L (ref 0–35)
BASOPHILS ABSOLUTE: 0 K/UL (ref 0–0.2)
BASOPHILS RELATIVE PERCENT: 0.1 %
BILIRUB SERPL-MCNC: 1.2 MG/DL (ref 0.2–0.7)
BLOOD CULTURE, ROUTINE: NORMAL
BUN BLDV-MCNC: 21 MG/DL (ref 8–23)
CALCIUM SERPL-MCNC: 10 MG/DL (ref 8.5–9.9)
CHLORIDE BLD-SCNC: 98 MEQ/L (ref 95–107)
CO2: 30 MEQ/L (ref 20–31)
CREAT SERPL-MCNC: 0.61 MG/DL (ref 0.5–0.9)
CULTURE, BLOOD 2: NORMAL
EKG ATRIAL RATE: 92 BPM
EKG P AXIS: 71 DEGREES
EKG P-R INTERVAL: 164 MS
EKG Q-T INTERVAL: 372 MS
EKG QRS DURATION: 126 MS
EKG QTC CALCULATION (BAZETT): 460 MS
EKG R AXIS: 88 DEGREES
EKG T AXIS: 52 DEGREES
EKG VENTRICULAR RATE: 92 BPM
EOSINOPHILS ABSOLUTE: 0 K/UL (ref 0–0.7)
EOSINOPHILS RELATIVE PERCENT: 0.2 %
GFR AFRICAN AMERICAN: >60
GFR NON-AFRICAN AMERICAN: >60
GLOBULIN: 2.6 G/DL (ref 2.3–3.5)
GLUCOSE BLD-MCNC: 178 MG/DL (ref 70–99)
HCT VFR BLD CALC: 43.6 % (ref 37–47)
HEMOGLOBIN: 14.1 G/DL (ref 12–16)
LACTIC ACID: 2.5 MMOL/L (ref 0.5–2.2)
LIPASE: 65 U/L (ref 12–95)
LYMPHOCYTES ABSOLUTE: 0.8 K/UL (ref 1–4.8)
LYMPHOCYTES RELATIVE PERCENT: 7.9 %
MCH RBC QN AUTO: 30.4 PG (ref 27–31.3)
MCHC RBC AUTO-ENTMCNC: 32.4 % (ref 33–37)
MCV RBC AUTO: 94 FL (ref 82–100)
MONOCYTES ABSOLUTE: 0.2 K/UL (ref 0.2–0.8)
MONOCYTES RELATIVE PERCENT: 1.8 %
NEUTROPHILS ABSOLUTE: 9.2 K/UL (ref 1.4–6.5)
NEUTROPHILS RELATIVE PERCENT: 90 %
PDW BLD-RTO: 13.8 % (ref 11.5–14.5)
PLATELET # BLD: 276 K/UL (ref 130–400)
POTASSIUM SERPL-SCNC: 4.2 MEQ/L (ref 3.4–4.9)
RBC # BLD: 4.64 M/UL (ref 4.2–5.4)
SODIUM BLD-SCNC: 140 MEQ/L (ref 135–144)
TOTAL PROTEIN: 6.7 G/DL (ref 6.3–8)
WBC # BLD: 10.2 K/UL (ref 4.8–10.8)

## 2020-10-17 PROCEDURE — 99285 EMERGENCY DEPT VISIT HI MDM: CPT

## 2020-10-17 PROCEDURE — 83605 ASSAY OF LACTIC ACID: CPT

## 2020-10-17 PROCEDURE — 2580000003 HC RX 258: Performed by: EMERGENCY MEDICINE

## 2020-10-17 PROCEDURE — 85025 COMPLETE CBC W/AUTO DIFF WBC: CPT

## 2020-10-17 PROCEDURE — 6360000002 HC RX W HCPCS: Performed by: EMERGENCY MEDICINE

## 2020-10-17 PROCEDURE — 74176 CT ABD & PELVIS W/O CONTRAST: CPT

## 2020-10-17 PROCEDURE — 83690 ASSAY OF LIPASE: CPT

## 2020-10-17 PROCEDURE — 36415 COLL VENOUS BLD VENIPUNCTURE: CPT

## 2020-10-17 PROCEDURE — 80053 COMPREHEN METABOLIC PANEL: CPT

## 2020-10-17 PROCEDURE — 93005 ELECTROCARDIOGRAM TRACING: CPT

## 2020-10-17 PROCEDURE — 96374 THER/PROPH/DIAG INJ IV PUSH: CPT

## 2020-10-17 RX ORDER — 0.9 % SODIUM CHLORIDE 0.9 %
500 INTRAVENOUS SOLUTION INTRAVENOUS ONCE
Status: COMPLETED | OUTPATIENT
Start: 2020-10-17 | End: 2020-10-17

## 2020-10-17 RX ORDER — ONDANSETRON 2 MG/ML
4 INJECTION INTRAMUSCULAR; INTRAVENOUS ONCE
Status: COMPLETED | OUTPATIENT
Start: 2020-10-17 | End: 2020-10-17

## 2020-10-17 RX ORDER — MORPHINE SULFATE 4 MG/ML
4 INJECTION, SOLUTION INTRAMUSCULAR; INTRAVENOUS
Status: DISCONTINUED | OUTPATIENT
Start: 2020-10-17 | End: 2020-10-17 | Stop reason: HOSPADM

## 2020-10-17 RX ORDER — SODIUM CHLORIDE 9 MG/ML
INJECTION, SOLUTION INTRAVENOUS CONTINUOUS
Status: DISCONTINUED | OUTPATIENT
Start: 2020-10-17 | End: 2020-10-17 | Stop reason: HOSPADM

## 2020-10-17 RX ADMIN — SODIUM CHLORIDE: 9 INJECTION, SOLUTION INTRAVENOUS at 16:31

## 2020-10-17 RX ADMIN — ONDANSETRON 4 MG: 2 INJECTION INTRAMUSCULAR; INTRAVENOUS at 15:08

## 2020-10-17 RX ADMIN — MORPHINE SULFATE 4 MG: 4 INJECTION, SOLUTION INTRAMUSCULAR; INTRAVENOUS at 15:08

## 2020-10-17 RX ADMIN — SODIUM CHLORIDE 500 ML: 9 INJECTION, SOLUTION INTRAVENOUS at 15:09

## 2020-10-17 ASSESSMENT — PAIN SCALES - GENERAL
PAINLEVEL_OUTOF10: 0
PAINLEVEL_OUTOF10: 8

## 2020-10-17 ASSESSMENT — PAIN DESCRIPTION - FREQUENCY: FREQUENCY: CONTINUOUS

## 2020-10-17 ASSESSMENT — PAIN DESCRIPTION - PAIN TYPE: TYPE: ACUTE PAIN

## 2020-10-17 ASSESSMENT — PAIN DESCRIPTION - ORIENTATION: ORIENTATION: LEFT;RIGHT;UPPER

## 2020-10-17 ASSESSMENT — PAIN DESCRIPTION - DESCRIPTORS: DESCRIPTORS: BURNING

## 2020-10-17 NOTE — ED NOTES
No needs at this time, patient states she remains pain free at this time, no n/v.      Yumiko Olivarez RN  10/17/20 0742

## 2020-10-17 NOTE — ED NOTES
Bed: 04  Expected date:   Expected time:   Means of arrival:   Comments:     Michelle Avalos RN  10/17/20 0244

## 2020-10-17 NOTE — ED PROVIDER NOTES
[facility] ED  eMERGENCY dEPARTMENT eNCOUnter      Pt Name: Paramjit Adame  MRN: [id number]  [place] 1945  Date of evaluation: 10/17/2020  Provider: Patrice Black MD    CHIEF COMPLAINT       Chief Complaint   Patient presents with    Abdominal Pain         HISTORY OF PRESENT ILLNESS   (Location/Symptom, Timing/Onset,Context/Setting, Quality, Duration, Modifying Factors, Severity)  Note limiting factors. Paramjit Adame is a 66 y.o. female who presents to the emergency department complaint of acute abdominal pain. Patient was pain started this morning. Patient has pain very intense. Patient admits to seems to be epigastric in nature. Patient is she was recently in the hospital for COPD. Patient admits nausea without vomiting. Patient admits she has had routine stool. Patient admits she has what is believed to be metastatic breast cancer with extension to her bones. Patient denies acute chest pain. Patient denies acute shortness of breath. Patient denies other atypical symptomatologies. He does admit remote history of gallstones, without cholecystectomy. Patient denies alcohol abuse at this time. HPI    NursingNotes were reviewed. REVIEW OF SYSTEMS    (2-9 systems for level 4, 10 or more for level 5)     Review of Systems   Constitutional: Positive for activity change and appetite change. Negative for chills and fever. HENT: Negative for congestion, ear pain, rhinorrhea and trouble swallowing. Eyes: Negative. Respiratory: Negative for shortness of breath, wheezing and stridor. Cardiovascular: Negative for chest pain and leg swelling. Gastrointestinal: Positive for abdominal pain and nausea. Negative for vomiting. Endocrine: Negative. Genitourinary: Negative for dysuria, frequency and hematuria. Musculoskeletal: Negative for gait problem and neck pain. Skin: Negative. Allergic/Immunologic: Negative.     Neurological: Negative for seizures, syncope, speech difficulty and light-headedness. Hematological: Negative. Psychiatric/Behavioral: Negative for hallucinations, self-injury and suicidal ideas. Except as noted above the remainder of the review of systems was reviewed and negative. PAST MEDICAL HISTORY     Past Medical History:   Diagnosis Date    Anxiety     Anxiety and depression     CAD (coronary artery disease)     Cancer ([facility] [address] 3/2014    Invasive ductal cancer left breast, spread to 5 lymph nodes    Carotid artery stenosis and occlusion     COPD (chronic obstructive pulmonary disease) ([facility] [address]     Depression 1/15/2018    GERD (gastroesophageal reflux disease)     Headache(784.0)     Hyperlipidemia     Hypertension     Hypothyroidism     Insomnia     Osteoarthritis     RBBB 10/15/2014    Right carotid bruit 5/26/2015    S/P cardiac catheterization 7/13/2016         SURGICALHISTORY       Past Surgical History:   Procedure Laterality Date    BREAST BIOPSY Right 11/8/2016    US biopsy    BREAST SURGERY Left 2/26/14    U/S Guided core bx of the left breast    BREAST SURGERY Right 3/20/14    U/S of the lateral right breast    BREAST SURGERY Left 3/27/14    U/S Guided Left breast lumpectomy and left snb    OTHER SURGICAL HISTORY  4/11/14    Placement drain, left axillary seroma    WI MASTECTOMY, SIMPLE, COMPLETE Right 9/6/2018    R modified radical mastectomy         CURRENT MEDICATIONS       Discharge Medication List as of 10/17/2020  7:14 PM      CONTINUE these medications which have NOT CHANGED    Details   ALPRAZolam (XANAX) 0.5 MG tablet Take 1 tablet by mouth 2 times daily as needed for Anxiety for up to 7 days. , Disp-14 tablet,R-0Normal      predniSONE (DELTASONE) 10 MG tablet Take 3 tabs daily for 3 days then take 2 tabs daily for 3 days then take 1 tab daily for 3 days. , Disp-18 tablet,R-0Normal      budesonide (PULMICORT) 0.5 MG/2ML nebulizer suspension Take 1 ampule by nebulization 2 times dailyHistorical Med Cancer Sister         breast    Cancer Maternal Uncle         pancreatic          SOCIAL HISTORY       Social History     Socioeconomic History    Marital status:      Spouse name: None    Number of children: None    Years of education: None    Highest education level: None   Occupational History    None   Social Needs    Financial resource strain: None    Food insecurity     Worry: None     Inability: None    Transportation needs     Medical: None     Non-medical: None   Tobacco Use    Smoking status: Former Smoker     Packs/day: 1.50     Years: 40.00     Pack years: 60.00     Types: Cigarettes     Last attempt to quit: 1989     Years since quittin.7    Smokeless tobacco: Never Used   Substance and Sexual Activity    Alcohol use: No    Drug use: No    Sexual activity: Never   Lifestyle    Physical activity     Days per week: None     Minutes per session: None    Stress: None   Relationships    Social connections     Talks on phone: None     Gets together: None     Attends Restoration service: None     Active member of club or organization: None     Attends meetings of clubs or organizations: None     Relationship status: None    Intimate partner violence     Fear of current or ex partner: None     Emotionally abused: None     Physically abused: None     Forced sexual activity: None   Other Topics Concern    None   Social History Narrative    None       SCREENINGS    [place] Coma Scale  Eye Opening: Spontaneous  Best Verbal Response: Oriented  Best Motor Response: Obeys commands  [place] Coma Scale Score: 15        PHYSICAL EXAM    (up to 7 for level 4, 8 or more for level 5)     ED Triage Vitals [10/17/20 1409]   BP Temp Temp Source Pulse Resp SpO2 Height Weight   (!) 161/94 98.3 °F (36.8 °C) Oral 91 18 100 % 5' 3\" (1.6 m) 122 lb (55.3 kg)       Physical Exam  Vitals signs and nursing note reviewed. Constitutional:       Appearance: She is well-developed. She is diaphoretic.  She is not toxic-appearing. Comments: Patient demonstrates acute distress of pain, however does not demonstrate acute cardiopulmonary distress. HENT:      Head: Normocephalic. Mouth/Throat:      Mouth: Mucous membranes are moist.   Eyes:      Extraocular Movements: Extraocular movements intact. Conjunctiva/sclera: Conjunctivae normal.      Pupils: Pupils are equal, round, and reactive to light. Cardiovascular:      Rate and Rhythm: Normal rate. Heart sounds: No friction rub. No gallop. Pulmonary:      Effort: Pulmonary effort is normal. No respiratory distress. Breath sounds: No stridor. No wheezing or rhonchi. Chest:      Chest wall: No tenderness. Abdominal:      General: Bowel sounds are normal. There is distension. There is no abdominal bruit. There are no signs of injury. Palpations: There is no fluid wave, mass or pulsatile mass. Tenderness: There is abdominal tenderness in the epigastric area. There is guarding. Musculoskeletal: Normal range of motion. Skin:     General: Skin is warm. Capillary Refill: Capillary refill takes less than 2 seconds. Coloration: Skin is not jaundiced. Findings: No bruising or erythema. Neurological:      General: No focal deficit present. Mental Status: She is alert and oriented to person, place, and time. Psychiatric:         Mood and Affect: Mood normal.         Behavior: Behavior normal.         Thought Content: Thought content normal.         Judgment: Judgment normal.         DIAGNOSTIC RESULTS     EKG: All EKG's are interpreted by the Emergency Department Physician who either signs or Co-signsthis chart in the absence of a cardiologist.    EKG shows normal sinus rhythm. Right bundle branch block. Rate is 92. There is no obvious acute ST segment changes. However they are abnormal.  This is abnormal EKG.     RADIOLOGY:   Non-plain filmimages such as CT, Ultrasound and MRI are read by the radiologist. Plain radiographic images are visualized and preliminarily interpreted by the emergency physician with the below findings:    Patient's findings are as noted below. Metastatic disease as described by patient is consistent with CAT scan findings. There is notation of cholelithiasis, without evidence of cholecystitis. Please see radiologist report for full detail. Interpretation per the Radiologist below, if available at the time ofthis note:    CT ABDOMEN PELVIS WO CONTRAST Additional Contrast? None   Final Result   1. NODULAR DENSITIES IN THE VISUALIZED BASAL LUNGS SUPERIMPOSED UPON PARASEPTAL EMPHYSEMA DESCRIBED ABOVE PLEASE SEE CT SCAN CHEST REPORT OCTOBER 11, 2020 FOR ADDITIONAL DETAILS. FINDINGS SHOULD BE SUSPICIOUS FOR THAT OF MALIGNANCY UNTIL PROVEN    ESPECIALLY IN REGARDS TO THE NODULE IN THE LEFT LOWER LOBE. 2. CHOLELITHIASIS. 3. THE BLADDER IS DISTENDED. CORRELATE CLINICALLY. CONSIDER DECOMPRESSION. 4. SCLEROTIC CHANGES SEEN IN THE L2 AND L5 VERTEBRA AS DESCRIBED ABOVE. GIVEN THE FINDINGS WITHIN THE LUNG FINDINGS TO BE SUSPICIOUS FOR POSSIBLE METASTATIC DISEASE UNTIL PROVEN OTHERWISE. All CT scans at this facility use dose modulation, iterative reconstruction, and/or weight based dosing when appropriate to reduce radiation dose to as low as reasonably achievable. Findings:      The visualized basal lungs show no focal parenchymal abnormalities. The liver, gallbladder, spleen, pancreas, adrenals, kidneys are unremarkable. Large and small bowel show no sign of obstruction. The appendix is visualized. No periappendiceal stranding. No diverticulitis. No free air. No free fluid. The visualized abdominal aorta is of normal size and caliber. No significant retroperitoneal adenopathy. Visualized osseous structures are grossly unremarkable.       Impression: UNREMARKABLE CT SCAN OF THE ABDOMEN AND PELVIS         All CT scans at this facility use dose modulation, iterative reconstruction, and/or weight based dosing when appropriate to reduce radiation dose to as low as reasonably achievable. ED BEDSIDE ULTRASOUND:   Performed by ED Physician - none    LABS:  Labs Reviewed   COMPREHENSIVE METABOLIC PANEL - Abnormal; Notable for the following components:       Result Value    Glucose 178 (*)     Calcium 10.0 (*)     Total Bilirubin 1.2 (*)     ALT 83 (*)      (*)     All other components within normal limits   CBC WITH AUTO DIFFERENTIAL - Abnormal; Notable for the following components:    MCHC 32.4 (*)     Neutrophils Absolute 9.2 (*)     Lymphocytes Absolute 0.8 (*)     All other components within normal limits   LACTIC ACID, PLASMA - Abnormal; Notable for the following components:    Lactic Acid 2.5 (*)     All other components within normal limits   LIPASE   URINE RT REFLEX TO CULTURE       All other labs were within normal range or not returned as of this dictation. EMERGENCY DEPARTMENT COURSE and DIFFERENTIAL DIAGNOSIS/MDM:   Vitals:    Vitals:    10/17/20 1551 10/17/20 1631 10/17/20 1809 10/17/20 1914   BP:  (!) 122/59 110/70 126/72   Pulse: 85 81 95 87   Resp: 18 18 18 18   Temp:       TempSrc:       SpO2: 98% 100% 100% 100%   Weight:       Height:           Patient's laboratory evaluation demonstrates significant marked increase in liver enzymes from a few days earlier while in the hospital.  Patient symptomatologies have entirely in 100% resolved status post analgesia. Patient recalls episode similar to this approximately 30 years ago with associated gallbladder attack. At this time based upon findings suspect patient likely had a merely biliary colic type episode. I have advised patient on current liver enzymes. I have advised on gallbladder findings and cholelithiasis. I advised patient on stool burden within colon. At this time patient is asymptomatic and pledges to follow-up with her primary care physician for further detail.   She will likely require repeat laboratory study and perhaps ultrasonography of the gallbladder. Dietary modifications recommended. Patient will return to the emergency department should her symptoms return in any capacity. She was very appreciative of care. MDM    CRITICAL CARE TIME   Total Critical Care time was - minutes, excluding separately reportableprocedures. There was a high probability of clinicallysignificant/life threatening deterioration in the patient's condition which required my urgent intervention.  -    CONSULTS:  None    PROCEDURES:  Unless otherwise noted below, none     Procedures    FINAL IMPRESSION      1. Epigastric pain Improving   2. Biliary colic    3.  Functional constipation        DISPOSITION/PLAN   DISPOSITION Decision To Discharge 10/17/2020 07:02:03 PM      PATIENT REFERRED TO:  ROSALIND Aviles - CNP  [facility] [address] [place] [address] Road  [phone]    Call today  for follow up Emergency Department visit      DISCHARGE MEDICATIONS:  Discharge Medication List as of 10/17/2020  7:14 PM             (Please note that portions of this note were completed with a voice recognitionprogram.  Efforts were made to edit the dictations but occasionally words are mis-transcribed.)    Apolinar Moffett MD (electronically signed)  Attending Emergency Physician          Apolinar Moffett MD  10/18/20 [id number]

## 2020-10-18 ASSESSMENT — ENCOUNTER SYMPTOMS
STRIDOR: 0
EYES NEGATIVE: 1
ABDOMINAL PAIN: 1
TROUBLE SWALLOWING: 0
RHINORRHEA: 0
WHEEZING: 0
NAUSEA: 1
ALLERGIC/IMMUNOLOGIC NEGATIVE: 1
SHORTNESS OF BREATH: 0
VOMITING: 0

## 2020-10-19 ENCOUNTER — TELEPHONE (OUTPATIENT)
Dept: OTHER | Facility: CLINIC | Age: 75
End: 2020-10-19

## 2020-10-19 ENCOUNTER — CARE COORDINATION (OUTPATIENT)
Dept: CASE MANAGEMENT | Age: 75
End: 2020-10-19

## 2020-10-19 PROCEDURE — 93010 ELECTROCARDIOGRAM REPORT: CPT | Performed by: INTERNAL MEDICINE

## 2020-10-19 NOTE — CARE COORDINATION
[facility] Transitions Initial Follow Up Call     Call within 2 business days of discharge: Yes     Patient: Steffani Meigs      Patient : 1945   MRN: [id number]         Reason for Admission: 10/11-10/16/2020 [address] IP COPD. 10/17/2020 [facility] ED Epigastric pain. Discharge Date: 10/16/20     RARS: Readmission Risk Score: 20  NR  No CV-19 lab performed this admit  Dr Sid Chirinos 10/23 2:40, Dr Destinee Solorzano  2:15. Care Transitions     Care Transitions request call back to P: [phone] for dc review. Two call attempts made for outreach.

## 2020-10-19 NOTE — DISCHARGE SUMMARY
Hospital Medicine Discharge Summary    Abel Alford  :  1945  MRN:  [id number]    Admit date:  10/11/2020  Discharge date:  10/16/2020    Admitting Physician:  Claudia Morejon DO  Primary Care Physician:  Jad Maria, APRN - CNP      Discharge Diagnoses:    Principal Problem:    COPD exacerbation [facility])  Active Problems:    Hypothyroidism    HTN (hypertension)    Dyslipidemia  Resolved Problems:    * No resolved hospital problems. *      Hospital Course:   Abel Alford is a 66 y.o. female that was admitted and treated at [facility] for copd with acute exacerbation. Pt exhibited several days worsening dyspnea, coccasional cough, hypoxia with minimal exertion. Pt has advanced COPD, FEV1 22%. She also has noted metastatic breast CA, with diffuse metastatic disease, with extension to LLE. She was referred for RT, however has been resistant to starting radio therapy. She was seen by pulmonary, started on steroid administration, with aggressive pulmonary toilet. She had slow steady improvement, however has a persistent LLE pain, 2/2 metastatic disease. She has been seen in the past by orthopedics and was recommended to undergo nailing but refused. At this point breathing is improved closer to baseline. She is refusing rehab, SNF, and home therapy. She will d/c back home today. With pulmonary and pcp follow up. Patient was seen by the following consultants while admitted to [facility]:   Consults:  Dionicio Quiñonez  IP CONSULT TO ONCOLOGY  IP CONSULT TO PALLIATIVE CARE    Physical Exam:   Constitutional:       Appearance: Normal appearance. Comments: Thin and cachectic   HENT:      Head: Normocephalic and atraumatic. Mouth/Throat:      Mouth: Mucous membranes are moist.      Pharynx: Oropharynx is clear. Eyes:      Extraocular Movements: Extraocular movements intact.       Pupils: Pupils are equal, round, and reactive to light. Cardiovascular:      Rate and Rhythm: Normal rate and regular rhythm. Heart sounds: No murmur. No friction rub. No gallop. Pulmonary:      Breath sounds: Wheezing present. No rhonchi or rales. Comments: Improved air movement, no wheezes, rales, rhonchi  Abdominal:      General: There is no distension. Tenderness: There is no abdominal tenderness. There is no guarding. Musculoskeletal: Normal range of motion. Comments: LLE painful anterior shin. RLE normal   Neurological:      General: No focal deficit present. Mental Status: She is alert and oriented to person, place, and time. Psychiatric:         Mood and Affect: Mood normal.         Thought Content: Thought content normal.         Judgment: Judgment normal.     Significant Diagnostic Studies:  CTA chest  EXAM:CTA CHEST W WO CONTRAST         DATE10/12/2020 2:46 AM:         REASON FOR EXAM:Acute severe anterior chest wall pain.  pe         COMPARISON: Chest CT from September 8, 2020         Technique: Helical CT was performed through the chest following the IV administration of100  cc of nonionic contrast. 3-D MIP reconstructions were performed on an independent workstation in the coronal plane with thick slab technique utilized in the    interpretation. 3-D maximum intensity projection performed. All CT scans at this facility use dose modulation, iterative reconstruction, and/or weight based dosing when appropriate to reduce radiation dose to as low as reasonably achievable.         FINDINGS         Vascular structures: There is no evidence for thoracic aortic aneurysm or dissection. No evidence of traumatic aortic injury. There are vascular calcifications and evidence of arteriosclerosis. There is no evidence for pulmonary emboli.         Lungs: The lungs are so due to previous examination demonstrating hyperlucency, hyperinflation and centrilobular emphysematous changes are seen in the apices.  There are small nodules in both lungs measuring up to 13 mm in the left lower lobe similar to    the prior study.         Pleura: No pleural effusion or thickening.         Mediastinum: Mediastinum and simón are normal. There are no pathologically enlarged lymph nodes. The chest wall and lower neck are normal there is a persistent large hiatal hernia with a diameter of approximately 7centimeters containing the proximal    stomach.         Upper abdomen: There is a partially calcified lesion in the liver which is not adequately imaged on the current study but has been present since the prior examination.         Bones/axillae/soft tissues: There has been an right axillary node dissection and right mastectomy. There is a 14 mm subcutaneous nodule in the region of the right chest wall.              Impression    1. No evidence of thoracic aortic dissection or aneurysm. Negative for pulmonary embolus    2. Stable appearance of the lungs since the prior study demonstrating chronic emphysematous changes as well as pulmonary nodules measuring up to 13 mm. May represent metastatic disease, PET CT evaluation may be considered. 3. There is a 14 mm subcutaneous soft tissue nodule on the anterior right chest wall in the region of the prior mastectomy which may represent residual or recurrent tumor. Status post right axillary node dissection.           Discharge Medications:       [facility] Med[facility]:[id number]    Printed on:10/18/20 2151   Medication Information                      acetylcysteine (MUCOMYST) 10 % nebulizer solution  Inhale 4 mLs into the lungs 4 times daily             albuterol (PROVENTIL) (2.5 MG/3ML) 0.083% nebulizer solution  Take 3 mLs by nebulization 4 times daily             albuterol sulfate HFA (VENTOLIN HFA) 108 (90 Base) MCG/ACT inhaler  Inhale 2 puffs into the lungs every 6 hours as needed for Wheezing             ALPRAZolam (XANAX) 0.5 MG tablet  Take 1 tablet by mouth 2 times daily as needed for Anxiety for up to 7 days. budesonide (PULMICORT) 0.5 MG/2ML nebulizer suspension  Take 1 ampule by nebulization 2 times daily             Calcium 600-200 MG-UNIT TABS  Take by mouth 2 times daily             guaiFENesin (MUCINEX) 600 MG extended release tablet  Take 1,200 mg by mouth 2 times daily             levothyroxine (SYNTHROID) 75 MCG tablet  Take 1 tablet by mouth Daily             lovastatin (MEVACOR) 20 MG tablet  TAKE 1 TABLET NIGHTLY             metoprolol tartrate (LOPRESSOR) 50 MG tablet  Take 50 mg by mouth 2 times daily             omeprazole (PRILOSEC) 20 MG delayed release capsule  Take 1 capsule by mouth Daily             OXYGEN  Oxygen on 2L with exertion and Nocturnal             predniSONE (DELTASONE) 10 MG tablet  Take 3 tabs daily for 3 days then take 2 tabs daily for 3 days then take 1 tab daily for 3 days. Respiratory Therapy Supplies (NEBULIZER/TUBING/MOUTHPIECE) KIT  1 kit by Does not apply route daily as needed (wheezing)             SYMBICORT 160-4.5 MCG/ACT AERO  USE 2 INHALATIONS TWICE A DAY             Tiotropium Bromide Monohydrate (SPIRIVA RESPIMAT IN)  Inhale into the lungs             traMADol (ULTRAM) 50 MG tablet  Take 1 tablet by mouth every 4 hours as needed for Pain for up to 90 days. Intended supply: 7 days. Take lowest dose possible to manage pain             traZODone (DESYREL) 150 MG tablet  Take 1 tablet by mouth nightly             vitamin D (ERGOCALCIFEROL) 1.25 MG (39789 UT) CAPS capsule  Take 1 capsule by mouth once a week                 Disposition:   Discharged to Home. Any [facility] needs that were indicated and/or required as been addressed and set up by Social Work. Condition at discharge: Pt was medically stable at the time of discharge. Activity: activity as tolerated    Total time taken for discharging this patient: 40 minutes. Greater than 70% of time was spent focused exclusively on this patient.  Time was taken to review chart, discuss plans with consultants, reconciling medications, discussing plan answering questions with patient.      Fern Wilkes  10/18/2020, 9:51 PM

## 2020-10-22 ENCOUNTER — CARE COORDINATION (OUTPATIENT)
Dept: CASE MANAGEMENT | Age: 75
End: 2020-10-22

## 2020-10-22 NOTE — CARE COORDINATION
[facility] Transitions Initial Follow Up Call     Call within 2 business days of discharge: Yes     Patient: Halina Witt      Patient GLR: [id number]   ZAD: [id number]         [id number] for Admission: 10/11-10/16/2020 [address] IP COPD. 10/17/2020 [facility] ED Epigastric pain. Discharge Date: 10/16/20     RARS: Readmission Risk Score: 20  NR  No CV-19 lab performed this admit  Dr Bob Alejandro 10/23 2:40, Dr Christina Louis 11/13 2:15. Care Transitions     Care Transitions request call back to P: [phone] for dc review. Three call attempts made for outreach. CTN s/o.

## 2020-10-23 ENCOUNTER — VIRTUAL VISIT (OUTPATIENT)
Dept: FAMILY MEDICINE CLINIC | Age: 75
End: 2020-10-23
Payer: MEDICARE

## 2020-10-23 PROCEDURE — 99442 PR PHYS/QHP TELEPHONE EVALUATION 11-20 MIN: CPT | Performed by: FAMILY MEDICINE

## 2020-10-23 ASSESSMENT — ENCOUNTER SYMPTOMS
NAUSEA: 0
VOMITING: 0
SHORTNESS OF BREATH: 0
APNEA: 0
COLOR CHANGE: 0
CONSTIPATION: 0
DIARRHEA: 0
CHEST TIGHTNESS: 0
ABDOMINAL PAIN: 0
COUGH: 0
BLOOD IN STOOL: 0

## 2020-10-24 NOTE — PROGRESS NOTES
10/23/2020    TELEHEALTH EVALUATION -- Audio/Visual (During GUBXV-36 public health emergency)    Due to COVID 19 outbreak, patient's office visit was converted to a virtual visit. Patient was contacted and agreed to proceed with a virtual visit via Telephone Visit  The risks and benefits of converting to a virtual visit were discussed in light of the current infectious disease epidemic. Patient also understood that insurance coverage and co-pays are up to their individual insurance plans. Patient location: Home  Provider location: [place]  HPI:    Stanley Ying (:  1945) has requested an audio/video evaluation for the following concern(s):    Patient is a 40-year-old female presents today to follow-up after recent admission for COPD exacerbation patient states she feels improved and no longer has any shortness of breath. Of note shortly after discharge patient was admitted for severe epigastric pain. CT scan findings showed cholelithiasis as well as suspicious findings in the lung and lower spine which may be secondary to metastatic disease. Patient states that she is aware and would like to follow-up with her oncologist.  It was also noted that the patient had significantly elevated liver function tests. Patient does not want any further testing and is adamant about not wanting to follow-up with gastroenterology or general surgery. States the pain has resolved. She would like to focus on her leg which appears to be a metastatic lesion affecting her tibia. Appears that she has an upcoming appointment with palliative care      Review of Systems   Constitutional: Negative for activity change, appetite change and fatigue. Respiratory: Negative for apnea, cough, chest tightness and shortness of breath. Cardiovascular: Negative for chest pain, palpitations and leg swelling. Gastrointestinal: Negative for abdominal pain, blood in stool, constipation, diarrhea, nausea and vomiting. Musculoskeletal: Positive for arthralgias and gait problem. Skin: Negative for color change and pallor. Neurological: Negative for seizures and headaches. Psychiatric/Behavioral: Negative for hallucinations and suicidal ideas. Prior to Visit Medications    Medication Sig Taking? Authorizing Provider   predniSONE (DELTASONE) 10 MG tablet Take 3 tabs daily for 3 days then take 2 tabs daily for 3 days then take 1 tab daily for 3 days. Yes Jefe Wilkes,    budesonide (PULMICORT) 0.5 MG/2ML nebulizer suspension Take 1 ampule by nebulization 2 times daily Yes Historical Provider, MD   albuterol sulfate HFA (VENTOLIN HFA) 108 (90 Base) MCG/ACT inhaler Inhale 2 puffs into the lungs every 6 hours as needed for Wheezing Yes ROSALIND Dalton CNP   traMADol (ULTRAM) 50 MG tablet Take 1 tablet by mouth every 4 hours as needed for Pain for up to 90 days. Intended supply: 7 days.  Take lowest dose possible to manage pain Yes ROSALIND Dalton CNP   Calcium 600-200 MG-UNIT TABS Take by mouth 2 times daily Yes Historical Provider, MD   SYMBICORT 160-4.5 MCG/ACT AERO USE 2 INHALATIONS TWICE A DAY Yes ROSALIND Dalton CNP   Tiotropium Bromide Monohydrate (SPIRIVA RESPIMAT IN) Inhale into the lungs Yes Historical Provider, MD   traZODone (DESYREL) 150 MG tablet Take 1 tablet by mouth nightly Yes ROSALIND Dalton CNP   omeprazole (PRILOSEC) 20 MG delayed release capsule Take 1 capsule by mouth Daily Yes ROSALIND Dalton CNP   vitamin D (ERGOCALCIFEROL) 1.25 MG (84863 UT) CAPS capsule Take 1 capsule by mouth once a week Yes ROSALIND Dalton CNP   albuterol (PROVENTIL) (2.5 MG/3ML) 0.083% nebulizer solution Take 3 mLs by nebulization 4 times daily Yes Drake Gonzalez MD   lovastatin (MEVACOR) 20 MG tablet TAKE 1 TABLET NIGHTLY Yes ROSALIND Dalton CNP   levothyroxine (SYNTHROID) 75 MCG tablet Take 1 tablet by mouth Daily Yes Eder Gerold Riedy, APRN - CNP   acetylcysteine (MUCOMYST) 10 % nebulizer solution Inhale 4 mLs into the lungs 4 times daily Yes Dylon Carbajal MD   OXYGEN Oxygen on 2L with exertion and Nocturnal Yes Reji Jim MD   metoprolol tartrate (LOPRESSOR) 50 MG tablet Take 50 mg by mouth 2 times daily Yes Historical Provider, MD   guaiFENesin (MUCINEX) 600 MG extended release tablet Take 1,200 mg by mouth 2 times daily Yes Historical Provider, MD   Respiratory Therapy Supplies (NEBULIZER/TUBING/MOUTHPIECE) KIT 1 kit by Does not apply route daily as needed (wheezing) Yes Osman Avendano, APRN - CNP       Social History     Tobacco Use    Smoking status: Former Smoker     Packs/day: 1.50     Years: 40.00     Pack years: 60.00     Types: Cigarettes     Last attempt to quit: 1989     Years since quittin.7    Smokeless tobacco: Never Used   Substance Use Topics    Alcohol use: No    Drug use: No        PHYSICAL EXAMINATION:  unavailable due to telephone encounter  [x] Alert  [x] Oriented to person/place/time    [x] No apparent distress       [] OTHER:      Due to this being a TeleHealth encounter, evaluation of the following organ systems is limited: Vitals/Constitutional/EENT/Resp/CV/GI//MS/Neuro/Skin/Heme-Lymph-Imm. ASSESSMENT/PLAN:  1. Chronic obstructive pulmonary disease, unspecified COPD type ([facility] [address]  Resolved: Continue current management    2. Calculus of gallbladder without cholecystitis without obstruction  If pain returns would like patient to have general surgery consult, despite wanting to concentrate on oncology concerns  - Ambulatory referral to General Surgery    3. Elevated liver enzymes  Unclear etiology at this time but believe gastroenterology evaluation either in office or virtual  - Ambulatory referral to Gastroenterology      Return in about 1 month (around 2020) for PCP, chronic condtions. 21 or more minutes were spent on the digital evaluation and management of this patient.       An  electronic signature was used to

## 2020-11-03 ENCOUNTER — OFFICE VISIT (OUTPATIENT)
Dept: PULMONOLOGY | Age: 75
End: 2020-11-03
Payer: MEDICARE

## 2020-11-03 VITALS
BODY MASS INDEX: 22.15 KG/M2 | TEMPERATURE: 97.6 F | OXYGEN SATURATION: 98 % | HEART RATE: 68 BPM | DIASTOLIC BLOOD PRESSURE: 80 MMHG | HEIGHT: 63 IN | SYSTOLIC BLOOD PRESSURE: 128 MMHG | WEIGHT: 125 LBS

## 2020-11-03 PROCEDURE — G8484 FLU IMMUNIZE NO ADMIN: HCPCS | Performed by: INTERNAL MEDICINE

## 2020-11-03 PROCEDURE — 1090F PRES/ABSN URINE INCON ASSESS: CPT | Performed by: INTERNAL MEDICINE

## 2020-11-03 PROCEDURE — 4040F PNEUMOC VAC/ADMIN/RCVD: CPT | Performed by: INTERNAL MEDICINE

## 2020-11-03 PROCEDURE — 1111F DSCHRG MED/CURRENT MED MERGE: CPT | Performed by: INTERNAL MEDICINE

## 2020-11-03 PROCEDURE — 99214 OFFICE O/P EST MOD 30 MIN: CPT | Performed by: INTERNAL MEDICINE

## 2020-11-03 PROCEDURE — G8427 DOCREV CUR MEDS BY ELIG CLIN: HCPCS | Performed by: INTERNAL MEDICINE

## 2020-11-03 PROCEDURE — 1123F ACP DISCUSS/DSCN MKR DOCD: CPT | Performed by: INTERNAL MEDICINE

## 2020-11-03 PROCEDURE — G8926 SPIRO NO PERF OR DOC: HCPCS | Performed by: INTERNAL MEDICINE

## 2020-11-03 PROCEDURE — 3023F SPIROM DOC REV: CPT | Performed by: INTERNAL MEDICINE

## 2020-11-03 PROCEDURE — G8399 PT W/DXA RESULTS DOCUMENT: HCPCS | Performed by: INTERNAL MEDICINE

## 2020-11-03 PROCEDURE — G8420 CALC BMI NORM PARAMETERS: HCPCS | Performed by: INTERNAL MEDICINE

## 2020-11-03 PROCEDURE — 1036F TOBACCO NON-USER: CPT | Performed by: INTERNAL MEDICINE

## 2020-11-03 RX ORDER — PREDNISONE 10 MG/1
10 TABLET ORAL DAILY
Qty: 30 TABLET | Refills: 3 | Status: SHIPPED | OUTPATIENT
Start: 2020-11-03 | End: 2020-12-03

## 2020-11-03 NOTE — PROGRESS NOTES
Subjective:     Zeferino Mondragon is a 66 y.o. female who complains today of:     Chief Complaint   Patient presents with    COPD     follow up, questions about medication       HPI  Patient presents for COPD    She is doing okay, shortness of breath mainly exertional, no coughing, no chest pain, her main problem is left leg pain due to metastatic lytic lesion she is agreeable to pursue treatment now and she has appointment with Dr Kenn Ch  soon as per the patient, she did meet with palliative medicine however she does not have follow-up but she is agreeable to reestablish, she did feel that Xanax helped her significantly when she was hospitalized. She denies fever or chills, no weight loss, no nausea or vomiting, no joint swelling or pain and no joint stiffness. Heartburn is controlled, she is on PPI, no nasal congestion or postnasal drip. Allergies:  Patient has no known allergies.   Past Medical History:   Diagnosis Date    Anxiety     Anxiety and depression     CAD (coronary artery disease)     Cancer ([facility] [address] 3/2014    Invasive ductal cancer left breast, spread to 5 lymph nodes    Carotid artery stenosis and occlusion     COPD (chronic obstructive pulmonary disease) ([facility] [address]     Depression 1/15/2018    GERD (gastroesophageal reflux disease)     Headache(784.0)     Hyperlipidemia     Hypertension     Hypothyroidism     Insomnia     Osteoarthritis     RBBB 10/15/2014    Right carotid bruit 5/26/2015    S/P cardiac catheterization 7/13/2016     Past Surgical History:   Procedure Laterality Date    BREAST BIOPSY Right 11/8/2016     biopsy    BREAST SURGERY Left 2/26/14    U/S Guided core bx of the left breast    BREAST SURGERY Right 3/20/14    U/S of the lateral right breast    BREAST SURGERY Left 3/27/14    U/S Guided Left breast lumpectomy and left snb    OTHER SURGICAL HISTORY  4/11/14    Placement drain, left axillary seroma    MS MASTECTOMY, SIMPLE, COMPLETE Right 9/6/2018    R modified radical mastectomy     Family History   Problem Relation Age of Onset    Cancer Sister         breast    Cancer Maternal Uncle         pancreatic     Social History     Socioeconomic History    Marital status:      Spouse name: Not on file    Number of children: Not on file    Years of education: Not on file    Highest education level: Not on file   Occupational History    Not on file   Social Needs    Financial resource strain: Not on file    Food insecurity     Worry: Not on file     Inability: Not on file    Transportation needs     Medical: Not on file     Non-medical: Not on file   Tobacco Use    Smoking status: Former Smoker     Packs/day: 1.50     Years: 40.00     Pack years: 60.00     Types: Cigarettes     Last attempt to quit: 1989     Years since quittin.7    Smokeless tobacco: Never Used   Substance and Sexual Activity    Alcohol use: No    Drug use: No    Sexual activity: Never   Lifestyle    Physical activity     Days per week: Not on file     Minutes per session: Not on file    Stress: Not on file   Relationships    Social connections     Talks on phone: Not on file     Gets together: Not on file     Attends Oriental orthodox service: Not on file     Active member of club or organization: Not on file     Attends meetings of clubs or organizations: Not on file     Relationship status: Not on file    Intimate partner violence     Fear of current or ex partner: Not on file     Emotionally abused: Not on file     Physically abused: Not on file     Forced sexual activity: Not on file   Other Topics Concern    Not on file   Social History Narrative    Not on file         Review of Systems      ROS: 10 organs review of system is done including general, psychological, ENT, hematological, endocrine, respiratory, cardiovascular, gastrointestinal,musculoskeletal, neurological,  allergy and Immunology is done and is otherwise negative.     Current Outpatient Medications Medication Sig Dispense Refill    Budeson-Glycopyrrol-Formoterol 160-9-4.8 MCG/ACT AERO Inhale 2 puffs into the lungs 2 times daily 1 Inhaler 3    predniSONE (DELTASONE) 10 MG tablet Take 1 tablet by mouth daily 30 tablet 3    budesonide (PULMICORT) 0.5 MG/2ML nebulizer suspension Take 1 ampule by nebulization 2 times daily      albuterol sulfate HFA (VENTOLIN HFA) 108 (90 Base) MCG/ACT inhaler Inhale 2 puffs into the lungs every 6 hours as needed for Wheezing 3 Inhaler 3    traMADol (ULTRAM) 50 MG tablet Take 1 tablet by mouth every 4 hours as needed for Pain for up to 90 days. Intended supply: 7 days. Take lowest dose possible to manage pain 120 tablet 2    Calcium 600-200 MG-UNIT TABS Take by mouth 2 times daily      traZODone (DESYREL) 150 MG tablet Take 1 tablet by mouth nightly 90 tablet 4    omeprazole (PRILOSEC) 20 MG delayed release capsule Take 1 capsule by mouth Daily 90 capsule 4    vitamin D (ERGOCALCIFEROL) 1.25 MG (74256 UT) CAPS capsule Take 1 capsule by mouth once a week 12 capsule 4    albuterol (PROVENTIL) (2.5 MG/3ML) 0.083% nebulizer solution Take 3 mLs by nebulization 4 times daily 120 each 3    lovastatin (MEVACOR) 20 MG tablet TAKE 1 TABLET NIGHTLY 90 tablet 4    levothyroxine (SYNTHROID) 75 MCG tablet Take 1 tablet by mouth Daily 90 tablet 4    acetylcysteine (MUCOMYST) 10 % nebulizer solution Inhale 4 mLs into the lungs 4 times daily 15 vial 1    OXYGEN Oxygen on 2L with exertion and Nocturnal 1 Units 0    metoprolol tartrate (LOPRESSOR) 50 MG tablet Take 50 mg by mouth 2 times daily      guaiFENesin (MUCINEX) 600 MG extended release tablet Take 1,200 mg by mouth 2 times daily      Respiratory Therapy Supplies (NEBULIZER/TUBING/MOUTHPIECE) KIT 1 kit by Does not apply route daily as needed (wheezing) 1 kit 5     No current facility-administered medications for this visit.         Objective:     Vitals:    11/03/20 1443   BP: 128/80   Pulse: 68   Temp: 97.6 °F (36.4 °C) SpO2: 98%   Weight: 125 lb (56.7 kg)   Height: 5' 3\" (1.6 m)         Physical Exam  Constitutional:       General: She is not in acute distress. Appearance: She is well-developed. She is not diaphoretic. HENT:      Head: Normocephalic and atraumatic. Eyes:      Conjunctiva/sclera: Conjunctivae normal.      Pupils: Pupils are equal, round, and reactive to light. Neck:      Musculoskeletal: Normal range of motion and neck supple. Cardiovascular:      Rate and Rhythm: Normal rate and regular rhythm. Heart sounds: No murmur. No friction rub. No gallop. Pulmonary:      Effort: Pulmonary effort is normal. No respiratory distress. Breath sounds: Normal breath sounds. No wheezing or rales. Chest:      Chest wall: No tenderness. Abdominal:      General: There is no distension. Palpations: Abdomen is soft. Tenderness: There is no abdominal tenderness. There is no rebound. Musculoskeletal:         General: No tenderness. Right lower leg: No edema. Left lower leg: No edema. Lymphadenopathy:      Cervical: No cervical adenopathy. Skin:     General: Skin is warm and dry. Findings: No erythema. Neurological:      Mental Status: She is alert and oriented to person, place, and time. Psychiatric:         Judgment: Judgment normal.         Imaging studies reviewed by me CT chest October 2020 shows emphysema, small pulmonary nodules likely metastatic disease, and a small nodule anterior right chest  Lab results reviewed in chart  PFT 2019, FEV1 26%  ECHO: 2019 EF 21% with diastolic dysfunction    Assessment and Plan       Diagnosis Orders   1. Chronic obstructive pulmonary disease, unspecified COPD type ([facility]  Budeson-Glycopyrrol-Formoterol 160-9-4.8 MCG/ACT AERO    predniSONE (DELTASONE) 10 MG tablet   2. Lung nodule     3. Gastroesophageal reflux disease without esophagitis     4.  Chronic respiratory failure with hypoxia (HCC)       · Very severe COPD, will change to Breztri, patient will stop Symbicort and Spiriva, I gave her samples to try  · Multiple lung nodules likely metastatic disease from breast cancer, she follows up with oncology, she likely has metastatic lesion left leg. · Patient COPD symptoms are not controlled, she requested chronic prednisone treatment, she is aware of risk of bone fracture, infections, and diabetes. However she feels most comfortable when on prednisone and she is looking more toward better quality breathing. We will start maintenance 10 mg daily prednisone  · Continue budesonide nebs for now will discontinue if patient feels better on maintenance oral steroids  · Continue O2 to keep sat 88 to 90%  · Continue PPI  · Patient will contact palliative medicine to reestablish again, I do recommend patient started on low-dose Xanax 0.5 twice a day as needed for anxiety this will help with her breathing as well. No orders of the defined types were placed in this encounter. Orders Placed This Encounter   Medications    Budeson-Glycopyrrol-Formoterol 160-9-4.8 MCG/ACT AERO     Sig: Inhale 2 puffs into the lungs 2 times daily     Dispense:  1 Inhaler     Refill:  3    predniSONE (DELTASONE) 10 MG tablet     Sig: Take 1 tablet by mouth daily     Dispense:  30 tablet     Refill:  3            Discussed with patient the importance of exercise and weight control and  overall health and well-being. Reviewed with the patient: current clinical status, medications, activities and diet. Side effects, adverse effects of the medication prescribed today, as well as treatment plan and result expectations have been discussed with the patient who expresses understanding and desires to proceed. Return in about 3 months (around 2/3/2021).       Sam Borrero MD

## 2020-11-12 NOTE — PROGRESS NOTES
Patient called for persistent shortness of breath, no cough, no chest pain, no fever, no nasal congestion postnasal drip, she is currently on 10 mg prednisone daily no lower extremity edema, she does have pulse ox machine at home her saturation is 98% and heart rate 70s.   Will increase prednisone to 40 daily for 5 days she will call me back if she does not improve, she will touch base with me back next week at the end of the 5 days treatment trial.

## 2020-11-18 ENCOUNTER — OFFICE VISIT (OUTPATIENT)
Dept: PALLATIVE CARE | Age: 75
End: 2020-11-18
Payer: MEDICARE

## 2020-11-18 PROCEDURE — 99205 OFFICE O/P NEW HI 60 MIN: CPT | Performed by: FAMILY MEDICINE

## 2020-11-18 PROCEDURE — G8484 FLU IMMUNIZE NO ADMIN: HCPCS | Performed by: FAMILY MEDICINE

## 2020-11-18 PROCEDURE — 1036F TOBACCO NON-USER: CPT | Performed by: FAMILY MEDICINE

## 2020-11-18 PROCEDURE — 99497 ADVNCD CARE PLAN 30 MIN: CPT | Performed by: FAMILY MEDICINE

## 2020-11-18 PROCEDURE — 4040F PNEUMOC VAC/ADMIN/RCVD: CPT | Performed by: FAMILY MEDICINE

## 2020-11-18 PROCEDURE — G8427 DOCREV CUR MEDS BY ELIG CLIN: HCPCS | Performed by: FAMILY MEDICINE

## 2020-11-18 PROCEDURE — G8399 PT W/DXA RESULTS DOCUMENT: HCPCS | Performed by: FAMILY MEDICINE

## 2020-11-18 PROCEDURE — 3023F SPIROM DOC REV: CPT | Performed by: FAMILY MEDICINE

## 2020-11-18 PROCEDURE — G8420 CALC BMI NORM PARAMETERS: HCPCS | Performed by: FAMILY MEDICINE

## 2020-11-18 PROCEDURE — 1090F PRES/ABSN URINE INCON ASSESS: CPT | Performed by: FAMILY MEDICINE

## 2020-11-18 PROCEDURE — G8926 SPIRO NO PERF OR DOC: HCPCS | Performed by: FAMILY MEDICINE

## 2020-11-18 PROCEDURE — 1123F ACP DISCUSS/DSCN MKR DOCD: CPT | Performed by: FAMILY MEDICINE

## 2020-11-18 RX ORDER — HYDROCODONE BITARTRATE AND ACETAMINOPHEN 5; 325 MG/1; MG/1
1 TABLET ORAL EVERY 6 HOURS PRN
Qty: 120 TABLET | Refills: 0 | Status: CANCELLED | OUTPATIENT
Start: 2020-11-18 | End: 2020-12-18

## 2020-11-18 RX ORDER — ONDANSETRON 4 MG/1
4 TABLET, FILM COATED ORAL EVERY 8 HOURS PRN
Qty: 30 TABLET | Refills: 0 | Status: ON HOLD | OUTPATIENT
Start: 2020-11-18 | End: 2021-03-27

## 2020-11-18 RX ORDER — OXYCODONE HYDROCHLORIDE 5 MG/1
5 TABLET ORAL EVERY 8 HOURS PRN
Qty: 90 TABLET | Refills: 0 | Status: SHIPPED | OUTPATIENT
Start: 2020-11-18 | End: 2021-02-19 | Stop reason: SDUPTHER

## 2020-11-18 RX ORDER — OMEPRAZOLE 20 MG/1
20 CAPSULE, DELAYED RELEASE ORAL
Qty: 60 CAPSULE | Refills: 0 | Status: SHIPPED | OUTPATIENT
Start: 2020-11-18 | End: 2020-12-29 | Stop reason: ALTCHOICE

## 2020-11-18 RX ORDER — ALPRAZOLAM 0.5 MG/1
0.5 TABLET ORAL 2 TIMES DAILY
Qty: 60 TABLET | Refills: 0 | Status: SHIPPED | OUTPATIENT
Start: 2020-11-18 | End: 2021-01-22 | Stop reason: SDUPTHER

## 2020-11-18 ASSESSMENT — ENCOUNTER SYMPTOMS
VOICE CHANGE: 0
EYES NEGATIVE: 1
CONSTIPATION: 0
SHORTNESS OF BREATH: 1
COUGH: 0
ALLERGIC/IMMUNOLOGIC NEGATIVE: 1
SORE THROAT: 0
COLOR CHANGE: 0
WHEEZING: 0
NAUSEA: 1
BACK PAIN: 1

## 2020-11-18 NOTE — PROGRESS NOTES
Subjective:      Patient Id: Seen Shayan Juarez at [facility], for initial palliative medicine consult for symptom management, advance care planning and goals of care discussion. She was accompanied to the appointment by: her daughter. Chief Complaint   Patient presents with    Pain    Shortness of Breath    Other    Referral - General      HPI       Paramjit Adame is a 66 y.o. female referred to palliative care by Dr. Bud Huerta  for symptom management, advance care planning, and goals of care conversation. Shayan Juarez has complex medical history that includes pulmonary nodule, COPD, GERD, Former smoker, and Breast cancer. Home visit is necessary in lieu of office due to significant frailty and high symptom burden from comorbid illnesses. Patient complains of pain. States pain is not well controlled on current regime. Rates pain at a 8/ 10 and states it occurs in her Left leg, back, and joints throughout body. Describes pain as a constant ache that intermittently worsens. Currently taking ultram PRN on average 600mg routine. Denies Sedation, Constipation, or other adverse effects on current regime. SOB and baseline. Taking diuretics as ordered. Taking nebulizer and inhaler treatments as ordered. States SOB causes severe anxiety. Has had xanax in past for such with great relief. Denies excessive fatigue, fever, chills, myalgias, increased sputum production or cough, nausea, vomiting, chest pain, or orthopnea. Weight stable, Appetite is good. Makes own meals. Has intermittent nausea and GERD per patient. Tolerating diet. Ambulation and strength. Denies significant sleep disturbance, depression, anxiety, or agitation episodes; denies suicidal or homicidal ideation or signs suggesting existential grief or spiritual pain. FUs with Dr Jose Antonio Blake regarding cancer.  Has radiaion planned on LLE in future    Past Medical History:   Diagnosis Date    Anxiety     Anxiety and depression     CAD (coronary artery disease)     [facility]/2014    Invasive ductal cancer left breast, spread to 5 lymph nodes    Carotid artery stenosis and occlusion     [facility]ch[facility] obstructive [facility] disease) [facility]     Depression 1/15/2018    GERD (gastroesophageal reflux disease)     Headache(784.0)     Hyperlipidemia     Hypertension     Hypothyroidism     Insomnia     Osteoarthritis     RBBB 10/15/2014    Right carotid bruit 2015    S/P cardiac catheterization 2016     Past Surgical History:   Procedure Laterality Date    BREAST BIOPSY Right 2016    US biopsy    BREAST SURGERY Left 14    U/S Guided core bx of the left breast    BREAST SURGERY Right 3/20/14    U/S of the lateral right breast    BREAST SURGERY Left 3/27/14    U/S Guided Left breast lumpectomy and left snb    OTHER SURGICAL HISTORY  14    Placement drain, left axillary seroma    [place] MASTECTOMY, SIMPLE, COMPLETE Right 2018    R modified radical mastectomy     Social History     Socioeconomic History    Marital status:       Spouse name: Not on file    Number of children: Not on file    Years of education: Not on file    Highest education level: Not on file   Occupational History    Not on file   Social Needs    Financial resource strain: Not on file    Food insecurity     Worry: Not on file     Inability: Not on file    Transportation needs     Medical: Not on file     Non-medical: Not on file   Tobacco Use    Smoking status: Former Smoker     Packs/day: 1.50     Years: 40.00     Pack years: 60.00     Types: Cigarettes     Last attempt to quit: 1989     Years since quittin.8    Smokeless tobacco: Never Used   Substance and Sexual Activity    Alcohol use: No    Drug use: No    Sexual activity: Never   Lifestyle    Physical activity     Days per week: Not on file     Minutes per session: Not on file    Stress: Not on file   Relationships    Social connections     Talks on phone: Not on file     Gets together: Not on file     Attends Jewish service: Not on file     Active member of club or organization: Not on file     Attends meetings of clubs or organizations: Not on file     Relationship status: Not on file    Intimate partner violence     Fear of current or ex partner: Not on file     Emotionally abused: Not on file     Physically abused: Not on file     Forced sexual activity: Not on file   Other Topics Concern    Not on file   Social History Narrative    Not on file     Family History   Problem Relation Age of Onset    Cancer Sister         breast    Cancer Maternal Uncle         pancreatic     No Known Allergies  Current Outpatient Medications on File Prior to Visit   Medication Sig Dispense Refill    Budeson-Glycopyrrol-Formoterol 160-9-4.8 MCG/ACT AERO Inhale 2 puffs into the lungs 2 times daily 1 Inhaler 3    predniSONE (DELTASONE) 10 MG tablet Take 1 tablet by mouth daily 30 tablet 3    budesonide (PULMICORT) 0.5 MG/2ML nebulizer suspension Take 1 ampule by nebulization 2 times daily      albuterol sulfate HFA (VENTOLIN HFA) 108 (90 Base) MCG/ACT inhaler Inhale 2 puffs into the lungs every 6 hours as needed for Wheezing 3 Inhaler 3    traMADol (ULTRAM) 50 MG tablet Take 1 tablet by mouth every 4 hours as needed for Pain for up to 90 days. Intended supply: 7 days.  Take lowest dose possible to manage pain 120 tablet 2    Calcium 600-200 MG-UNIT TABS Take by mouth 2 times daily      traZODone (DESYREL) 150 MG tablet Take 1 tablet by mouth nightly 90 tablet 4    omeprazole (PRILOSEC) 20 MG delayed release capsule Take 1 capsule by mouth Daily 90 capsule 4    vitamin D (ERGOCALCIFEROL) 1.25 MG (67694 UT) CAPS capsule Take 1 capsule by mouth once a week 12 capsule 4    albuterol (PROVENTIL) (2.5 MG/3ML) 0.083% nebulizer solution Take 3 mLs by nebulization 4 times daily 120 each 3    lovastatin (MEVACOR) 20 MG tablet TAKE 1 TABLET NIGHTLY 90 tablet 4    levothyroxine (SYNTHROID) 75 MCG tablet Take 1 tablet by mouth Daily 90 tablet 4    acetylcysteine (MUCOMYST) 10 % nebulizer solution Inhale 4 mLs into the lungs 4 times daily 15 vial 1    OXYGEN Oxygen on 2L with exertion and Nocturnal 1 Units 0    metoprolol tartrate (LOPRESSOR) 50 MG tablet Take 50 mg by mouth 2 times daily      guaiFENesin (MUCINEX) 600 MG extended release tablet Take 1,200 mg by mouth 2 times daily      Respiratory Therapy Supplies (NEBULIZER/TUBING/MOUTHPIECE) KIT 1 kit by Does not apply route daily as needed (wheezing) 1 kit 5     No current facility-administered medications on file prior to visit. Review of Systems   Constitutional: Positive for activity change. Negative for appetite change, fatigue, fever and unexpected weight change. HENT: Negative. Negative for sore throat and voice change. Eyes: Negative. Respiratory: Positive for shortness of breath. Negative for cough and wheezing. Cardiovascular: Negative. Negative for chest pain. Gastrointestinal: Positive for nausea. Negative for constipation. Endocrine: Negative. Genitourinary: Negative. Musculoskeletal: Positive for arthralgias, back pain, joint swelling and myalgias. Skin: Negative. Negative for color change and rash. Allergic/Immunologic: Negative. Neurological: Positive for weakness. Negative for dizziness, tremors and syncope. Hematological: Negative. Psychiatric/Behavioral: Negative. Negative for agitation, confusion, decreased concentration and suicidal ideas. The patient is not nervous/anxious. Objective:   LMP  (LMP Unknown)    Wt Readings from Last 3 Encounters:   11/03/20 125 lb (56.7 kg)   10/17/20 122 lb (55.3 kg)   10/13/20 123 lb 12.8 oz (56.2 kg)     Physical Exam  Vitals signs reviewed. Constitutional:       Appearance: She is well-developed. She is ill-appearing. HENT:      Head: Normocephalic. Eyes:      Pupils: Pupils are equal, round, and reactive to light. Neck:      Musculoskeletal: Normal range of motion. Cardiovascular:      Rate and Rhythm: Normal rate and regular rhythm. Pulmonary:      Effort: Pulmonary effort is normal.      Breath sounds: Normal breath sounds. Abdominal:      General: Bowel sounds are normal. There is no distension. Palpations: Abdomen is soft. Tenderness: There is no abdominal tenderness. There is no guarding. Musculoskeletal: Normal range of motion. Left lower leg: Edema (trace) present. Skin:     General: Skin is warm and dry. Neurological:      Mental Status: She is alert and oriented to person, place, and time. Motor: Weakness present. Gait: Gait abnormal.   Psychiatric:         Behavior: Behavior normal.         Assessment and Plan:      1. SOB (shortness of breath)  2. Chronic obstructive pulmonary disease, unspecified COPD type (HCC)  - Baseline    - ALPRAZolam (XANAX) 0.5 MG tablet; Take 1 tablet by mouth 2 times daily for 30 days. Dispense: 60 tablet; Refill: 0    Maintain current COPD Directed therapies  Call for increased SOB, cough, sputum production, excessive fatigue, fever, chills, or myalgias  Maintain follow up with PCP and pulmonology  Rinse out mouth after inhaler use. COPD ER PACK in place. Call for change in condition prior to initiating  COPD Action plan reviewed    3. Cancer associated pain  - Will switch to oxy as tylenol causes ringing in ears. - oxyCODONE (ROXICODONE) 5 MG immediate release tablet; Take 1 tablet by mouth every 8 hours as needed for Pain (moderate to severe pain) for up to 30 days. Dispense: 90 tablet; Refill: 0    Pt is tolerating current pain meds without adverse effects or over sedation. Lowest effective dose used. Pt advised to call and notify palliative care for any adverse effects or sedation  Pt is able to maintain adequate functional level and participate in ADLs  OARRS reviewed.  There is no indication of aberrant behavior  Pt advised to call for increasing or uncontrolled pain. Risk vs benefit assessed. Pt educated on risk of addiction. Pt advised to take only as prescribed and not to change frequency of pain meds without consulting palliative care first.    4. Pulmonary nodule  5. Malignant neoplasm of female breast, unspecified estrogen receptor status, unspecified laterality, unspecified site of breast ([facility])    - FU with Rad/onc as scheduled    6. Anxiety    - ALPRAZolam (XANAX) 0.5 MG tablet; Take 1 tablet by mouth 2 times daily for 30 days. Dispense: 60 tablet; Refill: 0    7. Nausea    - ondansetron (ZOFRAN) 4 MG tablet; Take 1 tablet by mouth every 8 hours as needed for Nausea or Vomiting  Dispense: 30 tablet; Refill: 0    8. Dyspepsia  9. Gastroesophageal reflux disease without esophagitis  - omeprazole (PRILOSEC) 20 MG delayed release capsule; Take 1 capsule by mouth 2 times daily (before meals)  Dispense: 60 capsule; Refill: 0    10. Former smoker  - Does not smoke at this time per pt    11. Encounter for palliative care  - Call for any questions, concerns or change in condition. Much support, guidance and active listening provided. There are no discontinued medications. - Advance Care Planning   We discussed Living Will (LW), and [address] [place] [address]) as well as their activation. Patient choice discussed. LW/HCPOA in place Yes; Tasked  for assistance with completing paperwork No; Code status discussed Yes; signed Yes. Code Full code. All related questions were answered completely. - Goals of Care Discussion:  Disease process and goals of treatment were discussed in basic terms. Halina's goal is to optimize available comfort care measures. We discussed the palliative care philosophy in light of those goals. We discussed all care options contingent on treatment response and QOL. Much active listening, presence, and emotional support were given.      Discussed with patient/surrogate: POC, Treatment risks/benefits, and alternatives. All questions were answered. Additionally, a printed copy of the CDC medications/opioid safety informational sheet was reviewed and given to patient for reference. Opioid contract signed:Yes    Due to acuity, symptomatology and high-risk medication management, I advised patient to Return in about 1 month (around 12/18/2020), or if symptoms worsen or fail to improve. Thanks for the opportunity you have allowed us to provide palliative care to [facility]. We will be in touch as care progresses. Please feel free to reach out to us should you have any questions or requests.     Total Time 60 mins (Adv. Care planning 17 mins)   > 50% Time Spent Counseling/Care coordination yes       ROSALIND Garcia - CNP    Collaborating physician: Dr. Brandon Maya

## 2020-11-19 RX ORDER — LEVOTHYROXINE SODIUM 0.07 MG/1
75 TABLET ORAL DAILY
Qty: 90 TABLET | Refills: 3 | Status: SHIPPED | OUTPATIENT
Start: 2020-11-19

## 2020-11-19 NOTE — TELEPHONE ENCOUNTER
Pharmacy is requesting medication refill.  Please approve or deny this request.    Rx requested:  Requested Prescriptions     Pending Prescriptions Disp Refills    levothyroxine (SYNTHROID) 75 MCG tablet [Pharmacy Med Name: L-THYROXINE (SYNTHROID) TABS 75MCG] 90 tablet 3     Sig: Take 1 tablet by mouth Daily         Last Office Visit:   9/29/2020      Next Visit Date:  Future Appointments   Date Time Provider Darlene Walsh   12/16/2020  2:00 PM Damion Chisholm APRN - CNP [facility]   12/29/2020  3:40 PM ROSALIND Cedeno - CNP [facility]   2/2/2021  3:30 PM Declan Ramesh MD [address] [place] [address]

## 2020-12-16 ENCOUNTER — VIRTUAL VISIT (OUTPATIENT)
Dept: PALLATIVE CARE | Age: 75
End: 2020-12-16
Payer: MEDICARE

## 2020-12-16 ENCOUNTER — CLINICAL DOCUMENTATION (OUTPATIENT)
Dept: PALLATIVE CARE | Age: 75
End: 2020-12-16

## 2020-12-16 PROCEDURE — 99442 PR PHYS/QHP TELEPHONE EVALUATION 11-20 MIN: CPT | Performed by: FAMILY MEDICINE

## 2020-12-16 ASSESSMENT — ENCOUNTER SYMPTOMS
SHORTNESS OF BREATH: 1
COUGH: 0
WHEEZING: 0
CONSTIPATION: 0
BACK PAIN: 1
COLOR CHANGE: 0
SORE THROAT: 0
EYES NEGATIVE: 1
ALLERGIC/IMMUNOLOGIC NEGATIVE: 1
NAUSEA: 1
VOICE CHANGE: 0

## 2020-12-16 NOTE — PROGRESS NOTES
Subjective:      Patient Id: Seen Daryl Cowan via TV. She was accompanied to the appointment by: self  Chief Complaint   Patient presents with    Pain    Shortness of Breath      Time spent with Patient: 15 mins  Patient was made aware he will be billed for telephone service. Pt presents via telephonic contact due to COVID-19 pandemic emergency protocol. HPI       Viry Brown is a 66 y.o. female referred to palliative care by Dr. Luis Moody  for symptom management, advance care planning, and goals of care conversation. Daryl Cowan has complex medical history that includes pulmonary nodule, COPD, GERD, Former smoker, and Breast cancer. Home visit is necessary in lieu of office due to significant frailty and high symptom burden from comorbid illnesses. Patient complains of pain. States pain is not well controlled on current regime. Rates pain at a 4/ 10 and states it occurs in her Left leg, back, and joints throughout body. Describes pain as a constant ache that intermittently worsens. Currently taking oxy-ir prn. Denies Sedation, Constipation, or other adverse effects on current regime. SOB and baseline. Taking diuretics as ordered. Taking nebulizer and inhaler treatments as ordered. States SOB causes severe anxiety. Anxiety controlled on xanax. Denies excessive fatigue, fever, chills, myalgias, increased sputum production or cough, nausea, vomiting, chest pain, or orthopnea. FUs with Dr Kendal Howard regarding cancer.  Has radiaion planned on LLE in future    Past Medical History:   Diagnosis Date    Anxiety     Anxiety and depression     CAD (coronary artery disease)     [facility] 3/2014    Invasive ductal cancer left breast, spread to 5 lymph nodes    Carotid artery stenosis and occlusion     COPD (chronic obstructive pulmonary disease) ([facility]     Depression 1/15/2018    GERD (gastroesophageal reflux disease)     Headache(784.0)     Hyperlipidemia     Hypertension     Hypothyroidism  Insomnia     Osteoarthritis     RBBB 10/15/2014    Right carotid bruit 2015    S/P cardiac catheterization 2016     Past Surgical History:   Procedure Laterality Date    BREAST BIOPSY Right 2016    US biopsy    BREAST SURGERY Left 14    U/S Guided core bx of the left breast    BREAST SURGERY Right 3/20/14    U/S of the lateral right breast    BREAST SURGERY Left 3/27/14    U/S Guided Left breast lumpectomy and left snb    OTHER SURGICAL HISTORY  14    Placement drain, left axillary seroma    [place] MASTECTOMY, SIMPLE, COMPLETE Right 2018    R modified radical mastectomy     Social History     Socioeconomic History    Marital status:       Spouse name: Not on file    Number of children: Not on file    Years of education: Not on file    Highest education level: Not on file   Occupational History    Not on file   Social Needs    Financial resource strain: Not on file    Food insecurity     Worry: Not on file     Inability: Not on file    Transportation needs     Medical: Not on file     Non-medical: Not on file   Tobacco Use    Smoking status: Former Smoker     Packs/day: 1.50     Years: 40.00     Pack years: 60.00     Types: Cigarettes     Quit date: 1989     Years since quittin.9    Smokeless tobacco: Never Used   Substance and Sexual Activity    Alcohol use: No    Drug use: No    Sexual activity: Never   Lifestyle    Physical activity     Days per week: Not on file     Minutes per session: Not on file    Stress: Not on file   Relationships    Social connections     Talks on phone: Not on file     Gets together: Not on file     Attends Congregational service: Not on file     Active member of club or organization: Not on file     Attends meetings of clubs or organizations: Not on file     Relationship status: Not on file    Intimate partner violence     Fear of current or ex partner: Not on file     Emotionally abused: Not on file Physically abused: Not on file     Forced sexual activity: Not on file   Other Topics Concern    Not on file   Social History Narrative    Not on file     Family History   Problem Relation Age of Onset    Cancer Sister         breast    Cancer Maternal Uncle         pancreatic     No Known Allergies  Current Outpatient Medications on File Prior to Visit   Medication Sig Dispense Refill    levothyroxine (SYNTHROID) 75 MCG tablet Take 1 tablet by mouth Daily 90 tablet 3    omeprazole (PRILOSEC) 20 MG delayed release capsule Take 1 capsule by mouth 2 times daily (before meals) 60 capsule 0    ALPRAZolam (XANAX) 0.5 MG tablet Take 1 tablet by mouth 2 times daily for 30 days. 60 tablet 0    oxyCODONE (ROXICODONE) 5 MG immediate release tablet Take 1 tablet by mouth every 8 hours as needed for Pain (moderate to severe pain) for up to 30 days. 90 tablet 0    ondansetron (ZOFRAN) 4 MG tablet Take 1 tablet by mouth every 8 hours as needed for Nausea or Vomiting 30 tablet 0    Budeson-Glycopyrrol-Formoterol 160-9-4.8 MCG/ACT AERO Inhale 2 puffs into the lungs 2 times daily 1 Inhaler 3    budesonide (PULMICORT) 0.5 MG/2ML nebulizer suspension Take 1 ampule by nebulization 2 times daily      albuterol sulfate HFA (VENTOLIN HFA) 108 (90 Base) MCG/ACT inhaler Inhale 2 puffs into the lungs every 6 hours as needed for Wheezing 3 Inhaler 3    traMADol (ULTRAM) 50 MG tablet Take 1 tablet by mouth every 4 hours as needed for Pain for up to 90 days. Intended supply: 7 days.  Take lowest dose possible to manage pain 120 tablet 2    Calcium 600-200 MG-UNIT TABS Take by mouth 2 times daily      traZODone (DESYREL) 150 MG tablet Take 1 tablet by mouth nightly 90 tablet 4    omeprazole (PRILOSEC) 20 MG delayed release capsule Take 1 capsule by mouth Daily 90 capsule 4    vitamin D (ERGOCALCIFEROL) 1.25 MG (43158 UT) CAPS capsule Take 1 capsule by mouth once a week 12 capsule 4  albuterol (PROVENTIL) (2.5 MG/3ML) 0.083% nebulizer solution Take 3 mLs by nebulization 4 times daily 120 each 3    lovastatin (MEVACOR) 20 MG tablet TAKE 1 TABLET NIGHTLY 90 tablet 4    acetylcysteine (MUCOMYST) 10 % nebulizer solution Inhale 4 mLs into the lungs 4 times daily 15 vial 1    OXYGEN Oxygen on 2L with exertion and Nocturnal 1 Units 0    metoprolol tartrate (LOPRESSOR) 50 MG tablet Take 50 mg by mouth 2 times daily      guaiFENesin (MUCINEX) 600 MG extended release tablet Take 1,200 mg by mouth 2 times daily      Respiratory Therapy Supplies (NEBULIZER/TUBING/MOUTHPIECE) KIT 1 kit by Does not apply route daily as needed (wheezing) 1 kit 5     No current facility-administered medications on file prior to visit. Review of Systems   Constitutional: Positive for activity change. Negative for appetite change, fatigue, fever and unexpected weight change. HENT: Negative. Negative for sore throat and voice change. Eyes: Negative. Respiratory: Positive for shortness of breath. Negative for cough and wheezing. Cardiovascular: Negative. Negative for chest pain. Gastrointestinal: Positive for nausea. Negative for constipation. Endocrine: Negative. Genitourinary: Negative. Musculoskeletal: Positive for arthralgias, back pain, joint swelling and myalgias. Skin: Negative. Negative for color change and rash. Allergic/Immunologic: Negative. Neurological: Positive for weakness. Negative for dizziness, tremors and syncope. Hematological: Negative. Psychiatric/Behavioral: Negative. Negative for agitation, confusion, decreased concentration and suicidal ideas. The patient is not nervous/anxious. Objective:   LMP  (LMP Unknown)    Wt Readings from Last 3 Encounters:   11/03/20 125 lb (56.7 kg)   10/17/20 122 lb (55.3 kg)   10/13/20 123 lb 12.8 oz (56.2 kg)     Physical Exam  Vitals signs reviewed.    Constitutional: Appearance: She is well-developed. She is ill-appearing. HENT:      Head: Normocephalic. Eyes:      Pupils: Pupils are equal, round, and reactive to light. Neck:      Musculoskeletal: Normal range of motion. Pulmonary:      Effort: Pulmonary effort is normal.   Abdominal:      Palpations: Abdomen is soft. Musculoskeletal: Normal range of motion. Left lower leg: Edema (trace) present. Skin:     General: Skin is dry. Neurological:      Mental Status: She is alert and oriented to person, place, and time. Motor: Weakness present. Gait: Gait abnormal.   Psychiatric:         Behavior: Behavior normal.         Assessment and Plan:      1. SOB (shortness of breath)  2. Chronic obstructive pulmonary disease, unspecified COPD type ([facility]  - Baseline    Maintain current COPD Directed therapies  Call for increased SOB, cough, sputum production, excessive fatigue, fever, chills, or myalgias  Maintain follow up with PCP and pulmonology  Rinse out mouth after inhaler use. COPD ER PACK in place. Call for change in condition prior to initiating  COPD Action plan reviewed    3. Cancer associated pain  - Controlled on current regime    Pt is tolerating current pain meds without adverse effects or over sedation. Lowest effective dose used. Pt advised to call and notify palliative care for any adverse effects or sedation  Pt is able to maintain adequate functional level and participate in ADLs  OARRS reviewed. There is no indication of aberrant behavior  Pt advised to call for increasing or uncontrolled pain. Risk vs benefit assessed. Pt educated on risk of addiction. Pt advised to take only as prescribed and not to change frequency of pain meds without consulting palliative care first.    4. Pulmonary nodule  5. Malignant neoplasm of female breast, unspecified estrogen receptor status, unspecified laterality, unspecified site of breast ([facility]    - FU with Rad/onc as scheduled    6.  Anxiety Controlled on current regime    7. Nausea  Controlled on current regime    8. Dyspepsia  9. Gastroesophageal reflux disease without esophagitis  Controlled on current regime    10. Encounter for palliative care  - Call for any questions, concerns or change in condition. Much support, guidance and active listening provided. There are no discontinued medications. Due to acuity, symptomatology and high-risk medication management, I advised patient to Return in about 1 month (around 1/16/2021), or if symptoms worsen or fail to improve. Thanks for the opportunity you have allowed us to provide palliative care to [facility]. We will be in touch as care progresses. Please feel free to reach out to us should you have any questions or requests.     Total Time 15 mins   > 50% Time Spent Counseling/Care coordination yes       ROSALIND Moreira - CNP    Collaborating physician: Dr. Farhan Motley

## 2020-12-16 NOTE — PROGRESS NOTES
Called x 2 attempts and went diectly to [facility] for patient to reschedule apt.

## 2020-12-29 ENCOUNTER — VIRTUAL VISIT (OUTPATIENT)
Dept: FAMILY MEDICINE CLINIC | Age: 75
End: 2020-12-29
Payer: MEDICARE

## 2020-12-29 PROCEDURE — 1123F ACP DISCUSS/DSCN MKR DOCD: CPT | Performed by: NURSE PRACTITIONER

## 2020-12-29 PROCEDURE — 4040F PNEUMOC VAC/ADMIN/RCVD: CPT | Performed by: NURSE PRACTITIONER

## 2020-12-29 PROCEDURE — G0438 PPPS, INITIAL VISIT: HCPCS | Performed by: NURSE PRACTITIONER

## 2020-12-29 PROCEDURE — G8484 FLU IMMUNIZE NO ADMIN: HCPCS | Performed by: NURSE PRACTITIONER

## 2020-12-29 RX ORDER — ERGOCALCIFEROL 1.25 MG/1
50000 CAPSULE ORAL WEEKLY
Qty: 12 CAPSULE | Refills: 3 | Status: SHIPPED | OUTPATIENT
Start: 2020-12-29

## 2020-12-29 ASSESSMENT — PATIENT HEALTH QUESTIONNAIRE - PHQ9
SUM OF ALL RESPONSES TO PHQ QUESTIONS 1-9: 0
SUM OF ALL RESPONSES TO PHQ9 QUESTIONS 1 & 2: 0
1. LITTLE INTEREST OR PLEASURE IN DOING THINGS: 0
SUM OF ALL RESPONSES TO PHQ QUESTIONS 1-9: 0
2. FEELING DOWN, DEPRESSED OR HOPELESS: 0
SUM OF ALL RESPONSES TO PHQ QUESTIONS 1-9: 0

## 2020-12-29 ASSESSMENT — LIFESTYLE VARIABLES: HOW OFTEN DO YOU HAVE A DRINK CONTAINING ALCOHOL: 0

## 2020-12-29 NOTE — PROGRESS NOTES
Medicare Annual Wellness Visit  Are Name: Wendi Grider Date: 2020   MRN: [id number] Sex: Female   Age: 66 y.o. Ethnicity: Non-/Non    : 1945 Race: Magdalena Avery is here for Medicare AWV    Screenings for behavioral, psychosocial and functional/safety risks, and cognitive dysfunction are all negative except as indicated below. These results, as well as other patient data from the [address] form, are documented in Flowsheets linked to this Encounter. No Known Allergies      Prior to Visit Medications    Medication Sig Taking?  Authorizing Provider   vitamin D (ERGOCALCIFEROL) 1.25 MG (57574 UT) CAPS capsule Take 1 capsule by mouth once a week Yes Rea Opitz Riedy, APRN - CNP   levothyroxine (SYNTHROID) 75 MCG tablet Take 1 tablet by mouth Daily Yes Rea Opitz Riedy, APRN - CNP   ondansetron (ZOFRAN) 4 MG tablet Take 1 tablet by mouth every 8 hours as needed for Nausea or Vomiting Yes ROSALIND Chamberlain CNP   Budeson-Glycopyrrol-Formoterol 160-9-4.8 MCG/ACT AERO Inhale 2 puffs into the lungs 2 times daily Yes Katina Gould MD   budesonide (PULMICORT) 0.5 MG/2ML nebulizer suspension Take 1 ampule by nebulization 2 times daily Yes Historical Provider, MD   albuterol sulfate HFA (VENTOLIN HFA) 108 (90 Base) MCG/ACT inhaler Inhale 2 puffs into the lungs every 6 hours as needed for Wheezing Yes Rea Opitz Riedy, APRN - CNP   Calcium 600-200 MG-UNIT TABS Take by mouth 2 times daily Yes Historical Provider, MD   traZODone (DESYREL) 150 MG tablet Take 1 tablet by mouth nightly Yes Rea Opitz Riedy, APRN - CNP   omeprazole (PRILOSEC) 20 MG delayed release capsule Take 1 capsule by mouth Daily Yes ROSALIND Almanza CNP   albuterol (PROVENTIL) (2.5 MG/3ML) 0.083% nebulizer solution Take 3 mLs by nebulization 4 times daily Yes Katina Gould MD   lovastatin (MEVACOR) 20 MG tablet TAKE 1 TABLET NIGHTLY Yes ROSALIND De Oliveira - CNP acetylcysteine (MUCOMYST) 10 % nebulizer solution Inhale 4 mLs into the lungs 4 times daily Yes Lizett Ribeiro MD   OXYGEN Oxygen on 2L with exertion and Nocturnal Yes Kandis Mary MD   metoprolol tartrate (LOPRESSOR) 50 MG tablet Take 50 mg by mouth 2 times daily Yes Historical Provider, MD   guaiFENesin (MUCINEX) 600 MG extended release tablet Take 1,200 mg by mouth 2 times daily Yes Historical Provider, MD   Respiratory Therapy Supplies (NEBULIZER/TUBING/MOUTHPIECE) KIT 1 kit by Does not apply route daily as needed (wheezing) Yes Kurt Avendano, APRN - CNP         Past Medical History:   Diagnosis Date    Anxiety     Anxiety and depression     CAD (coronary artery disease)     Cancer ([facility] 3/2014    Invasive ductal cancer left breast, spread to 5 lymph nodes    Carotid artery stenosis and occlusion     COPD (chronic obstructive pulmonary disease) ([facility]     Depression 1/15/2018    GERD (gastroesophageal reflux disease)     Headache(784.0)     Hyperlipidemia     Hypertension     Hypothyroidism     Insomnia     Osteoarthritis     RBBB 10/15/2014    Right carotid bruit 5/26/2015    S/P cardiac catheterization 7/13/2016       Past Surgical History:   Procedure Laterality Date    BREAST BIOPSY Right 11/8/2016    US biopsy    BREAST SURGERY Left 2/26/14    U/S Guided core bx of the left breast    BREAST SURGERY Right 3/20/14    U/S of the lateral right breast    BREAST SURGERY Left 3/27/14    U/S Guided Left breast lumpectomy and left snb    OTHER SURGICAL HISTORY  4/11/14    Placement drain, left axillary seroma    WI MASTECTOMY, SIMPLE, COMPLETE Right 9/6/2018    R modified radical mastectomy         Family History   Problem Relation Age of Onset    Cancer Sister         breast    Cancer Maternal Uncle         pancreatic       CareTeam (Including outside providers/suppliers regularly involved in providing care):   Patient Care Team: [place] Casey, APRN - CNP as PCP - General (Nurse Practitioner)  ROSALIND Cook CNP as PCP - [facility]  Donnie Russell MD (General Surgery)  Nicole Badillo MD (General Surgery)  Josue Moon MD (Oncology)  ROSALIND Anderson CNP as Nurse Practitioner (Palliative Medicine)  ROSALIND Anderson CNP as Nurse Practitioner (Palliative Medicine)    Wt Readings from Last 3 Encounters:   11/03/20 125 lb (56.7 kg)   10/17/20 122 lb (55.3 kg)   10/13/20 123 lb 12.8 oz (56.2 kg)      No flowsheet data found. There is no height or weight on file to calculate BMI. Based upon direct observation of the patient, evaluation of cognition reveals recent and remote memory intact. Telephone visit. Patient's complete Health Risk Assessment and screening values have been reviewed and are found in Flowsheets. The following problems were reviewed today and where indicated follow up appointments were made and/or referrals ordered.     Positive Risk Factor Screenings with Interventions:           Health Habits/Nutrition:  Health Habits/Nutrition  Do you exercise for at least 20 minutes 2-3 times per week?: (!) No  Have you lost any weight without trying in the past 3 months?: No  Do you eat fewer than 2 meals per day?: No  Have you seen a dentist within the past year?: (!) No     Health Habits/Nutrition Interventions:  · Dental exam overdue:  patient encouraged to make appointment with his/her dentist    Hearing/Vision:  No exam data present  Hearing/Vision  Do you or your family notice any trouble with your hearing?: (!) Yes  Do you have difficulty driving, watching TV, or doing any of your daily activities because of your eyesight?: No  Have you had an eye exam within the past year?: (!) No  Hearing/Vision Interventions:  · Vision concerns:  patient encouraged to make appointment with his/her eye specialist    Safety:  Safety  Do you have working smoke detectors?: Yes Have all throw rugs been removed or fastened?: Yes  Do you have non-slip mats or surfaces in all bathtubs/showers?: Yes  Do all of your stairways have a railing or banister?: Yes  Are your doorways, halls and stairs free of clutter?: Yes  Do you always fasten your seatbelt when you are in a car?: (!) No  Safety Interventions:  · Home safety tips provided     Personalized Preventive Plan   Current Health Maintenance Status  Immunization History   Administered Date(s) Administered    Influenza Virus Vaccine 12/05/2014, 11/20/2015, 11/03/2016    Influenza, High Dose (Fluzone 65 yrs and older) 11/20/2015, 11/03/2016, 09/26/2017    Influenza, Eugena Krzysztof, 6 mo and older, IM, PF (Flulaval, Fluarix) 11/06/2018    Influenza, Triv, inactivated, subunit, adjuvanted, IM (Fluad 65 yrs and older) 12/05/2019    Pneumococcal Conjugate 13-valent ([id number]) 01/15/2018    Pneumococcal Polysaccharide ([id number]) 12/05/2019        Health Maintenance   Topic Date Due    DTaP/Tdap/Td vaccine (1 - Tdap) 01/29/1962    Annual Wellness Visit (AWV)  05/29/2019    Lipid screen  08/28/2020    TSH testing  08/28/2020    Flu vaccine (1) 09/01/2020    Shingles Vaccine (1 of 2) 12/29/2021 (Originally 1/29/1993)    Hepatitis C screen  12/29/2021 (Originally 1945)    DEXA (modify frequency per FRAX score)  Completed    Pneumococcal 65+ yrs at Risk Vaccine  Completed    Hepatitis A vaccine  Aged Out    Hepatitis B vaccine  Aged Out    Hib vaccine  Aged Out    Meningococcal (ACWY) vaccine  Aged Out     Recommendations for [place] Due: see orders and patient instructions/AVS.  . Recommended screening schedule for the next 5-10 years is provided to the patient in written form: see Patient Instructions/AVS.        Diagnosis Orders   1. Routine general medical examination at a health care facility     2.  Chronic obstructive pulmonary disease, unspecified COPD type ([facility] Please note this report has been partially produced using speech recognition software and may cause contain errors related to that system including grammar, punctuation and spelling as well as words and phrases that may seem inappropriate. If there are questions or concerns please feel free to contact me to clarify. Patient identification was verified at the start of the visit: Yes    Services were provided through phone to substitute for in-person clinic visit. Patient and provider were located at their individual homes. --ROSALIND Borden - CNP on 12/29/2020 at 4:30 PM    An electronic signature was used to authenticate this note.

## 2020-12-29 NOTE — PATIENT INSTRUCTIONS
Personalized Preventive Plan for Arthurine Homa - 12/29/2020  Medicare offers a range of preventive health benefits. Some of the tests and screenings are paid in full while other may be subject to a deductible, co-insurance, and/or copay. Some of these benefits include a comprehensive review of your medical history including lifestyle, illnesses that may run in your family, and various assessments and screenings as appropriate. After reviewing your medical record and screening and assessments performed today your provider may have ordered immunizations, labs, imaging, and/or referrals for you. A list of these orders (if applicable) as well as your Preventive Care list are included within your After Visit Summary for your review. Other Preventive Recommendations:    · A preventive eye exam performed by an eye specialist is recommended every 1-2 years to screen for glaucoma; cataracts, macular degeneration, and other eye disorders. · A preventive dental visit is recommended every 6 months. · Try to get at least 150 minutes of exercise per week or 10,000 steps per day on a pedometer . · Order or download the FREE \"Exercise & Physical Activity: Your Everyday Guide\" from [employer]. Call 7-383.831.5821 or search [employer] online. · You need 4614-5478 mg of calcium and 6028-7041 IU of vitamin D per day. It is possible to meet your calcium requirement with diet alone, but a vitamin D supplement is usually necessary to meet this goal.  · When exposed to the sun, use a sunscreen that protects against both UVA and UVB radiation with an SPF of 30 or greater. Reapply every 2 to 3 hours or after sweating, drying off with a towel, or swimming. · Always wear a seat belt when traveling in a car. Always wear a helmet when riding a bicycle or motorcycle.

## 2021-01-11 ENCOUNTER — TELEPHONE (OUTPATIENT)
Dept: PULMONOLOGY | Age: 76
End: 2021-01-11

## 2021-01-11 NOTE — TELEPHONE ENCOUNTER
LVM for patient to schedule a follow up. Can't put her refill request up until she makes a follow up appt.

## 2021-01-22 ENCOUNTER — OFFICE VISIT (OUTPATIENT)
Dept: PALLATIVE CARE | Age: 76
End: 2021-01-22
Payer: MEDICARE

## 2021-01-22 VITALS
OXYGEN SATURATION: 98 % | RESPIRATION RATE: 18 BRPM | DIASTOLIC BLOOD PRESSURE: 84 MMHG | SYSTOLIC BLOOD PRESSURE: 177 MMHG | HEART RATE: 74 BPM

## 2021-01-22 DIAGNOSIS — R91.1 PULMONARY NODULE: ICD-10-CM

## 2021-01-22 DIAGNOSIS — G89.3 CANCER ASSOCIATED PAIN: Primary | ICD-10-CM

## 2021-01-22 DIAGNOSIS — C50.919 MALIGNANT NEOPLASM OF FEMALE BREAST, UNSPECIFIED ESTROGEN RECEPTOR STATUS, UNSPECIFIED LATERALITY, UNSPECIFIED SITE OF BREAST (HCC): ICD-10-CM

## 2021-01-22 DIAGNOSIS — J44.9 CHRONIC OBSTRUCTIVE PULMONARY DISEASE, UNSPECIFIED COPD TYPE (HCC): ICD-10-CM

## 2021-01-22 DIAGNOSIS — R06.02 SOB (SHORTNESS OF BREATH): ICD-10-CM

## 2021-01-22 DIAGNOSIS — R10.13 DYSPEPSIA: ICD-10-CM

## 2021-01-22 DIAGNOSIS — Z51.5 ENCOUNTER FOR PALLIATIVE CARE: ICD-10-CM

## 2021-01-22 DIAGNOSIS — K21.9 GASTROESOPHAGEAL REFLUX DISEASE WITHOUT ESOPHAGITIS: ICD-10-CM

## 2021-01-22 DIAGNOSIS — R11.0 NAUSEA: ICD-10-CM

## 2021-01-22 DIAGNOSIS — F41.9 ANXIETY: ICD-10-CM

## 2021-01-22 PROCEDURE — G8926 SPIRO NO PERF OR DOC: HCPCS | Performed by: FAMILY MEDICINE

## 2021-01-22 PROCEDURE — 3023F SPIROM DOC REV: CPT | Performed by: FAMILY MEDICINE

## 2021-01-22 PROCEDURE — 1123F ACP DISCUSS/DSCN MKR DOCD: CPT | Performed by: FAMILY MEDICINE

## 2021-01-22 PROCEDURE — 1036F TOBACCO NON-USER: CPT | Performed by: FAMILY MEDICINE

## 2021-01-22 PROCEDURE — G8399 PT W/DXA RESULTS DOCUMENT: HCPCS | Performed by: FAMILY MEDICINE

## 2021-01-22 PROCEDURE — 4040F PNEUMOC VAC/ADMIN/RCVD: CPT | Performed by: FAMILY MEDICINE

## 2021-01-22 PROCEDURE — G8427 DOCREV CUR MEDS BY ELIG CLIN: HCPCS | Performed by: FAMILY MEDICINE

## 2021-01-22 PROCEDURE — G8484 FLU IMMUNIZE NO ADMIN: HCPCS | Performed by: FAMILY MEDICINE

## 2021-01-22 PROCEDURE — 99214 OFFICE O/P EST MOD 30 MIN: CPT | Performed by: FAMILY MEDICINE

## 2021-01-22 PROCEDURE — 1090F PRES/ABSN URINE INCON ASSESS: CPT | Performed by: FAMILY MEDICINE

## 2021-01-22 PROCEDURE — G8420 CALC BMI NORM PARAMETERS: HCPCS | Performed by: FAMILY MEDICINE

## 2021-01-22 RX ORDER — ALPRAZOLAM 0.5 MG/1
0.5 TABLET ORAL 2 TIMES DAILY
Qty: 60 TABLET | Refills: 0 | Status: SHIPPED | OUTPATIENT
Start: 2021-01-22 | End: 2021-02-19 | Stop reason: SDUPTHER

## 2021-01-22 ASSESSMENT — ENCOUNTER SYMPTOMS
COLOR CHANGE: 0
CONSTIPATION: 0
BACK PAIN: 1
VOICE CHANGE: 0
EYES NEGATIVE: 1
NAUSEA: 1
SORE THROAT: 0
WHEEZING: 0
SHORTNESS OF BREATH: 1
ALLERGIC/IMMUNOLOGIC NEGATIVE: 1
COUGH: 0

## 2021-01-22 NOTE — PROGRESS NOTES
Subjective:      Patient Id: Seen Shawn Sawant at [facility]. She was accompanied to the appointment by: self  Chief Complaint   Patient presents with    Shortness of Breath    Leg Swelling    Anxiety    Pain      HPI       Anton Gagnon is a 66 y.o. female referred to palliative care  for symptom management. Shawn Sawant has complex medical history that includes pulmonary nodule, COPD, GERD, Former smoker, and Breast cancer. Patient complains of pain. States pain is well controlled on current regime. Rates pain at a 2/ 10 and states it occurs in her Left leg, back, and joints throughout body. Describes pain as a constant ache that intermittently worsens. Currently taking oxy-ir prn when worsens. Denies Sedation, Constipation, or other adverse effects on current regime. SOB and baseline. Taking nebulizer and inhaler treatments as ordered. Denies excessive fatigue, fever, chills, myalgias, increased sputum production or cough, nausea, vomiting, chest pain, or orthopnea. Anxiety remains controlled on xanax. Sees Dr Shama Bush regarding Ca tx with possible LLE radiaion planned in future per pt.      Past Medical History:   Diagnosis Date    Anxiety     Anxiety and depression     CAD (coronary artery disease)     Cancer ([facility] 3/2014    Invasive ductal cancer left breast, spread to 5 lymph nodes    Carotid artery stenosis and occlusion     COPD (chronic obstructive pulmonary disease) ([facility]     Depression 1/15/2018    GERD (gastroesophageal reflux disease)     Headache(784.0)     Hyperlipidemia     Hypertension     Hypothyroidism     Insomnia     Osteoarthritis     RBBB 10/15/2014    Right carotid bruit 5/26/2015    S/P cardiac catheterization 7/13/2016     Past Surgical History:   Procedure Laterality Date    BREAST BIOPSY Right 11/8/2016     biopsy    BREAST SURGERY Left 2/26/14    U/S Guided core bx of the left breast    BREAST SURGERY Right 3/20/14    U/S of the lateral right breast    BREAST vitamin D (ERGOCALCIFEROL) 1.25 MG (18385 UT) CAPS capsule Take 1 capsule by mouth once a week 12 capsule 3    levothyroxine (SYNTHROID) 75 MCG tablet Take 1 tablet by mouth Daily 90 tablet 3    ondansetron (ZOFRAN) 4 MG tablet Take 1 tablet by mouth every 8 hours as needed for Nausea or Vomiting 30 tablet 0    Budeson-Glycopyrrol-Formoterol 160-9-4.8 MCG/ACT AERO Inhale 2 puffs into the lungs 2 times daily 1 Inhaler 3    budesonide (PULMICORT) 0.5 MG/2ML nebulizer suspension Take 1 ampule by nebulization 2 times daily      albuterol sulfate HFA (VENTOLIN HFA) 108 (90 Base) MCG/ACT inhaler Inhale 2 puffs into the lungs every 6 hours as needed for Wheezing 3 Inhaler 3    Calcium 600-200 MG-UNIT TABS Take by mouth 2 times daily      traZODone (DESYREL) 150 MG tablet Take 1 tablet by mouth nightly 90 tablet 4    omeprazole (PRILOSEC) 20 MG delayed release capsule Take 1 capsule by mouth Daily 90 capsule 4    albuterol (PROVENTIL) (2.5 MG/3ML) 0.083% nebulizer solution Take 3 mLs by nebulization 4 times daily 120 each 3    lovastatin (MEVACOR) 20 MG tablet TAKE 1 TABLET NIGHTLY 90 tablet 4    acetylcysteine (MUCOMYST) 10 % nebulizer solution Inhale 4 mLs into the lungs 4 times daily 15 vial 1    OXYGEN Oxygen on 2L with exertion and Nocturnal 1 Units 0    metoprolol tartrate (LOPRESSOR) 50 MG tablet Take 50 mg by mouth 2 times daily      guaiFENesin (MUCINEX) 600 MG extended release tablet Take 1,200 mg by mouth 2 times daily      Respiratory Therapy Supplies (NEBULIZER/TUBING/MOUTHPIECE) KIT 1 kit by Does not apply route daily as needed (wheezing) 1 kit 5     No current facility-administered medications on file prior to visit. Review of Systems   Constitutional: Positive for activity change. Negative for appetite change, fatigue, fever and unexpected weight change. HENT: Negative. Negative for sore throat and voice change. Eyes: Negative. Respiratory: Positive for shortness of breath. Negative for cough and wheezing. Cardiovascular: Negative. Negative for chest pain. Gastrointestinal: Positive for nausea. Negative for constipation. Endocrine: Negative. Genitourinary: Negative. Musculoskeletal: Positive for arthralgias, back pain, joint swelling and myalgias. Skin: Negative. Negative for color change and rash. Allergic/Immunologic: Negative. Neurological: Positive for weakness. Negative for dizziness, tremors and syncope. Hematological: Negative. Psychiatric/Behavioral: Negative. Negative for agitation, confusion, decreased concentration and suicidal ideas. The patient is not nervous/anxious. Objective:   BP (!) 177/84   Pulse 74   Resp 18   LMP  (LMP Unknown)   SpO2 98%    Wt Readings from Last 3 Encounters:   11/03/20 125 lb (56.7 kg)   10/17/20 122 lb (55.3 kg)   10/13/20 123 lb 12.8 oz (56.2 kg)     Physical Exam  Vitals signs reviewed. Constitutional:       Appearance: She is well-developed. She is ill-appearing. HENT:      Head: Normocephalic. Eyes:      Pupils: Pupils are equal, round, and reactive to light. Neck:      Musculoskeletal: Normal range of motion. Pulmonary:      Effort: Pulmonary effort is normal.   Abdominal:      Palpations: Abdomen is soft. Musculoskeletal: Normal range of motion. Left lower leg: Edema (trace) present. Skin:     General: Skin is dry. Neurological:      Mental Status: She is alert and oriented to person, place, and time. Motor: Weakness present. Gait: Gait abnormal.   Psychiatric:         Behavior: Behavior normal.         Assessment and Plan:      1. SOB (shortness of breath)  2. Chronic obstructive pulmonary disease, unspecified COPD type ([facility]  - Baseline    Maintain current COPD Directed therapies  Call for increased SOB, cough, sputum production, excessive fatigue, fever, chills, or myalgias  Maintain follow up with PCP and pulmonology  Rinse out mouth after inhaler use.   COPD ER PACK in place. Call for change in condition prior to initiating  COPD Action plan reviewed    3. Cancer associated pain  - Controlled on current regime    Pt is tolerating current pain meds without adverse effects or over sedation. Lowest effective dose used. Pt advised to call and notify palliative care for any adverse effects or sedation  Pt is able to maintain adequate functional level and participate in ADLs  OARRS reviewed. There is no indication of aberrant behavior  Pt advised to call for increasing or uncontrolled pain. Risk vs benefit assessed. Pt educated on risk of addiction. Pt advised to take only as prescribed and not to change frequency of pain meds without consulting palliative care first.    4. Pulmonary nodule  5. Malignant neoplasm of female breast, unspecified estrogen receptor status, unspecified laterality, unspecified site of breast ([facility]  - FU with Rad/onc as scheduled    6. Anxiety  Controlled on current regime    7. Nausea  Controlled on current regime    8. Dyspepsia  9. Gastroesophageal reflux disease without esophagitis  Controlled on current regime    10. Encounter for palliative care  - Call for any questions, concerns or change in condition. Much support, guidance and active listening provided. Medications Discontinued During This Encounter   Medication Reason    ALPRAZolam (Cheron Lolling) 0.5 MG tablet REORDER       Due to acuity, symptomatology and high-risk medication management, I advised patient to Return in about 1 month (around 2/22/2021), or if symptoms worsen or fail to improve. Thanks for the opportunity you have allowed us to provide palliative care to [facility]. We will be in touch as care progresses. Please feel free to reach out to us should you have any questions or requests.     Total Time 25 mins   > 50% Time Spent Counseling/Care coordination yes       ROSALIND De Los Santos - CNP    Collaborating physician: Dr. Wallace Chirinos

## 2021-02-15 ENCOUNTER — VIRTUAL VISIT (OUTPATIENT)
Dept: PULMONOLOGY | Age: 76
End: 2021-02-15
Payer: MEDICARE

## 2021-02-15 DIAGNOSIS — K44.9 HIATAL HERNIA: ICD-10-CM

## 2021-02-15 DIAGNOSIS — R22.2 NODULE OF ANTERIOR CHEST WALL: Primary | ICD-10-CM

## 2021-02-15 DIAGNOSIS — K21.9 GASTROESOPHAGEAL REFLUX DISEASE WITHOUT ESOPHAGITIS: ICD-10-CM

## 2021-02-15 DIAGNOSIS — R91.1 LUNG NODULE: ICD-10-CM

## 2021-02-15 DIAGNOSIS — C50.911 MALIGNANT NEOPLASM OF RIGHT FEMALE BREAST, UNSPECIFIED ESTROGEN RECEPTOR STATUS, UNSPECIFIED SITE OF BREAST (HCC): ICD-10-CM

## 2021-02-15 DIAGNOSIS — J44.9 CHRONIC OBSTRUCTIVE PULMONARY DISEASE, UNSPECIFIED COPD TYPE (HCC): ICD-10-CM

## 2021-02-15 PROCEDURE — 99443 PR PHYS/QHP TELEPHONE EVALUATION 21-30 MIN: CPT | Performed by: INTERNAL MEDICINE

## 2021-02-15 ASSESSMENT — ENCOUNTER SYMPTOMS
GASTROINTESTINAL NEGATIVE: 1
ALLERGIC/IMMUNOLOGIC NEGATIVE: 1
EYES NEGATIVE: 1
RESPIRATORY NEGATIVE: 1

## 2021-02-15 NOTE — PROGRESS NOTES
budesonide (PULMICORT) 0.5 MG/2ML nebulizer suspension Take 1 ampule by nebulization 2 times daily  Historical Provider, MD   albuterol sulfate HFA (VENTOLIN HFA) 108 (90 Base) MCG/ACT inhaler Inhale 2 puffs into the lungs every 6 hours as needed for Wheezing  ROSALIND Martin CNP   Calcium 600-200 MG-UNIT TABS Take by mouth 2 times daily  Historical Provider, MD   traZODone (DESYREL) 150 MG tablet Take 1 tablet by mouth nightly  ROSALIND Martin CNP   omeprazole (PRILOSEC) 20 MG delayed release capsule Take 1 capsule by mouth Daily  ROSALIND Martin CNP   albuterol (PROVENTIL) (2.5 MG/3ML) 0.083% nebulizer solution Take 3 mLs by nebulization 4 times daily  Ray Claros MD   lovastatin (MEVACOR) 20 MG tablet TAKE 1 TABLET NIGHTLY  ROSALIND Almanza CNP   acetylcysteine (MUCOMYST) 10 % nebulizer solution Inhale 4 mLs into the lungs 4 times daily  Danielle Baugh MD   OXYGEN Oxygen on 2L with exertion and Nocturnal  Ray Claros MD   metoprolol tartrate (LOPRESSOR) 50 MG tablet Take 50 mg by mouth 2 times daily  Historical Provider, MD   guaiFENesin (MUCINEX) 600 MG extended release tablet Take 1,200 mg by mouth 2 times daily  Historical Provider, MD   Respiratory Therapy Supplies (NEBULIZER/TUBING/MOUTHPIECE) KIT 1 kit by Does not apply route daily as needed (wheezing)  ROSALIND Martin CNP       Social History     Tobacco Use    Smoking status: Former Smoker     Packs/day: 1.50     Years: 40.00     Pack years: 60.00     Types: Cigarettes     Quit date: 1989     Years since quittin.0    Smokeless tobacco: Never Used   Substance Use Topics    Alcohol use: No    Drug use: No        No Known Allergies,   Past Medical History:   Diagnosis Date    Anxiety     Anxiety and depression     CAD (coronary artery disease)     [facility]    Invasive ductal cancer left breast, spread to 5 lymph nodes    Carotid artery stenosis and occlusion  COPD (chronic obstructive pulmonary disease) (HCC)     Depression 1/15/2018    GERD (gastroesophageal reflux disease)     Headache(784.0)     Hyperlipidemia     Hypertension     Hypothyroidism     Insomnia     Osteoarthritis     RBBB 10/15/2014    Right carotid bruit 2015    S/P cardiac catheterization 2016   ,   Past Surgical History:   Procedure Laterality Date    BREAST BIOPSY Right 2016    US biopsy    BREAST SURGERY Left 14    U/S Guided core bx of the left breast    BREAST SURGERY Right 3/20/14    U/S of the lateral right breast    BREAST SURGERY Left 3/27/14    U/S Guided Left breast lumpectomy and left snb    OTHER SURGICAL HISTORY  14    Placement drain, left axillary seroma    IL MASTECTOMY, SIMPLE, COMPLETE Right 2018    R modified radical mastectomy   ,   Social History     Tobacco Use    Smoking status: Former Smoker     Packs/day: 1.50     Years: 40.00     Pack years: 60.00     Types: Cigarettes     Quit date: 1989     Years since quittin.0    Smokeless tobacco: Never Used   Substance Use Topics    Alcohol use: No    Drug use: No   ,   Family History   Problem Relation Age of Onset    Cancer Sister         breast    Cancer Maternal Uncle         pancreatic   ,   Immunization History   Administered Date(s) Administered    Influenza Virus Vaccine 2014, 2015, 2016    Influenza, High Dose (Fluzone 65 yrs and older) 2015, 2016, 2017    Influenza, Ruddy Days, 6 mo and older, IM, PF (Flulaval, Fluarix) 2018    Influenza, Triv, inactivated, subunit, adjuvanted, IM (Fluad 65 yrs and older) 2019    Pneumococcal Conjugate 13-valent ([id number]) 01/15/2018    Pneumococcal Polysaccharide ([id number]) 2019   ,   Health Maintenance   Topic Date Due    COVID-19 Vaccine (1 of 2) 1959    DTaP/Tdap/Td vaccine (1 - Tdap) 1962    Lipid screen  2020    TSH testing  2020  Flu vaccine (1) 09/01/2020    Shingles Vaccine (1 of 2) 12/29/2021 (Originally 1/29/1993)    Hepatitis C screen  12/29/2021 (Originally 1945)    Annual Wellness Visit (AWV)  12/30/2021    DEXA (modify frequency per FRAX score)  Completed    Pneumococcal 65+ yrs at Risk Vaccine  Completed    Hepatitis A vaccine  Aged Out    Hepatitis B vaccine  Aged Out    Hib vaccine  Aged Out    Meningococcal (ACWY) vaccine  Aged Out           PHYSICAL EXAMINATION:  [ INSTRUCTIONS:  \"[x]\" Indicates a positive item  \"[]\" Indicates a negative item  -- DELETE ALL ITEMS NOT EXAMINED]  [] Alert  [] Oriented to person/place/time    [] No apparent distress  [] Toxic appearing    [] Face flushed appearing [] Sclera clear  [] Lips are cyanotic      [x] Breathing appears normal  [] Appears tachypneic      [] No rash on visible skin    [] Cranial Nerves II-XII grossly intact    [] Motor grossly intact in visible upper extremities    [] Motor grossly intact in visible lower extremities    [] Normal Mood  [] Anxious appearing    [] Depressed appearing  [] Confused appearing      [] Poor short term memory  [] Poor long term memory    [] OTHER:      Due to this being a TeleHealth encounter, evaluation of the following organ systems is limited: Vitals/Constitutional/EENT/Resp/CV/GI//MS/Neuro/Skin/Heme-Lymph-Imm. Imaging studies CT chest October 2020, shows left lower lobe lung nodule stable compared to September 2020. Anterior right chest wall mass. Otherwise no infiltrate. She has rash hiatal hernia  Labs reviewed   PFT 2019 reviewed by me, FEV1 26% indicating very severe COPD  Echo September 2019, shows EF 60%, with abnormal diastolic function    ASSESSMENT/PLAN:  1.  Chronic obstructive pulmonary disease, unspecified COPD type ([facility]  · Very severe, symptoms not controlled, she responded well to [facility], will continue same, continue budesonide nebs twice daily - Budeson-Glycopyrrol-Formoterol 160-9-4.8 MCG/ACT AERO; Inhale 2 puffs into the lungs 2 times daily  Dispense: 3 Inhaler; Refill: 1    2. Nodule of anterior chest wall  Etiology is not clear, possibly metastatic disease, patient follows up with oncology    3. Lung nodule  Stable however she needs a follow-up CT, she follows up with oncology she told me she does have follow-up CT already ordered, she will need follow-up CAT scan of the chest in April of this year for 6-month monitoring    4. Gastroesophageal reflux disease without esophagitis  Continue PPI    5. Hiatal hernia  Continue PPI, monitor for now, if become a significant problem can consider surgical repair    6. Malignant neoplasm of right female breast, unspecified estrogen receptor status, unspecified site of breast [facility])  Patient follows up with oncology, and plan is per Dr. Jason De Souza       Return in about 3 months (around 5/15/2021). Total time 22 minutes  An  electronic signature was used to authenticate this note. --Merry Weeks MD on 2/15/2021 at 3:57 PM        Pursuant to the emergency declaration under the [facility] waiver authority and the [employer] Act, this Virtual  Visit was conducted, with patient's consent, to reduce the patient's risk of exposure to COVID-19 and provide continuity of care for an established patient. Services were provided through a video synchronous discussion virtually to substitute for in-person clinic visit.

## 2021-02-19 ENCOUNTER — OFFICE VISIT (OUTPATIENT)
Dept: PALLATIVE CARE | Age: 76
End: 2021-02-19
Payer: MEDICARE

## 2021-02-19 DIAGNOSIS — F41.9 ANXIETY: ICD-10-CM

## 2021-02-19 DIAGNOSIS — J44.9 CHRONIC OBSTRUCTIVE PULMONARY DISEASE, UNSPECIFIED COPD TYPE (HCC): ICD-10-CM

## 2021-02-19 DIAGNOSIS — R10.13 DYSPEPSIA: ICD-10-CM

## 2021-02-19 DIAGNOSIS — Z51.5 ENCOUNTER FOR PALLIATIVE CARE: ICD-10-CM

## 2021-02-19 DIAGNOSIS — R91.1 PULMONARY NODULE: Primary | ICD-10-CM

## 2021-02-19 DIAGNOSIS — R06.02 SOB (SHORTNESS OF BREATH): ICD-10-CM

## 2021-02-19 DIAGNOSIS — R11.0 NAUSEA: ICD-10-CM

## 2021-02-19 DIAGNOSIS — C50.919 MALIGNANT NEOPLASM OF FEMALE BREAST, UNSPECIFIED ESTROGEN RECEPTOR STATUS, UNSPECIFIED LATERALITY, UNSPECIFIED SITE OF BREAST (HCC): ICD-10-CM

## 2021-02-19 DIAGNOSIS — K21.9 GASTROESOPHAGEAL REFLUX DISEASE WITHOUT ESOPHAGITIS: ICD-10-CM

## 2021-02-19 DIAGNOSIS — G89.3 CANCER ASSOCIATED PAIN: ICD-10-CM

## 2021-02-19 PROCEDURE — 99442 PR PHYS/QHP TELEPHONE EVALUATION 11-20 MIN: CPT | Performed by: FAMILY MEDICINE

## 2021-02-19 RX ORDER — ALPRAZOLAM 0.5 MG/1
0.5 TABLET ORAL 2 TIMES DAILY
Qty: 60 TABLET | Refills: 0 | Status: SHIPPED | OUTPATIENT
Start: 2021-02-19 | End: 2021-03-21

## 2021-02-19 RX ORDER — OXYCODONE HYDROCHLORIDE 5 MG/1
5 TABLET ORAL EVERY 8 HOURS PRN
Qty: 90 TABLET | Refills: 0 | Status: SHIPPED | OUTPATIENT
Start: 2021-02-19 | End: 2021-03-21

## 2021-02-19 ASSESSMENT — ENCOUNTER SYMPTOMS
SORE THROAT: 0
CONSTIPATION: 0
BACK PAIN: 1
WHEEZING: 0
COLOR CHANGE: 0
ALLERGIC/IMMUNOLOGIC NEGATIVE: 1
VOICE CHANGE: 0
EYES NEGATIVE: 1
SHORTNESS OF BREATH: 1
COUGH: 0

## 2021-02-19 NOTE — PROGRESS NOTES
Subjective:      Patient Id: Seen Shawn Sawant at [facility]. She was accompanied to the appointment by: self  Chief Complaint   Patient presents with    Pain    Shortness of Breath    Anxiety    Follow-up      Time spent with Patient: 15 mins  Patient was made aware he will be billed for telephone service. Pt presents via telephonic contact due to COVID-19 pandemic emergency protocol. (Converted as patient snowed in and unable to do VV)    HPI       Anton Gagnon is a 64 y.o. female referred to palliative care for symptom management. Shawn Sawant has complex medical history that includes pulmonary nodule, COPD, GERD, Former smoker, and Breast cancer. Patient complains of pain. States pain is well controlled on current regime. Rates pain at a 4/ 10 and states it occurs in her Left leg, back, and joints throughout body. Describes pain as a constant ache that intermittently worsens. Currently taking oxy-ir 5mg prn. Denies Sedation, Constipation, or other adverse effects on current regime. SOB and baseline. Taking nebulizer and inhaler treatments as ordered. Denies excessive fatigue, fever, chills, myalgias, increased sputum production or cough, nausea, vomiting, chest pain, or orthopnea. Anxiety remains controlled on xanax.      Past Medical History:   Diagnosis Date    Anxiety     Anxiety and depression     CAD (coronary artery disease)     Cancer ([facility] [address] 3/2014    Invasive ductal cancer left breast, spread to 5 lymph nodes    Carotid artery stenosis and occlusion     COPD (chronic obstructive pulmonary disease) ([facility] [address]     Depression 1/15/2018    GERD (gastroesophageal reflux disease)     Headache(784.0)     Hyperlipidemia     Hypertension     Hypothyroidism     Insomnia     Osteoarthritis     RBBB 10/15/2014    Right carotid bruit 5/26/2015    S/P cardiac catheterization 7/13/2016     Past Surgical History:   Procedure Laterality Date    BREAST BIOPSY Right 11/8/2016    US biopsy  Cancer Maternal Uncle         pancreatic     No Known Allergies  Current Outpatient Medications on File Prior to Visit   Medication Sig Dispense Refill    Budeson-Glycopyrrol-Formoterol 160-9-4.8 MCG/ACT AERO Inhale 2 puffs into the lungs 2 times daily 3 Inhaler 1    ALPRAZolam (XANAX) 0.5 MG tablet Take 1 tablet by mouth 2 times daily for 30 days. 60 tablet 0    vitamin D (ERGOCALCIFEROL) 1.25 MG (07264 UT) CAPS capsule Take 1 capsule by mouth once a week 12 capsule 3    levothyroxine (SYNTHROID) 75 MCG tablet Take 1 tablet by mouth Daily 90 tablet 3    ondansetron (ZOFRAN) 4 MG tablet Take 1 tablet by mouth every 8 hours as needed for Nausea or Vomiting 30 tablet 0    budesonide (PULMICORT) 0.5 MG/2ML nebulizer suspension Take 1 ampule by nebulization 2 times daily      albuterol sulfate HFA (VENTOLIN HFA) 108 (90 Base) MCG/ACT inhaler Inhale 2 puffs into the lungs every 6 hours as needed for Wheezing 3 Inhaler 3    Calcium 600-200 MG-UNIT TABS Take by mouth 2 times daily      traZODone (DESYREL) 150 MG tablet Take 1 tablet by mouth nightly 90 tablet 4    omeprazole (PRILOSEC) 20 MG delayed release capsule Take 1 capsule by mouth Daily 90 capsule 4    albuterol (PROVENTIL) (2.5 MG/3ML) 0.083% nebulizer solution Take 3 mLs by nebulization 4 times daily 120 each 3    lovastatin (MEVACOR) 20 MG tablet TAKE 1 TABLET NIGHTLY 90 tablet 4    acetylcysteine (MUCOMYST) 10 % nebulizer solution Inhale 4 mLs into the lungs 4 times daily 15 vial 1    OXYGEN Oxygen on 2L with exertion and Nocturnal 1 Units 0    metoprolol tartrate (LOPRESSOR) 50 MG tablet Take 50 mg by mouth 2 times daily      guaiFENesin (MUCINEX) 600 MG extended release tablet Take 1,200 mg by mouth 2 times daily      Respiratory Therapy Supplies (NEBULIZER/TUBING/MOUTHPIECE) KIT 1 kit by Does not apply route daily as needed (wheezing) 1 kit 5     No current facility-administered medications on file prior to visit. Review of Systems   Constitutional: Positive for activity change. Negative for appetite change, fatigue, fever and unexpected weight change. HENT: Negative. Negative for sore throat and voice change. Eyes: Negative. Respiratory: Positive for shortness of breath. Negative for cough and wheezing. Cardiovascular: Negative. Negative for chest pain. Gastrointestinal: Negative for constipation. Endocrine: Negative. Genitourinary: Negative. Musculoskeletal: Positive for arthralgias, back pain, joint swelling and myalgias. Skin: Negative. Negative for color change and rash. Allergic/Immunologic: Negative. Neurological: Positive for weakness. Negative for dizziness, tremors and syncope. Hematological: Negative. Psychiatric/Behavioral: Negative. Negative for agitation, confusion, decreased concentration and suicidal ideas. The patient is not nervous/anxious. Objective:   LMP  (LMP Unknown)    Wt Readings from Last 3 Encounters:   11/03/20 125 lb (56.7 kg)   10/17/20 122 lb (55.3 kg)   10/13/20 123 lb 12.8 oz (56.2 kg)     Physical Exam  Pulmonary:      Breath sounds: Normal breath sounds. Neurological:      Mental Status: She is alert and oriented to person, place, and time. Assessment and Plan:      1. SOB (shortness of breath)  2. Chronic obstructive pulmonary disease, unspecified COPD type ([facility] [address]  - Baseline    Maintain current COPD Directed therapies  Call for increased SOB, cough, sputum production, excessive fatigue, fever, chills, or myalgias  Maintain follow up with PCP and pulmonology  Rinse out mouth after inhaler use. COPD ER PACK in place. Call for change in condition prior to initiating  COPD Action plan reviewed    3.  Cancer associated pain  - Controlled on current regime      Orders Placed This Encounter   Medications    oxyCODONE (ROXICODONE) 5 MG immediate release tablet Sig: Take 1 tablet by mouth every 8 hours as needed for Pain (moderate to severe pain) for up to 30 days. Dispense:  90 tablet     Refill:  0     Reduce doses taken as pain becomes manageable    ALPRAZolam (XANAX) 0.5 MG tablet     Sig: Take 1 tablet by mouth 2 times daily for 30 days. Dispense:  60 tablet     Refill:  0     Pt is tolerating current pain meds without adverse effects or over sedation. Lowest effective dose used. Pt advised to call and notify palliative care for any adverse effects or sedation  Pt is able to maintain adequate functional level and participate in ADLs  OARRS reviewed. There is no indication of aberrant behavior  Pt advised to call for increasing or uncontrolled pain. Risk vs benefit assessed. Pt educated on risk of addiction. Pt advised to take only as prescribed and not to change frequency of pain meds without consulting palliative care first.    4. Pulmonary nodule  5. Malignant neoplasm of female breast, unspecified estrogen receptor status, unspecified laterality, unspecified site of breast ([facility] [address]  - FU with Rad/onc as scheduled    6. Anxiety  Controlled on current regime    Orders Placed This Encounter   Medications    oxyCODONE (ROXICODONE) 5 MG immediate release tablet     Sig: Take 1 tablet by mouth every 8 hours as needed for Pain (moderate to severe pain) for up to 30 days. Dispense:  90 tablet     Refill:  0     Reduce doses taken as pain becomes manageable    ALPRAZolam (XANAX) 0.5 MG tablet     Sig: Take 1 tablet by mouth 2 times daily for 30 days. Dispense:  60 tablet     Refill:  0       7. Nausea  Controlled on current regime    8. Dyspepsia  9. Gastroesophageal reflux disease without esophagitis  Controlled on current regime    10. Encounter for palliative care  - Call for any questions, concerns or change in condition. Much support, guidance and active listening provided. There are no discontinued medications. Due to acuity, symptomatology and high-risk medication management, I advised patient to Return in about 1 month (around 3/19/2021), or if symptoms worsen or fail to improve. Thanks for the opportunity you have allowed us to provide palliative care to [facility]. We will be in touch as care progresses. Please feel free to reach out to us should you have any questions or requests.     Total Time 15 mins   > 50% Time Spent Counseling/Care coordination yes       ROSALIND De Los Santos - CNP    Collaborating physician: Dr. Wallace Chirinos

## 2021-02-25 ENCOUNTER — VIRTUAL VISIT (OUTPATIENT)
Dept: FAMILY MEDICINE CLINIC | Age: 76
End: 2021-02-25
Payer: MEDICARE

## 2021-02-25 DIAGNOSIS — C77.3 BREAST CANCER METASTASIZED TO AXILLARY LYMPH NODE, RIGHT (HCC): ICD-10-CM

## 2021-02-25 DIAGNOSIS — J44.9 CHRONIC OBSTRUCTIVE PULMONARY DISEASE, UNSPECIFIED COPD TYPE (HCC): Primary | ICD-10-CM

## 2021-02-25 DIAGNOSIS — M79.605 LEFT LEG PAIN: ICD-10-CM

## 2021-02-25 DIAGNOSIS — F33.1 MODERATE EPISODE OF RECURRENT MAJOR DEPRESSIVE DISORDER (HCC): ICD-10-CM

## 2021-02-25 DIAGNOSIS — G47.09 OTHER INSOMNIA: ICD-10-CM

## 2021-02-25 DIAGNOSIS — M89.8X9 BONE PAIN: ICD-10-CM

## 2021-02-25 DIAGNOSIS — C50.911 BREAST CANCER METASTASIZED TO AXILLARY LYMPH NODE, RIGHT (HCC): ICD-10-CM

## 2021-02-25 PROCEDURE — 99443 PR PHYS/QHP TELEPHONE EVALUATION 21-30 MIN: CPT | Performed by: NURSE PRACTITIONER

## 2021-02-25 RX ORDER — PREDNISONE 10 MG/1
TABLET ORAL
COMMUNITY
Start: 2021-01-24

## 2021-02-25 RX ORDER — TRAMADOL HYDROCHLORIDE 50 MG/1
TABLET ORAL
Status: ON HOLD | COMMUNITY
Start: 2020-12-29 | End: 2021-03-27

## 2021-02-25 RX ORDER — TRAZODONE HYDROCHLORIDE 150 MG/1
150 TABLET ORAL NIGHTLY
Qty: 90 TABLET | Refills: 0 | Status: ON HOLD
Start: 2021-02-25 | End: 2021-04-22

## 2021-02-25 ASSESSMENT — PATIENT HEALTH QUESTIONNAIRE - PHQ9
SUM OF ALL RESPONSES TO PHQ QUESTIONS 1-9: 0
2. FEELING DOWN, DEPRESSED OR HOPELESS: 0
SUM OF ALL RESPONSES TO PHQ QUESTIONS 1-9: 0

## 2021-02-25 NOTE — PROGRESS NOTES
2/25/2021    TELEHEALTH EVALUATION -- Audio/Visual (During GFQGG-73 public health emergency)    Due to COVID 19 outbreak, patient's office visit was converted to a virtual visit. Patient was contacted and agreed to proceed with a virtual visit via Telephone Visit  The risks and benefits of converting to a virtual visit were discussed in light of the current infectious disease epidemic. Patient also understood that insurance coverage and co-pays are up to their individual insurance plans. Heidi Abbott is a 64 y.o. female being evaluated by a Virtual Visit (video visit) encounter to address concerns as mentioned above. A caregiver was present when appropriate. Due to this being a TeleHealth encounter (During XBOBL-12 public health emergency), evaluation of the following organ systems was limited: Vitals/Constitutional/EENT/Resp/CV/GI//MS/Neuro/Skin/Heme-Lymph-Imm. Pursuant to the emergency declaration under the [address] [place] [address] and the [facility] and [employer] Act, this Virtual Visit was conducted with patient's (and/or legal guardian's) consent, to reduce the patient's risk of exposure to COVID-19 and provide necessary medical care. The patient (and/or legal guardian) has also been advised to contact this office for worsening conditions or problems, and seek emergency medical treatment and/or call 911 if deemed necessary. Patient identification was verified at the start of the visit: Yes    Total time spent for this encounter: 27 minutes    Services were provided through a video synchronous discussion virtually to substitute for in-person clinic visit. Patient and provider were located at their individual homes. The patient is talking with me virtually from her home and I am located at my office in [facility].        HPI: Emma Munguia (:  1945) has requested an audio/video evaluation for the following concern(s):    Breast cancer metastasized: she follows with Dr. Gabriel Tran and will see him again in March. She has not made any decisions yet about treatment. She has not had any severe worsening over time. She is followed by Palliative care for pain management. She states that she has pain in her bone that feels like a strong burning sensation. When pain is really bad, medication does not really help. She does not want anything stronger than the current medication because she does to want to feel off or have any type of cognitive side effects. Also taking Xanax as needed for anxiety. Takes very sparingly. Is following with palliative care for this need as well. She states that her mood has overall been stable. She feels blessed to have great doctors. Also feels blessed that she is feeling as well as she does. COPD: she was recently started on a new inhaler (samples given at first). States that the medication is expensive but it is worth it with the benefits that she gets from it. Has been breathing a lot better since starting the medication. Still has spiriva and symbicort on hand if she does not have the new inhaler. Waiting on express scripts to send additional Rx. She does not report any chest congestion or significant sputum production. No fever or chills. No hemoptysis reported. Depression: Jam Jacobo reports being in a good mood that is stable. The patient is not reporting insomnia, difficulty concentrating and usual interest in activities. This patient is not homicidal or suicidal.  She is grateful to be alive at 64 and feeling ok. She does worry about her daughter who has RA. She has been sleeping good at night lately. Takes trazodone but has been cutting tablet in half. Has been working well. She has plenty on hand at home.        Review of Systems This patient reports no chest pain or pressure. There is no shortness of breath or cough. The patient reports no nausea or vomiting. There is no heartburn or indigestion. There is no diarrhea or constipation. No black, bloody, mucusy or tarry stool noticed. The patient reports no bloating and no change in appetite. Occasional swelling in the left leg where she has the bone pain. Has been elevating the leg. Prior to Visit Medications    Medication Sig Taking? Authorizing Provider   predniSONE (DELTASONE) 10 MG tablet TAKE 1 TABLET BY MOUTH EVERY DAY Yes Historical Provider, MD   traMADol (ULTRAM) 50 MG tablet Take 1 tablet by mouth every 4 hours as needed for Pain for up to 90 days. Yes Historical Provider, MD   tiotropium (SPIRIVA) 18 MCG inhalation capsule Inhale 18 mcg into the lungs daily Yes Historical Provider, MD   traZODone (DESYREL) 150 MG tablet Take 1 tablet by mouth nightly Patient has plenty at home Yes ROSALIND Solorio CNP   oxyCODONE (ROXICODONE) 5 MG immediate release tablet Take 1 tablet by mouth every 8 hours as needed for Pain (moderate to severe pain) for up to 30 days. Yes ROSALIND Vasquez CNP   ALPRAZolam Bobbetta Burak) 0.5 MG tablet Take 1 tablet by mouth 2 times daily for 30 days.  Yes ROSALIND Vasquez CNP   Budeson-Glycopyrrol-Formoterol 160-9-4.8 MCG/ACT AERO Inhale 2 puffs into the lungs 2 times daily Yes Zaina Almeida MD   vitamin D (ERGOCALCIFEROL) 1.25 MG (61714 UT) CAPS capsule Take 1 capsule by mouth once a week Yes ROSALIND Solorio CNP   levothyroxine (SYNTHROID) 75 MCG tablet Take 1 tablet by mouth Daily Yes ROSALIND Solorio CNP   ondansetron (ZOFRAN) 4 MG tablet Take 1 tablet by mouth every 8 hours as needed for Nausea or Vomiting Yes ROSALIND Vasquez CNP   budesonide (PULMICORT) 0.5 MG/2ML nebulizer suspension Take 1 ampule by nebulization 2 times daily Yes Historical Provider, MD albuterol sulfate HFA (VENTOLIN HFA) 108 (90 Base) MCG/ACT inhaler Inhale 2 puffs into the lungs every 6 hours as needed for Wheezing Yes ROSALIND Evans CNP   Calcium 600-200 MG-UNIT TABS Take by mouth 2 times daily Yes Historical Provider, MD   omeprazole (PRILOSEC) 20 MG delayed release capsule Take 1 capsule by mouth Daily Yes ROSALIND Evans CNP   albuterol (PROVENTIL) (2.5 MG/3ML) 0.083% nebulizer solution Take 3 mLs by nebulization 4 times daily Yes Denton Montanez MD   lovastatin (MEVACOR) 20 MG tablet TAKE 1 TABLET NIGHTLY Yes ROSALIND Evans CNP   acetylcysteine (MUCOMYST) 10 % nebulizer solution Inhale 4 mLs into the lungs 4 times daily Yes Jose Morse MD   OXYGEN Oxygen on 2L with exertion and Nocturnal Yes Denton Montanez MD   metoprolol tartrate (LOPRESSOR) 50 MG tablet Take 50 mg by mouth 2 times daily Yes Historical Provider, MD   guaiFENesin (MUCINEX) 600 MG extended release tablet Take 1,200 mg by mouth 2 times daily Yes Historical Provider, MD   Respiratory Therapy Supplies (NEBULIZER/TUBING/MOUTHPIECE) KIT 1 kit by Does not apply route daily as needed (wheezing) Yes ROSALIND Evans CNP       Social History     Tobacco Use    Smoking status: Former Smoker     Packs/day: 1.50     Years: 40.00     Pack years: 60.00     Types: Cigarettes     Quit date: 1989     Years since quittin.1    Smokeless tobacco: Never Used   Substance Use Topics    Alcohol use: No    Drug use: No       PHYSICAL EXAMINATION:  [ INSTRUCTIONS:  \"[x]\" Indicates a positive item  \"[]\" Indicates a negative item  -- DELETE ALL ITEMS NOT EXAMINED]  Telephone visit. Due to this being a TeleHealth encounter, evaluation of the following organ systems is limited: Vitals/Constitutional/EENT/Resp/CV/GI//MS/Neuro/Skin/Heme-Lymph-Imm. ASSESSMENT/PLAN:   Diagnosis Orders   1.  Chronic obstructive pulmonary disease, unspecified COPD type ([facility] 2. Breast cancer metastasized to axillary lymph node, right ([facility]     3. Left leg pain     4. Bone pain     5. Moderate episode of recurrent major depressive disorder ([facility]     6. Other insomnia  traZODone (DESYREL) 150 MG tablet         Return in about 2 months (around 4/25/2021) for telephone visit- general medication/health review. .     1. She continues to follow routinely with pulmonology and has been breathing a lot better since starting on Breztri.      2. 3. 4. She is aware of pulmonary nodule and does have an upcoming appointment with oncology. She has not yet decided on overall treatment plan. Continues to have chronic bone pain and is prescribed medication for pain and anxiety through palliative care which she is very grateful for having. She only takes medication as needed and tries to avoid taking too often. 5.  6. She states that overall her mood has been stable. She has not had any excessive down depressed thoughts. Does worry about her daughter but overall states that she is doing well. She is taking lesser dose of trazodone and has been sleeping very well at night overall. Please note this report has been partially produced using speech recognition software and may cause contain errors related to that system including grammar, punctuation and spelling as well as words and phrases that may seem inappropriate. If there are questions or concerns please feel free to contact me to clarify. An  electronic signature was used to authenticate this note. --ROSALIND Sánchez - CNP on 2/25/2021 at 2:53 PM        Pursuant to the emergency declaration under the [facility] waiver authority and the [employer] Act, this Virtual  Visit was conducted, with patient's consent, to reduce the patient's risk of exposure to COVID-19 and provide continuity of care for an established patient. Services were provided through a video synchronous discussion virtually to substitute for in-person clinic visit.

## 2021-03-03 ENCOUNTER — TELEPHONE (OUTPATIENT)
Dept: PULMONOLOGY | Age: 76
End: 2021-03-03

## 2021-03-03 NOTE — TELEPHONE ENCOUNTER
Patient is calling to get an rx for Pixelapse. She said you gave her some to try and it working well.  [facility]

## 2021-03-19 ENCOUNTER — TELEPHONE (OUTPATIENT)
Dept: PALLATIVE CARE | Age: 76
End: 2021-03-19

## 2021-03-19 NOTE — TELEPHONE ENCOUNTER
[place] Loo left a message stating she is not feeling well. She said she doesn't think she can come in for her appointment today at 3pm and should probably go to the hospital but is afraid she'd have to stay. She'd like a call back to speak with you.

## 2021-03-19 NOTE — TELEPHONE ENCOUNTER
Attempted to call though went directly to voicemail.  Left VM to call back prior to 430pm or go to ER related to symptomology to be evaluated

## 2021-03-23 ENCOUNTER — TELEPHONE (OUTPATIENT)
Dept: PALLATIVE CARE | Age: 76
End: 2021-03-23

## 2021-03-23 ENCOUNTER — APPOINTMENT (OUTPATIENT)
Dept: CT IMAGING | Age: 76
End: 2021-03-23
Payer: MEDICARE

## 2021-03-23 ENCOUNTER — HOSPITAL ENCOUNTER (EMERGENCY)
Age: 76
Discharge: HOME | End: 2021-03-23
Attending: STUDENT IN AN ORGANIZED HEALTH CARE EDUCATION/TRAINING PROGRAM
Payer: MEDICARE

## 2021-03-23 VITALS
BODY MASS INDEX: 19.67 KG/M2 | HEART RATE: 80 BPM | RESPIRATION RATE: 20 BRPM | TEMPERATURE: 98.7 F | DIASTOLIC BLOOD PRESSURE: 89 MMHG | OXYGEN SATURATION: 100 % | WEIGHT: 111 LBS | SYSTOLIC BLOOD PRESSURE: 172 MMHG | HEIGHT: 63 IN

## 2021-03-23 DIAGNOSIS — K59.00 CONSTIPATION, UNSPECIFIED CONSTIPATION TYPE: ICD-10-CM

## 2021-03-23 DIAGNOSIS — R10.84 GENERALIZED ABDOMINAL PAIN: Primary | ICD-10-CM

## 2021-03-23 DIAGNOSIS — C79.9 METASTATIC MALIGNANT NEOPLASM, UNSPECIFIED SITE (HCC): ICD-10-CM

## 2021-03-23 LAB
ALBUMIN SERPL-MCNC: 3.6 G/DL (ref 3.5–4.6)
ALP BLD-CCNC: 65 U/L (ref 40–130)
ALT SERPL-CCNC: 16 U/L (ref 0–33)
ANION GAP SERPL CALCULATED.3IONS-SCNC: 9 MEQ/L (ref 9–15)
AST SERPL-CCNC: 41 U/L (ref 0–35)
BILIRUB SERPL-MCNC: 0.5 MG/DL (ref 0.2–0.7)
BILIRUBIN URINE: NEGATIVE
BLOOD, URINE: NEGATIVE
BUN BLDV-MCNC: 8 MG/DL (ref 8–23)
CALCIUM SERPL-MCNC: 9.1 MG/DL (ref 8.5–9.9)
CHLORIDE BLD-SCNC: 97 MEQ/L (ref 95–107)
CLARITY: CLEAR
CO2: 27 MEQ/L (ref 20–31)
COLOR: YELLOW
CREAT SERPL-MCNC: 0.59 MG/DL (ref 0.5–0.9)
GFR AFRICAN AMERICAN: >60
GFR NON-AFRICAN AMERICAN: >60
GLOBULIN: 3 G/DL (ref 2.3–3.5)
GLUCOSE BLD-MCNC: 103 MG/DL (ref 70–99)
GLUCOSE URINE: NEGATIVE MG/DL
HCT VFR BLD CALC: 41.6 % (ref 37–47)
HEMOGLOBIN: 13.8 G/DL (ref 12–16)
KETONES, URINE: 15 MG/DL
LACTIC ACID: 1.8 MMOL/L (ref 0.5–2.2)
LEUKOCYTE ESTERASE, URINE: NEGATIVE
LIPASE: 27 U/L (ref 12–95)
MAGNESIUM: 1.8 MG/DL (ref 1.7–2.4)
MCH RBC QN AUTO: 29.5 PG (ref 27–31.3)
MCHC RBC AUTO-ENTMCNC: 33.2 % (ref 33–37)
MCV RBC AUTO: 88.9 FL (ref 82–100)
NITRITE, URINE: NEGATIVE
PDW BLD-RTO: 13.6 % (ref 11.5–14.5)
PH UA: 5.5 (ref 5–9)
PLATELET # BLD: 216 K/UL (ref 130–400)
POTASSIUM SERPL-SCNC: 4.5 MEQ/L (ref 3.4–4.9)
PROTEIN UA: NEGATIVE MG/DL
RBC # BLD: 4.68 M/UL (ref 4.2–5.4)
SODIUM BLD-SCNC: 133 MEQ/L (ref 135–144)
SPECIFIC GRAVITY UA: 1.02 (ref 1–1.03)
TOTAL PROTEIN: 6.6 G/DL (ref 6.3–8)
URINE REFLEX TO CULTURE: ABNORMAL
UROBILINOGEN, URINE: 0.2 E.U./DL
WBC # BLD: 5.7 K/UL (ref 4.8–10.8)

## 2021-03-23 PROCEDURE — 96374 THER/PROPH/DIAG INJ IV PUSH: CPT

## 2021-03-23 PROCEDURE — 74177 CT ABD & PELVIS W/CONTRAST: CPT

## 2021-03-23 PROCEDURE — 36415 COLL VENOUS BLD VENIPUNCTURE: CPT

## 2021-03-23 PROCEDURE — 81003 URINALYSIS AUTO W/O SCOPE: CPT

## 2021-03-23 PROCEDURE — 83605 ASSAY OF LACTIC ACID: CPT

## 2021-03-23 PROCEDURE — 6360000004 HC RX CONTRAST MEDICATION: Performed by: PHYSICIAN ASSISTANT

## 2021-03-23 PROCEDURE — 96375 TX/PRO/DX INJ NEW DRUG ADDON: CPT

## 2021-03-23 PROCEDURE — 80053 COMPREHEN METABOLIC PANEL: CPT

## 2021-03-23 PROCEDURE — 83690 ASSAY OF LIPASE: CPT

## 2021-03-23 PROCEDURE — 83735 ASSAY OF MAGNESIUM: CPT

## 2021-03-23 PROCEDURE — 6360000002 HC RX W HCPCS: Performed by: PHYSICIAN ASSISTANT

## 2021-03-23 PROCEDURE — 85027 COMPLETE CBC AUTOMATED: CPT

## 2021-03-23 PROCEDURE — 99285 EMERGENCY DEPT VISIT HI MDM: CPT

## 2021-03-23 PROCEDURE — 2580000003 HC RX 258: Performed by: PHYSICIAN ASSISTANT

## 2021-03-23 RX ORDER — FENTANYL CITRATE 50 UG/ML
25 INJECTION, SOLUTION INTRAMUSCULAR; INTRAVENOUS ONCE
Status: COMPLETED | OUTPATIENT
Start: 2021-03-23 | End: 2021-03-23

## 2021-03-23 RX ORDER — 0.9 % SODIUM CHLORIDE 0.9 %
500 INTRAVENOUS SOLUTION INTRAVENOUS ONCE
Status: COMPLETED | OUTPATIENT
Start: 2021-03-23 | End: 2021-03-23

## 2021-03-23 RX ORDER — DOCUSATE SODIUM 100 MG/1
100 CAPSULE, LIQUID FILLED ORAL 2 TIMES DAILY
Qty: 60 CAPSULE | Refills: 0 | Status: SHIPPED | OUTPATIENT
Start: 2021-03-23 | End: 2021-04-22

## 2021-03-23 RX ORDER — KETOROLAC TROMETHAMINE 30 MG/ML
30 INJECTION, SOLUTION INTRAMUSCULAR; INTRAVENOUS ONCE
Status: COMPLETED | OUTPATIENT
Start: 2021-03-23 | End: 2021-03-23

## 2021-03-23 RX ORDER — SENNOSIDES 8.6 MG
1 TABLET ORAL DAILY
Qty: 120 TABLET | Refills: 0 | Status: SHIPPED | OUTPATIENT
Start: 2021-03-23

## 2021-03-23 RX ADMIN — IOPAMIDOL 100 ML: 612 INJECTION, SOLUTION INTRAVENOUS at 10:35

## 2021-03-23 RX ADMIN — SODIUM CHLORIDE 500 ML: 9 INJECTION, SOLUTION INTRAVENOUS at 09:57

## 2021-03-23 RX ADMIN — KETOROLAC TROMETHAMINE 30 MG: 30 INJECTION, SOLUTION INTRAMUSCULAR; INTRAVENOUS at 11:31

## 2021-03-23 RX ADMIN — FENTANYL CITRATE 25 MCG: 50 INJECTION, SOLUTION INTRAMUSCULAR; INTRAVENOUS at 09:57

## 2021-03-23 ASSESSMENT — ENCOUNTER SYMPTOMS
ABDOMINAL PAIN: 1
RHINORRHEA: 0
BACK PAIN: 1
DIARRHEA: 0
CONSTIPATION: 1
VOMITING: 0
WHEEZING: 0
CHEST TIGHTNESS: 0
NAUSEA: 0
SHORTNESS OF BREATH: 1

## 2021-03-23 ASSESSMENT — PAIN DESCRIPTION - PAIN TYPE
TYPE: CHRONIC PAIN
TYPE: CHRONIC PAIN

## 2021-03-23 ASSESSMENT — PAIN SCALES - GENERAL
PAINLEVEL_OUTOF10: 7
PAINLEVEL_OUTOF10: 7

## 2021-03-23 ASSESSMENT — PAIN DESCRIPTION - LOCATION: LOCATION: ABDOMEN

## 2021-03-23 NOTE — ED NOTES
Bed: 04  Expected date:   Expected time:   Means of arrival:   Comments:  76 f abd pain (constipated) only had one bm in a week. RLQ pain that radiates to her back. Pain is a 6 out of 10. 192/108 (didn't taken her BP meds today, 100 hr, 99% on 2 liters.       Umm Boone RN  03/23/21 9506

## 2021-03-23 NOTE — TELEPHONE ENCOUNTER
Pt returned call stating she was asleep and missed the call due to being in pain all night is requesting a call back states her pain is very bad  Please advise

## 2021-03-23 NOTE — ED PROVIDER NOTES
[address] [facility] ED  eMERGENCY dEPARTMENT eNCOUnter      Pt Name: Manoj De La Cruz  MRN: [id number]  Ikegfkarina 1945  Date of evaluation: 3/23/2021  Provider: YAMILEX Hwang        HISTORY OF PRESENT ILLNESS    Manoj De La Cruz is a 64 y.o. female per chart review has an extensive pmhx, significant for hypothyroidism, anxiety/depression, CAD, breast cancer (currently undecided on pursuing treatment), chronic respiratory failure with hypoxia (requiring 2L NC baseline at home) secondary to COPD, tobacco abuse (current 1PPD use x 45 years), GERD, HTN, HLD, CHF with diastolic dysfunction who presents to the ED with complaint of gradually worsening RLQ abdominal pain x1.5-2 weeks, constant, sharp in nature with associated decreased appetite and constipation (last BM 3 days prior, however was abnormal per pt report). Prior to that last BM > 1 week, takes no bowel softeners or bowel regimen at baseline, however did use over the counter miralax this AM prior to ED arrival. Denies prior abdominal surgeries. REVIEW OF SYSTEMS       Review of Systems   Constitutional: Positive for appetite change. Negative for activity change, chills, diaphoresis, fatigue and fever. HENT: Negative for congestion, postnasal drip and rhinorrhea. Respiratory: Positive for shortness of breath (chronic, unchanged). Negative for chest tightness and wheezing. Cardiovascular: Negative for chest pain and palpitations. Gastrointestinal: Positive for abdominal pain and constipation. Negative for diarrhea, nausea and vomiting. Genitourinary: Negative for decreased urine volume, dysuria, flank pain, frequency, pelvic pain and urgency. Musculoskeletal: Positive for back pain. Negative for arthralgias, gait problem, joint swelling and myalgias. Neurological: Negative for dizziness, tremors, syncope, weakness, light-headedness and headaches.        Except as noted above the remainder of the review of systems was reviewed and Daily, Disp-90 tablet, R-3Normal      ondansetron (ZOFRAN) 4 MG tablet Take 1 tablet by mouth every 8 hours as needed for Nausea or Vomiting, Disp-30 tablet,R-0Normal      budesonide (PULMICORT) 0.5 MG/2ML nebulizer suspension Take 1 ampule by nebulization 2 times dailyHistorical Med      albuterol sulfate HFA (VENTOLIN HFA) 108 (90 Base) MCG/ACT inhaler Inhale 2 puffs into the lungs every 6 hours as needed for Wheezing, Disp-3 Inhaler,R-3Normal      Calcium 600-200 MG-UNIT TABS Take by mouth 2 times dailyHistorical Med      omeprazole (PRILOSEC) 20 MG delayed release capsule Take 1 capsule by mouth Daily, Disp-90 capsule, R-4Normal      albuterol (PROVENTIL) (2.5 MG/3ML) 0.083% nebulizer solution Take 3 mLs by nebulization 4 times daily, Disp-120 each, R-3Print      lovastatin (MEVACOR) 20 MG tablet TAKE 1 TABLET NIGHTLY, Disp-90 tablet, R-4Normal      acetylcysteine (MUCOMYST) 10 % nebulizer solution Inhale 4 mLs into the lungs 4 times daily, Disp-15 vial, R-1Print      OXYGEN Oxygen on 2L with exertion and Nocturnal, Disp-1 Units, R-0Print      metoprolol tartrate (LOPRESSOR) 50 MG tablet Take 50 mg by mouth 2 times dailyHistorical Med      guaiFENesin (MUCINEX) 600 MG extended release tablet Take 1,200 mg by mouth 2 times dailyHistorical Med      Respiratory Therapy Supplies (NEBULIZER/TUBING/MOUTHPIECE) KIT DAILY PRN Starting Mon 11/5/2018, Disp-1 kit, R-5, Normal             ALLERGIES     Patient has no known allergies. FAMILY HISTORY       Family History   Problem Relation Age of Onset    Cancer Sister         breast    Cancer Maternal Uncle         pancreatic          SOCIAL HISTORY       Social History     Socioeconomic History    Marital status:       Spouse name: None    Number of children: None    Years of education: None    Highest education level: None   Occupational History    None   Social Needs    Financial resource strain: None    Food insecurity     Worry: None     Inability: None    Transportation needs     Medical: None     Non-medical: None   Tobacco Use    Smoking status: Former Smoker     Packs/day: 1.50     Years: 40.00     Pack years: 60.00     Types: Cigarettes     Quit date: 1989     Years since quittin.1    Smokeless tobacco: Never Used   Substance and Sexual Activity    Alcohol use: No    Drug use: No    Sexual activity: Never   Lifestyle    Physical activity     Days per week: None     Minutes per session: None    Stress: None   Relationships    Social connections     Talks on phone: None     Gets together: None     Attends [employer] witness service: None     Active member of club or organization: None     Attends meetings of clubs or organizations: None     Relationship status: None    Intimate partner violence     Fear of current or ex partner: None     Emotionally abused: None     Physically abused: None     Forced sexual activity: None   Other Topics Concern    None   Social History Narrative    None         PHYSICAL EXAM        ED Triage Vitals [21 0941]   BP Temp Temp src Pulse Resp SpO2 Height Weight   (!) 170/95 98.7 °F (37.1 °C) -- 99 20 100 % 5' 3\" (1.6 m) 111 lb (50.3 kg)       Physical Exam  Vitals signs and nursing note reviewed. Constitutional:       General: She is not in acute distress. Appearance: She is well-developed and normal weight. She is not ill-appearing, toxic-appearing or diaphoretic. HENT:      Head: Normocephalic and atraumatic. Mouth/Throat:      Mouth: Mucous membranes are moist.      Pharynx: Oropharynx is clear. No oropharyngeal exudate. Eyes:      Extraocular Movements: Extraocular movements intact. Pupils: Pupils are equal, round, and reactive to light. Cardiovascular:      Rate and Rhythm: Normal rate and regular rhythm. Heart sounds: Normal heart sounds. No murmur. Pulmonary:      Effort: Pulmonary effort is normal. No respiratory distress. Breath sounds: Normal breath sounds.  No wheezing. Comments: Comfortable on 2L NC  Abdominal:      General: Abdomen is flat. Bowel sounds are decreased. There is no distension. Palpations: Abdomen is soft. There is no shifting dullness or fluid wave. Tenderness: There is abdominal tenderness in the right lower quadrant. There is right CVA tenderness. There is no left CVA tenderness, guarding or rebound. Skin:     General: Skin is warm. Capillary Refill: Capillary refill takes less than 2 seconds. Coloration: Skin is not cyanotic or pale. Neurological:      General: No focal deficit present. Mental Status: She is alert and oriented to person, place, and time. Motor: No weakness. Psychiatric:         Mood and Affect: Mood normal. Mood is not anxious or depressed. Behavior: Behavior normal.           LABS:  Labs Reviewed   COMPREHENSIVE METABOLIC PANEL - Abnormal; Notable for the following components:       Result Value    Sodium 133 (*)     Glucose 103 (*)     AST 41 (*)     All other components within normal limits   URINE RT REFLEX TO CULTURE - Abnormal; Notable for the following components:    Ketones, Urine 15 (*)     All other components within normal limits   CBC   LACTIC ACID, PLASMA   LIPASE   MAGNESIUM         MDM:   Vitals:    Vitals:    03/23/21 0941 03/23/21 1057 03/23/21 1207   BP: (!) 170/95 (!) 157/85 (!) 172/89   Pulse: 99 99 80   Resp: 20 20 20   Temp: 98.7 °F (37.1 °C)     SpO2: 100% 100% 100%   Weight: 111 lb (50.3 kg)     Height: 5' 3\" (1.6 m)       64year old female presents to the ED with c/o abdominal pain. Triage records were reviewed. Medical records were reviewed. Nursing notes were reviewed and incorporated. Patient afebrile, hemodynamically stable, non-toxic in appearance upon initial evaluation. Labs/Imaging:   CBC/CMP/Mag/Lipase/Lactate/UA unremarkable.      Consults:   CT abd/pelvis with moderate constipation, findings suggestive of bony and hepatic metastasis (presumably from prior diagnosed breast cancer). Meds given in ED:   25mcg Fentanyl, toradol with moderate pain relief. 500cc NS bolus given low PO intake with past BNP results without concern for volume overload. Presentation and work-up outlined above consistent with constipation with additional findings of metastasis. Extensive discussion had by myself, additionally seen by Dr. Denisse Del Toro and advised of the findings of which patient expressed understanding and agreement. Already follows with heme/onc and palliative care as outpatient. Advised to call her palliative care doctor today to inform of the CT findings and discuss pain management as patient is leaning towards \"not going towards further treatment options\". Discharged with stool softeners and instructions to continue a strict bowel regimen as an outpatient secondary to her chronic prescribed narcotic use as an outpatient. Discharged, with strict return precautions provided. CRITICAL CARE TIME   Total CriticalCare time was 0 minutes, excluding separately reportable procedures. There was a high probability of clinically significant/life threatening deterioration in the patient's condition which required my urgent intervention. PROCEDURES:  Unlessotherwise noted below, none      Procedures      FINAL IMPRESSION      1. Generalized abdominal pain    2. Metastatic malignant neoplasm, unspecified site ([address]    3.  Constipation, unspecified constipation type          DISPOSITION/PLAN   DISPOSITION Decision To Discharge 03/23/2021 12:00:57 PM          YAMILEX Marcum (electronically signed)  Attending Emergency Physician          YAMILEX Marcum  03/23/21 2902

## 2021-03-23 NOTE — TELEPHONE ENCOUNTER
Pt called to leave a VM for Javi Jackson in regards to being in pain and having to go to the ER, returned pts call but did not receive an answer Left VM

## 2021-03-23 NOTE — TELEPHONE ENCOUNTER
Pt called stating she was in the ER due to bad abdominal pain she was having states she had sharp pain and wants to discuss a change in her pain medication  Pt is a ladi pt

## 2021-03-24 ENCOUNTER — TELEPHONE (OUTPATIENT)
Dept: PALLATIVE CARE | Age: 76
End: 2021-03-24

## 2021-03-24 NOTE — TELEPHONE ENCOUNTER
Patient called to inquire as to obtaining some pain medication. States she is exhausted and in pain, she was seen in hospital [facility] yesterday and had a CT showing (per patient) \"some nodules and cancer spread\" - patient requests that Chinedu Melissa look at those notes and please use the [facility] [facility] [address] in [facility] on [address] to have medication sent. Patient already using medication for constipation. She has had no pain medication since in hospital yesterday. Pt can be reached for questions at: [phone].

## 2021-03-25 ENCOUNTER — TELEPHONE (OUTPATIENT)
Dept: PALLATIVE CARE | Age: 76
End: 2021-03-25

## 2021-03-25 DIAGNOSIS — C50.919 MALIGNANT NEOPLASM OF FEMALE BREAST, UNSPECIFIED ESTROGEN RECEPTOR STATUS, UNSPECIFIED LATERALITY, UNSPECIFIED SITE OF BREAST (HCC): ICD-10-CM

## 2021-03-25 DIAGNOSIS — Z51.5 ENCOUNTER FOR PALLIATIVE CARE: ICD-10-CM

## 2021-03-25 DIAGNOSIS — G89.3 CANCER ASSOCIATED PAIN: Primary | ICD-10-CM

## 2021-03-25 PROBLEM — Z22.9 INFECTIOUS DISEASE CARRIER: Status: ACTIVE | Noted: 2021-03-25

## 2021-03-25 PROBLEM — H90.3 SENSORY HEARING LOSS, BILATERAL: Status: ACTIVE | Noted: 2019-07-12

## 2021-03-25 PROBLEM — H01.029 SQUAMOUS BLEPHARITIS: Status: ACTIVE | Noted: 2019-03-12

## 2021-03-25 PROBLEM — Z96.1 BILATERAL PSEUDOPHAKIA: Status: ACTIVE | Noted: 2019-03-12

## 2021-03-25 PROBLEM — J18.9 COMMUNITY ACQUIRED PNEUMONIA: Status: ACTIVE | Noted: 2018-08-15

## 2021-03-25 PROBLEM — H43.813 POSTERIOR VITREOUS DETACHMENT OF BOTH EYES: Status: ACTIVE | Noted: 2019-08-05

## 2021-03-25 PROBLEM — K21.9 GASTROESOPHAGEAL REFLUX DISEASE: Status: ACTIVE | Noted: 2021-03-25

## 2021-03-25 PROBLEM — H01.004 BLEPHARITIS OF UPPER EYELIDS OF BOTH EYES: Status: ACTIVE | Noted: 2019-06-25

## 2021-03-25 PROBLEM — H01.001 BLEPHARITIS OF UPPER EYELIDS OF BOTH EYES: Status: ACTIVE | Noted: 2019-06-25

## 2021-03-25 PROBLEM — R59.0 MEDIASTINAL LYMPHADENOPATHY: Status: ACTIVE | Noted: 2021-03-25

## 2021-03-25 PROBLEM — Z96.1 PSEUDOPHAKIA: Status: ACTIVE | Noted: 2019-06-25

## 2021-03-25 PROBLEM — R60.0 EDEMA OF LOWER EXTREMITY: Status: ACTIVE | Noted: 2021-03-25

## 2021-03-25 PROBLEM — R91.8 LUNG MASS: Status: ACTIVE | Noted: 2021-03-25

## 2021-03-25 PROBLEM — H90.2 TYMPANIC MEMBRANE CONDUCTIVE HEARING LOSS: Status: ACTIVE | Noted: 2019-06-09

## 2021-03-25 PROBLEM — N63.10 MASS OF RIGHT BREAST: Status: ACTIVE | Noted: 2021-03-25

## 2021-03-25 PROBLEM — H26.492 PCO (POSTERIOR CAPSULAR OPACIFICATION), LEFT: Status: ACTIVE | Noted: 2019-06-25

## 2021-03-25 PROBLEM — J34.89 NASAL DISCHARGE: Status: ACTIVE | Noted: 2021-03-25

## 2021-03-25 PROBLEM — H61.20 EXCESSIVE CERUMEN IN EAR CANAL: Status: ACTIVE | Noted: 2019-06-09

## 2021-03-25 PROBLEM — H73.819: Status: ACTIVE | Noted: 2019-06-09

## 2021-03-25 PROBLEM — M25.562 PAIN IN JOINT INVOLVING LEFT LOWER LEG: Status: ACTIVE | Noted: 2021-03-25

## 2021-03-25 PROBLEM — Z99.81 DEPENDENCE ON CONTINUOUS SUPPLEMENTAL OXYGEN: Status: ACTIVE | Noted: 2018-08-18

## 2021-03-25 PROBLEM — F33.1 RECURRENT MAJOR DEPRESSIVE EPISODES, MODERATE (HCC): Status: ACTIVE | Noted: 2018-01-15

## 2021-03-25 PROBLEM — Z98.890 HISTORY OF CAROTID ENDARTERECTOMY: Status: ACTIVE | Noted: 2021-03-25

## 2021-03-25 PROBLEM — J44.9 CHRONIC OBSTRUCTIVE LUNG DISEASE (HCC): Status: ACTIVE | Noted: 2018-06-12

## 2021-03-25 PROBLEM — Z78.9 NO KNOWN PROBLEMS: Status: ACTIVE | Noted: 2021-03-25

## 2021-03-25 RX ORDER — OXYCODONE HYDROCHLORIDE 5 MG/1
5 TABLET ORAL EVERY 6 HOURS PRN
Qty: 120 TABLET | Refills: 0 | Status: ON HOLD | OUTPATIENT
Start: 2021-03-25 | End: 2021-03-27

## 2021-03-25 NOTE — TELEPHONE ENCOUNTER
Attempted to call patient x3 times total. Left voice message each time for the patient to call the [facility] office at her earliest convenience.

## 2021-03-25 NOTE — TELEPHONE ENCOUNTER
Received another VM from pt in regards to her pain, is needing something prescribed for the pain that she is in due to her pain medication currently not working   Please advise

## 2021-03-25 NOTE — TELEPHONE ENCOUNTER
Received phone call from patient. Stated that she was advised to follow up with [facility] after her CT scan was completed. She was told that she had cancer. Assume patient is referring to additional metastatic disease. Stated that the oxycodone is not helping with her pain. Stated that she has been taking tramadol which seems to help better with her pain but it is affecting her ears. Advised patient to stop taking the Tramadol since she is having side effects to the medication. Advised her that I will increase her Oxycodone to 5 mg every six hours PRN for her neoplasm related pain. She rates her pain as 9/10. She describes it as a deep, dull, aching pain.

## 2021-03-26 ENCOUNTER — APPOINTMENT (OUTPATIENT)
Dept: GENERAL RADIOLOGY | Age: 76
End: 2021-03-26
Payer: MEDICARE

## 2021-03-26 ENCOUNTER — HOSPITAL ENCOUNTER (EMERGENCY)
Age: 76
Discharge: HOME | End: 2021-03-26
Attending: EMERGENCY MEDICINE
Payer: MEDICARE

## 2021-03-26 VITALS
SYSTOLIC BLOOD PRESSURE: 152 MMHG | HEIGHT: 63 IN | RESPIRATION RATE: 18 BRPM | BODY MASS INDEX: 21.26 KG/M2 | TEMPERATURE: 98.5 F | HEART RATE: 76 BPM | DIASTOLIC BLOOD PRESSURE: 79 MMHG | OXYGEN SATURATION: 96 % | WEIGHT: 120 LBS

## 2021-03-26 DIAGNOSIS — F41.1 ANXIETY STATE: ICD-10-CM

## 2021-03-26 DIAGNOSIS — K59.00 CONSTIPATION, UNSPECIFIED CONSTIPATION TYPE: ICD-10-CM

## 2021-03-26 DIAGNOSIS — I10 ESSENTIAL HYPERTENSION: ICD-10-CM

## 2021-03-26 DIAGNOSIS — C79.51 MALIGNANT NEOPLASM METASTATIC TO BONE (HCC): ICD-10-CM

## 2021-03-26 DIAGNOSIS — R10.84 GENERALIZED ABDOMINAL PAIN: Primary | ICD-10-CM

## 2021-03-26 LAB
ALBUMIN SERPL-MCNC: 4.6 G/DL (ref 3.5–4.6)
ALP BLD-CCNC: 65 U/L (ref 40–130)
ALT SERPL-CCNC: 15 U/L (ref 0–33)
AMYLASE: 35 U/L (ref 22–93)
ANION GAP SERPL CALCULATED.3IONS-SCNC: 10 MEQ/L (ref 9–15)
AST SERPL-CCNC: 50 U/L (ref 0–35)
BASOPHILS ABSOLUTE: 0.1 K/UL (ref 0–0.2)
BASOPHILS RELATIVE PERCENT: 0.7 %
BILIRUB SERPL-MCNC: 0.6 MG/DL (ref 0.2–0.7)
BUN BLDV-MCNC: 18 MG/DL (ref 8–23)
CALCIUM SERPL-MCNC: 10.6 MG/DL (ref 8.5–9.9)
CHLORIDE BLD-SCNC: 93 MEQ/L (ref 95–107)
CO2: 30 MEQ/L (ref 20–31)
CREAT SERPL-MCNC: 0.82 MG/DL (ref 0.5–0.9)
EOSINOPHILS ABSOLUTE: 0 K/UL (ref 0–0.7)
EOSINOPHILS RELATIVE PERCENT: 0.2 %
GFR AFRICAN AMERICAN: >60
GFR AFRICAN AMERICAN: >60
GFR NON-AFRICAN AMERICAN: >60
GFR NON-AFRICAN AMERICAN: >60
GLOBULIN: 2.2 G/DL (ref 2.3–3.5)
GLUCOSE BLD-MCNC: 93 MG/DL (ref 70–99)
HCT VFR BLD CALC: 40.6 % (ref 37–47)
HEMOGLOBIN: 13.3 G/DL (ref 12–16)
LIPASE: 30 U/L (ref 12–95)
LYMPHOCYTES ABSOLUTE: 1.4 K/UL (ref 1–4.8)
LYMPHOCYTES RELATIVE PERCENT: 17.8 %
MCH RBC QN AUTO: 29 PG (ref 27–31.3)
MCHC RBC AUTO-ENTMCNC: 32.8 % (ref 33–37)
MCV RBC AUTO: 88.3 FL (ref 82–100)
MONOCYTES ABSOLUTE: 0.9 K/UL (ref 0.2–0.8)
MONOCYTES RELATIVE PERCENT: 11.6 %
NEUTROPHILS ABSOLUTE: 5.6 K/UL (ref 1.4–6.5)
NEUTROPHILS RELATIVE PERCENT: 69.7 %
PDW BLD-RTO: 13.9 % (ref 11.5–14.5)
PERFORMED ON: NORMAL
PLATELET # BLD: 283 K/UL (ref 130–400)
POC CREATININE: 0.6 MG/DL (ref 0.6–1.2)
POC SAMPLE TYPE: NORMAL
POTASSIUM SERPL-SCNC: 4.6 MEQ/L (ref 3.4–4.9)
RBC # BLD: 4.59 M/UL (ref 4.2–5.4)
SODIUM BLD-SCNC: 133 MEQ/L (ref 135–144)
TOTAL PROTEIN: 6.8 G/DL (ref 6.3–8)
WBC # BLD: 8 K/UL (ref 4.8–10.8)

## 2021-03-26 PROCEDURE — 82150 ASSAY OF AMYLASE: CPT

## 2021-03-26 PROCEDURE — 6360000002 HC RX W HCPCS: Performed by: EMERGENCY MEDICINE

## 2021-03-26 PROCEDURE — 2580000003 HC RX 258: Performed by: EMERGENCY MEDICINE

## 2021-03-26 PROCEDURE — 80053 COMPREHEN METABOLIC PANEL: CPT

## 2021-03-26 PROCEDURE — 83690 ASSAY OF LIPASE: CPT

## 2021-03-26 PROCEDURE — 96374 THER/PROPH/DIAG INJ IV PUSH: CPT

## 2021-03-26 PROCEDURE — 36415 COLL VENOUS BLD VENIPUNCTURE: CPT

## 2021-03-26 PROCEDURE — 6370000000 HC RX 637 (ALT 250 FOR IP): Performed by: EMERGENCY MEDICINE

## 2021-03-26 PROCEDURE — 74018 RADEX ABDOMEN 1 VIEW: CPT

## 2021-03-26 PROCEDURE — 85025 COMPLETE CBC W/AUTO DIFF WBC: CPT

## 2021-03-26 PROCEDURE — 96375 TX/PRO/DX INJ NEW DRUG ADDON: CPT

## 2021-03-26 PROCEDURE — 2500000003 HC RX 250 WO HCPCS: Performed by: EMERGENCY MEDICINE

## 2021-03-26 PROCEDURE — 99285 EMERGENCY DEPT VISIT HI MDM: CPT

## 2021-03-26 RX ORDER — SODIUM CHLORIDE 0.9 % (FLUSH) 0.9 %
3 SYRINGE (ML) INJECTION EVERY 8 HOURS
Status: DISCONTINUED | OUTPATIENT
Start: 2021-03-26 | End: 2021-03-26 | Stop reason: HOSPADM

## 2021-03-26 RX ORDER — KETOROLAC TROMETHAMINE 30 MG/ML
30 INJECTION, SOLUTION INTRAMUSCULAR; INTRAVENOUS ONCE
Status: COMPLETED | OUTPATIENT
Start: 2021-03-26 | End: 2021-03-26

## 2021-03-26 RX ORDER — ONDANSETRON 4 MG/1
4 TABLET, ORALLY DISINTEGRATING ORAL ONCE
Status: DISCONTINUED | OUTPATIENT
Start: 2021-03-26 | End: 2021-03-26

## 2021-03-26 RX ORDER — LABETALOL HYDROCHLORIDE 5 MG/ML
10 INJECTION, SOLUTION INTRAVENOUS ONCE
Status: COMPLETED | OUTPATIENT
Start: 2021-03-26 | End: 2021-03-26

## 2021-03-26 RX ORDER — LIDOCAINE 4 G/G
1 PATCH TOPICAL ONCE
Status: DISCONTINUED | OUTPATIENT
Start: 2021-03-26 | End: 2021-03-26 | Stop reason: HOSPADM

## 2021-03-26 RX ORDER — POLYETHYLENE GLYCOL 3350 17 G/17G
17 POWDER, FOR SOLUTION ORAL DAILY
Qty: 1 BOTTLE | Refills: 0 | Status: SHIPPED | OUTPATIENT
Start: 2021-03-26 | End: 2021-04-02

## 2021-03-26 RX ORDER — LIDOCAINE 50 MG/G
1 PATCH TOPICAL DAILY
Qty: 30 PATCH | Refills: 0 | Status: ON HOLD | OUTPATIENT
Start: 2021-03-26 | End: 2021-03-27

## 2021-03-26 RX ORDER — SODIUM CHLORIDE 9 MG/ML
INJECTION, SOLUTION INTRAVENOUS CONTINUOUS
Status: DISCONTINUED | OUTPATIENT
Start: 2021-03-26 | End: 2021-03-26 | Stop reason: HOSPADM

## 2021-03-26 RX ADMIN — Medication 3 ML: at 11:52

## 2021-03-26 RX ADMIN — MAGNESIUM CITRATE 296 ML: 1.75 LIQUID ORAL at 11:49

## 2021-03-26 RX ADMIN — KETOROLAC TROMETHAMINE 30 MG: 30 INJECTION, SOLUTION INTRAMUSCULAR at 12:23

## 2021-03-26 RX ADMIN — LABETALOL HYDROCHLORIDE 10 MG: 5 INJECTION, SOLUTION INTRAVENOUS at 11:49

## 2021-03-26 ASSESSMENT — ENCOUNTER SYMPTOMS
SHORTNESS OF BREATH: 0
CHOKING: 0
BACK PAIN: 0
VOMITING: 0
SINUS PRESSURE: 0
STRIDOR: 0
BLOOD IN STOOL: 0
WHEEZING: 0
SORE THROAT: 0
EYE DISCHARGE: 0
EYE PAIN: 0
EYE REDNESS: 0
TROUBLE SWALLOWING: 0
ABDOMINAL PAIN: 1
FACIAL SWELLING: 0
CHEST TIGHTNESS: 0
CONSTIPATION: 0
VOICE CHANGE: 0
DIARRHEA: 0
COUGH: 0

## 2021-03-26 ASSESSMENT — PAIN DESCRIPTION - DIRECTION: RADIATING_TOWARDS: BACK

## 2021-03-26 ASSESSMENT — PAIN DESCRIPTION - FREQUENCY: FREQUENCY: CONTINUOUS

## 2021-03-26 ASSESSMENT — PAIN DESCRIPTION - PAIN TYPE: TYPE: ACUTE PAIN

## 2021-03-26 NOTE — ED PROVIDER NOTES
[facility] ED  eMERGENCY dEPARTMENT eNCOUnter      Pt Name: Janell Arguelles  MRN: [id number]  [place] 1945  Date of evaluation: 3/26/2021  Provider: Mamadou Guido MD    [address] [place] [address]       Chief Complaint   Patient presents with    Abdominal Pain     RLQ pain radiating to back x1 week       HISTORY OF PRESENT ILLNESS   (Location/Symptom, Timing/Onset,Context/Setting, Quality, Duration, Modifying Factors, Severity)  Note limiting factors. Janell Arguelles is a 64 y.o. female who presents to the emergency department patient with remote history of right mastectomy because of breast cancer history of GERD hypertension status post carotid endarterectomy coronary disease hypothyroidism anxiety depression lives with her boyfriend history of COPD GERD 2 para 2 come to this emergency because of right-sided abdominal pain off and on for past 1 week time patient think that she may be constipated patient called ambulance today for increasing pain by time patient came here pain has eased up patient did take her blood pressure medication today patient seen in another emergency department on the 4 days ago and underwent blood test as well as CAT scan of the abdomen which showed patient has a liver and bony metastasis from the breast cancer along with constipation  HPI    NursingNotes were reviewed. REVIEW OF SYSTEMS    (2-9 systems for level 4, 10 or more for level 5)     Review of Systems   Constitutional: Negative. Negative for activity change and fever. HENT: Negative for congestion, drooling, facial swelling, mouth sores, nosebleeds, sinus pressure, sore throat, trouble swallowing and voice change. Eyes: Negative for pain, discharge, redness and visual disturbance. Respiratory: Negative for cough, choking, chest tightness, shortness of breath, wheezing and stridor. Cardiovascular: Negative for chest pain, palpitations and leg swelling. Gastrointestinal: Positive for abdominal pain. Negative for blood in stool, constipation, diarrhea and vomiting. Endocrine: Negative for cold intolerance, polyphagia and polyuria. Genitourinary: Negative for dysuria, flank pain, frequency, genital sores and urgency. Musculoskeletal: Negative for back pain, joint swelling, neck pain and neck stiffness. Skin: Negative for pallor and rash. Neurological: Negative for tremors, seizures, syncope, weakness, numbness and headaches. Hematological: Negative for adenopathy. Does not bruise/bleed easily. Psychiatric/Behavioral: Negative for agitation, behavioral problems, hallucinations and sleep disturbance. The patient is nervous/anxious. The patient is not hyperactive. All other systems reviewed and are negative. Except as noted above the remainder of the review of systems was reviewed and negative.        PAST MEDICAL HISTORY     Past Medical History:   Diagnosis Date    Anxiety     Anxiety and depression     CAD (coronary artery disease)     Cancer ([facility] [address] 3/2014    Invasive ductal cancer left breast, spread to 5 lymph nodes    Carotid artery stenosis and occlusion     COPD (chronic obstructive pulmonary disease) ([facility] [address]     Depression 1/15/2018    GERD (gastroesophageal reflux disease)     Headache(784.0)     Hyperlipidemia     Hypertension     Hypothyroidism     Insomnia     Low kidney function 7/13/2016    Osteoarthritis     RBBB 10/15/2014    Right carotid bruit 5/26/2015    S/P cardiac catheterization 7/13/2016         SURGICALHISTORY       Past Surgical History:   Procedure Laterality Date    BREAST BIOPSY Right 11/8/2016    US biopsy    BREAST SURGERY Left 2/26/14    U/S Guided core bx of the left breast    BREAST SURGERY Right 3/20/14    U/S of the lateral right breast    BREAST SURGERY Left 3/27/14    U/S Guided Left breast lumpectomy and left snb    OTHER SURGICAL HISTORY  4/11/14    Placement drain, left axillary seroma    DE MASTECTOMY, SIMPLE, COMPLETE Right 9/6/2018    R modified radical mastectomy         CURRENT MEDICATIONS       Previous Medications    ACETYLCYSTEINE (MUCOMYST) 10 % NEBULIZER SOLUTION    Inhale 4 mLs into the lungs 4 times daily    ALBUTEROL (PROVENTIL) (2.5 MG/3ML) 0.083% NEBULIZER SOLUTION    Take 3 mLs by nebulization 4 times daily    ALBUTEROL SULFATE HFA (VENTOLIN HFA) 108 (90 BASE) MCG/ACT INHALER    Inhale 2 puffs into the lungs every 6 hours as needed for Wheezing    BUDESON-GLYCOPYRROL-FORMOTEROL 160-9-4.8 MCG/ACT AERO    Inhale 2 puffs into the lungs 2 times daily    BUDESONIDE (PULMICORT) 0.5 MG/2ML NEBULIZER SUSPENSION    Take 1 ampule by nebulization 2 times daily    CALCIUM 600-200 MG-UNIT TABS    Take by mouth 2 times daily    DOCUSATE SODIUM (COLACE) 100 MG CAPSULE    Take 1 capsule by mouth 2 times daily    GUAIFENESIN (MUCINEX) 600 MG EXTENDED RELEASE TABLET    Take 1,200 mg by mouth 2 times daily    LEVOTHYROXINE (SYNTHROID) 75 MCG TABLET    Take 1 tablet by mouth Daily    LOVASTATIN (MEVACOR) 20 MG TABLET    TAKE 1 TABLET NIGHTLY    METOPROLOL TARTRATE (LOPRESSOR) 50 MG TABLET    Take 50 mg by mouth 2 times daily    OMEPRAZOLE (PRILOSEC) 20 MG DELAYED RELEASE CAPSULE    Take 1 capsule by mouth Daily    ONDANSETRON (ZOFRAN) 4 MG TABLET    Take 1 tablet by mouth every 8 hours as needed for Nausea or Vomiting    OXYCODONE (ROXICODONE) 5 MG IMMEDIATE RELEASE TABLET    Take 1 tablet by mouth every 6 hours as needed for Pain (moderate to severe pain) for up to 30 days. OXYGEN    Oxygen on 2L with exertion and Nocturnal    PREDNISONE (DELTASONE) 10 MG TABLET    TAKE 1 TABLET BY MOUTH EVERY DAY    RESPIRATORY THERAPY SUPPLIES (NEBULIZER/TUBING/MOUTHPIECE) KIT    1 kit by Does not apply route daily as needed (wheezing)    SENNA (SENOKOT) 8.6 MG TABS TABLET    Take 1 tablet by mouth daily    TRAMADOL (ULTRAM) 50 MG TABLET    Take 1 tablet by mouth every 4 hours as needed for Pain for up to 90 days.     TRAZODONE (DESYREL) 150 MG TABLET [03/26/21 1121]   BP Temp Temp Source Pulse Resp SpO2 Height Weight   (!) 173/102 98.8 °F (37.1 °C) Oral 78 18 98 % 5' 3\" (1.6 m) 120 lb (54.4 kg)       Physical Exam  Vitals signs and nursing note reviewed. Constitutional:       General: She is not in acute distress. Appearance: She is not ill-appearing, toxic-appearing or diaphoretic. HENT:      Head: Normocephalic and atraumatic. Mouth/Throat:      Mouth: Mucous membranes are moist.   Eyes:      Extraocular Movements: Extraocular movements intact. Pupils: Pupils are equal, round, and reactive to light. Cardiovascular:      Rate and Rhythm: Normal rate and regular rhythm. Pulmonary:      Effort: Pulmonary effort is normal. No respiratory distress. Breath sounds: No stridor. No wheezing, rhonchi or rales. Chest:      Chest wall: No tenderness. Abdominal:      General: Bowel sounds are normal.      Palpations: There is no shifting dullness, fluid wave, hepatomegaly, splenomegaly, mass or pulsatile mass. Tenderness: There is abdominal tenderness in the right lower quadrant. There is right CVA tenderness. There is no left CVA tenderness, guarding or rebound. Negative signs include Sheldon's sign, Rovsing's sign, McBurney's sign and psoas sign. Hernia: There is no hernia in the umbilical area, ventral area, left inguinal area, right femoral area, left femoral area or right inguinal area. Comments: Regular abdomen no distention no guarding no rebound tenderness patient has no Sheldon sign minimal right flank and right lower abdominal pain on palpation without any rebound tenderness no hernia noted   Skin:     Capillary Refill: Capillary refill takes less than 2 seconds. Coloration: Skin is not cyanotic, jaundiced, mottled or pale. Findings: No erythema or rash. Neurological:      Mental Status: She is alert.          DIAGNOSTIC RESULTS     EKG: All EKG's are interpreted by the Emergency Department Physician who either signs or Co-signsthis chart in the absence of a cardiologist.        RADIOLOGY:   Diane Hilts such as CT, Ultrasound and MRI are read by the radiologist. Plain radiographic images are visualized and preliminarily interpreted by the emergency physician with the below findings:        Interpretation per the Radiologist below, if available at the time ofthis note:    XR ABDOMEN (KUB) (SINGLE AP VIEW)   Final Result   There are no acute intra-abdominal changes. ED BEDSIDE ULTRASOUND:   Performed by ED Physician - none    LABS:  Labs Reviewed   COMPREHENSIVE METABOLIC PANEL - Abnormal; Notable for the following components:       Result Value    Sodium 133 (*)     Chloride 93 (*)     Calcium 10.6 (*)     AST 50 (*)     Globulin 2.2 (*)     All other components within normal limits   CBC WITH AUTO DIFFERENTIAL - Abnormal; Notable for the following components:    MCHC 32.8 (*)     Monocytes Absolute 0.9 (*)     All other components within normal limits   LIPASE   AMYLASE       All other labs were within normal range or not returned as of this dictation.     EMERGENCY DEPARTMENT COURSE and DIFFERENTIAL DIAGNOSIS/MDM:   Vitals:    Vitals:    03/26/21 1215 03/26/21 1230 03/26/21 1300 03/26/21 1315   BP: (!) 158/84 (!) 157/83 (!) 170/87 (!) 152/79   Pulse:       Resp:       Temp:       TempSrc:       SpO2:  95% 97% 96%   Weight:       Height:             MDM  Number of Diagnoses or Management Options  Anxiety state: established and improving  Constipation, unspecified constipation type: established and improving  Essential hypertension: established and improving  Generalized abdominal pain: established and improving  Malignant neoplasm metastatic to bone [facility])  Diagnosis management comments: Patient refuses any pain medication and refused any CAT scan as CAT scan was performed only 4 days ago and showed patient has metastatic disease from her breast cancer to the liver and bones patient at this time in palliative care patient refused any pain medication at the patient she is very anxious also at this time,   patient is told about the finding patient is afraid to take p.o. medication patient given Lidoderm patch she agrees to take Lidoderm patch and keep in touch with palliative Dr. Rosanne Weiss consider radiation treatment in case bony metastases causing pain may be helpful with that keep in touch with        Amount and/or Complexity of Data Reviewed  Clinical lab tests: ordered and reviewed      CRITICAL CARE TIME   Total Critical Care time was  minutes, excluding separately reportableprocedures. There was a high probability of clinicallysignificant/life threatening deterioration in the patient's condition which required my urgent intervention. ONSULTS:  None    PROCEDURES:  Unless otherwise noted below, none     Procedures    FINAL IMPRESSION      1. Generalized abdominal pain    2. Malignant neoplasm metastatic to bone ([facility] [address]    3. Constipation, unspecified constipation type    4. Essential hypertension    5. Anxiety state          DISPOSITION/PLAN   DISPOSITION        PATIENT REFERRED TO:  ROSALIND Banks - CNP  [facility] [address]  [phone]    In 3 days  For blood pressure control    Delia Carrillo MD  [address] [facility] [address] [id number]    In 1 week        DISCHARGE MEDICATIONS:  New Prescriptions    LIDOCAINE (LIDODERM) 5 %    Place 1 patch onto the skin daily 12 hours on, 12 hours off.     POLYETHYLENE GLYCOL (MIRALAX) 17 GM/SCOOP POWDER    Take 17 g by mouth daily for 7 days PRN constipation          (Please note that portions of this note were completed with a voice recognition program.  Efforts were made to edit the dictations but occasionally words are mis-transcribed.)    Joe Ledezma MD (electronically signed)  Attending Emergency Physician       Joe Ledezma MD  03/26/21 4136       Joe Ledezma MD  03/26/21 8522

## 2021-03-26 NOTE — ED TRIAGE NOTES
Patient to room #9 via EMS for c/o right lower abdominal pain that radiates to the back x1 week. Last BM 3/20/21.

## 2021-03-27 ENCOUNTER — APPOINTMENT (OUTPATIENT)
Dept: GENERAL RADIOLOGY | Age: 76
End: 2021-03-27
Payer: MEDICARE

## 2021-03-27 ENCOUNTER — HOSPITAL ENCOUNTER (OUTPATIENT)
Age: 76
Setting detail: OBSERVATION
Discharge: HOME | End: 2021-03-28
Attending: INTERNAL MEDICINE | Admitting: INTERNAL MEDICINE
Payer: MEDICARE

## 2021-03-27 DIAGNOSIS — Z78.9 IMPAIRED MOBILITY AND ADLS: ICD-10-CM

## 2021-03-27 DIAGNOSIS — R10.9 INTRACTABLE ABDOMINAL PAIN: Primary | ICD-10-CM

## 2021-03-27 DIAGNOSIS — R63.0 APPETITE IMPAIRED: ICD-10-CM

## 2021-03-27 DIAGNOSIS — C50.919 METASTATIC BREAST CANCER (HCC): ICD-10-CM

## 2021-03-27 DIAGNOSIS — E86.0 DEHYDRATION: ICD-10-CM

## 2021-03-27 DIAGNOSIS — Z74.09 IMPAIRED MOBILITY AND ADLS: ICD-10-CM

## 2021-03-27 DIAGNOSIS — R53.1 GENERAL WEAKNESS: ICD-10-CM

## 2021-03-27 LAB
ALBUMIN SERPL-MCNC: 3.8 G/DL (ref 3.5–4.6)
ALP BLD-CCNC: 58 U/L (ref 40–130)
ALT SERPL-CCNC: 13 U/L (ref 0–33)
ANION GAP SERPL CALCULATED.3IONS-SCNC: 12 MEQ/L (ref 9–15)
AST SERPL-CCNC: 53 U/L (ref 0–35)
BASOPHILS ABSOLUTE: 0.1 K/UL (ref 0–0.2)
BASOPHILS RELATIVE PERCENT: 0.8 %
BILIRUB SERPL-MCNC: 0.5 MG/DL (ref 0.2–0.7)
BUN BLDV-MCNC: 16 MG/DL (ref 8–23)
CALCIUM SERPL-MCNC: 9.1 MG/DL (ref 8.5–9.9)
CHLORIDE BLD-SCNC: 90 MEQ/L (ref 95–107)
CO2: 28 MEQ/L (ref 20–31)
CREAT SERPL-MCNC: 0.59 MG/DL (ref 0.5–0.9)
EKG ATRIAL RATE: 95 BPM
EKG P AXIS: 78 DEGREES
EKG P-R INTERVAL: 152 MS
EKG Q-T INTERVAL: 384 MS
EKG QRS DURATION: 128 MS
EKG QTC CALCULATION (BAZETT): 482 MS
EKG R AXIS: 85 DEGREES
EKG T AXIS: 60 DEGREES
EKG VENTRICULAR RATE: 95 BPM
EOSINOPHILS ABSOLUTE: 0.1 K/UL (ref 0–0.7)
EOSINOPHILS RELATIVE PERCENT: 0.9 %
GFR AFRICAN AMERICAN: >60
GFR NON-AFRICAN AMERICAN: >60
GLOBULIN: 2.4 G/DL (ref 2.3–3.5)
GLUCOSE BLD-MCNC: 85 MG/DL (ref 70–99)
HCT VFR BLD CALC: 40.2 % (ref 37–47)
HEMOGLOBIN: 13.4 G/DL (ref 12–16)
INR BLD: 0.9
LYMPHOCYTES ABSOLUTE: 1.6 K/UL (ref 1–4.8)
LYMPHOCYTES RELATIVE PERCENT: 22.7 %
MAGNESIUM: 2 MG/DL (ref 1.7–2.4)
MCH RBC QN AUTO: 29.4 PG (ref 27–31.3)
MCHC RBC AUTO-ENTMCNC: 33.4 % (ref 33–37)
MCV RBC AUTO: 87.8 FL (ref 82–100)
MONOCYTES ABSOLUTE: 0.7 K/UL (ref 0.2–0.8)
MONOCYTES RELATIVE PERCENT: 10.1 %
NEUTROPHILS ABSOLUTE: 4.6 K/UL (ref 1.4–6.5)
NEUTROPHILS RELATIVE PERCENT: 65.5 %
PDW BLD-RTO: 13.8 % (ref 11.5–14.5)
PLATELET # BLD: 241 K/UL (ref 130–400)
POTASSIUM SERPL-SCNC: 4.4 MEQ/L (ref 3.4–4.9)
PROTHROMBIN TIME: 12.4 SEC (ref 12.3–14.9)
RBC # BLD: 4.58 M/UL (ref 4.2–5.4)
SARS-COV-2, NAAT: NOT DETECTED
SODIUM BLD-SCNC: 130 MEQ/L (ref 135–144)
TOTAL CK: 94 U/L (ref 0–170)
TOTAL PROTEIN: 6.2 G/DL (ref 6.3–8)
TROPONIN: <0.01 NG/ML (ref 0–0.01)
WBC # BLD: 7.1 K/UL (ref 4.8–10.8)

## 2021-03-27 PROCEDURE — 82550 ASSAY OF CK (CPK): CPT

## 2021-03-27 PROCEDURE — G0378 HOSPITAL OBSERVATION PER HR: HCPCS

## 2021-03-27 PROCEDURE — 6360000002 HC RX W HCPCS: Performed by: NURSE PRACTITIONER

## 2021-03-27 PROCEDURE — 94761 N-INVAS EAR/PLS OXIMETRY MLT: CPT

## 2021-03-27 PROCEDURE — 87635 SARS-COV-2 COVID-19 AMP PRB: CPT

## 2021-03-27 PROCEDURE — 2700000000 HC OXYGEN THERAPY PER DAY

## 2021-03-27 PROCEDURE — 71045 X-RAY EXAM CHEST 1 VIEW: CPT

## 2021-03-27 PROCEDURE — 6370000000 HC RX 637 (ALT 250 FOR IP): Performed by: INTERNAL MEDICINE

## 2021-03-27 PROCEDURE — 6360000002 HC RX W HCPCS: Performed by: INTERNAL MEDICINE

## 2021-03-27 PROCEDURE — 99285 EMERGENCY DEPT VISIT HI MDM: CPT

## 2021-03-27 PROCEDURE — 83735 ASSAY OF MAGNESIUM: CPT

## 2021-03-27 PROCEDURE — 36415 COLL VENOUS BLD VENIPUNCTURE: CPT

## 2021-03-27 PROCEDURE — 96375 TX/PRO/DX INJ NEW DRUG ADDON: CPT

## 2021-03-27 PROCEDURE — 80053 COMPREHEN METABOLIC PANEL: CPT

## 2021-03-27 PROCEDURE — 74018 RADEX ABDOMEN 1 VIEW: CPT

## 2021-03-27 PROCEDURE — 84484 ASSAY OF TROPONIN QUANT: CPT

## 2021-03-27 PROCEDURE — 96361 HYDRATE IV INFUSION ADD-ON: CPT

## 2021-03-27 PROCEDURE — 93005 ELECTROCARDIOGRAM TRACING: CPT | Performed by: NURSE PRACTITIONER

## 2021-03-27 PROCEDURE — 96372 THER/PROPH/DIAG INJ SC/IM: CPT

## 2021-03-27 PROCEDURE — 2580000003 HC RX 258: Performed by: INTERNAL MEDICINE

## 2021-03-27 PROCEDURE — 96374 THER/PROPH/DIAG INJ IV PUSH: CPT

## 2021-03-27 PROCEDURE — 2500000003 HC RX 250 WO HCPCS: Performed by: NURSE PRACTITIONER

## 2021-03-27 PROCEDURE — 85025 COMPLETE CBC W/AUTO DIFF WBC: CPT

## 2021-03-27 PROCEDURE — 85610 PROTHROMBIN TIME: CPT

## 2021-03-27 PROCEDURE — 94640 AIRWAY INHALATION TREATMENT: CPT

## 2021-03-27 PROCEDURE — 2580000003 HC RX 258: Performed by: NURSE PRACTITIONER

## 2021-03-27 RX ORDER — MAGNESIUM SULFATE IN WATER 40 MG/ML
2000 INJECTION, SOLUTION INTRAVENOUS PRN
Status: DISCONTINUED | OUTPATIENT
Start: 2021-03-27 | End: 2021-03-28 | Stop reason: HOSPADM

## 2021-03-27 RX ORDER — ALPRAZOLAM 0.25 MG/1
0.5 TABLET ORAL 2 TIMES DAILY PRN
Status: DISCONTINUED | OUTPATIENT
Start: 2021-03-27 | End: 2021-03-28 | Stop reason: HOSPADM

## 2021-03-27 RX ORDER — BUDESONIDE 0.5 MG/2ML
0.5 INHALANT ORAL 2 TIMES DAILY
Status: DISCONTINUED | OUTPATIENT
Start: 2021-03-27 | End: 2021-03-28 | Stop reason: HOSPADM

## 2021-03-27 RX ORDER — PANTOPRAZOLE SODIUM 40 MG/1
40 TABLET, DELAYED RELEASE ORAL
Status: DISCONTINUED | OUTPATIENT
Start: 2021-03-28 | End: 2021-03-28 | Stop reason: HOSPADM

## 2021-03-27 RX ORDER — ONDANSETRON 2 MG/ML
4 INJECTION INTRAMUSCULAR; INTRAVENOUS EVERY 6 HOURS PRN
Status: DISCONTINUED | OUTPATIENT
Start: 2021-03-27 | End: 2021-03-28 | Stop reason: HOSPADM

## 2021-03-27 RX ORDER — ALBUTEROL SULFATE 2.5 MG/3ML
2.5 SOLUTION RESPIRATORY (INHALATION) 4 TIMES DAILY
Status: DISCONTINUED | OUTPATIENT
Start: 2021-03-27 | End: 2021-03-28 | Stop reason: HOSPADM

## 2021-03-27 RX ORDER — 0.9 % SODIUM CHLORIDE 0.9 %
500 INTRAVENOUS SOLUTION INTRAVENOUS ONCE
Status: COMPLETED | OUTPATIENT
Start: 2021-03-27 | End: 2021-03-27

## 2021-03-27 RX ORDER — SODIUM CHLORIDE 9 MG/ML
25 INJECTION, SOLUTION INTRAVENOUS PRN
Status: DISCONTINUED | OUTPATIENT
Start: 2021-03-27 | End: 2021-03-28 | Stop reason: HOSPADM

## 2021-03-27 RX ORDER — ONDANSETRON 2 MG/ML
4 INJECTION INTRAMUSCULAR; INTRAVENOUS ONCE
Status: COMPLETED | OUTPATIENT
Start: 2021-03-27 | End: 2021-03-27

## 2021-03-27 RX ORDER — PREDNISONE 10 MG/1
10 TABLET ORAL DAILY
Status: DISCONTINUED | OUTPATIENT
Start: 2021-03-27 | End: 2021-03-28 | Stop reason: HOSPADM

## 2021-03-27 RX ORDER — POTASSIUM CHLORIDE 20 MEQ/1
40 TABLET, EXTENDED RELEASE ORAL PRN
Status: DISCONTINUED | OUTPATIENT
Start: 2021-03-27 | End: 2021-03-28 | Stop reason: HOSPADM

## 2021-03-27 RX ORDER — METOPROLOL TARTRATE 50 MG/1
50 TABLET, FILM COATED ORAL 2 TIMES DAILY
Status: DISCONTINUED | OUTPATIENT
Start: 2021-03-27 | End: 2021-03-28 | Stop reason: HOSPADM

## 2021-03-27 RX ORDER — POTASSIUM CHLORIDE 7.45 MG/ML
10 INJECTION INTRAVENOUS PRN
Status: DISCONTINUED | OUTPATIENT
Start: 2021-03-27 | End: 2021-03-28 | Stop reason: HOSPADM

## 2021-03-27 RX ORDER — ACETAMINOPHEN 325 MG/1
650 TABLET ORAL EVERY 6 HOURS PRN
Status: DISCONTINUED | OUTPATIENT
Start: 2021-03-27 | End: 2021-03-28 | Stop reason: HOSPADM

## 2021-03-27 RX ORDER — ACETAMINOPHEN 650 MG/1
650 SUPPOSITORY RECTAL EVERY 6 HOURS PRN
Status: DISCONTINUED | OUTPATIENT
Start: 2021-03-27 | End: 2021-03-28 | Stop reason: HOSPADM

## 2021-03-27 RX ORDER — GUAIFENESIN 600 MG/1
1200 TABLET, EXTENDED RELEASE ORAL 2 TIMES DAILY
Status: DISCONTINUED | OUTPATIENT
Start: 2021-03-27 | End: 2021-03-28 | Stop reason: HOSPADM

## 2021-03-27 RX ORDER — DOCUSATE SODIUM 100 MG/1
100 CAPSULE, LIQUID FILLED ORAL 2 TIMES DAILY
Status: DISCONTINUED | OUTPATIENT
Start: 2021-03-27 | End: 2021-03-28 | Stop reason: HOSPADM

## 2021-03-27 RX ORDER — POLYETHYLENE GLYCOL 3350 17 G/17G
17 POWDER, FOR SOLUTION ORAL DAILY
Status: DISCONTINUED | OUTPATIENT
Start: 2021-03-27 | End: 2021-03-28 | Stop reason: HOSPADM

## 2021-03-27 RX ORDER — LEVOTHYROXINE SODIUM 0.07 MG/1
75 TABLET ORAL DAILY
Status: DISCONTINUED | OUTPATIENT
Start: 2021-03-27 | End: 2021-03-28 | Stop reason: HOSPADM

## 2021-03-27 RX ORDER — LIDOCAINE 4 G/G
1 PATCH TOPICAL DAILY
Status: DISCONTINUED | OUTPATIENT
Start: 2021-03-27 | End: 2021-03-28 | Stop reason: HOSPADM

## 2021-03-27 RX ORDER — SODIUM CHLORIDE 0.9 % (FLUSH) 0.9 %
10 SYRINGE (ML) INJECTION PRN
Status: DISCONTINUED | OUTPATIENT
Start: 2021-03-27 | End: 2021-03-28 | Stop reason: HOSPADM

## 2021-03-27 RX ORDER — SENNA PLUS 8.6 MG/1
1 TABLET ORAL DAILY
Status: DISCONTINUED | OUTPATIENT
Start: 2021-03-27 | End: 2021-03-28 | Stop reason: HOSPADM

## 2021-03-27 RX ORDER — SODIUM CHLORIDE 0.9 % (FLUSH) 0.9 %
10 SYRINGE (ML) INJECTION EVERY 12 HOURS SCHEDULED
Status: DISCONTINUED | OUTPATIENT
Start: 2021-03-27 | End: 2021-03-28 | Stop reason: HOSPADM

## 2021-03-27 RX ORDER — TRAZODONE HYDROCHLORIDE 50 MG/1
150 TABLET ORAL NIGHTLY
Status: DISCONTINUED | OUTPATIENT
Start: 2021-03-27 | End: 2021-03-28 | Stop reason: HOSPADM

## 2021-03-27 RX ORDER — ALPRAZOLAM 0.5 MG/1
0.5 TABLET ORAL 2 TIMES DAILY PRN
COMMUNITY

## 2021-03-27 RX ORDER — FENTANYL CITRATE 50 UG/ML
50 INJECTION, SOLUTION INTRAMUSCULAR; INTRAVENOUS
Status: DISCONTINUED | OUTPATIENT
Start: 2021-03-27 | End: 2021-03-28 | Stop reason: HOSPADM

## 2021-03-27 RX ORDER — PROMETHAZINE HYDROCHLORIDE 12.5 MG/1
12.5 TABLET ORAL EVERY 6 HOURS PRN
Status: DISCONTINUED | OUTPATIENT
Start: 2021-03-27 | End: 2021-03-28 | Stop reason: HOSPADM

## 2021-03-27 RX ADMIN — DOCUSATE SODIUM 100 MG: 100 CAPSULE, LIQUID FILLED ORAL at 20:43

## 2021-03-27 RX ADMIN — FAMOTIDINE 20 MG: 10 INJECTION, SOLUTION INTRAVENOUS at 13:52

## 2021-03-27 RX ADMIN — GUAIFENESIN 1200 MG: 600 TABLET ORAL at 20:43

## 2021-03-27 RX ADMIN — SODIUM CHLORIDE, PRESERVATIVE FREE 10 ML: 5 INJECTION INTRAVENOUS at 20:44

## 2021-03-27 RX ADMIN — ONDANSETRON 4 MG: 2 INJECTION INTRAMUSCULAR; INTRAVENOUS at 11:30

## 2021-03-27 RX ADMIN — FENTANYL CITRATE 50 MCG: 50 INJECTION, SOLUTION INTRAMUSCULAR; INTRAVENOUS at 11:30

## 2021-03-27 RX ADMIN — BUDESONIDE 500 MCG: 0.5 SUSPENSION RESPIRATORY (INHALATION) at 20:34

## 2021-03-27 RX ADMIN — ALPRAZOLAM 0.5 MG: 0.25 TABLET ORAL at 20:43

## 2021-03-27 RX ADMIN — LEVOTHYROXINE SODIUM 75 MCG: 75 TABLET ORAL at 15:43

## 2021-03-27 RX ADMIN — PREDNISONE 10 MG: 10 TABLET ORAL at 15:44

## 2021-03-27 RX ADMIN — ALBUTEROL SULFATE 2.5 MG: 2.5 SOLUTION RESPIRATORY (INHALATION) at 20:34

## 2021-03-27 RX ADMIN — METOPROLOL TARTRATE 50 MG: 50 TABLET, FILM COATED ORAL at 20:43

## 2021-03-27 RX ADMIN — ALBUTEROL SULFATE 2.5 MG: 2.5 SOLUTION RESPIRATORY (INHALATION) at 16:06

## 2021-03-27 RX ADMIN — ENOXAPARIN SODIUM 40 MG: 40 INJECTION SUBCUTANEOUS at 15:44

## 2021-03-27 RX ADMIN — SODIUM CHLORIDE 500 ML: 9 INJECTION, SOLUTION INTRAVENOUS at 11:30

## 2021-03-27 RX ADMIN — STANDARDIZED SENNA CONCENTRATE 8.6 MG: 8.6 TABLET ORAL at 15:44

## 2021-03-27 RX ADMIN — POLYETHYLENE GLYCOL 3350 17 G: 17 POWDER, FOR SOLUTION ORAL at 15:44

## 2021-03-27 ASSESSMENT — ENCOUNTER SYMPTOMS
VOMITING: 0
DIARRHEA: 0
RHINORRHEA: 0
SINUS PRESSURE: 0
CHEST TIGHTNESS: 0
BACK PAIN: 0
WHEEZING: 0
SINUS PAIN: 0
NAUSEA: 1
ABDOMINAL PAIN: 1
TROUBLE SWALLOWING: 0
COUGH: 0
SHORTNESS OF BREATH: 0
ABDOMINAL DISTENTION: 0
COLOR CHANGE: 0
SORE THROAT: 0

## 2021-03-27 ASSESSMENT — PAIN DESCRIPTION - PAIN TYPE: TYPE: ACUTE PAIN

## 2021-03-27 NOTE — ED NOTES
Bed: 07  Expected date: 3/27/21  Expected time: 10:36 AM  Means of arrival: [facility]  Comments:  63 yo female C/O dehydration  164/90    98% 2L    Refusing IV access

## 2021-03-27 NOTE — CARE COORDINATION
I met with the patient and discussed with her how we can best help her here at the hospital and with her home-going needs. She states that she does have palliative care and sees Guadalupe Funes NP. She states that her care with palliative care is acceptable. She states that she does not have any [facility] services and states that she does not want those services at this time. She states that she lives with her daughter who is able to take care of her home and help her. She denies needing any rehab at this time. She states that her pain is worsening \"and that's new\" and \"I don't take narcotics\" and that she has tramadol but does not use that either. She denies any other pain medications or techniques that she uses to help with the pain. She states that \"I'm so weak because I can't eat\" and states that she is not able to take fluids either, states \"I try but I just can't. \" she states that she has ensure, states \"I try to chug it down\" but has difficulty tolerating it. She has tried instant breakfast shakes as well but that did not help her. She states that her main issues are pain control and needs help with her appetite. I offered emotional support and prayed with the patient. I encouraged her to call me with any needs she has and that I was here until 2130 today and would assist her in any way I could. She did state that she would do so. Increase to 53 units two times a day and call next week with readings.

## 2021-03-27 NOTE — CARE COORDINATION
[facility] Initial Discharge Assessment    Met with Patient to discuss discharge plan. PCP: ROSALIND Burr CNP                                Date of Last Visit:   2/25/21    If no PCP, list provided? N/A    Discharge Planning    Living Arrangements: independently at home    Who do you live with? daughter    Who helps you with your care:  family    If lives at home:     Do you have any barriers navigating in your home? no    Patient can perform ADL? Yes    Current Services (outpatient and in home) :  Palliative Care ([address]: Eddie Mata NP)    Dialysis: No    Is transportation available to get to your appointments? Yes    DME Equipment:  yes - Wheel chair    Respiratory equipment: 02 at 2l and nebulizer    Respiratory provider:  yes - med services     [facility]    Consult with Medication Assistance Program?  No      Does Patient Have a High-Risk for Readmission Diagnosis (CHF, PN, MI, COPD)? No    Initial Discharge Plan? (Note: please see concurrent daily documentation for any updates after initial note). See detailed note. Pt declined hhc. Is followed by palliative care and stated that this care is acceptable. A dietary consult may be helpful if pt permits it. CM to assess for further d/c needs and referrals.       Electronically signed by Sohan Cruz on 3/27/2021 at 2:34 PM

## 2021-03-27 NOTE — PROGRESS NOTES
[facility] Respiratory Therapy Evaluation   Current Order:  Alb qid & pul bid      Home Regimen: y      Ordering Physician: nicholas  Re-evaluation Date:  3/30     Diagnosis: abd pain      Patient Status: Stable / Unstable + Physician notified    The following MDI Criteria must be met in order to convert aerosol to MDI with spacer. If unable to meet, MDI will be converted to aerosol:  []  Patient able to demonstrate the ability to use MDI effectively  []  Patient alert and cooperative  []  Patient able to take deep breath with 5-10 second hold  []  Medication(s) available in this delivery method   []  Peak flow greater than or equal to 200 ml/min            Current Order Substituted To  (same drug, same frequency)   Aerosol to MDI [] Albuterol Sulfate 0.083% unit dose by aerosol Albuterol Sulfate MDI 2 puffs by inhalation with spacer    [] Levalbuterol 1.25 mg unit dose by aerosol Levalbuterol MDI 2 puffs by inhalation with spacer    [] Levalbuterol 0.63 mg unit dose by aerosol Levalbuterol MDI 2 puffs by inhalation with spacer    [] Ipratropium Bromide 0.02% unit dose by aerosol Ipratropium Bromide MDI 2 puffs by inhalation with spacer    [] Duoneb (Ipratropium + Albuterol) unit dose by aerosol Ipratropium MDI + Albuterol MDI 2 puffs by inhalation w/spacer   MDI to Aerosol [] Albuterol Sulfate MDI Albuterol Sulfate 0.083% unit dose by aerosol    [] Levalbuterol MDI 2 puffs by inhalation Levalbuterol 1.25 mg unit dose by aerosol    [] Ipratropium Bromide MDI by inhalation Ipratropium Bromide 0.02% unit dose by aerosol    [] Combivent (Ipratropium + Albuterol) MDI by inhalation Duoneb (Ipratropium + Albuterol) unit dose by aerosol       Treatment Assessment [Frequency/Schedule]:  Change frequency to: _____________o change_____________________________________per Protocol, P&T, MEC      Points 0 1 2 3 4   Pulmonary Status  Non-Smoker  []   Smoking history   < 20 pack years  []   Smoking history  ?  20 pack years  [] Pulmonary Disorder  (acute or chronic)  [x]   Severe or Chronic w/ Exacerbation  []     Surgical Status No [x]   Surgeries     General []   Surgery Lower []   Abdominal Thoracic or []   Upper Abdominal Thoracic with  PulmonaryDisorder  []     Chest X-ray Clear/Not  Ordered     [x]  Chronic Changes  Results Pending  []  Infiltrates, atelectasis, pleural effusion, or edema  []  Infiltrates in more than one lobe []  Infiltrate + Atelectasis, &/or pleural effusion  []    Respiratory Pattern Regular,  RR = 12-20 [x]  Increased,  RR = 21-25 []  SOL, irregular,  or RR = 26-30 []  Decreased FEV1  or RR = 31-35 []  Severe SOB, use  of accessory muscles, or RR ? 35  []    Mental Status Alert, oriented,  Cooperative [x]  Confused but Follows commands []  Lethargic or unable to follow commands []  Obtunded  []  Comatose  []    Breath Sounds Clear to  auscultation  []  Decreased unilaterally or  in bases only []  Decreased  bilaterally  [x]  Crackles or intermittent wheezes []  Wheezes []    Cough Strong, Spontan., & nonproductive [x]  Strong,  spontaneous, &  productive []  Weak,  Nonproductive []  Weak, productive or  with wheezes []  No spontaneous  cough or may require suctioning []    Level of Activity Ambulatory []  Ambulatory w/ Assist  [x]  Non-ambulatory []  Paraplegic []  Quadriplegic []    Total    Score:__6_____     Triage Score:_4_______      Tri       Triage:     1. (>20) Freq: Q3    2. (16-20) Freq: Q4   3. (11-15) Freq: QID & Albuterol Q2 PRN    4. (6-10) Freq: TID & Albuterol Q2 PRN    5. (0-5) Freq Q4prn

## 2021-03-27 NOTE — CARE COORDINATION
I discussed code status with pt, she states she \"does not want\" cpr/vent but does consent to cardiac monitoring and medications up to cardiopulmonary arrest. I discussed with her that this would be a DNRCC-arrest status. I discussed this with Dr Marcus Julien and he did sign the order as did the patient. The copies are placed on the patient's chart and copies given to her.

## 2021-03-27 NOTE — ED PROVIDER NOTES
[address] [facility] ED  EMERGENCY DEPARTMENT ENCOUNTER      Pt Name: Trav Hooper  MRN: [id number]  [place] 1945  Date of evaluation: 3/27/2021  Provider: Sanjay Roberts       Chief Complaint   Patient presents with    Failure To Thrive         HISTORY OF PRESENT ILLNESS   (Location/Symptom, Timing/Onset,Context/Setting, Quality, Duration, Modifying Factors, Severity)  Note limiting factors. Trav Hooper is a 64 y.o. female who presents to the emergency department for complaint of worsening generalized weakness severe fatigue malnourishment and dehydration. Patient states that she has a history of metastatic breast cancer spread to the liver and other regions. She has been in the ER multiple times in the past week was originally diagnosed with constipation. She states she was able to have a large bowel movement a few times on on the 24th. She states she was in the ER again yesterday and appeared to have resolution of the constipation but continues to have right upper quadrant abdominal pain right-sided abdominal pain. She states she is on palliative care and does take pain medications as needed but she was trying to cut back due to the constipation and other side effects. She denies any vomiting but states she has nausea and complete lack of appetite. She states she is not been able to tolerate any p.o. intake over the past few days and believes now she is not having any bowel movements because she no longer has any stool but to produce due to her lack of food. She states she additionally had decreased urine output she is only taking small sips of water throughout the day. She states has become very weak and is having difficulty even sit up to use the bathroom. She states she does not feel safe at home because she feels very [place] and unsteady\".   She states is been getting much worse over the past few days she is afraid she is going to fall but denies any new falls or trauma. She denies fevers chills sweats or recent upper respiratory infection symptoms. She denies chest pain or shortness of breath. She states currently the pain is up to a 5 out of 10 cramping pain in the right side of the abdomen with more focal pain in the right upper quadrant intermittently sharp with no apparent modifying factors. She states that she is no longer on any chemotherapy or radiation has not been this year. She states she is refusing further treatment specifically for the cancer as she did not have benefit from previous treatment. Nursing Notes were reviewed. REVIEW OF SYSTEMS    (2-9 systems for level 4, 10 or more for level 5)     Review of Systems   Constitutional: Positive for activity change, appetite change and fatigue. Negative for chills, diaphoresis and fever. HENT: Negative for congestion, ear pain, postnasal drip, rhinorrhea, sinus pressure, sinus pain, sore throat and trouble swallowing. Respiratory: Negative for cough, chest tightness, shortness of breath and wheezing. Cardiovascular: Negative for chest pain and palpitations. Gastrointestinal: Positive for abdominal pain and nausea. Negative for abdominal distention, diarrhea and vomiting. Genitourinary: Negative for difficulty urinating, dysuria, flank pain, frequency and urgency. Musculoskeletal: Negative for arthralgias, back pain, myalgias, neck pain and neck stiffness. Skin: Negative for color change and rash. Neurological: Positive for weakness. Negative for dizziness, tremors, seizures, syncope, speech difficulty, light-headedness, numbness and headaches. Except as noted above the remainder of the review of systems was reviewed and negative.        PAST MEDICAL HISTORY     Past Medical History:   Diagnosis Date    Anxiety     Anxiety and depression     CAD (coronary artery disease)     Cancer ([facility] [address] 3/2014    Invasive ductal cancer left breast, spread to 5 lymph nodes  Carotid artery stenosis and occlusion     COPD (chronic obstructive pulmonary disease) (HCC)     Depression 1/15/2018    GERD (gastroesophageal reflux disease)     Headache(784.0)     Hyperlipidemia     Hypertension     Hypothyroidism     Insomnia     Low kidney function 2016    Osteoarthritis     RBBB 10/15/2014    Right carotid bruit 2015    S/P cardiac catheterization 2016     Past Surgical History:   Procedure Laterality Date    BREAST BIOPSY Right 2016    US biopsy    BREAST SURGERY Left 14    U/S Guided core bx of the left breast    BREAST SURGERY Right 3/20/14    U/S of the lateral right breast    BREAST SURGERY Left 3/27/14    U/S Guided Left breast lumpectomy and left snb    OTHER SURGICAL HISTORY  14    Placement drain, left axillary seroma    [place] MASTECTOMY, SIMPLE, COMPLETE Right 2018    R modified radical mastectomy     Social History     Socioeconomic History    Marital status:       Spouse name: None    Number of children: None    Years of education: None    Highest education level: None   Occupational History    None   Social Needs    Financial resource strain: None    Food insecurity     Worry: None     Inability: None    Transportation needs     Medical: None     Non-medical: None   Tobacco Use    Smoking status: Former Smoker     Packs/day: 1.50     Years: 40.00     Pack years: 60.00     Types: Cigarettes     Quit date: 1989     Years since quittin.1    Smokeless tobacco: Never Used   Substance and Sexual Activity    Alcohol use: No    Drug use: No    Sexual activity: Never   Lifestyle    Physical activity     Days per week: None     Minutes per session: None    Stress: None   Relationships    Social connections     Talks on phone: None     Gets together: None     Attends Anglican service: None     Active member of club or organization: None     Attends meetings of clubs or organizations: None     Relationship status: None    Intimate partner violence     Fear of current or ex partner: None     Emotionally abused: None     Physically abused: None     Forced sexual activity: None   Other Topics Concern    None   Social History Narrative    None       SCREENINGS    [place] Coma Scale  Eye Opening: Spontaneous  Best Verbal Response: Oriented  Best Motor Response: Obeys commands  Heike Coma Scale Score: 15        PHYSICAL EXAM    (up to 7 for level 4, 8 or more for level 5)     ED Triage Vitals   BP Temp Temp Source Pulse Resp SpO2 Height Weight   03/27/21 1050 03/27/21 1050 03/27/21 1050 03/27/21 1050 03/27/21 1048 03/27/21 1050 03/27/21 1048 03/27/21 1048   (!) 172/96 98.5 °F (36.9 °C) Oral 102 16 97 % 5' 3\" (1.6 m) 115 lb (52.2 kg)       Physical Exam  Constitutional:       General: She is not in acute distress. Comments: Patient appears underweight well-kempt   HENT:      Head: Normocephalic and atraumatic. Right Ear: External ear normal.      Left Ear: External ear normal.      Nose: Nose normal. No congestion or rhinorrhea. Mouth/Throat:      Mouth: Mucous membranes are dry. Pharynx: Oropharynx is clear. No oropharyngeal exudate or posterior oropharyngeal erythema. Eyes:      General:         Right eye: No discharge. Left eye: No discharge. Conjunctiva/sclera: Conjunctivae normal.      Pupils: Pupils are equal, round, and reactive to light. Neck:      Musculoskeletal: Normal range of motion and neck supple. No neck rigidity or muscular tenderness. Cardiovascular:      Rate and Rhythm: Normal rate and regular rhythm. Pulses: Normal pulses. Pulmonary:      Effort: Pulmonary effort is normal.      Breath sounds: Normal breath sounds. Abdominal:      General: There is no distension. Palpations: Abdomen is soft. There is no mass. Tenderness: There is abdominal tenderness. There is no guarding or rebound. Hernia: No hernia is present.    Musculoskeletal: Normal range of motion. General: No swelling, deformity or signs of injury. Skin:     General: Skin is warm and dry. Capillary Refill: Capillary refill takes less than 2 seconds. Neurological:      General: No focal deficit present. Mental Status: She is alert and oriented to person, place, and time. Mental status is at baseline. Cranial Nerves: No cranial nerve deficit. Sensory: No sensory deficit. Motor: Weakness (Notable generalized weakness patient has difficulty even sitting up in the bed for examination) present. Coordination: Coordination normal.         RESULTS     EKG: All EKG's are interpreted by the Emergency Department Physician who either signs or Co-signsthis chart in the absence of a cardiologist.    Sinus rhythm 95 bpm no apparent ST elevation noted right bundle branch block no ectopy QTc 482 ms. Right bundle branch block and waveform presentation is similar to EKG from October 17, 2020    RADIOLOGY:   Non-plain filmimages such as CT, Ultrasound and MRI are read by the radiologist. Plain radiographic images are visualized and preliminarily interpreted by the emergency physician with the below findings:        Interpretation per the Radiologist below, if available at the time ofthis note:    XR CHEST PORTABLE   Final Result   No radiographic evidence of acute intrathoracic process. XR ABDOMEN (KUB) (SINGLE AP VIEW)   Final Result   There are no acute intra-abdominal changes.             ED BEDSIDE ULTRASOUND:   Performed by ED Physician - none    LABS:  Labs Reviewed   COMPREHENSIVE METABOLIC PANEL - Abnormal; Notable for the following components:       Result Value    Sodium 130 (*)     Chloride 90 (*)     Total Protein 6.2 (*)     AST 53 (*)     All other components within normal limits   COVID-19, RAPID   CBC WITH AUTO DIFFERENTIAL   TROPONIN   CK   PROTIME-INR   MAGNESIUM   URINE RT REFLEX TO CULTURE       All other labs were within normal range or not returned as of this dictation. EMERGENCY DEPARTMENT COURSE and DIFFERENTIAL DIAGNOSIS/MDM:   Vitals:    Vitals:    03/27/21 1048 03/27/21 1050 03/27/21 1100 03/27/21 1130   BP:  (!) 172/96 (!) 165/94 (!) 175/86   Pulse:  102 101 96   Resp: 16   18   Temp:  98.5 °F (36.9 °C)     TempSrc:  Oral     SpO2:  97% 100% 99%   Weight: 115 lb (52.2 kg)      Height: 5' 3\" (1.6 m)               MDM patient is afebrile nontoxic dry mucosa peers slightly underweight with a known history of metastatic breast cancer to multiple regions of the body. She has had 3 ER visits now in the past week for intractable abdominal pain as well as her reported inability to eat low appetite and risk for dehydration. Patient given IV fluids. Patient's lab work is nondiagnostic there is no obvious severe dehydration or notable severe malnutrition. She continues to be slightly hypertensive with a history of hypertension. She is new medication pain discomfort and states that she only has limited relief from these and that is intermittent she is on palliative care at home but has declined hospice care. She no longer has chemoradiation and has declined further oncology treatment by her report since 2019. Due to patient's notable weakness and other risk decision made to speak to the hospitalist about admission possible observation for her impaired mobility ADLs and risk for failure to thrive. Patient's lab work appears stable at this time patient does have generalized overall weakness. Spoke to the hospitalist about admission needs he will start with observation stay for further evaluation for patient safety. CRITICAL CARE TIME       CONSULTS:  None    PROCEDURES:  Unless otherwise noted below, none     Procedures    FINAL IMPRESSION      1. Intractable abdominal pain    2. Metastatic breast cancer ([facility] [address]    3. Impaired mobility and ADLs    4. General weakness    5. Appetite impaired    6.  Dehydration          DISPOSITION/PLAN DISPOSITION        PATIENT REFERRED TO:  No follow-up provider specified.     DISCHARGE MEDICATIONS:  New Prescriptions    No medications on file          (Please notethat portions of this note were completed with a voice recognition program.  Efforts were made to edit the dictations but occasionally words are mis-transcribed.)    ROSALIND Solis CNP (electronically signed)  Attending Emergency Physician         ROSALIND Solis CNP  03/27/21 3658

## 2021-03-27 NOTE — PROGRESS NOTES
Pt came from ER via transport. VS stable.l Pt is alert and oriented.  Electronically signed by Zari Arshad RN on 3/27/2021 at 4:00 PM

## 2021-03-27 NOTE — PROGRESS NOTES
Pts skin in intact, other than a few discolorations on her arms. Ammon Adjutant ESA wittnessed this as well.  Electronically signed by Kathy Roblero RN on 3/27/2021 at 3:53 PM

## 2021-03-27 NOTE — H&P
diagnosis or treatment of her cancer further, there is nothing I can provide her that she is not already getting at home with palliative care. Pt then told me - I dont have cancer. It was treated already. There is obvious disconnect and denial about her condition. Pt continues to believe that if she ignores it, it will go away. Spoke at length with Dr. Ant Fisher and Brigitte Hobbs in the ED and decision was made to admit for observation with consult to palliative and oncology. Pt would not be a good rehab/SNF candidate as long as she refuses treatment as she will continue to decline despite therapy. Explained to patient that best option for her if she is refusing treatment is hospice, but patient states \"if you only knew how I feel, you'd do something\".      Past Medical History:        Diagnosis Date    Anxiety     Anxiety and depression     CAD (coronary artery disease)     Cancer ([facility] [address] 3/2014    Invasive ductal cancer left breast, spread to 5 lymph nodes    Carotid artery stenosis and occlusion     COPD (chronic obstructive pulmonary disease) ([facility] [address]     Depression 1/15/2018    GERD (gastroesophageal reflux disease)     Headache(784.0)     Hyperlipidemia     Hypertension     Hypothyroidism     Insomnia     Low kidney function 7/13/2016    Osteoarthritis     RBBB 10/15/2014    Right carotid bruit 5/26/2015    S/P cardiac catheterization 7/13/2016     Past Surgical History:        Procedure Laterality Date    BREAST BIOPSY Right 11/8/2016    US biopsy    BREAST SURGERY Left 2/26/14    U/S Guided core bx of the left breast    BREAST SURGERY Right 3/20/14    U/S of the lateral right breast    BREAST SURGERY Left 3/27/14    U/S Guided Left breast lumpectomy and left snb    OTHER SURGICAL HISTORY  4/11/14    Placement drain, left axillary seroma    [place] MASTECTOMY, SIMPLE, COMPLETE Right 9/6/2018    R modified radical mastectomy     Medications Prior to Admission:    Medications Prior to Admission: polyethylene glycol (MIRALAX) 17 GM/SCOOP powder, Take 17 g by mouth daily for 7 days PRN constipation  docusate sodium (COLACE) 100 MG capsule, Take 1 capsule by mouth 2 times daily  senna (SENOKOT) 8.6 MG TABS tablet, Take 1 tablet by mouth daily  predniSONE (DELTASONE) 10 MG tablet, TAKE 1 TABLET BY MOUTH EVERY DAY  traZODone (DESYREL) 150 MG tablet, Take 1 tablet by mouth nightly Patient has plenty at home  vitamin D (ERGOCALCIFEROL) 1.25 MG (21560 UT) CAPS capsule, Take 1 capsule by mouth once a week  levothyroxine (SYNTHROID) 75 MCG tablet, Take 1 tablet by mouth Daily  budesonide (PULMICORT) 0.5 MG/2ML nebulizer suspension, Take 1 ampule by nebulization 2 times daily  albuterol sulfate HFA (VENTOLIN HFA) 108 (90 Base) MCG/ACT inhaler, Inhale 2 puffs into the lungs every 6 hours as needed for Wheezing  Calcium 600-200 MG-UNIT TABS, Take by mouth 2 times daily  albuterol (PROVENTIL) (2.5 MG/3ML) 0.083% nebulizer solution, Take 3 mLs by nebulization 4 times daily  metoprolol tartrate (LOPRESSOR) 50 MG tablet, Take 50 mg by mouth 2 times daily  guaiFENesin (MUCINEX) 600 MG extended release tablet, Take 1,200 mg by mouth 2 times daily  [DISCONTINUED] lidocaine (LIDODERM) 5 %, Place 1 patch onto the skin daily 12 hours on, 12 hours off. [DISCONTINUED] oxyCODONE (ROXICODONE) 5 MG immediate release tablet, Take 1 tablet by mouth every 6 hours as needed for Pain (moderate to severe pain) for up to 30 days. [DISCONTINUED] Budeson-Glycopyrrol-Formoterol 160-9-4.8 MCG/ACT AERO, Inhale 2 puffs into the lungs 2 times daily  [DISCONTINUED] traMADol (ULTRAM) 50 MG tablet, Take 1 tablet by mouth every 4 hours as needed for Pain for up to 90 days.   [DISCONTINUED] ondansetron (ZOFRAN) 4 MG tablet, Take 1 tablet by mouth every 8 hours as needed for Nausea or Vomiting  [DISCONTINUED] omeprazole (PRILOSEC) 20 MG delayed release capsule, Take 1 capsule by mouth Daily  [DISCONTINUED] lovastatin (MEVACOR) 20 MG tablet, TAKE 1 TABLET NIGHTLY  [DISCONTINUED] acetylcysteine (MUCOMYST) 10 % nebulizer solution, Inhale 4 mLs into the lungs 4 times daily  OXYGEN, Oxygen on 2L with exertion and Nocturnal  Respiratory Therapy Supplies (NEBULIZER/TUBING/MOUTHPIECE) KIT, 1 kit by Does not apply route daily as needed (wheezing)    Allergies:  Patient has no known allergies. Social History: Former smoker, no etoh, no drugs    Family History:       Problem Relation Age of Onset    Cancer Sister         breast    Cancer Maternal Uncle         pancreatic     REVIEW OF SYSTEMS:  12 point ROS was negative unless otherwise noted in the HPI   PHYSICAL EXAM:    Vitals:  /86   Pulse 100   Temp 98.5 °F (36.9 °C) (Oral)   Resp 16   Ht 5' 3\" (1.6 m)   Wt 115 lb (52.2 kg)   LMP  (LMP Unknown)   SpO2 98%   BMI 20.37 kg/m²     Constitutional: Awake and alert in no acute distress.  Lying in bed comfortably  Head: Normocephalic, atraumatic  Eyes: EOMI, PERRLA  ENT: moist mucous membranes  Neck: neck supple, trachea midline  Lungs: Good inspiratory effort, no wheeze, no rhonchi, no rales  Heart: RRR, normal S1 and S2, no murmurs  GI: Soft, non-distended, mildly tender, no guarding, no rebound, +BS  MSK: no edema noted  Skin: warm, dry  Psych: appropriate affect       DATA:  CBC:   Lab Results   Component Value Date    WBC 7.1 03/27/2021    RBC 4.58 03/27/2021    HGB 13.4 03/27/2021    HCT 40.2 03/27/2021    MCV 87.8 03/27/2021    MCH 29.4 03/27/2021    MCHC 33.4 03/27/2021    RDW 13.8 03/27/2021     03/27/2021    MPV 7.8 08/31/2015     CMP:    Lab Results   Component Value Date     03/27/2021    K 4.4 03/27/2021    K 3.7 05/14/2019    CL 90 03/27/2021    CO2 28 03/27/2021    BUN 16 03/27/2021    CREATININE 0.59 03/27/2021    GFRAA >60.0 03/27/2021    LABGLOM >60.0 03/27/2021    GLUCOSE 85 03/27/2021    GLUCOSE 112 04/26/2012    PROT 6.2 03/27/2021    LABALBU 3.8 03/27/2021    CALCIUM 9.1 03/27/2021    BILITOT 0.5 03/27/2021    ALKPHOS 58 03/27/2021    AST 53 03/27/2021    ALT 13 03/27/2021     ASSESSMENT AND PLAN:      # Worsening abd pain, poor appetite due to progressing metastatic breast cancer recurrence  - CT abd shows mets to the liver, lungs and bone  - pt refuses treatment - no chemo, no radiation, no feeding tube  - oncology consulted to explain poor prognosis without treatment  - palliative care consulted for symptom management  - pt poor candidate for rehab/SNF as she will continue to decline   - dietitian consulted. Boost supplements ordered  - pt would benefit from hospice care at this time, but in denial and refuses to have that conversation. Does state she is DNR-CCA and would not want a feeding tube for nutrition    # Subjective dehydration  - pt is able to drink water, but choosing not to  - mild hyponatremia in the ED, but given IVF and clinically insignificant  - will monitor with AM labs  - no IVF. Encourage PO intake    # Poor appetite  - albumin and serum protein unremarkable  - dietitian consulted  - encourage po intake    # CAD, HTN, HLD, hypothyroid  - cont home meds    DVT: lovenox    Disposition: Pt admitted due to multiple ED visits for the same complaint. Continues to be in denial of her diagnosis and prognosis and refuses treatment. Oncology consulted to explain poor prognosis if she continues to refuse treatment. Pt would not be a good candidate for SNF/rehab in her condition and with her prognosis. Hospice more appropriate. Palliative consulted. Pt continues to have unrealistic expectations, expecting her symptoms to get better without addressing the cause.       [facility] DO  Internal Medicine

## 2021-03-27 NOTE — ACP (ADVANCE CARE PLANNING)
Advance Care Planning     Advance Care Planning Activator (Inpatient)  Conversation Note      Date of ACP Conversation: 3/27/2021    Conversation Conducted with: Patient with Decision Making Capacity    ACP Activator: [address] Decision Maker:     Current Designated Health Care Decision Maker:     Primary Decision Maker: Farideh Emerson - Child - [phone]    Discussed with pt about code status. DNRCC-Arrest form is on the chart. See separate note.

## 2021-03-27 NOTE — ED NOTES
Pt is alert and oriented x 4. Pt reports increasing weakness and a decreased appetite over the last 3 weeks. Pt reports she is not drinking much either. Pt denies falls but reports feeling very weak. Pt arrived via EMS from home. Pt's lungs are CTA in all lobes. Pt changed into a gown and placed on the bedside cardiac monitor. Alfonse Rinne CNP at bedside.      Bi Lafleur RN  03/27/21 1115

## 2021-03-28 VITALS
SYSTOLIC BLOOD PRESSURE: 155 MMHG | DIASTOLIC BLOOD PRESSURE: 66 MMHG | TEMPERATURE: 97.7 F | WEIGHT: 115 LBS | HEART RATE: 80 BPM | RESPIRATION RATE: 16 BRPM | HEIGHT: 63 IN | BODY MASS INDEX: 20.38 KG/M2 | OXYGEN SATURATION: 90 %

## 2021-03-28 LAB
ANION GAP SERPL CALCULATED.3IONS-SCNC: 12 MEQ/L (ref 9–15)
BUN BLDV-MCNC: 10 MG/DL (ref 8–23)
CALCIUM SERPL-MCNC: 9.2 MG/DL (ref 8.5–9.9)
CHLORIDE BLD-SCNC: 93 MEQ/L (ref 95–107)
CO2: 28 MEQ/L (ref 20–31)
CREAT SERPL-MCNC: 0.61 MG/DL (ref 0.5–0.9)
GFR AFRICAN AMERICAN: >60
GFR NON-AFRICAN AMERICAN: >60
GLUCOSE BLD-MCNC: 86 MG/DL (ref 70–99)
POTASSIUM REFLEX MAGNESIUM: 4.1 MEQ/L (ref 3.4–4.9)
SODIUM BLD-SCNC: 133 MEQ/L (ref 135–144)

## 2021-03-28 PROCEDURE — 96372 THER/PROPH/DIAG INJ SC/IM: CPT

## 2021-03-28 PROCEDURE — 2580000003 HC RX 258: Performed by: INTERNAL MEDICINE

## 2021-03-28 PROCEDURE — 6370000000 HC RX 637 (ALT 250 FOR IP): Performed by: INTERNAL MEDICINE

## 2021-03-28 PROCEDURE — 6360000002 HC RX W HCPCS: Performed by: INTERNAL MEDICINE

## 2021-03-28 PROCEDURE — 94761 N-INVAS EAR/PLS OXIMETRY MLT: CPT

## 2021-03-28 PROCEDURE — 94640 AIRWAY INHALATION TREATMENT: CPT

## 2021-03-28 PROCEDURE — G0378 HOSPITAL OBSERVATION PER HR: HCPCS

## 2021-03-28 PROCEDURE — 2700000000 HC OXYGEN THERAPY PER DAY

## 2021-03-28 PROCEDURE — 80048 BASIC METABOLIC PNL TOTAL CA: CPT

## 2021-03-28 PROCEDURE — 36415 COLL VENOUS BLD VENIPUNCTURE: CPT

## 2021-03-28 RX ADMIN — ALPRAZOLAM 0.5 MG: 0.25 TABLET ORAL at 08:17

## 2021-03-28 RX ADMIN — METOPROLOL TARTRATE 50 MG: 50 TABLET, FILM COATED ORAL at 08:12

## 2021-03-28 RX ADMIN — PANTOPRAZOLE SODIUM 40 MG: 40 TABLET, DELAYED RELEASE ORAL at 05:50

## 2021-03-28 RX ADMIN — ALBUTEROL SULFATE 2.5 MG: 2.5 SOLUTION RESPIRATORY (INHALATION) at 04:19

## 2021-03-28 RX ADMIN — SODIUM CHLORIDE, PRESERVATIVE FREE 10 ML: 5 INJECTION INTRAVENOUS at 08:17

## 2021-03-28 RX ADMIN — DOCUSATE SODIUM 100 MG: 100 CAPSULE, LIQUID FILLED ORAL at 08:12

## 2021-03-28 RX ADMIN — ALBUTEROL SULFATE 2.5 MG: 2.5 SOLUTION RESPIRATORY (INHALATION) at 14:34

## 2021-03-28 RX ADMIN — STANDARDIZED SENNA CONCENTRATE 8.6 MG: 8.6 TABLET ORAL at 08:11

## 2021-03-28 RX ADMIN — ACETAMINOPHEN 650 MG: 325 TABLET, FILM COATED ORAL at 06:54

## 2021-03-28 RX ADMIN — LEVOTHYROXINE SODIUM 75 MCG: 75 TABLET ORAL at 05:50

## 2021-03-28 RX ADMIN — PREDNISONE 10 MG: 10 TABLET ORAL at 08:12

## 2021-03-28 RX ADMIN — GUAIFENESIN 1200 MG: 600 TABLET ORAL at 08:12

## 2021-03-28 RX ADMIN — ENOXAPARIN SODIUM 40 MG: 40 INJECTION SUBCUTANEOUS at 08:12

## 2021-03-28 RX ADMIN — BUDESONIDE 500 MCG: 0.5 SUSPENSION RESPIRATORY (INHALATION) at 10:05

## 2021-03-28 RX ADMIN — ALBUTEROL SULFATE 2.5 MG: 2.5 SOLUTION RESPIRATORY (INHALATION) at 10:05

## 2021-03-28 RX ADMIN — ACETAMINOPHEN 650 MG: 325 TABLET, FILM COATED ORAL at 00:17

## 2021-03-28 ASSESSMENT — PAIN SCALES - GENERAL: PAINLEVEL_OUTOF10: 8

## 2021-03-28 NOTE — CONSULTS
Hematology/Oncology Consult  Encounter Date: 3/28/2021 1:31 PM    Ms. Alize Layne is a 64 y.o. female  : 1945  MRN: [id number]  [facility] Number: [de-identified]  Requesting Provider: DR Mcgregor   Reason for request:       CONSULTANT: Kanchan Romo    HPI: Judah Garcia was admitted for pain management. She has history of breast cancer in 2018 s/p mastectomy for T2N0. Lost to follow up and in 2019 showed bone metastasis . She has declined radiation therapy and orthopedic intervention. She has pain medication at home. Somehow the pain has improved since she has been hospitalized and is ready to go home.      Patient Active Problem List   Diagnosis    Hypothyroidism    COPD (chronic obstructive pulmonary disease) ([facility] [facility]- Dr. Medina Flow and depression    Insomnia    Osteoarthritis    S/P carotid endarterectomy    Dyspepsia    CAD (coronary artery disease)- Dr. Gerard [place]    History of tobacco abuse    GERD (gastroesophageal reflux disease)    Breast cancer ([facility] [facility]    Osteoporosis    Vitamin D deficiency    HTN (hypertension)    RBBB    Cholelithiasis    Right carotid bruit    Congenital hypothyroidism without goiter    Dyslipidemia    Hiatal hernia    Disturbance of smell and taste    Abnormal cardiovascular stress test    Myocardial perfusion defect, homogeneous    S/P cardiac catheterization    Abnormal ultrasound of breast    Bilateral carotid artery stenosis- Dr. Artemio Hudson- 100% occlusion left and 70% occlusion right- scan     Decreased GFR    Diastolic dysfunction    Depression    Moderate episode of recurrent major depressive disorder (HCC)    History of breast cancer- left    Acute exacerbation of chronic obstructive pulmonary disease (COPD) (HCC)    Pulmonary nodule    Breast mass, right- Dr. Andrew Rand acquired pneumonia of right lower lobe of lung    Chronic respiratory failure with hypoxia ([facility]    Breast cancer metastasized to axillary lymph node, right ([facility] [facility]  COPD exacerbation (HCC)    Dermatochalasis of both upper eyelids    Former smoker    Pseudophakia of both eyes    Squamous blepharitis of upper and lower eyelids of both eyes    Acute on chronic respiratory failure with hypoxia (HCC)    Pseudomonas aeruginosa colonization    Conductive hearing loss, tympanic membrane    Cerumen debris on tympanic membrane of right ear    Tympanosclerosis involving tympanic membrane only    Rhinorrhea    Dependence on continuous supplemental oxygen    No known problems    Blepharitis of upper eyelids of both eyes    Edema of lower extremity    Low kidney function    Mediastinal lymphadenopathy    Pain in joint involving left lower leg    PCO (posterior capsular opacification), left    Posterior vitreous detachment of both eyes    Sensory hearing loss, bilateral    Mass of right breast    Primary malignant neoplasm of breast ([facility] [address] [facility]    Coronary arteriosclerosis    Excessive cerumen in ear canal    Biliary calculus    Tympanic membrane conductive hearing loss    Tympanic membrane atrophy, flaccid    Community acquired pneumonia    Chronic obstructive lung disease (HCC)    Indigestion    Recurrent major depressive episodes, moderate (HCC)    Gastroesophageal reflux disease    Infectious disease carrier    Bilateral pseudophakia    Pseudophakia    Lung mass    Right bundle branch block    Nasal discharge    Carotid bruit    History of cardiac catheterization    History of carotid endarterectomy    Squamous blepharitis    Sensory disorder    Metastatic breast cancer ([facility] [address]     Past Medical History:   Diagnosis Date    Anxiety     Anxiety and depression     CAD (coronary artery disease)     Cancer ([facility] [address] [facility] 3/2014    Invasive ductal cancer left breast, spread to 5 lymph nodes    Carotid artery stenosis and occlusion     COPD (chronic obstructive pulmonary disease) (HCC)     Depression 1/15/2018    GERD (gastroesophageal reflux disease)     Headache(784.0)     Hyperlipidemia     Hypertension     Hypothyroidism     Insomnia     Low kidney function 2016    Osteoarthritis     RBBB 10/15/2014    Right carotid bruit 2015    S/P cardiac catheterization 2016     @PS@  Family History   Problem Relation Age of Onset    Cancer Sister         breast    Cancer Maternal Uncle         pancreatic     Social History     Socioeconomic History    Marital status:       Spouse name: Not on file    Number of children: Not on file    Years of education: Not on file    Highest education level: Not on file   Occupational History    Not on file   Social Needs    Financial resource strain: Not on file    Food insecurity     Worry: Not on file     Inability: Not on file    Transportation needs     Medical: Not on file     Non-medical: Not on file   Tobacco Use    Smoking status: Former Smoker     Packs/day: 1.50     Years: 40.00     Pack years: 60.00     Types: Cigarettes     Quit date: 1989     Years since quittin.1    Smokeless tobacco: Never Used   Substance and Sexual Activity    Alcohol use: No    Drug use: No    Sexual activity: Never   Lifestyle    Physical activity     Days per week: Not on file     Minutes per session: Not on file    Stress: Not on file   Relationships    Social connections     Talks on phone: Not on file     Gets together: Not on file     Attends Congregational service: Not on file     Active member of club or organization: Not on file     Attends meetings of clubs or organizations: Not on file     Relationship status: Not on file    Intimate partner violence     Fear of current or ex partner: Not on file     Emotionally abused: Not on file     Physically abused: Not on file     Forced sexual activity: Not on file   Other Topics Concern    Not on file   Social History Narrative    Not on file            Current Facility-Administered Medications   Medication Dose Route Frequency Provider Last Rate Last Admin    fentaNYL (SUBLIMAZE) injection 50 mcg  50 mcg Intravenous Q1H PRN Ulysses Mu, APRN - CNP   50 mcg at 03/27/21 1130    sodium chloride flush 0.9 % injection 10 mL  10 mL Intravenous 2 times per day Vitaly Tony, DO   10 mL at 03/28/21 0817    sodium chloride flush 0.9 % injection 10 mL  10 mL Intravenous PRN Weippe Tony, DO        0.9 % sodium chloride infusion  25 mL Intravenous PRN Vitaly Tony, DO        enoxaparin (LOVENOX) injection 40 mg  40 mg Subcutaneous Daily Weippe Tony, DO   40 mg at 03/28/21 1226    promethazine (PHENERGAN) tablet 12.5 mg  12.5 mg Oral Q6H PRN Weippe Tony, DO        Or    ondansetron [facility]) injection 4 mg  4 mg Intravenous Q6H PRN Weippe Tony, DO        polyethylene glycol (GLYCOLAX) packet 17 g  17 g Oral Daily Vitaly Tony, DO   17 g at 03/27/21 1544    acetaminophen (TYLENOL) tablet 650 mg  650 mg Oral Q6H PRN Vitaly Tony, DO   650 mg at 03/28/21 0654    Or    acetaminophen (TYLENOL) suppository 650 mg  650 mg Rectal Q6H PRN Vitaly Tony, DO        potassium chloride (KLOR-CON M) extended release tablet 40 mEq  40 mEq Oral PRN Weippe Tony, DO        Or    potassium bicarb-citric acid (EFFER-K) effervescent tablet 40 mEq  40 mEq Oral PRN Vitaly Tony, DO        Or    potassium chloride 10 mEq/100 mL IVPB (Peripheral Line)  10 mEq Intravenous PRN Weippe Tony, DO        magnesium sulfate 2000 mg in 50 mL IVPB premix  2,000 mg Intravenous PRN Weippe Tony, DO        lidocaine 4 % external patch 1 patch  1 patch Transdermal Daily Weippe Tony, DO        docusate sodium (COLACE) capsule 100 mg  100 mg Oral BID Vitaly Tony, DO   100 mg at 03/28/21 0008    traZODone (DESYREL) tablet 150 mg  150 mg Oral Nightly Weippe Tony, DO        levothyroxine (SYNTHROID) tablet 75 mcg  75 mcg Oral Daily Vitaly Tony, DO   75 mcg at 03/28/21 0550    budesonide (PULMICORT) nebulizer suspension 500 mcg  0.5 mg Nebulization BID [facility] DO   500 mcg at 03/28/21 1005    pantoprazole (PROTONIX) tablet 40 mg  40 mg Oral QAM AC [facility] DO   40 mg at 03/28/21 0550    metoprolol tartrate (LOPRESSOR) tablet 50 mg  50 mg Oral BID [facility] DO   50 mg at 03/28/21 2851    guaiFENesin [facility]) extended release tablet 1,200 mg  1,200 mg Oral BID [facility] DO   1,200 mg at 03/28/21 9354    predniSONE (DELTASONE) tablet 10 mg  10 mg Oral Daily [facility] DO   10 mg at 03/28/21 7683    senna (SENOKOT) tablet 8.6 mg  1 tablet Oral Daily [facility] DO   8.6 mg at 03/28/21 0584    albuterol (PROVENTIL) nebulizer solution 2.5 mg  2.5 mg Nebulization 4x Daily [facility] DO   2.5 mg at 03/28/21 1005    ALPRAZolam Bobbetta Burak) tablet 0.5 mg  0.5 mg Oral BID PRN Rob Haynes MD   0.5 mg at 03/28/21 0817     [unfilled]  No Known Allergies     Review of Systems  All other systems are normal other than ones mentioned in HPI. PHYSICAL EXAMINATION:   VITAL SIGNS: BP (!) 155/66   Pulse 75   Temp 97.7 °F (36.5 °C) (Oral)   Resp 16   Ht 5' 3\" (1.6 m)   Wt 115 lb (52.2 kg)   LMP  (LMP Unknown)   SpO2 100%   BMI 20.37 kg/m²         GENERAL: In no acute distress, well- nourished, well- developed,alert and oriented to person place and time. SKIN: Warm and dry, withoutjaundice, ecchymoses, or petechiae. HEENT: Normocephalic, sclera anicteric, oral mucosa moist without lesion or exudate in the visible oral cavity or oropharynx, tongue mid-line with good mobility and no deviation with extension. NODES: No palpable adenopathy in the neck Levels I-V, bilateral   Supraclavicular fossae, axillary chains, or inguinal regions. LUNGS: Good inspiratory effort, no accessory muscle use, clear bilaterally, no focal wheeze, rales or rhonchi. CARDIAC: Regular rate and rhythm, without murmurs, rubs or gallops. ABDOMINAL: Normal bowel soundspresent, soft, non-tender, no mass or  organomegaly. MUSKL: .     LAB intra-abdominal changes. Ct Abdomen Pelvis W Iv Contrast Additional Contrast? None    Result Date: 3/23/2021  CT of the Abdomen and Pelvis with intravenous contrast medium History:  Abdominal pain, constipated. Right lower quadrant pain that radiates to her back Technical Factors: CT imaging of the abdomen and pelvis were obtained and formatted as 5 mm contiguous axial images from the domes of the diaphragm to the symphysis pubis. Sagittal and coronal reconstructions were also obtained. Oral contrast medium: Water as oral contrast medium. Intravenous contrast medium:  None. Comparison: September 8, 2020; October 17, 2020 Findings: Lungs:  A 1.6 cm nodule is seen in the left lower lobe. On previous examination it measured 1.1 cm. Liver:  Subtle hypodensities are seen in the medial segment of the left lobe of the liver that measure 1.4 cm and 4 mm (series 4, image 14). A 4 mm nodule  is now seen in the right lower lobe (series 4, image 3). A subpleural nodule is seen that measures 3 mm in the right lower lobe (series 4, image 1). There is also a 3 mm in the right lower lobe (series 4, image 2). In the right there is a 6 mm pleural-based nodule is visualized (series 4, image 7). A large hiatal hernia is also seen. .  Bile Ducts:  Normal in caliber. Gallbladder:  Within the gallbladder there are multiple stones versus porcelain gallbladder. Pancreas: Homogeneous parenchymal enhancement. No necrosis identified. No intrahepatic fluid collections, cystic or solid lesions, pancreatic ductal dilatation, or calcification. Spleen:  Normal in size without masses or calcifications. No splenules. Kidneys:  Normal in size and enhancement. No hydronephrosis, masses, or stones. Adrenals:  Normal. Small bowel:  Normal in caliber. Appendix:  Normal. Colon: Diverticulosis coli without evidence for inflammatory changes. A large amount of fecal material is seen in the rectum. Peritoneum:  No ascites, free air, or fluid collections. Vessels: There are atherosclerotic calcifications are diffuse. Mild ectasia of the abdominal aorta is seen below the renal vein measuring 2.3 cm. . Lymph nodes: No retroperitoneal lymph node enlargement. Bladder: Distends normally and contrast is seen layering in the bladder Abdominal Wall: Surgical skin clips, right lower anterior abdominal wall, unchanged. Bones: Sclerotic foci are seen in the visualized portion of the spine at T2, L1, L2 and L5. Slight anterolisthesis of L3 on L4 is seen. Anterolisthesis of L4 on L5 is also seen. Intervertebral disc space narrowing is seen at L5-S1 and vacuum phenomenon is also seen. Sclerotic foci are also seen in the right ischial and iliac bones. 1. There is no evidence for bowel obstruction, diverticulitis or appendicitis. There is a large amount of fecal material seen in the area of the rectum thought to reflect constipation 2. New hypodensities are seen in the liver concerning for metastatic disease 3. Multiple sclerotic foci are seen in the skeleton concerning for bony metastases 4. There are multiple lung nodules seen in the largest nodule seen in the left lower lobe has increased in size. 5. Large hiatal hernia is again seen 6. Cholelithiasis All CT scans at this facility use dose modulation, iterative reconstruction, and/or weight based dosing when appropriate to reduce radiation dose to as low as reasonably achievable. Xr Chest Portable    Result Date: 3/27/2021  Exam: XR CHEST PORTABLE History:  cardiopulmonary evaluation Technique: AP portable view of the chest obtained. Comparison: Chest x-ray from October 11, 2020 Chest x-ray portable Findings: The cardiomediastinal silhouette is within normal limits. There are no infiltrates, consolidations or effusions. Bones of the thorax appear intact. Status post right mastectomy and axillary node dissection. No radiographic evidence of acute intrathoracic process.        .     ASSESSMENT AND PLAN  Metastatis breast cancer with bone metastasis and ct scan from 3/2021 of the abdomen now suggest liver metastasis as well. She has been non compliant with follow up and has declined  treatment with radiation and orthopedic intervention to prevent fractures. Her pain seem to be under control at present and wish to go home.      Electronically signed by Ariadne Crowder MD on 3/28/2021 at 1:31 PM

## 2021-03-28 NOTE — FLOWSHEET NOTE
Spiritual Care Services     Summary of Visit:   responded to spiritual care consult,  Patient was sleeping during the brief visit, made two attempts to visit patient this afternoon, will revisit tomorrow,   Spiritual Assessment/Intervention/Outcomes:    Encounter Summary  Services provided to[de-identified] Patient  Referral/Consult From[de-identified] Physician  Support System: (P) Family members, Children  Continue Visiting: (P) Yes  Complexity of Encounter: (P) Low  Length of Encounter: (P) 15 minutes  Spiritual Assessment Completed: (P) Yes  Routine  Type: (P) Initial  Assessment: (P) Sleeping  Intervention: (P) Provided reading materials/devotional materials              Advance Directives (For Healthcare)  Pre-existing DNR Comfort Care/DNR Arrest/DNI Order: Yes, notify physician for order  Healthcare Directive: Yes, patient has an advance directive for healthcare treatment  Type of Healthcare Directive: Other (Comment)  Copy in Chart: Other (Comment)  Information on Healthcare Directives Requested: No  Patient Requests Assistance: No  Advance Directives: Pt. not interested at this time  Healthcare Agent Appointed: lFo Ricardo Agent's Name: [address] [place] [address] Agent's Phone Number: [phone]  If you are unable to speak for yourself, does your Healthcare Agent or Legal Spokesperson know your healthcare wishes?: Yes                Care Plan:        Spiritual Care Services   Electronically signed by Eric Arenas on 3/28/21 at 1:30 PM EDT     To reach a  for emotional and spiritual support, place an [facility] consult request.   If a  is needed immediately, dial 0 and ask to page the on-call .

## 2021-03-28 NOTE — PLAN OF CARE
Problem: Falls - Risk of:  Goal: Will remain free from falls  Outcome: Ongoing  Goal: Absence of physical injury  Outcome: Ongoing     Problem: Pain:  Description: Pain management should include both nonpharmacologic and pharmacologic interventions.   Goal: Pain level will decrease  Outcome: Ongoing  Goal: Control of acute pain  Outcome: Ongoing  Goal: Control of chronic pain  Outcome: Ongoing

## 2021-03-28 NOTE — DISCHARGE SUMMARY
Physician Discharge Summary     Patient ID:  Beth Singh  [id number]  26 y.o.  1945    Admit date: 3/27/2021    Discharge date : 03/28/21     Admitting Physician: Jeanine Cormier DO     Discharge Physician: Jeanine Cormier DO     Admission Diagnoses: Metastatic breast cancer [facility]) [C50.919]    Discharge Diagnoses: Metastatic breast cancer    Admission Condition: fair    Discharged Condition: fair    Hospital Course: 64 y.o. female with significant past medical history of CAD, COPD, depression, HTN, HLD, hypothyroid, invasive ductal carcinoma sp radiation and chemo (last in 2018) who presents with abd pain and poor appetite. Pt has been seen in the ED 3 times this week. Pt is followed up with palliative care at home. Pt labs and vitals have been stable despite her complaints and she has been sent home each time. Her last visit to the ED, CT abd showed metastatic lesions to her liver, lungs and bones. Pt was informed about this and declined any further treatment or investigation. Pt has adamantly refused any more chemo or radiation. She states she would not want to be on a ventilator, would not want CPR or shocks if her heart were to stop and states that she would not want a feeding tube. Pt states that despite knowing that her prognosis is poor, she continues to feel ill with abd pain and poor appetite. States that she has no problems swallowing, but just gets full after a couple of bites. Her albumin and protein in her labworks have been unremarkable. No signs of dehydration.      I spent over 45 minutes speaking to the patient about what her goals are and what she wants. She explained to me that she wants to feel better. When I explained that if she is refusing treatment for her cancer, she will continue to decline and she will continue to feel worse, she becomes silent and asks why I'm not addressing her dehydration.  When I explain that she is not clinically dehydrated and she needs to drink water if she feels dehydrated, she states then what are you going to do for me? I explained to her that I could bring her in to the hospital, but due to her wishes to not pursue diagnosis or treatment of her cancer further, there is nothing I can provide her that she is not already getting at home with palliative care. Pt then told me - I dont have cancer. It was treated already. There is obvious disconnect and denial about her condition. Pt continues to believe that if she ignores it, it will go away. Spoke at length with Dr. Ricarda Tian and Hima Solorio in the ED and decision was made to admit for observation with consult to palliative and oncology. Pt would not be a good rehab/SNF candidate as long as she refuses treatment as she will continue to decline despite therapy    Pt admitted. Oncology saw patient and expressed the same thing, that she has severe metastatic breast cancer which has a very poor prognosis, especially if she declines treatment. Pt refuses any further workup and was asking to go home on 3/28. Pt discharged home in stable condition. Anticipate patient will continue to decline to the point of readmission due to her neglect of her condition. Pt appropriate for hospice and hopefully palliative care and her PCP can have that conversation with her as she was unwilling to discuss that as an option during her admission. Pt discharged to resume care with palliative care, follow up with her PCP and to see oncology. Consults: hematology/oncology      Discharge Exam:  Constitutional: Awake and alert in no acute distress.  Lying in bed comfortably  Head: Normocephalic, atraumatic  Eyes: EOMI, PERRLA  ENT: moist mucous membranes  Neck: neck supple, trachea midline  Lungs: Good inspiratory effort, no wheeze, no rhonchi, no rales  Heart: RRR, normal S1 and S2, no murmurs  GI: Soft, non-distended, mildly tender, no guarding, no rebound, +BS  MSK: no edema noted  Skin: warm, dry  Psych: appropriate affect         Labs:   Recent Labs     03/26/21  1159 03/27/21  1115   WBC 8.0 7.1   HGB 13.3 13.4   HCT 40.6 40.2    241     Recent Labs     03/26/21  1159 03/27/21  1115 03/28/21  0616   * 130* 133*   K 4.6 4.4 4.1   CL 93* 90* 93*   CO2 30 28 28   BUN 18 16 10   CREATININE 0.82 0.59 0.61   CALCIUM 10.6* 9.1 9.2     Recent Labs     03/26/21  1159 03/27/21  1115   AST 50* 53*   ALT 15 13   BILITOT 0.6 0.5   ALKPHOS 65 58     Recent Labs     03/27/21  1115   INR 0.9     Recent Labs     03/27/21  1115   CKTOTAL 94   TROPONINI <0.010       Urinalysis:   Lab Results   Component Value Date    NITRU Negative 03/23/2021    BLOODU Negative 03/23/2021    SPECGRAV 1.024 03/23/2021    GLUCOSEU Negative 03/23/2021       Radiology:   Most recent    Chest CT      WITH CONTRAST:  Results for orders placed during the hospital encounter of 09/08/20   CT CHEST W CONTRAST    Narrative CT CHEST W CONTRAST    COMPARISON: November 5, 2018; February 10, 2020. HISTORY: Malignant neoplasm of the central portion of the right female breast    TECHNIQUE: CT CHEST W CONTRASTAxial CT images of the thorax were obtained following the administration of 100 mL of Isovue 370 intravenous contrast. 2-D and 3-D reconstructions were obtained. FINDINGS:     Diffuse emphysematous disease is seen. Bulla are seen bilaterally in the upper lobes, right greater than left. Paraseptal emphysematous changes are also seen on the left. In the lingula there are reticulations and pleural thickening in the chest wall   this could also represent post radiation postsurgical changes. A 3 mm nodule is seen in the right upper lobe (series 5, image 16). Anteriorly in the right upper lobe there is a 2.7 mm nodule (series 5, image 17). A 2.5 mm nodule is seen in the right   lateral upper lobe (series 5, image 16). This is also seen laterally where there is  pleural thickening visualized. This also may represent post radiation postsurgical changes.     Also in the right middle lobe obtained. Findings:       A large hiatal hernia is again seen. A calcified mass is again seen in the area of the gallbladder fossa thought to represent porcelain gallbladder. The liver, spleen, pancreas and left adrenal gland are unremarkable. A nodule is again seen in the right adrenal gland and is stable. Bilaterally both kidneys show uptake and excretion of contrast with no evidence for hydronephrosis or renal calculi. No   aneurysm or dissection is present. There are diffuse atherosclerotic calcifications. No enlarged retroperitoneal, mesenteric or pelvic lymph nodes seen. No abnormally dilated loops of bowel, free air or free fluid seen. Within the pelvis the uterus is unremarkable. The contrast is seen layering in the bladder. No bladder wall thickening is seen. The osseous structures contain no destructive lesions. A levoscoliosis is seen in the lumbar spine. Impression:    1. 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 There is no evidence for obstruction. No evidence of appendicitis or diverticulitis seen. 3. No hydronephrosis is seen  4. No pathologically enlarged lymph nodes seen. All CT scans at this facility use dose modulation, iterative reconstruction, and/or weight based dosing             WITHOUT CONTRAST: No results found for this or any previous visit. CXR      2-view:   Results for orders placed during the hospital encounter of 09/30/19   XR CHEST STANDARD (2 VW)    Narrative DIAGNOSIS:R07.89 Discomfort of chest wall ICD10    COMPARISON: September 10, 2010; September 22, 2019    TECHNIQUE: PA and lateral chest    FINDINGS: The lungs are hyperinflated. There is coarsening of the interstitium,  increased AP diameter and flattening of the diaphragm are also seen.: The lungs contain no focal infiltrate, pleural effusion, consolidation or pneumothorax. The cardiac   silhouette is not enlarged. Calcifications are seen in the thoracic aorta.  The bony structures are osteopenic with degenerative changes visualized. A retrocardiac air-fluid level is seen that may represent a hiatal hernia. Impression No acute cardiopulmonary process, findings are suggestive of COPD. Portable:   Results for orders placed during the hospital encounter of 03/27/21   XR CHEST PORTABLE    Narrative Exam: XR CHEST PORTABLE    History:  cardiopulmonary evaluation     Technique: AP portable view of the chest obtained. Comparison: Chest x-ray from October 11, 2020    Chest x-ray portable   Findings: The cardiomediastinal silhouette is within normal limits. There are no infiltrates, consolidations or effusions. Bones of the thorax appear intact. Status post right mastectomy and axillary node dissection. Impression No radiographic evidence of acute intrathoracic process. Echo No results found for this or any previous visit. Disposition: home    In process/preliminary results:  Outstanding Order Results     Date and Time Order Name Status Description    3/27/2021 1120 EKG 12 Lead Preliminary           Patient Instructions:   Current Discharge Medication List      CONTINUE these medications which have NOT CHANGED    Details   ALPRAZolam (XANAX) 0.5 MG tablet Take 0.5 mg by mouth 2 times daily as needed for Sleep or Anxiety.       polyethylene glycol (MIRALAX) 17 GM/SCOOP powder Take 17 g by mouth daily for 7 days PRN constipation  Qty: 1 Bottle, Refills: 0      docusate sodium (COLACE) 100 MG capsule Take 1 capsule by mouth 2 times daily  Qty: 60 capsule, Refills: 0      senna (SENOKOT) 8.6 MG TABS tablet Take 1 tablet by mouth daily  Qty: 120 tablet, Refills: 0      predniSONE (DELTASONE) 10 MG tablet TAKE 1 TABLET BY MOUTH EVERY DAY      traZODone (DESYREL) 150 MG tablet Take 1 tablet by mouth nightly Patient has plenty at home  Qty: 90 tablet, Refills: 0    Associated Diagnoses: Other insomnia      vitamin D (ERGOCALCIFEROL) 1.25 MG (73977 UT) CAPS capsule Take 1 capsule by mouth once a week  Qty: 12 capsule, Refills: 3      levothyroxine (SYNTHROID) 75 MCG tablet Take 1 tablet by mouth Daily  Qty: 90 tablet, Refills: 3      budesonide (PULMICORT) 0.5 MG/2ML nebulizer suspension Take 1 ampule by nebulization 2 times daily      albuterol sulfate HFA (VENTOLIN HFA) 108 (90 Base) MCG/ACT inhaler Inhale 2 puffs into the lungs every 6 hours as needed for Wheezing  Qty: 3 Inhaler, Refills: 3    Associated Diagnoses: Chronic obstructive pulmonary disease, unspecified COPD type (HCC)      Calcium 600-200 MG-UNIT TABS Take by mouth 2 times daily      albuterol (PROVENTIL) (2.5 MG/3ML) 0.083% nebulizer solution Take 3 mLs by nebulization 4 times daily  Qty: 120 each, Refills: 3    Associated Diagnoses: Chronic obstructive pulmonary disease, unspecified COPD type ([facility])      metoprolol tartrate (LOPRESSOR) 50 MG tablet Take 50 mg by mouth 2 times daily      guaiFENesin (MUCINEX) 600 MG extended release tablet Take 1,200 mg by mouth 2 times daily      OXYGEN Oxygen on 2L with exertion and Nocturnal  Qty: 1 Units, Refills: 0    Associated Diagnoses: Chronic obstructive pulmonary disease, unspecified COPD type ([facility])      Respiratory Therapy Supplies (NEBULIZER/TUBING/MOUTHPIECE) KIT 1 kit by Does not apply route daily as needed (wheezing)  Qty: 1 kit, Refills: 5    Associated Diagnoses: Chronic obstructive pulmonary disease, unspecified COPD type (HCC)         STOP taking these medications       lidocaine (LIDODERM) 5 % Comments:   Reason for Stopping:         oxyCODONE (ROXICODONE) 5 MG immediate release tablet Comments:   Reason for Stopping:         Budeson-Glycopyrrol-Formoterol 160-9-4.8 MCG/ACT AERO Comments:   Reason for Stopping:         traMADol (ULTRAM) 50 MG tablet Comments:   Reason for Stopping:         ondansetron (ZOFRAN) 4 MG tablet Comments:   Reason for Stopping:         omeprazole (PRILOSEC) 20 MG delayed release capsule Comments:   Reason for Stopping:         lovastatin (MEVACOR) 20 MG tablet Comments:   Reason for Stopping:         acetylcysteine (MUCOMYST) 10 % nebulizer solution Comments:   Reason for Stopping:             Activity: activity as tolerated  Diet: regular diet  Wound Care: none needed    Follow-up with ROSALIND Solorio CNP  in 1 week.     DC time 35 minutes    Signed:  Electronically signed by Janie Sandhu DO on 3/28/2021 at 1:59 PM

## 2021-03-28 NOTE — PROGRESS NOTES
Pt resting in bed. Up to the [facility] with stand by assist. Fall precautions in place. Pt alert and oriented anxious at times. meds given per orders. Co,plaint of feeling constipated. Colace given per orders. Call light in reach.

## 2021-03-28 NOTE — FLOWSHEET NOTE
Spiritual Care Services     Summary of Visit:   responded to spiritual care consult concerning the patient emotional distress, patient shared that her osorio has given her some peace about her life and recent diagnosis, but her distress is from not knowing who is going to take care of her daughter, patient shared that they do not attend a community of osorio,  Patient appreciated the talk, Patient is open to prayer, prayed with the patient before leaving the room,     Spiritual Assessment/Intervention/Outcomes:    Encounter Summary  Services provided to[de-identified] (P) Patient  Referral/Consult From[de-identified] (P) Physician, Nurse  Support System: (P) Children  Continue Visiting: (P) No  Complexity of Encounter: (P) Moderate  Length of Encounter: (P) 30 minutes  Spiritual Assessment Completed: (P) Yes  Routine  Type: (P) Follow up  Assessment: Sleeping  Intervention: Provided reading materials/devotional materials     Spiritual/Cheondoism  Type: (P) Spiritual struggle  Assessment: (P) Concerns with suffering, Questioning meaning/purpose, Approachable, Anxious, Anticipatory grief  Intervention: (P) Nurtured hope, Active listening, Prayer, Discussed illness/injury and it's impact, Discussed meaning/purpose, Discussed relationship with God  Outcome: (P) Comfort, Engaged in conversation, Shared life review, Expressed feelings/needs/concerns, Grieving, Receptive        Advance Directives (For Healthcare)  Pre-existing DNR Comfort Care/DNR Arrest/DNI Order: Yes, notify physician for order  Healthcare Directive: Yes, patient has an advance directive for healthcare treatment  Type of Healthcare Directive:  Other (Comment)  Copy in Chart: Other (Comment)  Information on Healthcare Directives Requested: No  Patient Requests Assistance: No  Advance Directives: Pt. not interested at this time  Healthcare Agent Appointed: Flo Ricardo Agent's Name: [facility] Arkadin Vanderpool Agent's Phone Number: [phone]  If you are unable

## 2021-03-29 ENCOUNTER — TELEPHONE (OUTPATIENT)
Dept: PULMONOLOGY | Age: 76
End: 2021-03-29

## 2021-03-29 PROCEDURE — 93010 ELECTROCARDIOGRAM REPORT: CPT | Performed by: INTERNAL MEDICINE

## 2021-03-29 NOTE — TELEPHONE ENCOUNTER
Pt states Dr. Daniela Gómez found more cancer in her spine and liver. She is going into palliative care now. She wanted you to call her to give her advice on what to do about palliative care. She can be reached at [phone].  Thanks
full weight-bearing

## 2021-03-30 ENCOUNTER — CARE COORDINATION (OUTPATIENT)
Dept: CARE COORDINATION | Age: 76
End: 2021-03-30

## 2021-03-30 ENCOUNTER — VIRTUAL VISIT (OUTPATIENT)
Dept: FAMILY MEDICINE CLINIC | Age: 76
End: 2021-03-30
Payer: MEDICARE

## 2021-03-30 DIAGNOSIS — M89.8X9 BONE PAIN: ICD-10-CM

## 2021-03-30 DIAGNOSIS — J44.9 CHRONIC OBSTRUCTIVE PULMONARY DISEASE, UNSPECIFIED COPD TYPE (HCC): ICD-10-CM

## 2021-03-30 DIAGNOSIS — C50.919 BREAST CANCER METASTASIZED TO BONE, UNSPECIFIED LATERALITY (HCC): ICD-10-CM

## 2021-03-30 DIAGNOSIS — C50.919 CARCINOMA OF BREAST METASTATIC TO LIVER, UNSPECIFIED LATERALITY (HCC): Primary | ICD-10-CM

## 2021-03-30 DIAGNOSIS — C79.51 BREAST CANCER METASTASIZED TO BONE, UNSPECIFIED LATERALITY (HCC): ICD-10-CM

## 2021-03-30 DIAGNOSIS — C78.7 CARCINOMA OF BREAST METASTATIC TO LIVER, UNSPECIFIED LATERALITY (HCC): Primary | ICD-10-CM

## 2021-03-30 PROCEDURE — 99443 PR PHYS/QHP TELEPHONE EVALUATION 21-30 MIN: CPT | Performed by: NURSE PRACTITIONER

## 2021-03-30 ASSESSMENT — PATIENT HEALTH QUESTIONNAIRE - PHQ9
1. LITTLE INTEREST OR PLEASURE IN DOING THINGS: 0
SUM OF ALL RESPONSES TO PHQ QUESTIONS 1-9: 0
SUM OF ALL RESPONSES TO PHQ9 QUESTIONS 1 & 2: 0

## 2021-03-30 NOTE — PROGRESS NOTES
that she feels very ill. She now understands that she will not get better and that her symptoms will probably not be able to be managed for much longer. She states that she is unable to eat and feels very weak. Her breathing is only okay if she sits perfectly still. Has significant activity intolerance. She states that she has seen palliative care as an outpatient but she thinks it may be something more as needed. Currently lives at home with her daughter but feels that additional care is likely going to be required in the very near future. She worries about her daughter who has some mental illness and is likely unable to afford keeping her home due to cost.  She states that her house has not paid off. She states that she \"tried to avoid the inevitable but now realizes that she likely does not have much time\". She states that her pain is very severe in the abdomen and her bones. She states that while she is at the hospital she wanted to come home but now that she is home she understands that she will not be able to remain at home. She is looking for a facility to go into to help make her comfortable in her final days. Prior to Visit Medications    Medication Sig Taking? Authorizing Provider   ALPRAZolam Rainaureliano Lowers) 0.5 MG tablet Take 0.5 mg by mouth 2 times daily as needed for Sleep or Anxiety.  Yes Historical Provider, MD   polyethylene glycol (MIRALAX) 17 GM/SCOOP powder Take 17 g by mouth daily for 7 days PRN constipation Yes Vivian Rush MD   docusate sodium (COLACE) 100 MG capsule Take 1 capsule by mouth 2 times daily Yes YAMILEX Sanders   senna (SENOKOT) 8.6 MG TABS tablet Take 1 tablet by mouth daily Yes YAMILEX Sanders   predniSONE (DELTASONE) 10 MG tablet TAKE 1 TABLET BY MOUTH EVERY DAY Yes Historical Provider, MD   traZODone (DESYREL) 150 MG tablet Take 1 tablet by mouth nightly Patient has plenty at home Yes Ankti Avendano APRN - CNP   vitamin D (ERGOCALCIFEROL) 1.25 MG (00202 UT) CAPS capsule Take 1 capsule by mouth once a week Yes ROSALIND Thornton CNP   levothyroxine (SYNTHROID) 75 MCG tablet Take 1 tablet by mouth Daily Yes ROSALIND Thornton CNP   budesonide (PULMICORT) 0.5 MG/2ML nebulizer suspension Take 1 ampule by nebulization 2 times daily Yes Historical Provider, MD   albuterol sulfate HFA (VENTOLIN HFA) 108 (90 Base) MCG/ACT inhaler Inhale 2 puffs into the lungs every 6 hours as needed for Wheezing Yes ROSALIND Thornton CNP   Calcium 600-200 MG-UNIT TABS Take by mouth 2 times daily Yes Historical Provider, MD   albuterol (PROVENTIL) (2.5 MG/3ML) 0.083% nebulizer solution Take 3 mLs by nebulization 4 times daily Yes Ameya Pino MD   OXYGEN Oxygen on 2L with exertion and Nocturnal Yes Ameya Pino MD   metoprolol tartrate (LOPRESSOR) 50 MG tablet Take 50 mg by mouth 2 times daily Yes Historical Provider, MD   guaiFENesin (MUCINEX) 600 MG extended release tablet Take 1,200 mg by mouth 2 times daily Yes Historical Provider, MD   Respiratory Therapy Supplies (NEBULIZER/TUBING/MOUTHPIECE) KIT 1 kit by Does not apply route daily as needed (wheezing) Yes ROSALIND Thornton CNP       Social History     Tobacco Use    Smoking status: Former Smoker     Packs/day: 1.50     Years: 40.00     Pack years: 60.00     Types: Cigarettes     Quit date: 1989     Years since quittin.1    Smokeless tobacco: Never Used   Substance Use Topics    Alcohol use: No    Drug use: No        PHYSICAL EXAMINATION:  [ INSTRUCTIONS:  \"[x]\" Indicates a positive item  \"[]\" Indicates a negative item  -- DELETE ALL ITEMS NOT EXAMINED]     Telephone visit. Due to this being a TeleHealth encounter, evaluation of the following organ systems is limited: Vitals/Constitutional/EENT/Resp/CV/GI//MS/Neuro/Skin/Heme-Lymph-Imm. ASSESSMENT/PLAN:   Diagnosis Orders   1.  Carcinoma of breast metastatic to liver, unspecified later[facility]  Vivek Yip MD, Hospice, [place]   2. Breast cancer metastasized to bone, unspecified laterality ([facility]  AGUSTO Bennett MD, Hospice, [place]   3. Chronic obstructive pulmonary disease, unspecified COPD type ([facility]  AGUSTO Bennett MD, Hospice, [place]   4. Bone pain  AGUSTO Bennett MD, Hospice, [place]     Please review ACP note. Urgent referral for hospice placed and consult/out reach for RN care coordinator, Vee Holbrook initiated. The patient was notified that she would be contacted later today by hospice care. Reassurance given. Discussed the goal of the patient's comfort right now as being the main priority given her wishes. She is encouraged to notify me if she has additional questions or concerns but at this time her care would transition over to hospice provider. She verbalizes understanding. Please note this report has been partially produced using speech recognition software and may cause contain errors related to that system including grammar, punctuation and spelling as well as words and phrases that may seem inappropriate. If there are questions or concerns please feel free to contact me to clarify. An  electronic signature was used to authenticate this note. --ROSALIND Mcneill - CNP on 3/30/2021 at 4:19 PM        Pursuant to the emergency declaration under the [facility], [facility] waiver authority and the [employer] Act, this Virtual  Visit was conducted, with patient's consent, to reduce the patient's risk of exposure to COVID-19 and provide continuity of care for an established patient. Services were provided through a video synchronous discussion virtually to substitute for in-person clinic visit.

## 2021-03-30 NOTE — ACP (ADVANCE CARE PLANNING)
Advance Care Planning     Advance Care Planning (ACP) Physician/NP/PA Conversation    Date of Conversation: 3/30/2021  Conducted with: Patient with Decision Making Capacity    Healthcare Decision Maker:      Primary Decision Maker: Julianna Blankenship - Otf - [phone]    Click here to complete Healthcare Decision Makers including selection of the Healthcare Decision Maker Relationship (ie \"Primary\")  Today we documented Decision Maker(s) consistent with Legal Next of Kin hierarchy. Care Preferences:    Hospitalization: \"If your health worsens and it becomes clear that your chance of recovery is unlikely, what would be your preference regarding hospitalization? \"   The patient would prefer hospitalization. Ventilation: \"If you were unable to breath on your own and your chance of recovery was unlikely, what would be your preference about the use of a ventilator (breathing machine) if it was available to you? \"  The patient would NOT desire the use of a ventilator. Resuscitation: \"In the event your heart stopped as a result of an underlying serious health condition, would you want attempts made to restart your heart, or would you prefer a natural death? \"  No, do NOT attempt to resuscitate.     hospice care    Conversation Outcomes / Follow-Up Plan:  ACP in process - information provided, considering goals and options  Reviewed DNR/DNI and patient confirms current DNR status - completed forms on file (place new order if needed)    Length of Voluntary ACP Conversation in minutes:  20 minutes    Sixto Long

## 2021-03-30 NOTE — CARE COORDINATION
Ambulatory Care Coordination Note  3/30/2021  CM Risk Score: 9  Charlson 10 Year Mortality Risk Score: 100%     ACC: Sergey Rose RN    Summary Note:    I received a test message to call the office regarding patient assistance needs. I spoke with Hilary Sharma  and she states that they are looking for assistance with patient to expedite hospice services. I spoke with Cruz Khan regarding need for hospice care for this patient. Request and referral sent for hospice care as requested. Once hospice receives the referral they will complete an in person visit with Sandy Jimenez to determine immediate needs/care. Goals Addressed    None         Prior to Admission medications    Medication Sig Start Date End Date Taking? Authorizing Provider   ALPRAZolam Eppie Cam) 0.5 MG tablet Take 0.5 mg by mouth 2 times daily as needed for Sleep or Anxiety.     Historical Provider, MD   polyethylene glycol (MIRALAX) 17 GM/SCOOP powder Take 17 g by mouth daily for 7 days PRN constipation 3/26/21 4/2/21  Carlito Mckeon MD   docusate sodium (COLACE) 100 MG capsule Take 1 capsule by mouth 2 times daily 3/23/21 4/22/21  YAMILEX Sanders   senna (SENOKOT) 8.6 MG TABS tablet Take 1 tablet by mouth daily 3/23/21   YAMILEX Sanders   predniSONE (DELTASONE) 10 MG tablet TAKE 1 TABLET BY MOUTH EVERY DAY 1/24/21   Historical Provider, MD   traZODone (DESYREL) 150 MG tablet Take 1 tablet by mouth nightly Patient has plenty at home 2/25/21   ROSALIND Hernandez CNP   vitamin D (ERGOCALCIFEROL) 1.25 MG (05977 UT) CAPS capsule Take 1 capsule by mouth once a week 12/29/20   ROSALIND Hernandez CNP   levothyroxine (SYNTHROID) 75 MCG tablet Take 1 tablet by mouth Daily 11/19/20   ROSALIND Hernandez CNP   budesonide (PULMICORT) 0.5 MG/2ML nebulizer suspension Take 1 ampule by nebulization 2 times daily    Historical Provider, MD   albuterol sulfate HFA (VENTOLIN HFA) 108 (90 Base) MCG/ACT inhaler Inhale 2 puffs into the lungs every 6 hours as needed for Wheezing 9/29/20   ROSALIND Escobar CNP   Calcium 600-200 MG-UNIT TABS Take by mouth 2 times daily    Historical Provider, MD   albuterol (PROVENTIL) (2.5 MG/3ML) 0.083% nebulizer solution Take 3 mLs by nebulization 4 times daily 10/15/19   Kathi Andre MD   OXYGEN Oxygen on 2L with exertion and Nocturnal 6/10/19   Kathi Andre MD   metoprolol tartrate (LOPRESSOR) 50 MG tablet Take 50 mg by mouth 2 times daily    Historical Provider, MD   guaiFENesin (MUCINEX) 600 MG extended release tablet Take 1,200 mg by mouth 2 times daily    Historical Provider, MD   Respiratory Therapy Supplies (NEBULIZER/TUBING/MOUTHPIECE) KIT 1 kit by Does not apply route daily as needed (wheezing) 11/5/18   ROSALIND Lorenzana CNP       Future Appointments   Date Time Provider Darlene Walsh   5/20/2021  3:00 PM Kathi Andre MD [address] [place] [address]

## 2021-04-21 DIAGNOSIS — G47.09 OTHER INSOMNIA: ICD-10-CM

## 2021-04-21 NOTE — TELEPHONE ENCOUNTER
Pharmacy is requesting medication refill.  Please approve or deny this request.    Rx requested:  Requested Prescriptions     Pending Prescriptions Disp Refills    traZODone (DESYREL) 150 MG tablet [Pharmacy Med Name: TRAZODONE HCL TABS 150MG] 90 tablet 3     Sig: TAKE 1 TABLET NIGHTLY         Last Office Visit:   10/23/2020      Next Visit Date:  Future Appointments   Date Time Provider Darlene Walsh   5/20/2021  3:00 PM Vero Friday, MD [address] [place] [address]

## 2021-04-22 RX ORDER — TRAZODONE HYDROCHLORIDE 150 MG/1
TABLET ORAL
Qty: 90 TABLET | Refills: 3 | Status: SHIPPED | OUTPATIENT
Start: 2021-04-22

## 2021-07-02 ENCOUNTER — CARE COORDINATION (OUTPATIENT)
Dept: CARE COORDINATION | Age: 76
End: 2021-07-02

## 2021-07-02 NOTE — CARE COORDINATION
Chart review completed for care coordination. Patient currently enrolled in hospice facility. Episode completed/resolved.

## (undated) DEVICE — SUTURE VCRL SZ 4-0 L18IN ABSRB UD L19MM PS-2 3/8 CIR PRIM J496H

## (undated) DEVICE — LABEL MED MINI W/ MARKER

## (undated) DEVICE — ELECTROSURGICAL PENCIL BUTTON SWITCH E-Z CLEAN COATED BLADE ELECTRODE 10 FT (3 M) CORD HOLSTER: Brand: MEGADYNE

## (undated) DEVICE — DRAIN ROUND 15FR PERFORATED SURG L49IN DIA3/16IN 10IN H SIL W/O TRCR END PERF

## (undated) DEVICE — GLOVE SURG 5.5 PF POLYISOPRENE WHT STRL SENSICARE MIC

## (undated) DEVICE — PACK,LAPAROTOMY,NO GOWNS: Brand: MEDLINE

## (undated) DEVICE — PIN SFTY LG ST

## (undated) DEVICE — COUNTER NDL 40 COUNT HLD 70 FOAM BLK ADH W/ MAG

## (undated) DEVICE — SUTURE PROL SZ 3-0 L30IN NONABSORBABLE BLU L30MM FS-1 3/8 8675H

## (undated) DEVICE — SUTURE VCRL + SZ 3-0 L36IN ABSRB UD L36MM CT-1 1/2 CIR VCP944H

## (undated) DEVICE — SPONGE,LAP,18"X18",DLX,XR,ST,5/PK,40/PK: Brand: MEDLINE

## (undated) DEVICE — SUTURE VCRL SZ 3-0 L54IN ABSRB VLT LIGAPAK REEL NDL J205G

## (undated) DEVICE — SUTURE PERMAHAND SZ 3-0 L30IN NONABSORBABLE BLK SH L26MM K832H

## (undated) DEVICE — MARKER SURG SKIN GENTIAN VLT REG TIP W/ 6IN RUL

## (undated) DEVICE — MEDI-VAC YANKAUER SUCTION HANDLE W/BULBOUS TIP: Brand: CARDINAL HEALTH

## (undated) DEVICE — JACKSON-PRATT 100CC BULB RESERVOIR: Brand: CARDINAL HEALTH

## (undated) DEVICE — GOWN,AURORA,NONREINFORCED,LARGE: Brand: MEDLINE

## (undated) DEVICE — APPLIER CLP L9.38IN M LIG TI DISP STR RNG HNDL LIGACLP

## (undated) DEVICE — PATIENT RETURN ELECTRODE, SINGLE-USE, CONTACT QUALITY MONITORING, ADULT, WITH 9FT CORD, FOR PATIENTS WEIGING OVER 33LBS. (15KG): Brand: MEGADYNE

## (undated) DEVICE — KIT EVAC 100CC W10MMXL20CM SIL FULL PERF HUBLESS FLAT DRN

## (undated) DEVICE — INTENDED FOR TISSUE SEPARATION, AND OTHER PROCEDURES THAT REQUIRE A SHARP SURGICAL BLADE TO PUNCTURE OR CUT.: Brand: BARD-PARKER ® CARBON RIB-BACK BLADES

## (undated) DEVICE — TOWEL,OR,DSP,ST,BLUE,STD,4/PK,20PK/CS: Brand: MEDLINE

## (undated) DEVICE — SUPER SPONGES,MEDIUM: Brand: KERLIX

## (undated) DEVICE — SYRINGE IRRIG 60ML SFT PLIABLE BLB EZ TO GRP 1 HND USE W/

## (undated) DEVICE — 3M™ STERI-STRIP™ REINFORCED ADHESIVE SKIN CLOSURES, R1547, 1/2 IN X 4 IN (12 MM X 100 MM), 6 STRIPS/ENVELOPE: Brand: 3M™ STERI-STRIP™

## (undated) DEVICE — TUBING, SUCTION, 1/4" X 10', STRAIGHT: Brand: MEDLINE

## (undated) DEVICE — SUTURE VCRL + SZ 3-0 L27IN ABSRB UD L26MM SH 1/2 CIR VCP416H

## (undated) DEVICE — SPONGE DRN W4XL4IN RAYON/POLYESTER 6 PLY NONWOVEN PRECUT

## (undated) DEVICE — TRAY PREP DRY W/ PREM GLV 2 APPL 6 SPNG 2 UNDPD 1 OVERWRAP